# Patient Record
Sex: FEMALE | Race: WHITE | NOT HISPANIC OR LATINO | Employment: FULL TIME | URBAN - METROPOLITAN AREA
[De-identification: names, ages, dates, MRNs, and addresses within clinical notes are randomized per-mention and may not be internally consistent; named-entity substitution may affect disease eponyms.]

---

## 2017-02-06 DIAGNOSIS — M06.9 RHEUMATOID ARTHRITIS INVOLVING MULTIPLE SITES, UNSPECIFIED RHEUMATOID FACTOR PRESENCE: ICD-10-CM

## 2017-03-11 DIAGNOSIS — E03.9 HYPOTHYROIDISM, UNSPECIFIED TYPE: ICD-10-CM

## 2017-03-13 RX ORDER — LEVOTHYROXINE SODIUM 112 UG/1
TABLET ORAL
Qty: 90 TAB | Refills: 3 | Status: SHIPPED | OUTPATIENT
Start: 2017-03-13 | End: 2017-10-05

## 2017-04-10 RX ORDER — RANITIDINE 150 MG/1
TABLET ORAL
Qty: 180 TAB | Refills: 0 | Status: SHIPPED | OUTPATIENT
Start: 2017-04-10 | End: 2017-07-07 | Stop reason: SDUPTHER

## 2017-04-11 ENCOUNTER — PATIENT MESSAGE (OUTPATIENT)
Dept: MEDICAL GROUP | Age: 57
End: 2017-04-11

## 2017-07-10 RX ORDER — RANITIDINE 150 MG/1
TABLET ORAL
Qty: 180 TAB | Refills: 0 | Status: SHIPPED | OUTPATIENT
Start: 2017-07-10 | End: 2017-10-17 | Stop reason: SDUPTHER

## 2017-07-24 ENCOUNTER — TELEPHONE (OUTPATIENT)
Dept: RHEUMATOLOGY | Facility: PHYSICIAN GROUP | Age: 57
End: 2017-07-24

## 2017-07-24 DIAGNOSIS — M06.9 RHEUMATOID ARTHRITIS INVOLVING MULTIPLE SITES, UNSPECIFIED RHEUMATOID FACTOR PRESENCE: ICD-10-CM

## 2017-07-24 NOTE — TELEPHONE ENCOUNTER
Please notify patient we have refilled her hydroxychloroquine prescription for one month, patient needs to have labs done for more refills after this, last labs in November 2016 and labs are done every 6 months for this particular medication.  Labs have been ordered in the computer  Patient also canceled her appointment December 13, 2016, patient last seen September 16, 2016  For us  to continue refilling her medication we need to see patient before September 16, 2017

## 2017-07-24 NOTE — TELEPHONE ENCOUNTER
Returned pts call however got a voice message again so I left a detailed message that she needs an appt and lab work done. I mailed pts lab orders to her home address.   Thank you

## 2017-07-31 ENCOUNTER — OFFICE VISIT (OUTPATIENT)
Dept: RHEUMATOLOGY | Facility: PHYSICIAN GROUP | Age: 57
End: 2017-07-31

## 2017-07-31 VITALS
BODY MASS INDEX: 30.99 KG/M2 | SYSTOLIC BLOOD PRESSURE: 120 MMHG | DIASTOLIC BLOOD PRESSURE: 60 MMHG | HEIGHT: 65 IN | OXYGEN SATURATION: 94 % | WEIGHT: 186 LBS | RESPIRATION RATE: 15 BRPM | TEMPERATURE: 99.3 F | HEART RATE: 76 BPM

## 2017-07-31 DIAGNOSIS — Z72.0 TOBACCO ABUSE: ICD-10-CM

## 2017-07-31 DIAGNOSIS — M06.9 RHEUMATOID ARTHRITIS, INVOLVING UNSPECIFIED SITE, UNSPECIFIED RHEUMATOID FACTOR PRESENCE: ICD-10-CM

## 2017-07-31 DIAGNOSIS — E03.9 HYPOTHYROIDISM, UNSPECIFIED TYPE: ICD-10-CM

## 2017-07-31 PROCEDURE — 99214 OFFICE O/P EST MOD 30 MIN: CPT | Performed by: INTERNAL MEDICINE

## 2017-07-31 RX ORDER — LEFLUNOMIDE 20 MG/1
TABLET ORAL
Qty: 90 TAB | Refills: 0 | Status: SHIPPED | OUTPATIENT
Start: 2017-07-31 | End: 2017-10-29 | Stop reason: SDUPTHER

## 2017-07-31 NOTE — MR AVS SNAPSHOT
"        Clifford Murphy   2017 11:30 AM   Office Visit   MRN: [id number]    Department:  Rheumatology Med [facility]   Dept Phone:  [phone]    Description:  Female : 1960   Provider:  Elizabeth Gómez M.D.           Reason for Visit     Follow-Up           Allergies as of 2017     Allergen Noted Reactions    Aspirin 2013       Codeine 2010       Morphine 2011       Sulfa Drugs 2010         You were diagnosed with     Rheumatoid arthritis, involving unspecified site, unspecified rheumatoid factor presence (CMS-HCC)   [7911333]       Hypothyroidism, unspecified type   [0960578]       Tobacco abuse   [442763]         Vital Signs     Blood Pressure Pulse Temperature Respirations Height Weight    120/60 mmHg 76 37.4 °C (99.3 °F) 15 1.651 m (5' 5\") 84.369 kg (186 lb)    Body Mass Index Oxygen Saturation Last Menstrual Period Breastfeeding? Smoking Status       30.95 kg/m2 94% 2008 No Current Every Day Smoker       Basic Information     Date Of Birth Sex Race Ethnicity Preferred Language    1960 Female White Non- English      Your appointments     Oct 31, 2017  9:15 AM   Follow Up Visit with Elizabeth Gómez M.D.   [facility] (--)    [address] [place] [id number] [phone]           You will be receiving a confirmation call a few days before your appointment from our automated call confirmation system.              Problem List              ICD-10-CM Priority Class Noted - Resolved    Macrocytosis without anemia D75.89   2011 - Present    Hypothyroid E03.9   2011 - Present    RA (rheumatoid arthritis) (CMS-HCC) M06.9   2011 - Present    Preventative health care Z00.00   9/15/2011 - Present    Menopause Z78.0   9/15/2011 - Present    Vitamin D insufficiency E55.9   5/15/2012 - Present    Post-cholecystectomy syndrome K91.5   2012 - Present    High risk medications (not anticoagulants) long-term use " Z79.899   8/29/2012 - Present    Jaw pain R68.84   8/4/2013 - Present    Genital herpes in women (Chronic) A60.09   2/11/2014 - Present    Hypertriglyceridemia E78.1   9/29/2014 - Present    Acute allergic otitis media of right ear H65.111   6/17/2015 - Present    Seasonal allergies J30.2   6/17/2015 - Present    Tobacco abuse Z72.0   8/5/2015 - Present    Cough with sputum R05   12/28/2015 - Present    Diarrhea R19.7   12/28/2015 - Present      Health Maintenance        Date Due Completion Dates    IMM DTaP/Tdap/Td Vaccine (1 - Tdap) 1/19/1977 ---    IMM PNEUMOCOCCAL 19-64 (ADULT) MEDIUM RISK SERIES (1 of 1 - PPSV23) 1/19/1977 ---    COLON CANCER SCREENING ANNUAL FIT 12/1/2015 12/1/2014, 10/27/2013    MAMMOGRAM 7/25/2017 8/25/2015, 11/12/2013    IMM INFLUENZA (1) 9/1/2017 9/29/2014, 10/22/2013    PAP SMEAR 9/23/2018 9/23/2015, 5/23/2012            Current Immunizations     Hepatitis B Vaccine Non-Recombivax (Ped/Adol) 7/29/2013, 2/25/2013, 1/23/2013    Hepatitis B Vaccine Recombivax (Adol/Adult) 7/29/2013, 2/25/2013, 1/23/2013    Influenza TIV (IM) 10/22/2013    Influenza Vaccine Quad Inj (Pf) 9/29/2014      Below and/or attached are the medications your provider expects you to take. Review all of your home medications and newly ordered medications with your provider and/or pharmacist. Follow medication instructions as directed by your provider and/or pharmacist. Please keep your medication list with you and share with your provider. Update the information when medications are discontinued, doses are changed, or new medications (including over-the-counter products) are added; and carry medication information at all times in the event of emergency situations     Allergies:  ASPIRIN - (reactions not documented)     CODEINE - (reactions not documented)     MORPHINE - (reactions not documented)     SULFA DRUGS - (reactions not documented)               Medications  Valid as of: July 31, 2017 - 12:18 PM    Generic Name  Brand Name Tablet Size Instructions for use    Acyclovir (Cap) ZOVIRAX 200 MG TAKE ONE CAPSULE BY MOUTH TWICE A DAY        Albuterol Sulfate (Aero Soln) albuterol 108 (90 BASE) MCG/ACT Inhale 2 Puffs by mouth every 6 hours as needed for Shortness of Breath.        Cetirizine HCl (Tab) ZYRTEC 10 MG Take 10 mg by mouth every day.        Cholecalciferol (Cap) Vitamin D3 2000 UNIT Take 1 Cap by mouth every day.        Cholestyramine (Pack) QUESTRAN 4 G Take 4 g by mouth 1 time daily as needed.        Clobetasol Propionate (Ointment) TEMOVATE 0.05 % Use thin layer bid x 1-2 weeks then qhs x1-2 weeks then every other night x 1-2 weeks then as needed for itch or rash.        Cyanocobalamin (Lozenge) Vitamin B 12 100 MCG Take  by mouth.        Estradiol (Cream) ESTRACE 0.1 MG/GM Insert 1 g in vagina every day.        Fluticasone Propionate (Suspension) FLONASE 50 MCG/ACT Spray 1 Spray in nose every day.        Folic Acid (Tab) FOLVITE 1 MG Take 1 mg by mouth every day.        Hydroxychloroquine Sulfate (Tab) PLAQUENIL 200 MG TAKE 1 TABLET BY MOUTH TWICE A DAY        Hydroxychloroquine Sulfate (Tab) PLAQUENIL 200 MG TAKE 1 TABLET BY MOUTH TWICE A DAY        Hydroxychloroquine Sulfate (Tab) PLAQUENIL 200 MG TAKE 1 TAB BY MOUTH TWICE A DAY.        Leflunomide (Tab) ARAVA 20 MG 1 tab po qday        Levothyroxine Sodium (Tab) SYNTHROID 112 MCG TAKE 1 TAB BY MOUTH EVERY MORNING ON AN EMPTY STOMACH.        Naproxen Sodium (Cap) Naproxen Sodium 220 MG Take  by mouth.        Pneumococcal Vac Polyvalent (Injection) PNEUMOVAX-23 25 MCG/0.5ML 0.5 mL by Intramuscular route Once PRN for up to 1 dose.        PredniSONE (Tab) DELTASONE 5 MG 4 tabs po qam for 3 days then 3 tabs po qam for 3 days then 2 tabs po qam for 3 days then 1 tab po qam for 3 days then stop        RaNITidine HCl (Tab) ZANTAC 150 MG TAKE 1 TAB BY MOUTH 2 TIMES A DAY.        Tetanus-Diphth-Acell Pertussis (Suspension) BOOSTRIX (Ages 7 & Older) 5-2.5-18.5 LF-MCG/0.5 0.5  mL by Intramuscular route Once PRN for up to 1 dose. May use Adacel or Boostrix.        .                 Medicines prescribed today were sent to:     [id number] [place] - [address]    [address] [place] [address]    Phone: [phone] Fax: [phone]    Open 24 Hours?: No    [id number] [address] [facility]    [address] [facility] [address]    Phone: [phone] Fax: [phone]    Open 24 Hours?: No      Medication refill instructions:       If your prescription bottle indicates you have medication refills left, it is not necessary to call your provider’s office. Please contact your pharmacy and they will refill your medication.    If your prescription bottle indicates you do not have any refills left, you may request refills at any time through one of the following ways: The online [employer] system (except Urgent Care), by calling your provider’s office, or by asking your pharmacy to contact your provider’s office with a refill request. Medication refills are processed only during regular business hours and may not be available until the next business day. Your provider may request additional information or to have a follow-up visit with you prior to refilling your medication.   *Please Note: Medication refills are assigned a new Rx number when refilled electronically. Your pharmacy may indicate that no refills were authorized even though a new prescription for the same medication is available at the pharmacy. Please request the medicine by name with the pharmacy before contacting your provider for a refill.        Your To Do List     Future Labs/Procedures Complete By Expires    CBC WITH DIFFERENTIAL  As directed 7/31/2018    COMP METABOLIC PANEL  As directed 7/31/2018    FREE THYROXINE  As directed 7/31/2018    TSH  As directed 7/31/2018    WESTERGREN SED RATE  As directed 7/31/2018         [employer] Access Code: Activation code not generated  Current [employer] Status:  Active          Quit Tobacco Information     Do you want to quit using tobacco?    Quitting tobacco decreases risks of cancer, heart and lung disease, increases life expectancy, improves sense of taste and smell, and increases spending money, among other benefits.    If you are thinking about quitting, we can help.  • [facility] Quit Tobacco Program: [phone]  o Program occurs weekly for four weeks and includes pharmacist consultation on products to support quitting smoking or chewing tobacco. A provider referral is needed for pharmacist consultation.  • Tobacco Users Help Hotline: 0-509-QUIT-NOW (604-0542) or https://[place].quitlogix.org/  o Free, confidential telephone and online coaching for [place] residents. Sessions are designed on a schedule that is convenient for you. Eligible clients receive free nicotine replacement therapy.  • Nationally: www.smokefree.gov  o Information and professional assistance to support both immediate and long-term needs as you become, and remain, a non-smoker. Smokefree.gov allows you to choose the help that best fits your needs.

## 2017-07-31 NOTE — PROGRESS NOTES
Chief Complaint- joint pain    Subjective:   Clifford Murphy is a 57 y.o. female here today for follow up of rheumatological issues    Patient is Here to followup her rheumatoid arthritis currently on Plaquenil 200 mg by mouth twice a day, patient had been on leflunomide but now has been off for 2 months because of insurance reasons and states she can really feel that she is off the medication. Patient states that leflunamide really does help her joints. Patient will like to resume the leflunomide. Patient  Swims  3 times a week when she is feeling well,  Patient continues to smoke in spite of knowledge that tobacco abuse will exacerbate her rheumatoid arthritis, and states her thyroid functions are under good control as far she knows. Since last visit patient has retired and is back in [place] rather than doing all the traveling she's been doing with her . Patient has less stress now.      S/p MTX-N/V  S/p Enbrel-sinus infections  S/p Humira-sinus infections and injection site skin breakdown  S/p Actemra-exacerbated pneumonia  S/p sulfasalazine-contradicted, pt with sulfa allergy    CCP neg 9/2015  Quantiferon Gold neg 9/2015  Hep B neg 9/2015  Uric acid 4.6 9/2015  Last optho eval 6/2015  RF 18 6/2009  CHIQUITA neg 6/2009   Hand x-rays 5/2015-no pathology  Feet X-rays 5/2015-indicates DJD, no erosions      Current medicines (including changes today)  Current Outpatient Prescriptions   Medication Sig Dispense Refill   • Cyanocobalamin (VITAMIN B 12) 100 MCG Lozenge Take  by mouth.     • leflunomide (ARAVA) 20 MG Tab 1 tab po qday 90 Tab 0   • ranitidine (ZANTAC) 150 MG Tab TAKE 1 TAB BY MOUTH 2 TIMES A DAY. 180 Tab 0   • levothyroxine (SYNTHROID) 112 MCG Tab TAKE 1 TAB BY MOUTH EVERY MORNING ON AN EMPTY STOMACH. 90 Tab 3   • Naproxen Sodium 220 MG Cap Take  by mouth.     • hydroxychloroquine (PLAQUENIL) 200 MG Tab TAKE 1 TABLET BY MOUTH TWICE A DAY 60 Tab 2   • fluticasone (FLONASE) 50 MCG/ACT nasal spray Spray 1  Spray in nose every day.     • cetirizine (ZYRTEC) 10 MG TABS Take 10 mg by mouth every day.     • Cholecalciferol (VITAMIN D3) 2000 UNIT CAPS Take 1 Cap by mouth every day.     • folic acid (FOLVITE) 1 MG TABS Take 1 mg by mouth every day.     • hydroxychloroquine (PLAQUENIL) 200 MG Tab TAKE 1 TAB BY MOUTH TWICE A DAY. 60 Tab 0   • hydroxychloroquine (PLAQUENIL) 200 MG Tab TAKE 1 TABLET BY MOUTH TWICE A  Tab 1   • estradiol (ESTRACE) 0.1 MG/GM vaginal cream Insert 1 g in vagina every day. 42.5 g 3   • acyclovir (ZOVIRAX) 200 MG Cap TAKE ONE CAPSULE BY MOUTH TWICE A  Cap 1   • pneumococcal vaccine (PNEUMOVAX 23) 25 MCG/0.5ML Injection 0.5 mL by Intramuscular route Once PRN for up to 1 dose. 1 Vial 0   • tetanus-diphth-acell pertussis (BOOSTRIX, AGES 7 & OLDER,) 5-2.5-18.5 LF-MCG/0.5 Suspension 0.5 mL by Intramuscular route Once PRN for up to 1 dose. May use Adacel or Boostrix. 0.5 mL 0   • predniSONE (DELTASONE) 5 MG Tab 4 tabs po qam for 3 days then 3 tabs po qam for 3 days then 2 tabs po qam for 3 days then 1 tab po qam for 3 days then stop 30 Tab 0   • albuterol (VENTOLIN OR PROVENTIL) 108 (90 BASE) MCG/ACT Aero Soln inhalation aerosol Inhale 2 Puffs by mouth every 6 hours as needed for Shortness of Breath. 8.5 g 3   • clobetasol (TEMOVATE) 0.05 % Ointment Use thin layer bid x 1-2 weeks then qhs x1-2 weeks then every other night x 1-2 weeks then as needed for itch or rash. 1 Tube 0   • cholestyramine (QUESTRAN) 4 G packet Take 4 g by mouth 1 time daily as needed. 30 Each 5     No current facility-administered medications for this visit.     She  has a past medical history of Rheumatoid arthritis (CMS-[facility]); RA (rheumatoid arthritis) (CMS-HCC); Kidney stones; Tobacco abuse, episodic (9/14/2011); Macrocytosis without anemia (med effect); Hypothyroid (9/14/2011); Menopause (9/15/2011); Vitamin d deficiency (5/15/2012); Post-cholecystectomy syndrome (5/23/2012); High risk medications (not anticoagulants)  "long-term use (8/29/2012); and Genital herpes in women (2/11/2014).    ROS   Other than what is mentioned in HPI or physical exam, there is no history of headaches, double vision or blurred vision. No temporal tenderness or jaw claudication. No history of cataracts or glaucoma. No trouble swallowing difficulties or sore throats. No history of thyroid disease. No chest complaints including chest pain, cough or sputum production. No shortness of breath. No GI complaints including nausea, vomiting, change in bowel habits, or past peptic ulcer disease. No history of blood in the stools. No urinary complaints including dysuria or frequency. No history of rash including psoriasis. No history of alopecia, photosensitivity, oral ulcerations, Raynaud's phenomena, or swollen joints. No history of gout. No back complaints. No history of low blood counts.       Objective:     Blood pressure 120/60, pulse 76, temperature 37.4 °C (99.3 °F), resp. rate 15, height 1.651 m (5' 5\"), weight 84.369 kg (186 lb), last menstrual period 01/19/2008, SpO2 94 %, not currently breastfeeding. Body mass index is 30.95 kg/(m^2).   Physical Exam:  Constitution: Alert, oriented X 3 in no acute distress.Eye: contact is good, speech goal directed, affect calmHEENT: conjunctiva non-injected, sclera non-icteric, both TMs are pearly gray, no evidence of infection  Pinna normal. Oral: mucous membranes pink and moist with no lesions.Neck: No thyromegaly, trachea midline Lymph: No supraclavicular lymphadenopathy, no axillary lymphadenopathy, no cervical lymphadenopathyLungs: clear to auscultation bilaterally with good excursion, did not appreciate any wheezes or rhonchiCV: regular rate and rhythm.Abdomen: soft, nontender, No CVAT, no HSM, a bit overweightNeuro: Cranial nerves 2-12 are grossly intact, no foot drop, gait within normal limits, no antalgiaSkin: No discoid lesions, no petechial lesions, no malar rash, no vasculitic lesionsExt: No evidence of " swan-neck or boutonniere deformities, no sausage digits, patient does have tenderness to palpation along bilateral wrists, bilateral PIP joints, with right worse than left,, no flexure contractures, no nodules no tophi, mild tenderness to palpation along the MCP joints but no victorina synovitis noted. Shoulders with full abduction but positive mild painful arc, no effusions in knee, well-healed scar on that left knee secondary to surgical intervention      Lab Results   Component Value Date/Time    SODIUM 144 11/23/2016 09:46 AM    POTASSIUM 4.2 11/23/2016 09:46 AM    CHLORIDE 106 11/23/2016 09:46 AM    CO2 25 11/23/2016 09:46 AM    GLUCOSE 76 11/23/2016 09:46 AM    BUN 23 11/23/2016 09:46 AM    CREATININE 0.76 11/23/2016 09:46 AM    BUN-CREATININE RATIO 30* 11/23/2016 09:46 AM      Lab Results   Component Value Date/Time    WBC 5.1 03/21/2016 09:46 AM    RBC 4.43 03/21/2016 09:46 AM    HEMOGLOBIN 14.6 03/21/2016 09:46 AM    HEMATOCRIT 44.1 03/21/2016 09:46 AM    * 03/21/2016 09:46 AM    MCH 33.0 03/21/2016 09:46 AM    MCHC 33.1 03/21/2016 09:46 AM    MPV 7.2 08/04/2013 02:30 PM    NEUTROPHILS-POLYS 61 03/21/2016 09:46 AM    LYMPHOCYTES 29 03/21/2016 09:46 AM    MONOCYTES 9 03/21/2016 09:46 AM    EOSINOPHILS 1 03/21/2016 09:46 AM    BASOPHILS 0 03/21/2016 09:46 AM      Lab Results   Component Value Date/Time    CALCIUM 9.0 11/23/2016 09:46 AM    AST(SGOT) 19 11/23/2016 09:46 AM    ALT(SGPT) 13 11/23/2016 09:46 AM    ALKALINE PHOSPHATASE 77 11/23/2016 09:46 AM    TOTAL BILIRUBIN 0.3 11/23/2016 09:46 AM    ALBUMIN 4.0 11/23/2016 09:46 AM    TOTAL PROTEIN 6.6 11/23/2016 09:46 AM     Lab Results   Component Value Date/Time    RHEUMATOID FACTOR -NEPH- 18* 06/15/2009 09:27 AM    ANTINUCLEAR ANTIBODY None Detected 06/15/2009 09:27 AM     Lab Results   Component Value Date/Time    SED RATE BETTEERGREN 7 03/21/2016 09:46 AM     Lab Results   Component Value Date/Time    GLYCOHEMOGLOBIN 5.3 11/23/2016 09:46 AM     Results  for orders placed in visit on 05/12/15   DX-JOINT SURVEY-HANDS SINGLE VIEW    Impression Negative joint survey view of the hands.     Results for orders placed in visit on 05/12/15   DX-JOINT SURVEY-FEET SINGLE VIEW    Impression 1.  Moderate bilateral hallux valgus deformity.    2.  No evidence of erosive arthritic change.     Results for orders placed in visit on 09/29/14   DX-FOOT-COMPLETE 3+    Impression Subacute nondisplaced fifth proximal phalanx extra-articular fracture     Results for orders placed during the hospital encounter of 07/11/09   DX-PELVIS-1 OR 2 VIEWS    Impression IMPRESSION:     1. VERY MILD DEGENERATIVE CHANGE OF THE HIPS, SLIGHTLY WORSE ON THE RIGHT   THAN THE LEFT.  MINIMAL SCLEROSIS IS SEEN AT THE LATERAL ASPECT OF THE   RIGHT FEMORAL NECK, WHICH IS OF DOUBTFUL CLINICAL SIGNIFICANCE.      2. NO PELVIS FRACTURE.    MM/srd     Read By MARICARMEN ANSARI MD on Jul 13 2009  8:38AM  : SRP Transcription Date: Jul 13 2009 10:42AM  THIS DOCUMENT HAS BEEN ELECTRONICALLY SIGNED BY: MARICARMEN ANSARI MD on Jul 13 2009  4:05PM        Results for orders placed during the hospital encounter of 07/11/09   DX-PELVIS-1 OR 2 VIEWS    Impression IMPRESSION:     1. VERY MILD DEGENERATIVE CHANGE OF THE HIPS, SLIGHTLY WORSE ON THE RIGHT   THAN THE LEFT.  MINIMAL SCLEROSIS IS SEEN AT THE LATERAL ASPECT OF THE   RIGHT FEMORAL NECK, WHICH IS OF DOUBTFUL CLINICAL SIGNIFICANCE.      2. NO PELVIS FRACTURE.    MM/srd     Read By MARICARMEN ANSARI MD on Jul 13 2009  8:38AM  : SRP Transcription Date: Jul 13 2009 10:42AM  THIS DOCUMENT HAS BEEN ELECTRONICALLY SIGNED BY: MARICARMEN ANSARI MD on Jul 13 2009  4:05PM        Results for orders placed during the hospital encounter of 10/10/09   DX-KNEE COMPLETE 4+    Impression IMPRESSION:     3. MODERATE THREE COMPARTMENT ARTHROSIS.     4. QUESTION SMALL EFFUSION.     MPW/vadim         Read By JASSI GREEN MD on Oct 12 2009  9:01AM  :  ZPT Transcription Date: Oct 12 2009  2:56PM  THIS DOCUMENT HAS BEEN ELECTRONICALLY SIGNED BY: JASSI GREEN MD on Oct   12 2009  3:47PM        Results for orders placed in visit on 09/02/15   DX-KNEE 2- LEFT    Impression 1. No evidence of acute fracture or dislocation.    2. Increased sclerosis with punctate hyperdensities in the proximal tibial metadiaphysis anteriorly may be related to prior surgery.    3. Mild left knee osteoarthritis.     Results for orders placed during the hospital encounter of 07/10/12   DX-HAND 3+    Impression No new radiographic evidence of erosive arthropathy    Stable second DIP subchondral cyst versus erosion    Stable mild first MCP osteoarthritis, periarticular calcification and osteopenia       Assessment and Plan:     1. Rheumatoid arthritis, involving unspecified site, unspecified rheumatoid factor presence (CMS-[facility])  Continue Plaquenil at 200 mg by mouth twice a day and restart leflunomide at 20 mg by mouth daily, patient will be due for labs in October 2017, labs ordered for patient  Next visit check status of ophthalmology evaluation.  - leflunomide (ARAVA) 20 MG Tab; 1 tab po qday  Dispense: 90 Tab; Refill: 0  - TSH; Future  - FREE THYROXINE; Future  - CBC WITH DIFFERENTIAL; Future  - COMP METABOLIC PANEL; Future  - WESTERGREN SED RATE; Future    2. Hypothyroidism, unspecified type  Patient thinks that her thyroid functions are doing well, patient does take thyroid medication, today we'll check thyroid functions and defer to PCP if abnormal  - leflunomide (ARAVA) 20 MG Tab; 1 tab po qday  Dispense: 90 Tab; Refill: 0  - TSH; Future  - FREE THYROXINE; Future    3. Tobacco abuse  Tobacco abuse is known to exacerbate rheumatoid arthritis, 20 minute discussion with patient regarding the need to stop tobacco abuse, patient states understanding  - leflunomide (ARAVA) 20 MG Tab; 1 tab po qday  Dispense: 90 Tab; Refill: 0  - TSH; Future  - FREE THYROXINE; Future    Followup: Return in  about 3 months (around 10/31/2017). or sooner prn    Patient was seen 30 minutes face-to-face of which more than 50% of the time was spent counseling the patient (excluding time for procedures)  regarding  rheumatological condition and care. Therapy was discussed in detail.    Please note that this dictation was created using voice recognition software. I have made every reasonable attempt to correct obvious errors, but I expect that there are errors of grammar and possibly content that I did not discover before finalizing the note.

## 2017-07-31 NOTE — Clinical Note
[facility]   [address], [address]  [place] [address]  Phone: [phone]  Fax: [phone]              Encounter Date: 7/31/2017    Dear Dr. Whitney ref. provider found,    It was a pleasure seeing your patient, Clifford Murphy, on 7/31/2017. Diagnoses of Rheumatoid arthritis, involving unspecified site, unspecified rheumatoid factor presence (CMS-HCC), Hypothyroidism, unspecified type, and Tobacco abuse were pertinent to this visit.     Please find attached progress note which includes the history I obtained from Ms. Murphy, my physical examination findings, my impression and recommendations.      Once again, it was a pleasure participating in your patient's care.  Please feel free to contact me if you have any questions or if I can be of any further assistance to your patients.      Sincerely,    Elizabeth Gómez M.D.  Electronically Signed          PROGRESS NOTE:  No notes on file

## 2017-09-05 DIAGNOSIS — Z79.899 LONG-TERM USE OF PLAQUENIL: ICD-10-CM

## 2017-09-05 DIAGNOSIS — M19.90 INFLAMMATORY ARTHRITIS: ICD-10-CM

## 2017-10-03 ENCOUNTER — HOSPITAL ENCOUNTER (OUTPATIENT)
Dept: LAB | Facility: MEDICAL CENTER | Age: 57
End: 2017-10-03
Attending: INTERNAL MEDICINE
Payer: COMMERCIAL

## 2017-10-03 DIAGNOSIS — M06.9 RHEUMATOID ARTHRITIS INVOLVING MULTIPLE SITES, UNSPECIFIED RHEUMATOID FACTOR PRESENCE: ICD-10-CM

## 2017-10-03 DIAGNOSIS — E03.9 HYPOTHYROIDISM, UNSPECIFIED TYPE: ICD-10-CM

## 2017-10-03 DIAGNOSIS — M06.9 RHEUMATOID ARTHRITIS, INVOLVING UNSPECIFIED SITE, UNSPECIFIED RHEUMATOID FACTOR PRESENCE: ICD-10-CM

## 2017-10-03 DIAGNOSIS — Z72.0 TOBACCO ABUSE: ICD-10-CM

## 2017-10-03 LAB
ALBUMIN SERPL BCP-MCNC: 3.8 G/DL (ref 3.2–4.9)
ALBUMIN/GLOB SERPL: 1.3 G/DL
ALP SERPL-CCNC: 69 U/L (ref 30–99)
ALT SERPL-CCNC: 13 U/L (ref 2–50)
ANION GAP SERPL CALC-SCNC: 6 MMOL/L (ref 0–11.9)
AST SERPL-CCNC: 20 U/L (ref 12–45)
BASOPHILS # BLD AUTO: 1 % (ref 0–1.8)
BASOPHILS # BLD: 0.05 K/UL (ref 0–0.12)
BILIRUB SERPL-MCNC: 0.6 MG/DL (ref 0.1–1.5)
BUN SERPL-MCNC: 15 MG/DL (ref 8–22)
CALCIUM SERPL-MCNC: 9.3 MG/DL (ref 8.5–10.5)
CHLORIDE SERPL-SCNC: 108 MMOL/L (ref 96–112)
CO2 SERPL-SCNC: 25 MMOL/L (ref 20–33)
CREAT SERPL-MCNC: 0.69 MG/DL (ref 0.5–1.4)
EOSINOPHIL # BLD AUTO: 0 K/UL (ref 0–0.51)
EOSINOPHIL NFR BLD: 0 % (ref 0–6.9)
ERYTHROCYTE [DISTWIDTH] IN BLOOD BY AUTOMATED COUNT: 47.8 FL (ref 35.9–50)
ERYTHROCYTE [SEDIMENTATION RATE] IN BLOOD BY WESTERGREN METHOD: 13 MM/HOUR (ref 0–30)
GFR SERPL CREATININE-BSD FRML MDRD: >60 ML/MIN/1.73 M 2
GLOBULIN SER CALC-MCNC: 3 G/DL (ref 1.9–3.5)
GLUCOSE SERPL-MCNC: 73 MG/DL (ref 65–99)
HCT VFR BLD AUTO: 48.4 % (ref 37–47)
HGB BLD-MCNC: 15.4 G/DL (ref 12–16)
IMM GRANULOCYTES # BLD AUTO: 0.01 K/UL (ref 0–0.11)
IMM GRANULOCYTES NFR BLD AUTO: 0.2 % (ref 0–0.9)
LYMPHOCYTES # BLD AUTO: 1.58 K/UL (ref 1–4.8)
LYMPHOCYTES NFR BLD: 32.9 % (ref 22–41)
MCH RBC QN AUTO: 31.2 PG (ref 27–33)
MCHC RBC AUTO-ENTMCNC: 31.8 G/DL (ref 33.6–35)
MCV RBC AUTO: 98 FL (ref 81.4–97.8)
MONOCYTES # BLD AUTO: 0.41 K/UL (ref 0–0.85)
MONOCYTES NFR BLD AUTO: 8.5 % (ref 0–13.4)
NEUTROPHILS # BLD AUTO: 2.75 K/UL (ref 2–7.15)
NEUTROPHILS NFR BLD: 57.4 % (ref 44–72)
NRBC # BLD AUTO: 0 K/UL
NRBC BLD AUTO-RTO: 0 /100 WBC
PLATELET # BLD AUTO: 141 K/UL (ref 164–446)
PMV BLD AUTO: 12.6 FL (ref 9–12.9)
POTASSIUM SERPL-SCNC: 3.8 MMOL/L (ref 3.6–5.5)
PROT SERPL-MCNC: 6.8 G/DL (ref 6–8.2)
RBC # BLD AUTO: 4.94 M/UL (ref 4.2–5.4)
SODIUM SERPL-SCNC: 139 MMOL/L (ref 135–145)
T4 FREE SERPL-MCNC: 1.06 NG/DL (ref 0.53–1.43)
TSH SERPL DL<=0.005 MIU/L-ACNC: 6.77 UIU/ML (ref 0.3–3.7)
WBC # BLD AUTO: 4.8 K/UL (ref 4.8–10.8)

## 2017-10-03 PROCEDURE — 84443 ASSAY THYROID STIM HORMONE: CPT

## 2017-10-03 PROCEDURE — 80053 COMPREHEN METABOLIC PANEL: CPT

## 2017-10-03 PROCEDURE — 36415 COLL VENOUS BLD VENIPUNCTURE: CPT

## 2017-10-03 PROCEDURE — 85025 COMPLETE CBC W/AUTO DIFF WBC: CPT

## 2017-10-03 PROCEDURE — 84439 ASSAY OF FREE THYROXINE: CPT

## 2017-10-03 PROCEDURE — 85652 RBC SED RATE AUTOMATED: CPT

## 2017-10-03 PROCEDURE — 82955 ASSAY OF G6PD ENZYME: CPT

## 2017-10-05 ENCOUNTER — OFFICE VISIT (OUTPATIENT)
Dept: MEDICAL GROUP | Facility: MEDICAL CENTER | Age: 57
End: 2017-10-05
Payer: COMMERCIAL

## 2017-10-05 VITALS
RESPIRATION RATE: 16 BRPM | OXYGEN SATURATION: 96 % | HEART RATE: 88 BPM | DIASTOLIC BLOOD PRESSURE: 76 MMHG | WEIGHT: 186 LBS | TEMPERATURE: 97.6 F | BODY MASS INDEX: 31.76 KG/M2 | HEIGHT: 64 IN | SYSTOLIC BLOOD PRESSURE: 122 MMHG

## 2017-10-05 DIAGNOSIS — Z12.11 SCREENING FOR COLON CANCER: ICD-10-CM

## 2017-10-05 DIAGNOSIS — E03.9 ACQUIRED HYPOTHYROIDISM: ICD-10-CM

## 2017-10-05 DIAGNOSIS — Z12.31 ENCOUNTER FOR SCREENING MAMMOGRAM FOR BREAST CANCER: ICD-10-CM

## 2017-10-05 DIAGNOSIS — M06.9 RHEUMATOID ARTHRITIS INVOLVING MULTIPLE SITES, UNSPECIFIED RHEUMATOID FACTOR PRESENCE: ICD-10-CM

## 2017-10-05 DIAGNOSIS — E78.1 HYPERTRIGLYCERIDEMIA: ICD-10-CM

## 2017-10-05 DIAGNOSIS — Z23 NEED FOR VACCINATION: ICD-10-CM

## 2017-10-05 DIAGNOSIS — D69.6 THROMBOCYTOPENIA (HCC): ICD-10-CM

## 2017-10-05 LAB — G6PD RBC-CCNC: 11.5 U/G HB (ref 9.9–16.6)

## 2017-10-05 PROCEDURE — 90472 IMMUNIZATION ADMIN EACH ADD: CPT | Performed by: FAMILY MEDICINE

## 2017-10-05 PROCEDURE — 90732 PPSV23 VACC 2 YRS+ SUBQ/IM: CPT | Performed by: FAMILY MEDICINE

## 2017-10-05 PROCEDURE — 99214 OFFICE O/P EST MOD 30 MIN: CPT | Mod: 25 | Performed by: FAMILY MEDICINE

## 2017-10-05 PROCEDURE — 99999 PR NO CHARGE: CPT | Performed by: FAMILY MEDICINE

## 2017-10-05 PROCEDURE — 90686 IIV4 VACC NO PRSV 0.5 ML IM: CPT | Performed by: FAMILY MEDICINE

## 2017-10-05 PROCEDURE — 90471 IMMUNIZATION ADMIN: CPT | Performed by: FAMILY MEDICINE

## 2017-10-05 RX ORDER — LEVOTHYROXINE SODIUM 0.12 MG/1
125 TABLET ORAL
Qty: 90 TAB | Refills: 1 | Status: SHIPPED | OUTPATIENT
Start: 2017-10-05 | End: 2018-04-27 | Stop reason: SDUPTHER

## 2017-10-05 ASSESSMENT — PATIENT HEALTH QUESTIONNAIRE - PHQ9: CLINICAL INTERPRETATION OF PHQ2 SCORE: 0

## 2017-10-05 NOTE — ASSESSMENT & PLAN NOTE
Very fatigued, brittle hair and nails, cold intoleterance. Currently taking levothyroxine 112 mcg daily as prescribed.  Denies palpitations or changes in bowel habits.  She has recent labs that she would like to review.

## 2017-10-06 NOTE — ASSESSMENT & PLAN NOTE
Patient has had low platelet count is being monitored by the rheumatologist. She denies any excess bruising, nosebleeds or black or bloody stools.

## 2017-10-06 NOTE — ASSESSMENT & PLAN NOTE
Patient has a history of rheumatoid arthritis. She sees a rheumatologist regularly. She has not been able to tolerate the immune modulators and is currently taking Plaquenil and leflunomide. This seems to be working fairly well.

## 2017-10-06 NOTE — PROGRESS NOTES
Chief Complaint   Patient presents with   • Establish Care         Clifford Murphy is a 57 y.o. female here to establish care and for evaluation and management of:        HPI:    Acquired hypothyroidism  Very fatigued, brittle hair and nails, cold intoleterance. Currently taking levothyroxine 112 mcg daily as prescribed.  Denies palpitations or changes in bowel habits.  She has recent labs that she would like to review.        RA (rheumatoid arthritis)  Patient has a history of rheumatoid arthritis. She sees a rheumatologist regularly. She has not been able to tolerate the immune modulators and is currently taking Plaquenil and leflunomide. This seems to be working fairly well.    Thrombocytopenia (CMS-HCC)  Patient has had low platelet count is being monitored by the rheumatologist. She denies any excess bruising, nosebleeds or black or bloody stools.      Allergies   Allergen Reactions   • Aspirin    • Codeine    • Morphine    • Sulfa Drugs        Current medicines (including changes today)  Current Outpatient Prescriptions   Medication Sig Dispense Refill   • levothyroxine (SYNTHROID) 125 MCG Tab Take 1 Tab by mouth Every morning on an empty stomach. 90 Tab 1   • Cyanocobalamin (VITAMIN B 12) 100 MCG Lozenge Take  by mouth.     • leflunomide (ARAVA) 20 MG Tab 1 tab po qday 90 Tab 0   • ranitidine (ZANTAC) 150 MG Tab TAKE 1 TAB BY MOUTH 2 TIMES A DAY. 180 Tab 0   • acyclovir (ZOVIRAX) 200 MG Cap TAKE ONE CAPSULE BY MOUTH TWICE A  Cap 1   • Naproxen Sodium 220 MG Cap Take  by mouth.     • hydroxychloroquine (PLAQUENIL) 200 MG Tab TAKE 1 TABLET BY MOUTH TWICE A DAY 60 Tab 2   • predniSONE (DELTASONE) 5 MG Tab 4 tabs po qam for 3 days then 3 tabs po qam for 3 days then 2 tabs po qam for 3 days then 1 tab po qam for 3 days then stop 30 Tab 0   • clobetasol (TEMOVATE) 0.05 % Ointment Use thin layer bid x 1-2 weeks then qhs x1-2 weeks then every other night x 1-2 weeks then as needed for itch or rash. 1 Tube 0    • fluticasone (FLONASE) 50 MCG/ACT nasal spray Spray 1 Spray in nose every day.     • cetirizine (ZYRTEC) 10 MG TABS Take 10 mg by mouth every day.     • Cholecalciferol (VITAMIN D3) 2000 UNIT CAPS Take 1 Cap by mouth every day.     • folic acid (FOLVITE) 1 MG TABS Take 1 mg by mouth every day.     • hydroxychloroquine (PLAQUENIL) 200 MG Tab TAKE 1 TAB BY MOUTH TWICE A DAY. 180 Tab 1   • hydroxychloroquine (PLAQUENIL) 200 MG Tab TAKE 1 TABLET BY MOUTH TWICE A  Tab 1   • estradiol (ESTRACE) 0.1 MG/GM vaginal cream Insert 1 g in vagina every day. 42.5 g 3   • pneumococcal vaccine (PNEUMOVAX 23) 25 MCG/0.5ML Injection 0.5 mL by Intramuscular route Once PRN for up to 1 dose. 1 Vial 0   • tetanus-diphth-acell pertussis (BOOSTRIX, AGES 7 & OLDER,) 5-2.5-18.5 LF-MCG/0.5 Suspension 0.5 mL by Intramuscular route Once PRN for up to 1 dose. May use Adacel or Boostrix. 0.5 mL 0   • albuterol (VENTOLIN OR PROVENTIL) 108 (90 BASE) MCG/ACT Aero Soln inhalation aerosol Inhale 2 Puffs by mouth every 6 hours as needed for Shortness of Breath. 8.5 g 3   • cholestyramine (QUESTRAN) 4 G packet Take 4 g by mouth 1 time daily as needed. 30 Each 5     No current facility-administered medications for this visit.      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  She  has a past surgical history that includes other orthopedic surgery; other abdominal surgery; appendectomy; tonsillectomy; meniscectomy (left knee); tmj reconstruction bilateral; and cholecystectomy (2006).  Social History   Substance Use Topics   • Smoking status: Current Every Day Smoker  "    Packs/day: 0.50     Years: 35.00     Types: Cigarettes   • Smokeless tobacco: Never Used   • Alcohol use 0.6 oz/week     1 Glasses of wine per week      Comment: occasional     Social History     Social History Narrative        Travels a lot    . 1 child     Family History   Problem Relation Age of Onset   • Lung Disease Mother      COPD   • Hyperlipidemia Mother    • Hypertension Mother    • Cancer Father      Prostate   • Diabetes Father    • Heart Disease Father    • Genetic Sister      Family Status   Relation Status   • Mother    • Father    • Sister Alive   • Sister Alive         ROS  No fever or chills.  No nausea or vomiting.  No chest pain or palpitations.  No cough or SOB.  No pain with urination or hematuria.  No black or bloody stools.  All other systems reviewed and are negative     Objective:     Blood pressure 122/76, pulse 88, temperature 36.4 °C (97.6 °F), resp. rate 16, height 1.626 m (5' 4\"), weight 84.4 kg (186 lb), last menstrual period 2010, SpO2 96 %. Body mass index is 31.93 kg/m².  Physical Exam:      Well developed, well nourished.  Alert, oriented in no acute distress.  Psych: Eye contact is good, speech goal directed, affect calm  Eyes: conjunctiva non-injected, sclera non-icteric.  Neck Supple.  No adenopathy or masses in the neck or supraclavicular regions. No thyromegaly  Lungs: clear to auscultation bilaterally with good excursion. No wheezes or rhonchi  CV: regular rate and rhythm. No murmur      Assessment and Plan:   The following treatment plan was discussed    1. Acquired hypothyroidism  Under replaced. Increase levothyroxine to 125 µg daily. Recheck labs in 3 months  - levothyroxine (SYNTHROID) 125 MCG Tab; Take 1 Tab by mouth Every morning on an empty stomach.  Dispense: 90 Tab; Refill: 1  - FREE THYROXINE; Future  - TSH; Future  - TRIIDOTHYRONINE; Future    2. Rheumatoid arthritis involving multiple sites, unspecified rheumatoid factor presence " (CMS-HCC)  Follow-up with rheumatology. Patient is considered high risk and should be given a pneumococcal.  - PNEUMOCOCCAL POLYSACCHARIDE VACCINE 23-VALENT =>1YO SQ/IM    3. Hypertriglyceridemia  Check lipid panel  - LIPID PROFILE; Future    4. Thrombocytopenia (CMS-HCC)  Recheck in 3 months. Call sooner if increased bruising or bleeding    5. Screening for colon cancer    - OCCULT BLOOD FECES IMMUNOASSAY; Future    6. Encounter for screening mammogram for breast cancer    - MA-SCREEN MAMMO W/CAD-BILAT; Future    7. Need for vaccination    - PNEUMOCOCCAL POLYSACCHARIDE VACCINE 23-VALENT =>1YO SQ/IM  - INFLUENZA VACCINE QUAD INJ >3Y(PF)      Records reviewed    Any change or worsening of signs or symptoms, patient encouraged to follow-up or report to the emergency room for further evaluation. Patient understands and agrees.    Followup: Return in about 3 months (around 1/5/2018), or if symptoms worsen or fail to improve, for for labs.

## 2017-10-23 ENCOUNTER — TELEPHONE (OUTPATIENT)
Dept: MEDICAL GROUP | Facility: MEDICAL CENTER | Age: 57
End: 2017-10-23

## 2017-10-23 DIAGNOSIS — M06.9 RHEUMATOID ARTHRITIS INVOLVING MULTIPLE SITES, UNSPECIFIED RHEUMATOID FACTOR PRESENCE: ICD-10-CM

## 2017-10-23 NOTE — TELEPHONE ENCOUNTER
1. Caller Name: [facility]                      Call Back Number: No call back necessary    2. Message: Pt has appt with [facility] next Tuesday and needs referral.     3. Patient approves office to leave a detailed voicemail/MyChart message: N\A

## 2017-10-31 ENCOUNTER — OFFICE VISIT (OUTPATIENT)
Dept: RHEUMATOLOGY | Facility: PHYSICIAN GROUP | Age: 57
End: 2017-10-31
Payer: COMMERCIAL

## 2017-10-31 VITALS
RESPIRATION RATE: 14 BRPM | BODY MASS INDEX: 31.93 KG/M2 | OXYGEN SATURATION: 98 % | TEMPERATURE: 97.9 F | HEART RATE: 82 BPM | WEIGHT: 186 LBS | SYSTOLIC BLOOD PRESSURE: 130 MMHG | DIASTOLIC BLOOD PRESSURE: 80 MMHG

## 2017-10-31 DIAGNOSIS — Z72.0 TOBACCO ABUSE: ICD-10-CM

## 2017-10-31 DIAGNOSIS — E03.9 HYPOTHYROIDISM, UNSPECIFIED TYPE: ICD-10-CM

## 2017-10-31 DIAGNOSIS — Z79.899 ENCOUNTER FOR MONITORING ARAVA THERAPY: ICD-10-CM

## 2017-10-31 DIAGNOSIS — Z51.81 ENCOUNTER FOR MONITORING ARAVA THERAPY: ICD-10-CM

## 2017-10-31 DIAGNOSIS — M81.0 SENILE OSTEOPOROSIS: ICD-10-CM

## 2017-10-31 DIAGNOSIS — M06.9 RHEUMATOID ARTHRITIS INVOLVING MULTIPLE SITES, UNSPECIFIED RHEUMATOID FACTOR PRESENCE: ICD-10-CM

## 2017-10-31 PROCEDURE — 99214 OFFICE O/P EST MOD 30 MIN: CPT | Performed by: INTERNAL MEDICINE

## 2017-10-31 RX ORDER — HYDROXYCHLOROQUINE SULFATE 200 MG/1
TABLET, FILM COATED ORAL
Qty: 180 TAB | Refills: 1 | Status: SHIPPED | OUTPATIENT
Start: 2017-10-31 | End: 2018-02-21

## 2017-10-31 NOTE — PROGRESS NOTES
Chief Complaint- joint pain    Subjective:   Clifford Murphy is a 57 y.o. female here today for follow up of rheumatological issues    Patient is Here to followup her rheumatoid arthritis currently on Plaquenil 200 mg by mouth twice a day, And leflunomide 20 mg by mouth daily with folic acid 1 mg by mouth daily. Patient states that leflunamide really does help her joints.  Patient  Swims  3 times a week when she is feeling well,  Patient continues to smoke in spite of knowledge that tobacco abuse will exacerbate her rheumatoid arthritis, and states her thyroid functions are under good control as far she knows. Since last visit patient has retired and is back in [place] rather than doing all the traveling she's been doing with her . Patient has less stress now. Of note recent sedimentation rate equals 13 October 2017        S/p MTX-N/V  S/p Enbrel-sinus infections  S/p Humira-sinus infections and injection site skin breakdown  S/p Actemra-exacerbated pneumonia  S/p sulfasalazine-contradicted, pt with sulfa allergy     CCP neg 9/2015  Quantiferon Gold neg 9/2015  Hep B neg 9/2015  Uric acid 4.6 9/2015  G6PD 11.5 adequate 10/2017  Last optho eval 9/2017  RF 18 6/2009  CHIQUITA neg 6/2009   Hand x-rays 5/2015-no pathology  Feet X-rays 5/2015-indicates DJD, no erosions          Current medicines (including changes today)  Current Outpatient Prescriptions   Medication Sig Dispense Refill   • hydroxychloroquine (PLAQUENIL) 200 MG Tab TAKE 1 TABLET BY MOUTH TWICE A  Tab 1   • leflunomide (ARAVA) 20 MG Tab TAKE 1 TABLET BY MOUTH EVERY DAY 90 Tab 0   • ranitidine (ZANTAC) 150 MG Tab Take 1 Tab by mouth 2 times a day. TAKE 1 TAB BY MOUTH 2 TIMES A DAY. 180 Tab 2   • levothyroxine (SYNTHROID) 125 MCG Tab Take 1 Tab by mouth Every morning on an empty stomach. 90 Tab 1   • acyclovir (ZOVIRAX) 200 MG Cap TAKE ONE CAPSULE BY MOUTH TWICE A  Cap 1   • Naproxen Sodium 220 MG Cap Take  by mouth.     • clobetasol  (TEMOVATE) 0.05 % Ointment Use thin layer bid x 1-2 weeks then qhs x1-2 weeks then every other night x 1-2 weeks then as needed for itch or rash. 1 Tube 0   • fluticasone (FLONASE) 50 MCG/ACT nasal spray Spray 1 Spray in nose every day.     • cetirizine (ZYRTEC) 10 MG TABS Take 10 mg by mouth every day.     • Cholecalciferol (VITAMIN D3) 2000 UNIT CAPS Take 1 Cap by mouth every day.     • folic acid (FOLVITE) 1 MG TABS Take 1 mg by mouth every day.     • hydroxychloroquine (PLAQUENIL) 200 MG Tab TAKE 1 TAB BY MOUTH TWICE A DAY. 180 Tab 1   • Cyanocobalamin (VITAMIN B 12) 100 MCG Lozenge Take  by mouth.     • hydroxychloroquine (PLAQUENIL) 200 MG Tab TAKE 1 TABLET BY MOUTH TWICE A  Tab 1   • estradiol (ESTRACE) 0.1 MG/GM vaginal cream Insert 1 g in vagina every day. 42.5 g 3   • pneumococcal vaccine (PNEUMOVAX 23) 25 MCG/0.5ML Injection 0.5 mL by Intramuscular route Once PRN for up to 1 dose. 1 Vial 0   • tetanus-diphth-acell pertussis (BOOSTRIX, AGES 7 & OLDER,) 5-2.5-18.5 LF-MCG/0.5 Suspension 0.5 mL by Intramuscular route Once PRN for up to 1 dose. May use Adacel or Boostrix. 0.5 mL 0   • predniSONE (DELTASONE) 5 MG Tab 4 tabs po qam for 3 days then 3 tabs po qam for 3 days then 2 tabs po qam for 3 days then 1 tab po qam for 3 days then stop 30 Tab 0   • albuterol (VENTOLIN OR PROVENTIL) 108 (90 BASE) MCG/ACT Aero Soln inhalation aerosol Inhale 2 Puffs by mouth every 6 hours as needed for Shortness of Breath. 8.5 g 3   • cholestyramine (QUESTRAN) 4 G packet Take 4 g by mouth 1 time daily as needed. 30 Each 5     No current facility-administered medications for this visit.      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    ROS   Other than what is mentioned in HPI or physical exam, there is no history of headaches, double vision or blurred vision. No temporal tenderness or jaw claudication. No history of cataracts or glaucoma. No trouble swallowing difficulties or sore throats. No history of thyroid disease. No chest complaints including chest pain, cough or sputum production. No shortness of breath. No GI complaints including nausea, vomiting, change in bowel habits, or past peptic ulcer disease. No history of blood in the stools. No urinary complaints including dysuria or frequency. No history of rash including psoriasis. No history of alopecia, photosensitivity, oral ulcerations, Raynaud's phenomena, or swollen joints. No history of gout. No back complaints. No history of low blood counts.       Objective:     Blood pressure 130/80, pulse 82, temperature 36.6 °C (97.9 °F), resp. rate 14, weight 84.4 kg (186 lb), last menstrual period 01/01/2010, SpO2 98 %. Body mass index is 31.93 kg/m².   Physical Exam:  Constitution: Alert, oriented X 3 in no acute distress.Eye: contact is good, speech goal directed, affect calmHEENT: conjunctiva non-injected, sclera non-icteric, both TMs are pearly gray, no evidence of infection  Pinna normal. Oral: mucous membranes pink and moist with no lesions.Neck: No thyromegaly, trachea midline Lymph: No supraclavicular lymphadenopathy, no axillary lymphadenopathy, no cervical lymphadenopathyLungs: clear to auscultation bilaterally with good excursion, did not appreciate any wheezes or rhonchiCV: regular rate and rhythm.Abdomen: soft, nontender, No CVAT, no HSM, a bit overweightNeuro: Cranial nerves 2-12 are grossly intact, no foot drop, gait within normal limits, no antalgiaSkin: No discoid lesions, no petechial lesions, no malar rash, no vasculitic lesionsExt: No evidence of swan-neck or boutonniere  deformities, no sausage digits,  no flexure contractures, no nodules no tophi, mild tenderness to palpation along the MCP joints but no victorina synovitis noted. Shoulders with full abduction but positive mild painful arc, no effusions in knee, well-healed scar on that left knee secondary to surgical intervention  Lab Results   Component Value Date/Time    SODIUM 139 10/03/2017 09:54 AM    POTASSIUM 3.8 10/03/2017 09:54 AM    CHLORIDE 108 10/03/2017 09:54 AM    CO2 25 10/03/2017 09:54 AM    GLUCOSE 73 10/03/2017 09:54 AM    BUN 15 10/03/2017 09:54 AM    CREATININE 0.69 10/03/2017 09:54 AM    CREATININE 0.77 03/26/2013 12:00 AM    BUNCREATRAT 30 (H) 11/23/2016 09:46 AM    BUNCREATRAT 30 (H) 03/26/2013 12:00 AM      Lab Results   Component Value Date/Time    WBC 4.8 10/03/2017 09:54 AM    WBC 5.7 10/20/2011 12:00 AM    RBC 4.94 10/03/2017 09:54 AM    RBC 4.32 10/20/2011 12:00 AM    HEMOGLOBIN 15.4 10/03/2017 09:54 AM    HEMATOCRIT 48.4 (H) 10/03/2017 09:54 AM    MCV 98.0 (H) 10/03/2017 09:54 AM    MCV 99 (H) 10/20/2011 12:00 AM    MCH 31.2 10/03/2017 09:54 AM    MCH 34.0 10/20/2011 12:00 AM    MCHC 31.8 (L) 10/03/2017 09:54 AM    MPV 12.6 10/03/2017 09:54 AM    NEUTSPOLYS 57.40 10/03/2017 09:54 AM    LYMPHOCYTES 32.90 10/03/2017 09:54 AM    MONOCYTES 8.50 10/03/2017 09:54 AM    EOSINOPHILS 0.00 10/03/2017 09:54 AM    BASOPHILS 1.00 10/03/2017 09:54 AM      Lab Results   Component Value Date/Time    CALCIUM 9.3 10/03/2017 09:54 AM    ASTSGOT 20 10/03/2017 09:54 AM    ALTSGPT 13 10/03/2017 09:54 AM    ALKPHOSPHAT 69 10/03/2017 09:54 AM    TBILIRUBIN 0.6 10/03/2017 09:54 AM    ALBUMIN 3.8 10/03/2017 09:54 AM    TOTPROTEIN 6.8 10/03/2017 09:54 AM     Lab Results   Component Value Date/Time    RHEUMFACTN 18 (H) 06/15/2009 09:27 AM    ANTINUCAB None Detected 06/15/2009 09:27 AM     Lab Results   Component Value Date/Time    SEDRATEWES 13 10/03/2017 09:54 AM     Lab Results   Component Value Date/Time    HBA1C 5.3 11/23/2016  09:46 AM     Lab Results   Component Value Date/Time    G6PD 11.5 10/03/2017 09:54 AM     Results for orders placed in visit on 05/12/15   DX-JOINT SURVEY-HANDS SINGLE VIEW    Impression Negative joint survey view of the hands.     Results for orders placed in visit on 05/12/15   DX-JOINT SURVEY-FEET SINGLE VIEW    Impression 1.  Moderate bilateral hallux valgus deformity.    2.  No evidence of erosive arthritic change.     Results for orders placed in visit on 09/29/14   DX-FOOT-COMPLETE 3+    Impression Subacute nondisplaced fifth proximal phalanx extra-articular fracture     Results for orders placed during the hospital encounter of 07/11/09   DX-PELVIS-1 OR 2 VIEWS    Impression IMPRESSION:     1. VERY MILD DEGENERATIVE CHANGE OF THE HIPS, SLIGHTLY WORSE ON THE RIGHT   THAN THE LEFT.  MINIMAL SCLEROSIS IS SEEN AT THE LATERAL ASPECT OF THE   RIGHT FEMORAL NECK, WHICH IS OF DOUBTFUL CLINICAL SIGNIFICANCE.      2. NO PELVIS FRACTURE.    MM/srd     Read By MARICARMEN ANSARI MD on Jul 13 2009  8:38AM  : SRP Transcription Date: Jul 13 2009 10:42AM  THIS DOCUMENT HAS BEEN ELECTRONICALLY SIGNED BY: MARICARMEN ANSARI MD on Jul 13 2009  4:05PM        Results for orders placed during the hospital encounter of 07/11/09   DX-PELVIS-1 OR 2 VIEWS    Impression IMPRESSION:     1. VERY MILD DEGENERATIVE CHANGE OF THE HIPS, SLIGHTLY WORSE ON THE RIGHT   THAN THE LEFT.  MINIMAL SCLEROSIS IS SEEN AT THE LATERAL ASPECT OF THE   RIGHT FEMORAL NECK, WHICH IS OF DOUBTFUL CLINICAL SIGNIFICANCE.      2. NO PELVIS FRACTURE.    MM/srd     Read By MARICARMEN ANSARI MD on Jul 13 2009  8:38AM  : SRP Transcription Date: Jul 13 2009 10:42AM  THIS DOCUMENT HAS BEEN ELECTRONICALLY SIGNED BY: MARICARMEN ANSARI MD on Jul 13 2009  4:05PM        Results for orders placed during the hospital encounter of 10/10/09   DX-KNEE COMPLETE 4+    Impression IMPRESSION:     3. MODERATE THREE COMPARTMENT ARTHROSIS.     4. QUESTION SMALL  EFFUSION.     MPW/vadim         Read By JASSI GREEN MD on Oct 12 2009  9:01AM  : DBERA Transcription Date: Oct 12 2009  2:56PM  THIS DOCUMENT HAS BEEN ELECTRONICALLY SIGNED BY: JASSI GREEN MD on Oct   12 2009  3:47PM        Results for orders placed in visit on 09/02/15   DX-KNEE 2- LEFT    Impression 1. No evidence of acute fracture or dislocation.    2. Increased sclerosis with punctate hyperdensities in the proximal tibial metadiaphysis anteriorly may be related to prior surgery.    3. Mild left knee osteoarthritis.     Results for orders placed during the hospital encounter of 07/10/12   DX-HAND 3+    Impression No new radiographic evidence of erosive arthropathy    Stable second DIP subchondral cyst versus erosion    Stable mild first MCP osteoarthritis, periarticular calcification and osteopenia      Assessment and Plan:     1. Rheumatoid arthritis involving multiple sites, unspecified rheumatoid factor presence (CMS-[facility])  Patient is doing really quite well, continue Plaquenil at 200 mg by mouth twice a day and leflunomide 20 mg by mouth daily with folic acid 1 mg by mouth daily, of note patient's sedimentation rate is 13 October 2017  We will recheck hand and feet x-rays compared x-rays done 2015 for any indication of progression of erosive arthritis.  While on leflunomide patient to do labs every 3 months CBC, comprehensive panel and sedimentation rate  - DX-JOINT SURVEY-FEET SINGLE VIEW; Future  - DX-JOINT SURVEY-HANDS SINGLE VIEW; Future  - hydroxychloroquine (PLAQUENIL) 200 MG Tab; TAKE 1 TABLET BY MOUTH TWICE A DAY  Dispense: 180 Tab; Refill: 1  - CBC WITH DIFFERENTIAL; Future  - COMP METABOLIC PANEL; Future  - WESTERGREN SED RATE; Future  - DS-BONE DENSITY STUDY (DEXA); Future    2. Encounter for monitoring Arava therapy  Labs to be done every 3 months i.e. CBC, comprehensive panel and sedimentation rate  - hydroxychloroquine (PLAQUENIL) 200 MG Tab; TAKE 1 TABLET BY MOUTH TWICE A DAY   Dispense: 180 Tab; Refill: 1  - CBC WITH DIFFERENTIAL; Future  - COMP METABOLIC PANEL; Future  - WESTERGREN SED RATE; Future  - DS-BONE DENSITY STUDY (DEXA); Future    3. Senile osteoporosis  Last DEXA over 4 years ago per patient report,  We will schedule DEXA for evaluation of osteopenia versus osteoporosis.   continue calcium 1200 mg by mouth daily and vitamin D about 1000 units by mouth daily and magnesium 400 mg by mouth daily  - DS-BONE DENSITY STUDY (DEXA); Future    4. Hypothyroidism, unspecified type  Can exacerbate joint pain, I recommend monitoring thyroid on a regular basis to assure it is not contributing to the patient's joint pain  - CBC WITH DIFFERENTIAL; Future  - COMP METABOLIC PANEL; Future  - WESTERGREN SED RATE; Future  - DS-BONE DENSITY STUDY (DEXA); Future    5. Tobacco abuse  Tobacco abuse is known to exacerbate rheumatoid arthritis, 20 minute discussion with patient regarding the need to stop tobacco abuse, patient states understanding  - DS-BONE DENSITY STUDY (DEXA); Future    Followup: Return in about 3 months (around 1/31/2018). or sooner prn    Patient was seen 30 minutes face-to-face of which more than 50% of the time was spent counseling the patient (excluding time for procedures)  regarding  rheumatological condition and care. Therapy was discussed in detail.    Please note that this dictation was created using voice recognition software. I have made every reasonable attempt to correct obvious errors, but I expect that there are errors of grammar and possibly content that I did not discover before finalizing the note.

## 2017-11-17 ENCOUNTER — OFFICE VISIT (OUTPATIENT)
Dept: URGENT CARE | Facility: CLINIC | Age: 57
End: 2017-11-17
Payer: COMMERCIAL

## 2017-11-17 VITALS
OXYGEN SATURATION: 97 % | DIASTOLIC BLOOD PRESSURE: 82 MMHG | BODY MASS INDEX: 31.92 KG/M2 | HEART RATE: 86 BPM | RESPIRATION RATE: 14 BRPM | HEIGHT: 64 IN | SYSTOLIC BLOOD PRESSURE: 122 MMHG | TEMPERATURE: 98.6 F | WEIGHT: 187 LBS

## 2017-11-17 DIAGNOSIS — E66.9 OBESITY (BMI 30-39.9): ICD-10-CM

## 2017-11-17 DIAGNOSIS — H10.32 ACUTE BACTERIAL CONJUNCTIVITIS OF LEFT EYE: Primary | ICD-10-CM

## 2017-11-17 DIAGNOSIS — F17.200 SMOKER: ICD-10-CM

## 2017-11-17 DIAGNOSIS — J01.40 ACUTE NON-RECURRENT PANSINUSITIS: ICD-10-CM

## 2017-11-17 PROCEDURE — 99214 OFFICE O/P EST MOD 30 MIN: CPT | Performed by: PHYSICIAN ASSISTANT

## 2017-11-17 RX ORDER — POLYMYXIN B SULFATE AND TRIMETHOPRIM 1; 10000 MG/ML; [USP'U]/ML
1 SOLUTION OPHTHALMIC EVERY 4 HOURS
Qty: 10 ML | Refills: 0 | Status: SHIPPED | OUTPATIENT
Start: 2017-11-17 | End: 2018-02-21

## 2017-11-17 RX ORDER — DOXYCYCLINE HYCLATE 100 MG
100 TABLET ORAL 2 TIMES DAILY
Qty: 14 TAB | Refills: 0 | Status: SHIPPED | OUTPATIENT
Start: 2017-11-17 | End: 2017-11-24

## 2017-11-17 NOTE — PATIENT INSTRUCTIONS
Bacterial Conjunctivitis  Bacterial conjunctivitis, commonly called pink eye, is an inflammation of the clear membrane that covers the white part of the eye (conjunctiva). The inflammation can also happen on the underside of the eyelids. The blood vessels in the conjunctiva become inflamed, causing the eye to become red or pink. Bacterial conjunctivitis may spread easily from one eye to another and from person to person (contagious).   CAUSES   Bacterial conjunctivitis is caused by bacteria. The bacteria may come from your own skin, your upper respiratory tract, or from someone else with bacterial conjunctivitis.  SYMPTOMS   The normally white color of the eye or the underside of the eyelid is usually pink or red. The pink eye is usually associated with irritation, tearing, and some sensitivity to light. Bacterial conjunctivitis is often associated with a thick, yellowish discharge from the eye. The discharge may turn into a crust on the eyelids overnight, which causes your eyelids to stick together. If a discharge is present, there may also be some blurred vision in the affected eye.  DIAGNOSIS   Bacterial conjunctivitis is diagnosed by your caregiver through an eye exam and the symptoms that you report. Your caregiver looks for changes in the surface tissues of your eyes, which may point to the specific type of conjunctivitis. A sample of any discharge may be collected on a cotton-tip swab if you have a severe case of conjunctivitis, if your cornea is affected, or if you keep getting repeat infections that do not respond to treatment. The sample will be sent to a lab to see if the inflammation is caused by a bacterial infection and to see if the infection will respond to antibiotic medicines.  TREATMENT   · Bacterial conjunctivitis is treated with antibiotics. Antibiotic eyedrops are most often used. However, antibiotic ointments are also available. Antibiotics pills are sometimes used. Artificial tears or eye  washes may ease discomfort.  HOME CARE INSTRUCTIONS   · To ease discomfort, apply a cool, clean washcloth to your eye for 10-20 minutes, 3-4 times a day.  · Gently wipe away any drainage from your eye with a warm, wet washcloth or a cotton ball.  · Wash your hands often with soap and water. Use paper towels to dry your hands.  · Do not share towels or washcloths. This may spread the infection.  · Change or wash your pillowcase every day.  · You should not use eye makeup until the infection is gone.  · Do not operate machinery or drive if your vision is blurred.  · Stop using contact lenses. Ask your caregiver how to sterilize or replace your contacts before using them again. This depends on the type of contact lenses that you use.  · When applying medicine to the infected eye, do not touch the edge of your eyelid with the eyedrop bottle or ointment tube.  SEEK IMMEDIATE MEDICAL CARE IF:   · Your infection has not improved within 3 days after beginning treatment.  · You had yellow discharge from your eye and it returns.  · You have increased eye pain.  · Your eye redness is spreading.  · Your vision becomes blurred.  · You have a fever or persistent symptoms for more than 2-3 days.  · You have a fever and your symptoms suddenly get worse.  · You have facial pain, redness, or swelling.  MAKE SURE YOU:   · Understand these instructions.  · Will watch your condition.  · Will get help right away if you are not doing well or get worse.     This information is not intended to replace advice given to you by your health care provider. Make sure you discuss any questions you have with your health care provider.     Document Released: 12/18/2006 Document Revised: 01/08/2016 Document Reviewed: 05/20/2013  [employer] Patient Education ©2016 [employer]

## 2017-11-17 NOTE — PROGRESS NOTES
Subjective:      Pt is a 57 y.o. female who presents with Eye Problem (possible pink eye)            HPI  PT presents to  clinic today complaining of sore throat,  pressure in ears,  fatigue, runny nose, red left eye and sinus pressure and congestion. PT denies CP, SOB, NVD, abdominal pain, joint pain. PT states these symptoms began around 2 days ago. PT states the pain is a 7/10 with sinus pressure, aching in nature and worse at night.  Pt has not taken any RX medications for this condition. The pt's medication list, problem list, and allergies have been evaluated and reviewed during today's visit.      PMH:  Past Medical History:   Diagnosis Date   • Genital herpes in women 2014   • High risk medications (not anticoagulants) long-term use 2012   • Hypothyroid 2011   • Kidney stones    • Macrocytosis without anemia med effect   • Menopause 9/15/2011   • Post-cholecystectomy syndrome 2012   • RA (rheumatoid arthritis) (CMS-[facility])    • Rheumatoid arthritis(714.0)    • Tobacco abuse, episodic 2011   • Vitamin d deficiency 5/15/2012       PSH:  Past Surgical History:   Procedure Laterality Date   • CHOLECYSTECTOMY     • APPENDECTOMY     • MENISCECTOMY  left knee   • OTHER ABDOMINAL SURGERY      gall bladder & appendix   • OTHER ORTHOPEDIC SURGERY     • TMJ RECONSTRUCTION BILATERAL     • TONSILLECTOMY         Fam Hx:    family history includes Cancer in her father; Diabetes in her father; Genetic in her sister; Heart Disease in her father; Hyperlipidemia in her mother; Hypertension in her mother; Lung Disease in her mother.  Family Status   Relation Status   • Mother    • Father    • Sister Alive   • Sister Alive       Soc HX:  Social History     Social History   • Marital status:      Spouse name: N/A   • Number of children: N/A   • Years of education: N/A     Occupational History   • Not on file.     Social History Main Topics   • Smoking status: Current Every Day  Smoker     Packs/day: 0.50     Years: 35.00     Types: Cigarettes   • Smokeless tobacco: Never Used   • Alcohol use 0.6 oz/week     1 Glasses of wine per week      Comment: occasional   • Drug use: No   • Sexual activity: Yes     Partners: Male     Other Topics Concern   • Not on file     Social History Narrative        Travels a lot    . 1 child         Medications:    Current Outpatient Prescriptions:   •  polymixin-trimethoprim (POLYTRIM) 01038-7.1 UNIT/ML-% Solution, Place 1 Drop in both eyes every 4 hours., Disp: 10 mL, Rfl: 0  •  doxycycline (VIBRAMYCIN) 100 MG Tab, Take 1 Tab by mouth 2 times a day for 7 days., Disp: 14 Tab, Rfl: 0  •  leflunomide (ARAVA) 20 MG Tab, TAKE 1 TABLET BY MOUTH EVERY DAY, Disp: 90 Tab, Rfl: 0  •  ranitidine (ZANTAC) 150 MG Tab, Take 1 Tab by mouth 2 times a day. TAKE 1 TAB BY MOUTH 2 TIMES A DAY., Disp: 180 Tab, Rfl: 2  •  levothyroxine (SYNTHROID) 125 MCG Tab, Take 1 Tab by mouth Every morning on an empty stomach., Disp: 90 Tab, Rfl: 1  •  Cyanocobalamin (VITAMIN B 12) 100 MCG Lozenge, Take  by mouth., Disp: , Rfl:   •  acyclovir (ZOVIRAX) 200 MG Cap, TAKE ONE CAPSULE BY MOUTH TWICE A DAY, Disp: 180 Cap, Rfl: 1  •  Naproxen Sodium 220 MG Cap, Take  by mouth., Disp: , Rfl:   •  predniSONE (DELTASONE) 5 MG Tab, 4 tabs po qam for 3 days then 3 tabs po qam for 3 days then 2 tabs po qam for 3 days then 1 tab po qam for 3 days then stop, Disp: 30 Tab, Rfl: 0  •  clobetasol (TEMOVATE) 0.05 % Ointment, Use thin layer bid x 1-2 weeks then qhs x1-2 weeks then every other night x 1-2 weeks then as needed for itch or rash., Disp: 1 Tube, Rfl: 0  •  fluticasone (FLONASE) 50 MCG/ACT nasal spray, Spray 1 Spray in nose every day., Disp: , Rfl:   •  cetirizine (ZYRTEC) 10 MG TABS, Take 10 mg by mouth every day., Disp: , Rfl:   •  Cholecalciferol (VITAMIN D3) 2000 UNIT CAPS, Take 1 Cap by mouth every day., Disp: , Rfl:   •  folic acid (FOLVITE) 1 MG TABS, Take 1 mg by mouth every day., Disp:  , Rfl:   •  hydroxychloroquine (PLAQUENIL) 200 MG Tab, TAKE 1 TABLET BY MOUTH TWICE A DAY, Disp: 180 Tab, Rfl: 1  •  hydroxychloroquine (PLAQUENIL) 200 MG Tab, TAKE 1 TAB BY MOUTH TWICE A DAY., Disp: 180 Tab, Rfl: 1  •  hydroxychloroquine (PLAQUENIL) 200 MG Tab, TAKE 1 TABLET BY MOUTH TWICE A DAY, Disp: 180 Tab, Rfl: 1  •  estradiol (ESTRACE) 0.1 MG/GM vaginal cream, Insert 1 g in vagina every day., Disp: 42.5 g, Rfl: 3  •  pneumococcal vaccine (PNEUMOVAX 23) 25 MCG/0.5ML Injection, 0.5 mL by Intramuscular route Once PRN for up to 1 dose., Disp: 1 Vial, Rfl: 0  •  tetanus-diphth-acell pertussis (BOOSTRIX, AGES 7 & OLDER,) 5-2.5-18.5 LF-MCG/0.5 Suspension, 0.5 mL by Intramuscular route Once PRN for up to 1 dose. May use Adacel or Boostrix., Disp: 0.5 mL, Rfl: 0  •  albuterol (VENTOLIN OR PROVENTIL) 108 (90 BASE) MCG/ACT Aero Soln inhalation aerosol, Inhale 2 Puffs by mouth every 6 hours as needed for Shortness of Breath., Disp: 8.5 g, Rfl: 3  •  cholestyramine (QUESTRAN) 4 G packet, Take 4 g by mouth 1 time daily as needed., Disp: 30 Each, Rfl: 5      Allergies:  Aspirin; Codeine; Morphine; and Sulfa drugs    ROS  Constitutional: Positive for chills and malaise/fatigue.   HENT: Positive for congestion and sore throat. Negative for ear pain.    Eyes: Negative for blurred vision, double vision and photophobia. +red left eye  Respiratory: Positive for cough and sputum production. Negative for hemoptysis, shortness of breath and wheezing.    Cardiovascular: Negative for chest pain and palpitations.   Gastrointestinal: Negative for nausea, vomiting, abdominal pain, diarrhea and constipation.   Genitourinary: Negative for dysuria and flank pain.   Musculoskeletal: Negative for falls and myalgias.   Skin: Negative for itching and rash.   Neurological: Positive for headaches. Negative for dizziness and tingling.   Endo/Heme/Allergies: Does not bruise/bleed easily.   Psychiatric/Behavioral: Negative for depression. The  "patient is not nervous/anxious.    All other systems reviewed and are negative.         Objective:     /82   Pulse 86   Temp 37 °C (98.6 °F)   Resp 14   Ht 1.626 m (5' 4\")   Wt 84.8 kg (187 lb)   LMP 01/01/2010   SpO2 97%   BMI 32.10 kg/m²      Physical Exam      Constitutional: PT is oriented to person, place, and time. PT appears well-developed and well-nourished. No distress.   HENT:   Head: Normocephalic and atraumatic.   Right Ear: Hearing, tympanic membrane, external ear and ear canal normal.   Left Ear: Hearing, tympanic membrane, external ear and ear canal normal.   Nose: Mucosal edema, rhinorrhea and sinus tenderness present. Right sinus exhibits frontal sinus tenderness. Left sinus exhibits frontal sinus tenderness.   Mouth/Throat: Uvula is midline. Mucous membranes are pale. Posterior oropharyngeal edema and posterior oropharyngeal erythema present. No oropharyngeal exudate.   Eyes: EOM are normal. Conjunctiva on left injected. Pupils are equal, round, and reactive to light. Right eye exhibits no discharge. Left eye exhibits discharge. No scleral icterus.    Neck: Normal range of motion. Neck supple. No thyromegaly present.   Cardiovascular: Normal rate, regular rhythm, normal heart sounds and intact distal pulses.  Exam reveals no gallop and no friction rub.    No murmur heard.  Pulmonary/Chest: Effort normal and breath sounds normal. No respiratory distress. PT has no wheezes. PT has no rales. PT exhibits no tenderness.   Abdominal: Soft. Bowel sounds are normal. PT exhibits no distension and no mass. There is no tenderness. There is no rebound and no guarding.   Musculoskeletal: Normal range of motion. PT exhibits no edema and no tenderness.   Lymphadenopathy:     PT has no cervical adenopathy.   Neurological: PT is alert and oriented to person, place, and time. PT displays normal reflexes. No cranial nerve deficit. PT exhibits normal muscle tone. Coordination normal.   Skin: Skin is warm " and dry. No rash noted. No erythema.   Psychiatric: PT has a normal mood and affect. PT behavior is normal. Judgment and thought content normal.          Assessment/Plan:     1. Acute bacterial conjunctivitis of left eye    - polymixin-trimethoprim (POLYTRIM) 75103-8.1 UNIT/ML-% Solution; Place 1 Drop in both eyes every 4 hours.  Dispense: 10 mL; Refill: 0    2. Acute non-recurrent pansinusitis    - doxycycline (VIBRAMYCIN) 100 MG Tab; Take 1 Tab by mouth 2 times a day for 7 days.  Dispense: 14 Tab; Refill: 0    3. Obesity (BMI 30-39.9)  Based on the electronic medical record calculations for BMI: the pt was diagnosed with obesity. Obesity counseling discussed concerning diet and exercise improvements. Pt was in full understanding with information given and plan set forth.    4. Smoker  Ready to quit: No  Counseling given: Yes      Rest, fluids encouraged.  OTC decongestant for congestion  AVS with medical info given.  Pt was in full understanding and agreement with the plan.  Follow-up as needed if symptoms worsen or fail to improve.

## 2017-12-11 ENCOUNTER — TELEPHONE (OUTPATIENT)
Dept: MEDICAL GROUP | Facility: MEDICAL CENTER | Age: 57
End: 2017-12-11

## 2017-12-12 ENCOUNTER — OFFICE VISIT (OUTPATIENT)
Dept: MEDICAL GROUP | Facility: MEDICAL CENTER | Age: 57
End: 2017-12-12

## 2017-12-12 VITALS
TEMPERATURE: 98.4 F | OXYGEN SATURATION: 97 % | DIASTOLIC BLOOD PRESSURE: 84 MMHG | WEIGHT: 178.3 LBS | HEART RATE: 75 BPM | BODY MASS INDEX: 30.44 KG/M2 | HEIGHT: 64 IN | SYSTOLIC BLOOD PRESSURE: 130 MMHG

## 2017-12-12 DIAGNOSIS — M23.92 INTERNAL DERANGEMENT OF LEFT KNEE: ICD-10-CM

## 2017-12-12 DIAGNOSIS — M54.32 SCIATICA OF LEFT SIDE: ICD-10-CM

## 2017-12-12 PROCEDURE — 99214 OFFICE O/P EST MOD 30 MIN: CPT | Performed by: FAMILY MEDICINE

## 2017-12-12 RX ORDER — METHYLPREDNISOLONE 4 MG/1
TABLET ORAL
Qty: 21 TAB | Refills: 0 | Status: SHIPPED | OUTPATIENT
Start: 2017-12-12 | End: 2018-06-25

## 2017-12-14 ENCOUNTER — PATIENT MESSAGE (OUTPATIENT)
Dept: MEDICAL GROUP | Facility: MEDICAL CENTER | Age: 57
End: 2017-12-14

## 2017-12-14 NOTE — TELEPHONE ENCOUNTER
From: Clifford Murphy  To: Tish Smallwood M.D.  Sent: 12/14/2017 7:59 AM PST  Subject: Non-Urgent Medical Question    I saw you Tuesday for a fall. We started prednisone for inflammation. The next day I started a deep cough and now have a head cold too. I feel awful and think I might need more than cold medication.    My back is still very painful, but more localized to certain areas.    Please advise.    Thank you,    Clifford Murphy

## 2017-12-15 ENCOUNTER — OFFICE VISIT (OUTPATIENT)
Dept: MEDICAL GROUP | Facility: MEDICAL CENTER | Age: 57
End: 2017-12-15

## 2017-12-15 VITALS
HEIGHT: 64 IN | WEIGHT: 178 LBS | BODY MASS INDEX: 30.39 KG/M2 | OXYGEN SATURATION: 98 % | DIASTOLIC BLOOD PRESSURE: 84 MMHG | RESPIRATION RATE: 16 BRPM | HEART RATE: 71 BPM | SYSTOLIC BLOOD PRESSURE: 132 MMHG | TEMPERATURE: 98 F

## 2017-12-15 DIAGNOSIS — J40 BRONCHITIS: ICD-10-CM

## 2017-12-15 PROBLEM — J06.9 VIRAL UPPER RESPIRATORY TRACT INFECTION: Status: ACTIVE | Noted: 2017-12-15

## 2017-12-15 PROCEDURE — 99213 OFFICE O/P EST LOW 20 MIN: CPT | Performed by: FAMILY MEDICINE

## 2017-12-15 RX ORDER — AZITHROMYCIN 250 MG/1
TABLET, FILM COATED ORAL
Qty: 6 TAB | Refills: 0 | Status: SHIPPED | OUTPATIENT
Start: 2017-12-15 | End: 2017-12-20

## 2017-12-15 NOTE — ASSESSMENT & PLAN NOTE
"Back Pain: Location/quality of pain: left sciatic notch  Radiation? yes - down left leg  Course of symptoms: worsening    Initial precipitant of symptoms: Tripped and fell backwards. Time of day when symptoms are worst: evening. Alleviating factors identifiable by patient: standing, recumbency. Exacerbating factors identifiable by patient: standing. Treatments so far initiated by patient: none    Previous back problems: Has rheumatoid arthritis. Previous workup: none. Previous treatment: none    CURRENT LIMITATIONS OF FUNCTION:  Sitting, standing, walking:   Lifting weights:  Activities limited to this degree since significant.     No \"RED FLAGS’ FOR FRACTURE: (Recent major trauma, Minor trauma or strenuous lifting in older or potentially osteoporotic patient, History of chronic corticosteroid therapy)  No \"RED FLAGS\" FOR NEOPLASM OR INFECTION: (Age over 50 or under 20, History of cancer, doron. breast, lung, prostate, Recent fever/chills, Recent unexplained weight loss,  Recent bacterial infection, current IV drug use, Immunosuppression (from meds, HIV, etc.), Pain worse when supine or at night)  No \"RED FLAGS’ FOR CAUDA EQUINA SYNDROME: (Saddle anesthesia, urine retention, fecal incontinence, Severe or progressive LE neurologic deficit)        "

## 2017-12-15 NOTE — PROGRESS NOTES
"Subjective:     Chief Complaint   Patient presents with   • Follow-Up     Hospital   • Fall     left leg and back pain       Clifford Murphy is a 57 y.o. female here today for evaluation and management of:    Sciatica of left side  Back Pain: Location/quality of pain: left sciatic notch  Radiation? yes - down left leg  Course of symptoms: worsening    Initial precipitant of symptoms: Tripped and fell backwards. Time of day when symptoms are worst: evening. Alleviating factors identifiable by patient: standing, recumbency. Exacerbating factors identifiable by patient: standing. Treatments so far initiated by patient: none    Previous back problems: Has rheumatoid arthritis. Previous workup: none. Previous treatment: none    CURRENT LIMITATIONS OF FUNCTION:  Sitting, standing, walking:   Lifting weights:  Activities limited to this degree since significant.     No \"RED FLAGS’ FOR FRACTURE: (Recent major trauma, Minor trauma or strenuous lifting in older or potentially osteoporotic patient, History of chronic corticosteroid therapy)  No \"RED FLAGS\" FOR NEOPLASM OR INFECTION: (Age over 50 or under 20, History of cancer, doron. breast, lung, prostate, Recent fever/chills, Recent unexplained weight loss,  Recent bacterial infection, current IV drug use, Immunosuppression (from meds, HIV, etc.), Pain worse when supine or at night)  No \"RED FLAGS’ FOR CAUDA EQUINA SYNDROME: (Saddle anesthesia, urine retention, fecal incontinence, Severe or progressive LE neurologic deficit)          Internal derangement of left knee  Patient took a step back both standing at the kitchen sink and her left leg buckled underneath her. She has a history of a meniscal tear.  She has had difficulty bearing weight since that time. She went to the emergency room at [facility] where she had a negative x-ray. She was given a long leg brace which has helped her pain. She is using crutches as she is unable to bear weight.   "     Allergies   Allergen Reactions   • Aspirin    • Codeine    • Morphine    • Sulfa Drugs        Current medicines (including changes today)  Current Outpatient Prescriptions   Medication Sig Dispense Refill   • MethylPREDNISolone (MEDROL DOSEPAK) 4 MG Tablet Therapy Pack As directed on the packaging label. 21 Tab 0   • polymixin-trimethoprim (POLYTRIM) 85853-9.1 UNIT/ML-% Solution Place 1 Drop in both eyes every 4 hours. 10 mL 0   • hydroxychloroquine (PLAQUENIL) 200 MG Tab TAKE 1 TABLET BY MOUTH TWICE A  Tab 1   • leflunomide (ARAVA) 20 MG Tab TAKE 1 TABLET BY MOUTH EVERY DAY 90 Tab 0   • ranitidine (ZANTAC) 150 MG Tab Take 1 Tab by mouth 2 times a day. TAKE 1 TAB BY MOUTH 2 TIMES A DAY. 180 Tab 2   • levothyroxine (SYNTHROID) 125 MCG Tab Take 1 Tab by mouth Every morning on an empty stomach. 90 Tab 1   • hydroxychloroquine (PLAQUENIL) 200 MG Tab TAKE 1 TAB BY MOUTH TWICE A DAY. 180 Tab 1   • Cyanocobalamin (VITAMIN B 12) 100 MCG Lozenge Take  by mouth.     • hydroxychloroquine (PLAQUENIL) 200 MG Tab TAKE 1 TABLET BY MOUTH TWICE A  Tab 1   • estradiol (ESTRACE) 0.1 MG/GM vaginal cream Insert 1 g in vagina every day. 42.5 g 3   • acyclovir (ZOVIRAX) 200 MG Cap TAKE ONE CAPSULE BY MOUTH TWICE A  Cap 1   • pneumococcal vaccine (PNEUMOVAX 23) 25 MCG/0.5ML Injection 0.5 mL by Intramuscular route Once PRN for up to 1 dose. 1 Vial 0   • tetanus-diphth-acell pertussis (BOOSTRIX, AGES 7 & OLDER,) 5-2.5-18.5 LF-MCG/0.5 Suspension 0.5 mL by Intramuscular route Once PRN for up to 1 dose. May use Adacel or Boostrix. 0.5 mL 0   • Naproxen Sodium 220 MG Cap Take  by mouth.     • albuterol (VENTOLIN OR PROVENTIL) 108 (90 BASE) MCG/ACT Aero Soln inhalation aerosol Inhale 2 Puffs by mouth every 6 hours as needed for Shortness of Breath. 8.5 g 3   • clobetasol (TEMOVATE) 0.05 % Ointment Use thin layer bid x 1-2 weeks then qhs x1-2 weeks then every other night x 1-2 weeks then as needed for itch or rash. 1 Tube  0   • cholestyramine (QUESTRAN) 4 G packet Take 4 g by mouth 1 time daily as needed. 30 Each 5   • fluticasone (FLONASE) 50 MCG/ACT nasal spray Spray 1 Spray in nose every day.     • cetirizine (ZYRTEC) 10 MG TABS Take 10 mg by mouth every day.     • Cholecalciferol (VITAMIN D3) 2000 UNIT CAPS Take 1 Cap by mouth every day.     • folic acid (FOLVITE) 1 MG TABS Take 1 mg by mouth every day.       No current facility-administered medications for this visit.        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    Patient Active Problem List    Diagnosis Date Noted   • Sciatica of left side 12/12/2017   • Internal derangement of left knee 12/12/2017   • Obesity (BMI 30-39.9) 11/17/2017   • Thrombocytopenia (CMS-HCC) 10/05/2017   • Cough with sputum 12/28/2015   • Diarrhea 12/28/2015   • Tobacco abuse 08/05/2015   • Acute allergic otitis media of right ear 06/17/2015   • Seasonal allergies 06/17/2015   • Hypertriglyceridemia 09/29/2014   • Genital herpes in women 02/11/2014   • Jaw pain 08/04/2013   • High risk medications (not anticoagulants) long-term use 08/29/2012   • Post-cholecystectomy syndrome 05/23/2012   • Vitamin D insufficiency 05/15/2012   • Preventative health care 09/15/2011   • Menopause 09/15/2011   • Macrocytosis without anemia 09/14/2011   • Acquired hypothyroidism 09/14/2011   • RA (rheumatoid arthritis) (CMS-HCC) 09/14/2011       ROS   No fever or chills.  No nausea or vomiting.  No chest pain or palpitations.  No cough or SOB.  No pain with urination or hematuria.  No black or bloody stools.       Objective:     Blood  "pressure 130/84, pulse 75, temperature 36.9 °C (98.4 °F), height 1.626 m (5' 4\"), weight 80.9 kg (178 lb 4.8 oz), last menstrual period 01/01/2010, SpO2 97 %. Body mass index is 30.61 kg/m².   Physical Exam:  Well developed, well nourished.  Alert, oriented in no acute distress.  Eye contact is good, speech goal directed, affect calm  Eyes: conjunctiva non-injected, sclera non-icteric.  SPINE: No significant spinal curvature on forward bend. Mild tenderness in paraspinous muscles lumbar spine without current spasm. SLT negative. DTR 2+ patella, 1+ achilles bilaterally. Strength 5/5 proximal and distal LE.  No pain with stress of SI. Full hip ROM. Poor hamstring flexibility. No symptoms with axial loading. Pain with palpation of sciatic notch  Knees: No erythema/edema/ecchymosis.  Patellar effusion present. Tenderness to palpation on MCL. Joint line tenderness bilaterally. Patellar grind test positive. Lachman's positive. Jeff's negative. ROM intact. 5/5 strength bilaterally. 2+ deep tendon reflexes bilaterally.            Assessment and Plan:   The following treatment plan was discussed    1. Sciatica of left side  Trial of Medrol Dosepak as directed. Patient does not tolerate pain medications so would like to avoid these. Consider physical therapy  - MethylPREDNISolone (MEDROL DOSEPAK) 4 MG Tablet Therapy Pack; As directed on the packaging label.  Dispense: 21 Tab; Refill: 0    2. Internal derangement of left knee  Weight-bear as tolerated. Continue knee brace as needed for stability. Consider physical therapy. Patient will need an MRI if not improving.    Any change or worsening of signs or symptoms, patient encouraged to follow-up or report to the emergency room for further evaluation. Patient understands and agrees.    Followup: Return in about 4 weeks (around 1/9/2018).           "

## 2017-12-15 NOTE — TELEPHONE ENCOUNTER
Regarding: FW: Non-Urgent Medical Question  Contact: [phone]  Corrine you call and see if she wants to come in tomorrow to be seen.  Tish Smallwood M.D.    ----- Message -----  From: Clifford Murphy  Sent: 12/14/2017   8:56 AM  To: Tish Smallwood M.D.  Subject: Non-Urgent Medical Question                      ----- Message from Lester Ruiz Ass't sent at 12/14/2017  8:56 AM PST -----       ----- Message from Clifford Murphy to Tish Smallwood M.D. sent at 12/14/2017  7:59 AM -----   I saw you Tuesday for a fall.  We started prednisone for inflammation.  The next day I started a deep cough and now have a head cold too.  I feel awful and think I might need more than cold medication.    My back is still very painful, but more localized to certain areas.    Please advise.    Thank you,    Clifford Murphy

## 2017-12-15 NOTE — ASSESSMENT & PLAN NOTE
Patient took a step back both standing at the kitchen sink and her left leg buckled underneath her. She has a history of a meniscal tear.  She has had difficulty bearing weight since that time. She went to the emergency room at [facility] where she had a negative x-ray. She was given a long leg brace which has helped her pain. She is using crutches as she is unable to bear weight.

## 2017-12-15 NOTE — PATIENT INSTRUCTIONS

## 2017-12-26 NOTE — PROGRESS NOTES
Subjective:     Chief Complaint   Patient presents with   • Cough       Clifford Murphy is a 57 y.o. female here today for evaluation and management of:    Bronchitis  Illness: 7 days of illness including: nasal congestion, laryngitis, cough, myalgias ,  Symptoms negative for fever, chills,   Treatments tried: OTC meds   Since onset, symptoms are worse   Similarly ill exposures: yes  Medical history negative for asthma  She  reports that she has been smoking Cigarettes.  She has a 17.50 pack-year smoking history. She has never used smokeless tobacco.         Allergies   Allergen Reactions   • Aspirin    • Codeine    • Morphine    • Sulfa Drugs        Current medicines (including changes today)  Current Outpatient Prescriptions   Medication Sig Dispense Refill   • MethylPREDNISolone (MEDROL DOSEPAK) 4 MG Tablet Therapy Pack As directed on the packaging label. 21 Tab 0   • polymixin-trimethoprim (POLYTRIM) 60261-0.1 UNIT/ML-% Solution Place 1 Drop in both eyes every 4 hours. 10 mL 0   • hydroxychloroquine (PLAQUENIL) 200 MG Tab TAKE 1 TABLET BY MOUTH TWICE A  Tab 1   • leflunomide (ARAVA) 20 MG Tab TAKE 1 TABLET BY MOUTH EVERY DAY 90 Tab 0   • ranitidine (ZANTAC) 150 MG Tab Take 1 Tab by mouth 2 times a day. TAKE 1 TAB BY MOUTH 2 TIMES A DAY. 180 Tab 2   • levothyroxine (SYNTHROID) 125 MCG Tab Take 1 Tab by mouth Every morning on an empty stomach. 90 Tab 1   • hydroxychloroquine (PLAQUENIL) 200 MG Tab TAKE 1 TAB BY MOUTH TWICE A DAY. 180 Tab 1   • Cyanocobalamin (VITAMIN B 12) 100 MCG Lozenge Take  by mouth.     • hydroxychloroquine (PLAQUENIL) 200 MG Tab TAKE 1 TABLET BY MOUTH TWICE A  Tab 1   • estradiol (ESTRACE) 0.1 MG/GM vaginal cream Insert 1 g in vagina every day. 42.5 g 3   • acyclovir (ZOVIRAX) 200 MG Cap TAKE ONE CAPSULE BY MOUTH TWICE A  Cap 1   • pneumococcal vaccine (PNEUMOVAX 23) 25 MCG/0.5ML Injection 0.5 mL by Intramuscular route Once PRN for up to 1 dose. 1 Vial 0   •  tetanus-diphth-acell pertussis (BOOSTRIX, AGES 7 & OLDER,) 5-2.5-18.5 LF-MCG/0.5 Suspension 0.5 mL by Intramuscular route Once PRN for up to 1 dose. May use Adacel or Boostrix. 0.5 mL 0   • Naproxen Sodium 220 MG Cap Take  by mouth.     • albuterol (VENTOLIN OR PROVENTIL) 108 (90 BASE) MCG/ACT Aero Soln inhalation aerosol Inhale 2 Puffs by mouth every 6 hours as needed for Shortness of Breath. 8.5 g 3   • clobetasol (TEMOVATE) 0.05 % Ointment Use thin layer bid x 1-2 weeks then qhs x1-2 weeks then every other night x 1-2 weeks then as needed for itch or rash. 1 Tube 0   • cholestyramine (QUESTRAN) 4 G packet Take 4 g by mouth 1 time daily as needed. 30 Each 5   • fluticasone (FLONASE) 50 MCG/ACT nasal spray Spray 1 Spray in nose every day.     • cetirizine (ZYRTEC) 10 MG TABS Take 10 mg by mouth every day.     • Cholecalciferol (VITAMIN D3) 2000 UNIT CAPS Take 1 Cap by mouth every day.     • folic acid (FOLVITE) 1 MG TABS Take 1 mg by mouth every day.       No current facility-administered medications for this visit.        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    Patient Active Problem List    Diagnosis Date Noted   • Bronchitis 12/15/2017   • Sciatica of left side 12/12/2017   • Internal derangement of left knee 12/12/2017   • Obesity (BMI 30-39.9) 11/17/2017   • Thrombocytopenia (CMS-[facility]) 10/05/2017   • Cough with sputum 12/28/2015   • Diarrhea 12/28/2015   • Tobacco abuse 08/05/2015   • Acute allergic otitis media of right ear 06/17/2015   • Seasonal allergies 06/17/2015   • Hypertriglyceridemia 09/29/2014   • Genital herpes  "in women 02/11/2014   • Jaw pain 08/04/2013   • High risk medications (not anticoagulants) long-term use 08/29/2012   • Post-cholecystectomy syndrome 05/23/2012   • Vitamin D insufficiency 05/15/2012   • Preventative health care 09/15/2011   • Menopause 09/15/2011   • Macrocytosis without anemia 09/14/2011   • Acquired hypothyroidism 09/14/2011   • RA (rheumatoid arthritis) (CMS-HCC) 09/14/2011       ROS   No fever or chills.  No nausea or vomiting.  No chest pain or palpitations.  No  SOB.  No pain with urination or hematuria.  No black or bloody stools.       Objective:     Blood pressure 132/84, pulse 71, temperature 36.7 °C (98 °F), resp. rate 16, height 1.626 m (5' 4\"), weight 80.7 kg (178 lb), last menstrual period 01/01/2010, SpO2 98 %. Body mass index is 30.55 kg/m².   Physical Exam:  Well developed, well nourished.  Alert, oriented in no acute distress.  Eye contact is good, speech goal directed, affect calm  Eyes: conjunctiva non-injected, sclera non-icteric.  Ears: Pinna normal. TM pearly gray.   Nose: Nares are patent.Erythematous, swollen mucosa with yellow discharge  Mouth: Oral mucous membranes pink and moist with no lesions. Mild diffuse erythema of the posterior pharynx   Neck Supple.  No adenopathy or masses in the neck or supraclavicular regions. No thyromegaly  Lungs: clear to auscultation bilaterally with good excursion. No wheezes or rhonchi  CV: regular rate and rhythm. No murmur      Assessment and Plan:   The following treatment plan was discussed    1. Bronchitis  Increase fluids and rest. If not improving start Zithromax as directed.  - azithromycin (ZITHROMAX) 250 MG Tab; 2 tabs by mouth day 1, 1 tab by mouth days 2-5  Dispense: 6 Tab; Refill: 0    Any change or worsening of signs or symptoms, patient encouraged to follow-up or report to the emergency room for further evaluation. Patient understands and agrees.    Followup: Return if symptoms worsen or fail to improve.           "

## 2017-12-26 NOTE — ASSESSMENT & PLAN NOTE
Illness: 7 days of illness including: nasal congestion, laryngitis, cough, myalgias ,  Symptoms negative for fever, chills,   Treatments tried: OTC meds   Since onset, symptoms are worse   Similarly ill exposures: yes  Medical history negative for asthma  She  reports that she has been smoking Cigarettes.  She has a 17.50 pack-year smoking history. She has never used smokeless tobacco.

## 2018-01-02 ENCOUNTER — OFFICE VISIT (OUTPATIENT)
Dept: MEDICAL GROUP | Facility: MEDICAL CENTER | Age: 58
End: 2018-01-02
Payer: COMMERCIAL

## 2018-01-02 VITALS
HEIGHT: 64 IN | OXYGEN SATURATION: 97 % | WEIGHT: 183 LBS | TEMPERATURE: 98 F | HEART RATE: 84 BPM | BODY MASS INDEX: 31.24 KG/M2 | RESPIRATION RATE: 16 BRPM | SYSTOLIC BLOOD PRESSURE: 132 MMHG | DIASTOLIC BLOOD PRESSURE: 86 MMHG

## 2018-01-02 DIAGNOSIS — J40 BRONCHITIS: ICD-10-CM

## 2018-01-02 DIAGNOSIS — M54.32 SCIATICA OF LEFT SIDE: ICD-10-CM

## 2018-01-02 DIAGNOSIS — M06.9 RHEUMATOID ARTHRITIS INVOLVING MULTIPLE SITES, UNSPECIFIED RHEUMATOID FACTOR PRESENCE: ICD-10-CM

## 2018-01-02 DIAGNOSIS — M23.92 INTERNAL DERANGEMENT OF LEFT KNEE: ICD-10-CM

## 2018-01-02 PROCEDURE — 99214 OFFICE O/P EST MOD 30 MIN: CPT | Performed by: FAMILY MEDICINE

## 2018-01-02 RX ORDER — AMOXICILLIN AND CLAVULANATE POTASSIUM 875; 125 MG/1; MG/1
1 TABLET, FILM COATED ORAL 2 TIMES DAILY
Qty: 20 TAB | Refills: 0 | Status: SHIPPED | OUTPATIENT
Start: 2018-01-02 | End: 2018-02-21

## 2018-01-02 NOTE — ASSESSMENT & PLAN NOTE
Steroids really seem to help her back pain. She noticed at least a 50% improvement with that. She does continue to have left knee pain. She is able to sit for 3-4 hours at a time which is a huge improvement from before. She denies any numbness or tingling. No loss of bowel or bladder function.

## 2018-01-02 NOTE — ASSESSMENT & PLAN NOTE
Patient continues to feel unstable on her left knee. She wears a brace at any time she is standing otherwise she feels like it gives way and she'll fall. It hasn't improved significantly since the original incident a month ago so she is ready to proceed with MRI and orthopedic referral. She continues to have pain in the area that is fairly well controlled with Naprosyn.

## 2018-01-02 NOTE — ASSESSMENT & PLAN NOTE
Illness: Improved for a week after antibiotics.  7 days of illness including: nasal congestion, ear pain/congestion, facial pressure, central, cough ,  Sinus pain and pressure: bilateral frontal.  Some fever and chills at the start  Symptoms negative for rhinorrhea,   Since onset, symptoms are worse   Similarly ill exposures: yes  Medical history negative for asthma  She  reports that she has been smoking Cigarettes.  She has a 17.50 pack-year smoking history. She has never used smokeless tobacco.

## 2018-01-02 NOTE — PROGRESS NOTES
Subjective:     Chief Complaint   Patient presents with   • Follow-Up       Clifford Murphy is a 57 y.o. female here today for evaluation and management of:    Bronchitis  Illness: Improved for a week after antibiotics.  7 days of illness including: nasal congestion, ear pain/congestion, facial pressure, central, cough ,  Sinus pain and pressure: bilateral frontal.  Some fever and chills at the start  Symptoms negative for rhinorrhea,   Since onset, symptoms are worse   Similarly ill exposures: yes  Medical history negative for asthma  She  reports that she has been smoking Cigarettes.  She has a 17.50 pack-year smoking history. She has never used smokeless tobacco.      RA (rheumatoid arthritis)  Patient has  insurance and needs a referral to Dr. Gómez for her rheumatoid arthritis.    Sciatica of left side  Steroids really seem to help her back pain. She noticed at least a 50% improvement with that. She does continue to have left knee pain. She is able to sit for 3-4 hours at a time which is a huge improvement from before. She denies any numbness or tingling. No loss of bowel or bladder function.    Internal derangement of left knee  Patient continues to feel unstable on her left knee. She wears a brace at any time she is standing otherwise she feels like it gives way and she'll fall. It hasn't improved significantly since the original incident a month ago so she is ready to proceed with MRI and orthopedic referral. She continues to have pain in the area that is fairly well controlled with Naprosyn.       Allergies   Allergen Reactions   • Aspirin    • Codeine    • Morphine    • Sulfa Drugs        Current medicines (including changes today)  Current Outpatient Prescriptions   Medication Sig Dispense Refill   • Acetaminophen (TYLENOL EX ST ARTHRITIS PAIN PO) Take  by mouth.     • amoxicillin-clavulanate (AUGMENTIN) 875-125 MG Tab Take 1 Tab by mouth 2 times a day. 20 Tab 0   • leflunomide (ARAVA) 20 MG Tab TAKE  1 TABLET BY MOUTH EVERY DAY 90 Tab 0   • ranitidine (ZANTAC) 150 MG Tab Take 1 Tab by mouth 2 times a day. TAKE 1 TAB BY MOUTH 2 TIMES A DAY. 180 Tab 2   • levothyroxine (SYNTHROID) 125 MCG Tab Take 1 Tab by mouth Every morning on an empty stomach. 90 Tab 1   • hydroxychloroquine (PLAQUENIL) 200 MG Tab TAKE 1 TAB BY MOUTH TWICE A DAY. 180 Tab 1   • Naproxen Sodium 220 MG Cap Take  by mouth.     • Cholecalciferol (VITAMIN D3) 2000 UNIT CAPS Take 1 Cap by mouth every day.     • folic acid (FOLVITE) 1 MG TABS Take 1 mg by mouth every day.     • MethylPREDNISolone (MEDROL DOSEPAK) 4 MG Tablet Therapy Pack As directed on the packaging label. 21 Tab 0   • polymixin-trimethoprim (POLYTRIM) 85526-0.1 UNIT/ML-% Solution Place 1 Drop in both eyes every 4 hours. 10 mL 0   • hydroxychloroquine (PLAQUENIL) 200 MG Tab TAKE 1 TABLET BY MOUTH TWICE A  Tab 1   • Cyanocobalamin (VITAMIN B 12) 100 MCG Lozenge Take  by mouth.     • hydroxychloroquine (PLAQUENIL) 200 MG Tab TAKE 1 TABLET BY MOUTH TWICE A  Tab 1   • estradiol (ESTRACE) 0.1 MG/GM vaginal cream Insert 1 g in vagina every day. 42.5 g 3   • acyclovir (ZOVIRAX) 200 MG Cap TAKE ONE CAPSULE BY MOUTH TWICE A  Cap 1   • pneumococcal vaccine (PNEUMOVAX 23) 25 MCG/0.5ML Injection 0.5 mL by Intramuscular route Once PRN for up to 1 dose. 1 Vial 0   • tetanus-diphth-acell pertussis (BOOSTRIX, AGES 7 & OLDER,) 5-2.5-18.5 LF-MCG/0.5 Suspension 0.5 mL by Intramuscular route Once PRN for up to 1 dose. May use Adacel or Boostrix. 0.5 mL 0   • albuterol (VENTOLIN OR PROVENTIL) 108 (90 BASE) MCG/ACT Aero Soln inhalation aerosol Inhale 2 Puffs by mouth every 6 hours as needed for Shortness of Breath. 8.5 g 3   • clobetasol (TEMOVATE) 0.05 % Ointment Use thin layer bid x 1-2 weeks then qhs x1-2 weeks then every other night x 1-2 weeks then as needed for itch or rash. 1 Tube 0   • cholestyramine (QUESTRAN) 4 G packet Take 4 g by mouth 1 time daily as needed. 30 Each 5   •  "fluticasone (FLONASE) 50 MCG/ACT nasal spray Spray 1 Spray in nose every day.     • cetirizine (ZYRTEC) 10 MG TABS Take 10 mg by mouth every day.       No current facility-administered medications for this visit.        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    Patient Active Problem List    Diagnosis Date Noted   • Bronchitis 12/15/2017   • Sciatica of left side 12/12/2017   • Internal derangement of left knee 12/12/2017   • Obesity (BMI 30-39.9) 11/17/2017   • Thrombocytopenia (CMS-HCC) 10/05/2017   • Cough with sputum 12/28/2015   • Diarrhea 12/28/2015   • Tobacco abuse 08/05/2015   • Acute allergic otitis media of right ear 06/17/2015   • Seasonal allergies 06/17/2015   • Hypertriglyceridemia 09/29/2014   • Genital herpes in women 02/11/2014   • Jaw pain 08/04/2013   • High risk medications (not anticoagulants) long-term use 08/29/2012   • Post-cholecystectomy syndrome 05/23/2012   • Vitamin D insufficiency 05/15/2012   • Preventative health care 09/15/2011   • Menopause 09/15/2011   • Macrocytosis without anemia 09/14/2011   • Acquired hypothyroidism 09/14/2011   • RA (rheumatoid arthritis) (CMS-HCC) 09/14/2011       ROS   No fever or chills.  No nausea or vomiting.  No chest pain or palpitations.  No cough or SOB.  No pain with urination or hematuria.  No black or bloody stools.       Objective:     Blood pressure 132/86, pulse 84, temperature 36.7 °C (98 °F), resp. rate 16, height 1.626 m (5' 4\"), weight 83 kg (183 lb), last menstrual period 01/01/2010, SpO2 97 %. Body mass index is 31.41 kg/m².   Physical Exam:  Well " developed, well nourished.  Alert, oriented in no acute distress.  Eye contact is good, speech goal directed, affect calm  Eyes: conjunctiva non-injected, sclera non-icteric.  Ears: Pinna normal. TM Dull with fluid bilaterally left greater than right   Nose: Nares are patent. Erythematous, swollen mucosa with yellow discharge  Mouth: Oral mucous membranes pink and moist with no lesions. Erythema of the posterior pharynx with yellow postnasal drainage  Sinuses: Tender to percussion of the bilateral frontal sinuses without any fluctuance or deformity  Neck Supple.  No adenopathy or masses in the neck or supraclavicular regions. No thyromegaly  Lungs: clear to auscultation bilaterally with good excursion. No wheezes or rhonchi  CV: regular rate and rhythm. No murmur  Abdomen: soft, nontender, no masses or organomegaly.  No rebound or gaurding  Ext: no edema, color normal, vascularity normal, temperature normal  Knees: No erythema/edema/ecchymosis/effusion. Tenderness to palpation on patella bilaterally. Joint line tenderness bilaterally. Patellar grind test positive. Lachman's positive. Jeff's negative. ROM intact. 5/5 strength bilaterally. 2+ deep tendon reflexes bilaterally.        Assessment and Plan:   The following treatment plan was discussed    1. Internal derangement of left knee  MRI and referred to orthopedics  - REFERRAL TO ORTHOPEDICS  - MR-KNEE-W/O LEFT; Future    2. Bronchitis  Augmentin as directed. Increase fluids and rest  - amoxicillin-clavulanate (AUGMENTIN) 875-125 MG Tab; Take 1 Tab by mouth 2 times a day.  Dispense: 20 Tab; Refill: 0    3. Rheumatoid arthritis involving multiple sites, unspecified rheumatoid factor presence (CMS-HCC)  Refer to rheumatology  - REFERRAL TO RHEUMATOLOGY    4. Sciatica of left side  Improved. No further therapy at this time.    Any change or worsening of signs or symptoms, patient encouraged to follow-up or report to the emergency room for further evaluation.  Patient understands and agrees.    Followup: Return if symptoms worsen or fail to improve.

## 2018-01-03 ENCOUNTER — HOSPITAL ENCOUNTER (OUTPATIENT)
Dept: RADIOLOGY | Facility: MEDICAL CENTER | Age: 58
End: 2018-01-03
Attending: FAMILY MEDICINE
Payer: COMMERCIAL

## 2018-01-03 DIAGNOSIS — M23.92 INTERNAL DERANGEMENT OF LEFT KNEE: ICD-10-CM

## 2018-01-03 PROCEDURE — 73721 MRI JNT OF LWR EXTRE W/O DYE: CPT | Mod: LT

## 2018-01-06 ENCOUNTER — HOSPITAL ENCOUNTER (OUTPATIENT)
Dept: LAB | Facility: MEDICAL CENTER | Age: 58
End: 2018-01-06
Attending: FAMILY MEDICINE
Payer: COMMERCIAL

## 2018-01-06 ENCOUNTER — HOSPITAL ENCOUNTER (OUTPATIENT)
Dept: LAB | Facility: MEDICAL CENTER | Age: 58
End: 2018-01-06
Attending: INTERNAL MEDICINE
Payer: COMMERCIAL

## 2018-01-06 DIAGNOSIS — E78.1 HYPERTRIGLYCERIDEMIA: ICD-10-CM

## 2018-01-06 DIAGNOSIS — E03.9 HYPOTHYROIDISM, UNSPECIFIED TYPE: ICD-10-CM

## 2018-01-06 DIAGNOSIS — Z79.899 ENCOUNTER FOR MONITORING ARAVA THERAPY: ICD-10-CM

## 2018-01-06 DIAGNOSIS — E03.9 ACQUIRED HYPOTHYROIDISM: ICD-10-CM

## 2018-01-06 DIAGNOSIS — M06.9 RHEUMATOID ARTHRITIS INVOLVING MULTIPLE SITES, UNSPECIFIED RHEUMATOID FACTOR PRESENCE: ICD-10-CM

## 2018-01-06 DIAGNOSIS — Z51.81 ENCOUNTER FOR MONITORING ARAVA THERAPY: ICD-10-CM

## 2018-01-06 LAB
ALBUMIN SERPL BCP-MCNC: 3.9 G/DL (ref 3.2–4.9)
ALBUMIN/GLOB SERPL: 1.2 G/DL
ALP SERPL-CCNC: 77 U/L (ref 30–99)
ALT SERPL-CCNC: 10 U/L (ref 2–50)
ANION GAP SERPL CALC-SCNC: 8 MMOL/L (ref 0–11.9)
AST SERPL-CCNC: 16 U/L (ref 12–45)
BASOPHILS # BLD AUTO: 0.8 % (ref 0–1.8)
BASOPHILS # BLD: 0.04 K/UL (ref 0–0.12)
BILIRUB SERPL-MCNC: 0.5 MG/DL (ref 0.1–1.5)
BUN SERPL-MCNC: 21 MG/DL (ref 8–22)
CALCIUM SERPL-MCNC: 9.4 MG/DL (ref 8.5–10.5)
CHLORIDE SERPL-SCNC: 108 MMOL/L (ref 96–112)
CHOLEST SERPL-MCNC: 136 MG/DL (ref 100–199)
CO2 SERPL-SCNC: 24 MMOL/L (ref 20–33)
CREAT SERPL-MCNC: 0.8 MG/DL (ref 0.5–1.4)
EOSINOPHIL # BLD AUTO: 0 K/UL (ref 0–0.51)
EOSINOPHIL NFR BLD: 0 % (ref 0–6.9)
ERYTHROCYTE [DISTWIDTH] IN BLOOD BY AUTOMATED COUNT: 45.9 FL (ref 35.9–50)
ERYTHROCYTE [SEDIMENTATION RATE] IN BLOOD BY WESTERGREN METHOD: 29 MM/HOUR (ref 0–30)
GFR SERPL CREATININE-BSD FRML MDRD: >60 ML/MIN/1.73 M 2
GLOBULIN SER CALC-MCNC: 3.2 G/DL (ref 1.9–3.5)
GLUCOSE SERPL-MCNC: 107 MG/DL (ref 65–99)
HCT VFR BLD AUTO: 44.8 % (ref 37–47)
HDLC SERPL-MCNC: 42 MG/DL
HGB BLD-MCNC: 14.6 G/DL (ref 12–16)
IMM GRANULOCYTES # BLD AUTO: 0.01 K/UL (ref 0–0.11)
IMM GRANULOCYTES NFR BLD AUTO: 0.2 % (ref 0–0.9)
LDLC SERPL CALC-MCNC: 78 MG/DL
LYMPHOCYTES # BLD AUTO: 1.61 K/UL (ref 1–4.8)
LYMPHOCYTES NFR BLD: 33.1 % (ref 22–41)
MCH RBC QN AUTO: 30.9 PG (ref 27–33)
MCHC RBC AUTO-ENTMCNC: 32.6 G/DL (ref 33.6–35)
MCV RBC AUTO: 94.7 FL (ref 81.4–97.8)
MONOCYTES # BLD AUTO: 0.58 K/UL (ref 0–0.85)
MONOCYTES NFR BLD AUTO: 11.9 % (ref 0–13.4)
NEUTROPHILS # BLD AUTO: 2.62 K/UL (ref 2–7.15)
NEUTROPHILS NFR BLD: 54 % (ref 44–72)
NRBC # BLD AUTO: 0 K/UL
NRBC BLD-RTO: 0 /100 WBC
PLATELET # BLD AUTO: 191 K/UL (ref 164–446)
PMV BLD AUTO: 11.9 FL (ref 9–12.9)
POTASSIUM SERPL-SCNC: 3.9 MMOL/L (ref 3.6–5.5)
PROT SERPL-MCNC: 7.1 G/DL (ref 6–8.2)
RBC # BLD AUTO: 4.73 M/UL (ref 4.2–5.4)
SODIUM SERPL-SCNC: 140 MMOL/L (ref 135–145)
T3 SERPL-MCNC: 86 NG/DL (ref 60–181)
T4 FREE SERPL-MCNC: 1.27 NG/DL (ref 0.53–1.43)
TRIGL SERPL-MCNC: 78 MG/DL (ref 0–149)
TSH SERPL DL<=0.005 MIU/L-ACNC: 5.62 UIU/ML (ref 0.38–5.33)
WBC # BLD AUTO: 4.9 K/UL (ref 4.8–10.8)

## 2018-01-06 PROCEDURE — 80053 COMPREHEN METABOLIC PANEL: CPT

## 2018-01-06 PROCEDURE — 36415 COLL VENOUS BLD VENIPUNCTURE: CPT

## 2018-01-06 PROCEDURE — 84439 ASSAY OF FREE THYROXINE: CPT

## 2018-01-06 PROCEDURE — 84443 ASSAY THYROID STIM HORMONE: CPT

## 2018-01-06 PROCEDURE — 84480 ASSAY TRIIODOTHYRONINE (T3): CPT

## 2018-01-06 PROCEDURE — 85025 COMPLETE CBC W/AUTO DIFF WBC: CPT

## 2018-01-06 PROCEDURE — 85652 RBC SED RATE AUTOMATED: CPT

## 2018-01-06 PROCEDURE — 80061 LIPID PANEL: CPT

## 2018-02-01 ENCOUNTER — OFFICE VISIT (OUTPATIENT)
Dept: RHEUMATOLOGY | Facility: PHYSICIAN GROUP | Age: 58
End: 2018-02-01
Payer: COMMERCIAL

## 2018-02-01 VITALS
BODY MASS INDEX: 30.73 KG/M2 | TEMPERATURE: 97.5 F | HEART RATE: 92 BPM | RESPIRATION RATE: 14 BRPM | WEIGHT: 179 LBS | SYSTOLIC BLOOD PRESSURE: 128 MMHG | OXYGEN SATURATION: 96 % | DIASTOLIC BLOOD PRESSURE: 80 MMHG

## 2018-02-01 DIAGNOSIS — Z79.899 LONG-TERM USE OF PLAQUENIL: ICD-10-CM

## 2018-02-01 DIAGNOSIS — Z79.899 ENCOUNTER FOR MONITORING ARAVA THERAPY: ICD-10-CM

## 2018-02-01 DIAGNOSIS — M54.42 ACUTE MIDLINE LOW BACK PAIN WITH LEFT-SIDED SCIATICA: ICD-10-CM

## 2018-02-01 DIAGNOSIS — Z72.0 TOBACCO ABUSE: ICD-10-CM

## 2018-02-01 DIAGNOSIS — M81.0 SENILE OSTEOPOROSIS: ICD-10-CM

## 2018-02-01 DIAGNOSIS — E03.9 HYPOTHYROIDISM, UNSPECIFIED TYPE: ICD-10-CM

## 2018-02-01 DIAGNOSIS — Z51.81 ENCOUNTER FOR MONITORING ARAVA THERAPY: ICD-10-CM

## 2018-02-01 DIAGNOSIS — M05.79 RHEUMATOID ARTHRITIS INVOLVING MULTIPLE SITES WITH POSITIVE RHEUMATOID FACTOR (HCC): ICD-10-CM

## 2018-02-01 PROCEDURE — 99214 OFFICE O/P EST MOD 30 MIN: CPT | Performed by: INTERNAL MEDICINE

## 2018-02-01 NOTE — LETTER
[facility]   [address], [address]  [place] [address]  Phone: [phone]  Fax: [phone]              Encounter Date: 2/1/2018    Dear Dr. Whitney ref. provider found,    It was a pleasure seeing your patient, Clifford Murphy, on 2/1/2018. Diagnoses of Rheumatoid arthritis involving multiple sites with positive rheumatoid factor (CMS-HCC), Acute midline low back pain with left-sided sciatica, Encounter for monitoring Arava therapy, Long-term use of Plaquenil, Senile osteoporosis, Hypothyroidism, unspecified type, and Tobacco abuse were pertinent to this visit.     Please find attached progress note which includes the history I obtained from Ms. Murphy, my physical examination findings, my impression and recommendations.      Once again, it was a pleasure participating in your patient's care.  Please feel free to contact me if you have any questions or if I can be of any further assistance to your patients.      Sincerely,    Elizabeth Gómez M.D.  Electronically Signed          PROGRESS NOTE:  No notes on file

## 2018-02-01 NOTE — PROGRESS NOTES
Chief Complaint- joint pain    Subjective:   Clifford Murphy is a 58 y.o. female here today for follow up of rheumatological issues    Patient is Here to followup her rheumatoid arthritis currently on Plaquenil 200 mg by mouth twice a day, And leflunomide 20 mg by mouth daily with folic acid 1 mg by mouth daily. Patient states that leflunamide really does help her joints. of note recent sedimentation rate 29.  Last ophthalmology evaluation July 2017.  At last visit we asked patient to get hand and feet x-rays as well as a bone density scan for which patient did not follow through.    Since last visit on December 9, 2017 patient had a fall with twisting of her left knee and falling on her low back status post evaluation the emergency room who found no broken bones and patient discharged. Patient now status post left knee MRI that does show some DJD, bone-on-bone and history of previous surgeries ×3 in that left knee, patient still having low back pain and feels pain down the medial aspect of her left calf and wonders if it's sciatica. Patient denies any bowel or bladder incontinence.      Patient continues to smoke in spite of knowledge that tobacco abuse will exacerbate her rheumatoid arthritis.    Other comorbidities include hypothyroidism       S/p MTX-N/V  S/p Enbrel-sinus infections  S/p Humira-sinus infections and injection site skin breakdown  S/p Actemra-exacerbated pneumonia  S/p sulfasalazine-contradicted, pt with sulfa allergy     CCP neg 9/2015  Quantiferon Gold neg 9/2015  Hep B neg 9/2015  Uric acid 4.6 9/2015  G6PD 11.5 adequate 10/2017  Last optho eval 9/2017  RF 18 6/2009  CHIQUITA neg 6/2009   Hand x-rays 5/2015-no pathology  Feet X-rays 5/2015-indicates DJD, no erosions    LS pine x-rays 7/2009-indicates multilevel DJD          Current medicines (including changes today)  Current Outpatient Prescriptions   Medication Sig Dispense Refill   • leflunomide (ARAVA) 20 MG Tab TAKE 1 TABLET BY MOUTH EVERY DAY  90 Tab 0   • ranitidine (ZANTAC) 150 MG Tab Take 1 Tab by mouth 2 times a day. TAKE 1 TAB BY MOUTH 2 TIMES A DAY. 180 Tab 2   • levothyroxine (SYNTHROID) 125 MCG Tab Take 1 Tab by mouth Every morning on an empty stomach. 90 Tab 1   • hydroxychloroquine (PLAQUENIL) 200 MG Tab TAKE 1 TAB BY MOUTH TWICE A DAY. 180 Tab 1   • Cyanocobalamin (VITAMIN B 12) 100 MCG Lozenge Take  by mouth.     • hydroxychloroquine (PLAQUENIL) 200 MG Tab TAKE 1 TABLET BY MOUTH TWICE A  Tab 1   • Naproxen Sodium 220 MG Cap Take  by mouth.     • Cholecalciferol (VITAMIN D3) 2000 UNIT CAPS Take 1 Cap by mouth every day.     • folic acid (FOLVITE) 1 MG TABS Take 1 mg by mouth every day.     • Acetaminophen (TYLENOL EX ST ARTHRITIS PAIN PO) Take  by mouth.     • amoxicillin-clavulanate (AUGMENTIN) 875-125 MG Tab Take 1 Tab by mouth 2 times a day. 20 Tab 0   • MethylPREDNISolone (MEDROL DOSEPAK) 4 MG Tablet Therapy Pack As directed on the packaging label. 21 Tab 0   • polymixin-trimethoprim (POLYTRIM) 06672-9.1 UNIT/ML-% Solution Place 1 Drop in both eyes every 4 hours. 10 mL 0   • hydroxychloroquine (PLAQUENIL) 200 MG Tab TAKE 1 TABLET BY MOUTH TWICE A  Tab 1   • estradiol (ESTRACE) 0.1 MG/GM vaginal cream Insert 1 g in vagina every day. 42.5 g 3   • acyclovir (ZOVIRAX) 200 MG Cap TAKE ONE CAPSULE BY MOUTH TWICE A  Cap 1   • pneumococcal vaccine (PNEUMOVAX 23) 25 MCG/0.5ML Injection 0.5 mL by Intramuscular route Once PRN for up to 1 dose. 1 Vial 0   • tetanus-diphth-acell pertussis (BOOSTRIX, AGES 7 & OLDER,) 5-2.5-18.5 LF-MCG/0.5 Suspension 0.5 mL by Intramuscular route Once PRN for up to 1 dose. May use Adacel or Boostrix. 0.5 mL 0   • albuterol (VENTOLIN OR PROVENTIL) 108 (90 BASE) MCG/ACT Aero Soln inhalation aerosol Inhale 2 Puffs by mouth every 6 hours as needed for Shortness of Breath. 8.5 g 3   • clobetasol (TEMOVATE) 0.05 % Ointment Use thin layer bid x 1-2 weeks then qhs x1-2 weeks then every other night x 1-2 weeks  then as needed for itch or rash. 1 Tube 0   • cholestyramine (QUESTRAN) 4 G packet Take 4 g by mouth 1 time daily as needed. 30 Each 5   • fluticasone (FLONASE) 50 MCG/ACT nasal spray Spray 1 Spray in nose every day.     • cetirizine (ZYRTEC) 10 MG TABS Take 10 mg by mouth every day.       No current facility-administered medications for this visit.      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    ROS   Other than what is mentioned in HPI or physical exam, there is no history of headaches, double vision or blurred vision. No temporal tenderness or jaw claudication. No history of cataracts or glaucoma. No trouble swallowing difficulties or sore throats. No history of thyroid disease. No chest complaints including chest pain, cough or sputum production. No shortness of breath. No GI complaints including nausea, vomiting, change in bowel habits, or past peptic ulcer disease. No history of blood in the stools. No urinary complaints including dysuria or frequency. No history of rash including psoriasis. No history of alopecia, photosensitivity, oral ulcerations, Raynaud's phenomena, or swollen joints. No history of gout. No back complaints. No history of low blood counts.       Objective:     Blood pressure 128/80, pulse 92, temperature 36.4 °C (97.5 °F), resp. rate 14, weight 81.2 kg (179 lb), last menstrual period 01/01/2010, SpO2 96 %. Body mass index is 30.73 kg/m².   Physical Exam:  Constitution: Alert, oriented X 3 in no acute distress.Eye: contact is good, speech goal directed, affect calmHEENT: conjunctiva non-injected, sclera  non-icteric, both TMs are pearly gray, no evidence of infection  Pinna normal. Oral: mucous membranes pink and moist with no lesions.Neck: No thyromegaly, trachea midline Lymph: No supraclavicular lymphadenopathy, no axillary lymphadenopathy, no cervical lymphadenopathyLungs: clear to auscultation bilaterally with good excursion, did not appreciate any wheezes or rhonchiCV: regular rate and rhythm.Abdomen: soft, nontender, No CVAT, no HSM, a bit overweightNeuro: Cranial nerves 2-12 are grossly intact, no foot drop, gait within normal limits, no antalgiaSkin: No discoid lesions, no petechial lesions, no malar rash, no vasculitic lesionsExt: No evidence of swan-neck or boutonniere deformities, no sausage digits,  no flexure contractures, no nodules no tophi, mild tenderness to palpation along the MCP joints but no victorina synovitis noted. Patient has tenderness along left knee joint line, slight bogginess but no erythema and no caloric no lymphangitis, well-healed scar secondary to multiple surgeries in that left knee. Patient does have some feliberto lumbar spine tenderness, no step-offs are noted, Schoebers is 10-13 cm. Gait without antalgia and without foot drop.    Lab Results   Component Value Date/Time    SODIUM 140 01/06/2018 09:24 AM    POTASSIUM 3.9 01/06/2018 09:24 AM    CHLORIDE 108 01/06/2018 09:24 AM    CO2 24 01/06/2018 09:24 AM    GLUCOSE 107 (H) 01/06/2018 09:24 AM    BUN 21 01/06/2018 09:24 AM    CREATININE 0.80 01/06/2018 09:24 AM    CREATININE 0.77 03/26/2013 12:00 AM    BUNCREATRAT 30 (H) 11/23/2016 09:46 AM    BUNCREATRAT 30 (H) 03/26/2013 12:00 AM      Lab Results   Component Value Date/Time    WBC 4.9 01/06/2018 09:24 AM    WBC 5.7 10/20/2011 12:00 AM    RBC 4.73 01/06/2018 09:24 AM    RBC 4.32 10/20/2011 12:00 AM    HEMOGLOBIN 14.6 01/06/2018 09:24 AM    HEMATOCRIT 44.8 01/06/2018 09:24 AM    MCV 94.7 01/06/2018 09:24 AM    MCV 99 (H) 10/20/2011 12:00 AM    MCH 30.9 01/06/2018 09:24 AM    MCH 34.0  10/20/2011 12:00 AM    MCHC 32.6 (L) 01/06/2018 09:24 AM    MPV 11.9 01/06/2018 09:24 AM    NEUTSPOLYS 54.00 01/06/2018 09:24 AM    LYMPHOCYTES 33.10 01/06/2018 09:24 AM    MONOCYTES 11.90 01/06/2018 09:24 AM    EOSINOPHILS 0.00 01/06/2018 09:24 AM    BASOPHILS 0.80 01/06/2018 09:24 AM      Lab Results   Component Value Date/Time    CALCIUM 9.4 01/06/2018 09:24 AM    ASTSGOT 16 01/06/2018 09:24 AM    ALTSGPT 10 01/06/2018 09:24 AM    ALKPHOSPHAT 77 01/06/2018 09:24 AM    TBILIRUBIN 0.5 01/06/2018 09:24 AM    ALBUMIN 3.9 01/06/2018 09:24 AM    TOTPROTEIN 7.1 01/06/2018 09:24 AM     Lab Results   Component Value Date/Time    RHEUMFACTN 18 (H) 06/15/2009 09:27 AM    ANTINUCAB None Detected 06/15/2009 09:27 AM     Lab Results   Component Value Date/Time    SEDRATEWES 29 01/06/2018 09:24 AM     Lab Results   Component Value Date/Time    HBA1C 5.3 11/23/2016 09:46 AM     Lab Results   Component Value Date/Time    G6PD 11.5 10/03/2017 09:54 AM     Results for orders placed in visit on 05/12/15   DX-JOINT SURVEY-HANDS SINGLE VIEW    Impression Negative joint survey view of the hands.     Results for orders placed in visit on 05/12/15   DX-JOINT SURVEY-FEET SINGLE VIEW    Impression 1.  Moderate bilateral hallux valgus deformity.    2.  No evidence of erosive arthritic change.     Results for orders placed in visit on 09/29/14   DX-FOOT-COMPLETE 3+    Impression Subacute nondisplaced fifth proximal phalanx extra-articular fracture     Results for orders placed during the hospital encounter of 07/11/09   DX-PELVIS-1 OR 2 VIEWS    Impression IMPRESSION:     1. VERY MILD DEGENERATIVE CHANGE OF THE HIPS, SLIGHTLY WORSE ON THE RIGHT   THAN THE LEFT.  MINIMAL SCLEROSIS IS SEEN AT THE LATERAL ASPECT OF THE   RIGHT FEMORAL NECK, WHICH IS OF DOUBTFUL CLINICAL SIGNIFICANCE.      2. NO PELVIS FRACTURE.    MM/srd     Read By MARICARMEN ANSARI MD on Jul 13 2009  8:38AM  : PREMA Transcription Date: Jul 13 2009 10:42AM  THIS  DOCUMENT HAS BEEN ELECTRONICALLY SIGNED BY: MARICARMEN ANSARI MD on Jul 13 2009  4:05PM        Results for orders placed during the hospital encounter of 07/11/09   DX-PELVIS-1 OR 2 VIEWS    Impression IMPRESSION:     1. VERY MILD DEGENERATIVE CHANGE OF THE HIPS, SLIGHTLY WORSE ON THE RIGHT   THAN THE LEFT.  MINIMAL SCLEROSIS IS SEEN AT THE LATERAL ASPECT OF THE   RIGHT FEMORAL NECK, WHICH IS OF DOUBTFUL CLINICAL SIGNIFICANCE.      2. NO PELVIS FRACTURE.    MM/srd     Read By MARICARMEN ANSARI MD on Jul 13 2009  8:38AM  : SRP Transcription Date: Jul 13 2009 10:42AM  THIS DOCUMENT HAS BEEN ELECTRONICALLY SIGNED BY: MARICARMEN ANSARI MD on Jul 13 2009  4:05PM        Results for orders placed during the hospital encounter of 10/10/09   DX-KNEE COMPLETE 4+    Impression IMPRESSION:     3. MODERATE THREE COMPARTMENT ARTHROSIS.     4. QUESTION SMALL EFFUSION.     MPW/vadim         Read By JASSI GREEN MD on Oct 12 2009  9:01AM  : ZPT Transcription Date: Oct 12 2009  2:56PM  THIS DOCUMENT HAS BEEN ELECTRONICALLY SIGNED BY: JASSI GREEN MD on Oct   12 2009  3:47PM        Results for orders placed in visit on 09/02/15   DX-KNEE 2- LEFT    Impression 1. No evidence of acute fracture or dislocation.    2. Increased sclerosis with punctate hyperdensities in the proximal tibial metadiaphysis anteriorly may be related to prior surgery.    3. Mild left knee osteoarthritis.     Results for orders placed during the hospital encounter of 07/10/12   DX-HAND 3+    Impression No new radiographic evidence of erosive arthropathy    Stable second DIP subchondral cyst versus erosion    Stable mild first MCP osteoarthritis, periarticular calcification and osteopenia     Results for orders placed during the hospital encounter of 01/03/18   MR-KNEE-W/O LEFT    Impression 1.  No MR explanation for acute symptoms. Ligaments and menisci are intact    2.  Postoperative change related to presumed prior tibial tubercle  transfer. There is mild patella timothy    3.  Severe patellofemoral cartilage thinning with areas of bone-on-bone articulation and moderate osteophytes.    4.  Moderate femorotibial osteophytes with minimal cartilage thinning     Assessment and Plan:     1. Rheumatoid arthritis involving multiple sites with positive rheumatoid factor (CMS-[facility])  Clinically looks quite good, continue Plaquenil 200 mg by mouth twice a day and leflunomide 20 mg by mouth daily with folic acid 1 mg by mouth daily  Labs to be done every 3 months well and leflunomide, standing orders written for patient  Asked patient to do her hand and feet x-rays as requested at last visit so can monitor for progression of erosive arthropathy.  - MR-LUMBAR SPINE-W/O; Future  - CBC WITH DIFFERENTIAL; Standing  - COMP METABOLIC PANEL; Standing  - WESTERGREN SED RATE; Standing    2. Acute midline low back pain with left-sided sciatica  Status post fall December 9, 2017, status post evaluation by [facility] with a CT scan negative for any fracture but patient still with pain and symptoms of sciatica in the left calf  Today will do an MRI for evaluation of disc issue versus osteophyte versus other pathology  - MR-LUMBAR SPINE-W/O; Future  - CBC WITH DIFFERENTIAL; Standing  - COMP METABOLIC PANEL; Standing  - WESTERGREN SED RATE; Standing    3. Encounter for monitoring Arava therapy  Labs every 3 months i.e. CBC, comprehensive panel and sedimentation rate also continue folic acid 1 mg by mouth daily  - MR-LUMBAR SPINE-W/O; Future  - CBC WITH DIFFERENTIAL; Standing  - COMP METABOLIC PANEL; Standing  - WESTERGREN SED RATE; Standing    4. Long-term use of Plaquenil  Last ophthalmology evaluation July 2017, next ophthalmology evaluation July 2018, of no G6PD levels are adequate patient will need CBC every 6 months while on Plaquenil    5. Senile osteoporosis  Last DEXA over 4 years ago, we had ordered bone density scan at last visit which patient  was not compliant we printed order for bone density scan to be done   continue calcium 1200 mg by mouth daily and vitamin D about 1000 units by mouth daily and magnesium 400 mg by mouth daily  - MR-LUMBAR SPINE-W/O; Future  - CBC WITH DIFFERENTIAL; Standing  - COMP METABOLIC PANEL; Standing  - WESTERGREN SED RATE; Standing    6. Hypothyroidism, unspecified type  Can exacerbate joint pain, I recommend monitoring thyroid on a regular basis to assure it is not contributing to the patient's joint pain  - MR-LUMBAR SPINE-W/O; Future  - CBC WITH DIFFERENTIAL; Standing  - COMP METABOLIC PANEL; Standing  - WESTERGREN SED RATE; Standing    7. Tobacco abuse  Tobacco abuse is known to exacerbate rheumatoid arthritis, 20 minute discussion with patient regarding the need to stop tobacco abuse, patient states understanding  - MR-LUMBAR SPINE-W/O; Future  - CBC WITH DIFFERENTIAL; Standing  - COMP METABOLIC PANEL; Standing  - WESTERGREN SED RATE; Standing    Followup: Return in about 3 months (around 5/1/2018). or sooner prn    Patient was seen 30 minutes face-to-face of which more than 50% of the time was spent counseling the patient (excluding time for procedures)  regarding  rheumatological condition and care. Therapy was discussed in detail.    Please note that this dictation was created using voice recognition software. I have made every reasonable attempt to correct obvious errors, but I expect that there are errors of grammar and possibly content that I did not discover before finalizing the note.

## 2018-02-02 ENCOUNTER — HOSPITAL ENCOUNTER (OUTPATIENT)
Dept: RADIOLOGY | Facility: MEDICAL CENTER | Age: 58
End: 2018-02-02
Attending: INTERNAL MEDICINE
Payer: COMMERCIAL

## 2018-02-02 DIAGNOSIS — Z51.81 ENCOUNTER FOR MONITORING ARAVA THERAPY: ICD-10-CM

## 2018-02-02 DIAGNOSIS — E03.9 HYPOTHYROIDISM, UNSPECIFIED TYPE: ICD-10-CM

## 2018-02-02 DIAGNOSIS — M06.9 RHEUMATOID ARTHRITIS INVOLVING MULTIPLE SITES, UNSPECIFIED RHEUMATOID FACTOR PRESENCE: ICD-10-CM

## 2018-02-02 DIAGNOSIS — Z72.0 TOBACCO ABUSE: ICD-10-CM

## 2018-02-02 DIAGNOSIS — M81.0 SENILE OSTEOPOROSIS: ICD-10-CM

## 2018-02-02 DIAGNOSIS — M05.79 RHEUMATOID ARTHRITIS INVOLVING MULTIPLE SITES WITH POSITIVE RHEUMATOID FACTOR (HCC): ICD-10-CM

## 2018-02-02 DIAGNOSIS — Z79.899 ENCOUNTER FOR MONITORING ARAVA THERAPY: ICD-10-CM

## 2018-02-02 DIAGNOSIS — M54.42 ACUTE MIDLINE LOW BACK PAIN WITH LEFT-SIDED SCIATICA: ICD-10-CM

## 2018-02-02 PROCEDURE — 77077 JOINT SURVEY SINGLE VIEW: CPT

## 2018-02-02 PROCEDURE — 72148 MRI LUMBAR SPINE W/O DYE: CPT

## 2018-02-05 ENCOUNTER — TELEPHONE (OUTPATIENT)
Dept: RHEUMATOLOGY | Facility: PHYSICIAN GROUP | Age: 58
End: 2018-02-05

## 2018-02-05 DIAGNOSIS — S32.010S COMPRESSION FRACTURE OF L1 LUMBAR VERTEBRA, SEQUELA: ICD-10-CM

## 2018-02-06 NOTE — TELEPHONE ENCOUNTER
Called patient, left message on voicemail regarding her MRI of her low back indicating compression of L1 and also multiple areas of neural foraminal impingement  As the compression of L1 looked acute will refer to interventional radiology for evaluation of expansion.  Also left message on voicemail stating the same

## 2018-02-08 ENCOUNTER — TELEPHONE (OUTPATIENT)
Dept: RHEUMATOLOGY | Facility: PHYSICIAN GROUP | Age: 58
End: 2018-02-08

## 2018-02-08 NOTE — TELEPHONE ENCOUNTER
Pt would like to have her MRI results released to my chart so she can read the report ect.  Please release asap. Thank you

## 2018-02-21 ENCOUNTER — HOSPITAL ENCOUNTER (OUTPATIENT)
Dept: RADIOLOGY | Facility: MEDICAL CENTER | Age: 58
End: 2018-02-21
Attending: ORTHOPAEDIC SURGERY | Admitting: ORTHOPAEDIC SURGERY
Payer: COMMERCIAL

## 2018-02-21 DIAGNOSIS — Z01.812 PRE-PROCEDURAL LABORATORY EXAMINATION: ICD-10-CM

## 2018-02-21 DIAGNOSIS — M80.08XS CRUSH FRACTURE OF VERTEBRA DUE TO OSTEOPOROSIS, SEQUELA: ICD-10-CM

## 2018-02-21 DIAGNOSIS — Z01.810 PRE-OPERATIVE CARDIOVASCULAR EXAMINATION: ICD-10-CM

## 2018-02-21 LAB
ANION GAP SERPL CALC-SCNC: 10 MMOL/L (ref 0–11.9)
APPEARANCE UR: CLEAR
APTT PPP: 32 SEC (ref 24.7–36)
BASOPHILS # BLD AUTO: 0.7 % (ref 0–1.8)
BASOPHILS # BLD: 0.04 K/UL (ref 0–0.12)
BILIRUB UR QL STRIP.AUTO: NEGATIVE
BUN SERPL-MCNC: 19 MG/DL (ref 8–22)
CALCIUM SERPL-MCNC: 9.6 MG/DL (ref 8.5–10.5)
CHLORIDE SERPL-SCNC: 105 MMOL/L (ref 96–112)
CO2 SERPL-SCNC: 24 MMOL/L (ref 20–33)
COLOR UR: YELLOW
CREAT SERPL-MCNC: 0.75 MG/DL (ref 0.5–1.4)
CULTURE IF INDICATED INDCX: NO UA CULTURE
EKG IMPRESSION: NORMAL
EOSINOPHIL # BLD AUTO: 0 K/UL (ref 0–0.51)
EOSINOPHIL NFR BLD: 0 % (ref 0–6.9)
ERYTHROCYTE [DISTWIDTH] IN BLOOD BY AUTOMATED COUNT: 51.5 FL (ref 35.9–50)
ERYTHROCYTE [SEDIMENTATION RATE] IN BLOOD BY WESTERGREN METHOD: 15 MM/HOUR (ref 0–30)
GLUCOSE SERPL-MCNC: 83 MG/DL (ref 65–99)
GLUCOSE UR STRIP.AUTO-MCNC: NEGATIVE MG/DL
HCT VFR BLD AUTO: 46.9 % (ref 37–47)
HGB BLD-MCNC: 15.3 G/DL (ref 12–16)
IMM GRANULOCYTES # BLD AUTO: 0.01 K/UL (ref 0–0.11)
IMM GRANULOCYTES NFR BLD AUTO: 0.2 % (ref 0–0.9)
INR PPP: 1.02 (ref 0.87–1.13)
KETONES UR STRIP.AUTO-MCNC: NEGATIVE MG/DL
LEUKOCYTE ESTERASE UR QL STRIP.AUTO: NEGATIVE
LYMPHOCYTES # BLD AUTO: 1.62 K/UL (ref 1–4.8)
LYMPHOCYTES NFR BLD: 28.7 % (ref 22–41)
MCH RBC QN AUTO: 32.6 PG (ref 27–33)
MCHC RBC AUTO-ENTMCNC: 32.6 G/DL (ref 33.6–35)
MCV RBC AUTO: 100 FL (ref 81.4–97.8)
MICRO URNS: NORMAL
MONOCYTES # BLD AUTO: 0.49 K/UL (ref 0–0.85)
MONOCYTES NFR BLD AUTO: 8.7 % (ref 0–13.4)
NEUTROPHILS # BLD AUTO: 3.49 K/UL (ref 2–7.15)
NEUTROPHILS NFR BLD: 61.7 % (ref 44–72)
NITRITE UR QL STRIP.AUTO: NEGATIVE
NRBC # BLD AUTO: 0 K/UL
NRBC BLD-RTO: 0 /100 WBC
PH UR STRIP.AUTO: 5.5 [PH]
PLATELET # BLD AUTO: 187 K/UL (ref 164–446)
PMV BLD AUTO: 12 FL (ref 9–12.9)
POTASSIUM SERPL-SCNC: 4 MMOL/L (ref 3.6–5.5)
PROT UR QL STRIP: NEGATIVE MG/DL
PROTHROMBIN TIME: 13.1 SEC (ref 12–14.6)
RBC # BLD AUTO: 4.69 M/UL (ref 4.2–5.4)
RBC UR QL AUTO: NEGATIVE
SODIUM SERPL-SCNC: 139 MMOL/L (ref 135–145)
SP GR UR STRIP.AUTO: 1.01
UROBILINOGEN UR STRIP.AUTO-MCNC: 0.2 MG/DL
WBC # BLD AUTO: 5.7 K/UL (ref 4.8–10.8)

## 2018-02-21 PROCEDURE — 85652 RBC SED RATE AUTOMATED: CPT

## 2018-02-21 PROCEDURE — 80048 BASIC METABOLIC PNL TOTAL CA: CPT

## 2018-02-21 PROCEDURE — 85730 THROMBOPLASTIN TIME PARTIAL: CPT

## 2018-02-21 PROCEDURE — 93005 ELECTROCARDIOGRAM TRACING: CPT

## 2018-02-21 PROCEDURE — 85610 PROTHROMBIN TIME: CPT

## 2018-02-21 PROCEDURE — 71046 X-RAY EXAM CHEST 2 VIEWS: CPT

## 2018-02-21 PROCEDURE — 93010 ELECTROCARDIOGRAM REPORT: CPT | Performed by: INTERNAL MEDICINE

## 2018-02-21 PROCEDURE — 72040 X-RAY EXAM NECK SPINE 2-3 VW: CPT

## 2018-02-21 PROCEDURE — 36415 COLL VENOUS BLD VENIPUNCTURE: CPT

## 2018-02-21 PROCEDURE — 81003 URINALYSIS AUTO W/O SCOPE: CPT

## 2018-02-21 PROCEDURE — 85025 COMPLETE CBC W/AUTO DIFF WBC: CPT

## 2018-02-22 ENCOUNTER — HOSPITAL ENCOUNTER (OUTPATIENT)
Facility: MEDICAL CENTER | Age: 58
End: 2018-02-22
Attending: ORTHOPAEDIC SURGERY | Admitting: ORTHOPAEDIC SURGERY
Payer: COMMERCIAL

## 2018-02-22 ENCOUNTER — APPOINTMENT (OUTPATIENT)
Dept: RADIOLOGY | Facility: MEDICAL CENTER | Age: 58
End: 2018-02-22
Attending: ORTHOPAEDIC SURGERY
Payer: COMMERCIAL

## 2018-02-22 VITALS
RESPIRATION RATE: 16 BRPM | HEIGHT: 64 IN | WEIGHT: 178.57 LBS | HEART RATE: 74 BPM | TEMPERATURE: 97.6 F | OXYGEN SATURATION: 98 % | BODY MASS INDEX: 30.49 KG/M2

## 2018-02-22 DIAGNOSIS — M54.32 SCIATICA OF LEFT SIDE: ICD-10-CM

## 2018-02-22 PROCEDURE — 160028 HCHG SURGERY MINUTES - 1ST 30 MINS LEVEL 3: Performed by: ORTHOPAEDIC SURGERY

## 2018-02-22 PROCEDURE — 88342 IMHCHEM/IMCYTCHM 1ST ANTB: CPT

## 2018-02-22 PROCEDURE — 72080 X-RAY EXAM THORACOLMB 2/> VW: CPT

## 2018-02-22 PROCEDURE — 88311 DECALCIFY TISSUE: CPT

## 2018-02-22 PROCEDURE — A6402 STERILE GAUZE <= 16 SQ IN: HCPCS | Performed by: ORTHOPAEDIC SURGERY

## 2018-02-22 PROCEDURE — 160009 HCHG ANES TIME/MIN: Performed by: ORTHOPAEDIC SURGERY

## 2018-02-22 PROCEDURE — A9270 NON-COVERED ITEM OR SERVICE: HCPCS

## 2018-02-22 PROCEDURE — 500448 HCHG DRESSING, TELFA 3X4: Performed by: ORTHOPAEDIC SURGERY

## 2018-02-22 PROCEDURE — 160002 HCHG RECOVERY MINUTES (STAT): Performed by: ORTHOPAEDIC SURGERY

## 2018-02-22 PROCEDURE — 160046 HCHG PACU - 1ST 60 MINS PHASE II: Performed by: ORTHOPAEDIC SURGERY

## 2018-02-22 PROCEDURE — 160035 HCHG PACU - 1ST 60 MINS PHASE I: Performed by: ORTHOPAEDIC SURGERY

## 2018-02-22 PROCEDURE — 700111 HCHG RX REV CODE 636 W/ 250 OVERRIDE (IP)

## 2018-02-22 PROCEDURE — 88341 IMHCHEM/IMCYTCHM EA ADD ANTB: CPT

## 2018-02-22 PROCEDURE — 160048 HCHG OR STATISTICAL LEVEL 1-5: Performed by: ORTHOPAEDIC SURGERY

## 2018-02-22 PROCEDURE — 160039 HCHG SURGERY MINUTES - EA ADDL 1 MIN LEVEL 3: Performed by: ORTHOPAEDIC SURGERY

## 2018-02-22 PROCEDURE — 88307 TISSUE EXAM BY PATHOLOGIST: CPT

## 2018-02-22 PROCEDURE — 500768 HCHG KIT, FIRST FX: Performed by: ORTHOPAEDIC SURGERY

## 2018-02-22 PROCEDURE — 700101 HCHG RX REV CODE 250

## 2018-02-22 PROCEDURE — L8699 PROSTHETIC IMPLANT NOS: HCPCS | Performed by: ORTHOPAEDIC SURGERY

## 2018-02-22 PROCEDURE — 160025 RECOVERY II MINUTES (STATS): Performed by: ORTHOPAEDIC SURGERY

## 2018-02-22 PROCEDURE — 700102 HCHG RX REV CODE 250 W/ 637 OVERRIDE(OP)

## 2018-02-22 DEVICE — BONE CEMENT & MIXER FOR KYPHO: Type: IMPLANTABLE DEVICE | Status: FUNCTIONAL

## 2018-02-22 RX ORDER — SODIUM CHLORIDE, SODIUM LACTATE, POTASSIUM CHLORIDE, CALCIUM CHLORIDE 600; 310; 30; 20 MG/100ML; MG/100ML; MG/100ML; MG/100ML
INJECTION, SOLUTION INTRAVENOUS CONTINUOUS
Status: DISCONTINUED | OUTPATIENT
Start: 2018-02-22 | End: 2018-02-22 | Stop reason: HOSPADM

## 2018-02-22 RX ORDER — MIDAZOLAM HYDROCHLORIDE 1 MG/ML
INJECTION INTRAMUSCULAR; INTRAVENOUS
Status: DISCONTINUED
Start: 2018-02-22 | End: 2018-02-22 | Stop reason: HOSPADM

## 2018-02-22 RX ORDER — MEPERIDINE HYDROCHLORIDE 50 MG/ML
INJECTION INTRAMUSCULAR; INTRAVENOUS; SUBCUTANEOUS
Status: COMPLETED
Start: 2018-02-22 | End: 2018-02-22

## 2018-02-22 RX ORDER — OXYCODONE HCL 5 MG/5 ML
SOLUTION, ORAL ORAL
Status: COMPLETED
Start: 2018-02-22 | End: 2018-02-22

## 2018-02-22 RX ORDER — TRAMADOL HYDROCHLORIDE 50 MG/1
50-100 TABLET ORAL EVERY 4 HOURS PRN
Qty: 30 TAB | Refills: 0 | Status: SHIPPED | OUTPATIENT
Start: 2018-02-22 | End: 2018-03-08

## 2018-02-22 RX ORDER — HALOPERIDOL 5 MG/ML
INJECTION INTRAMUSCULAR
Status: COMPLETED
Start: 2018-02-22 | End: 2018-02-22

## 2018-02-22 RX ORDER — ONDANSETRON 2 MG/ML
INJECTION INTRAMUSCULAR; INTRAVENOUS
Status: COMPLETED
Start: 2018-02-22 | End: 2018-02-22

## 2018-02-22 RX ORDER — BUPIVACAINE HYDROCHLORIDE AND EPINEPHRINE 5; 5 MG/ML; UG/ML
INJECTION, SOLUTION EPIDURAL; INTRACAUDAL; PERINEURAL
Status: DISCONTINUED | OUTPATIENT
Start: 2018-02-22 | End: 2018-02-22 | Stop reason: HOSPADM

## 2018-02-22 RX ADMIN — SODIUM CHLORIDE, SODIUM LACTATE, POTASSIUM CHLORIDE, CALCIUM CHLORIDE: 600; 310; 30; 20 INJECTION, SOLUTION INTRAVENOUS at 13:25

## 2018-02-22 RX ADMIN — MEPERIDINE HYDROCHLORIDE 12.5 MG: 50 INJECTION INTRAMUSCULAR; INTRAVENOUS; SUBCUTANEOUS at 15:50

## 2018-02-22 RX ADMIN — ONDANSETRON 4 MG: 2 INJECTION INTRAMUSCULAR; INTRAVENOUS at 15:30

## 2018-02-22 RX ADMIN — FENTANYL CITRATE 50 MCG: 50 INJECTION, SOLUTION INTRAMUSCULAR; INTRAVENOUS at 16:00

## 2018-02-22 RX ADMIN — FENTANYL CITRATE 50 MCG: 50 INJECTION, SOLUTION INTRAMUSCULAR; INTRAVENOUS at 15:30

## 2018-02-22 RX ADMIN — HALOPERIDOL LACTATE 1 MG: 5 INJECTION, SOLUTION INTRAMUSCULAR at 15:40

## 2018-02-22 RX ADMIN — OXYCODONE HYDROCHLORIDE 5 MG: 5 SOLUTION ORAL at 15:45

## 2018-02-22 RX ADMIN — FENTANYL CITRATE 50 MCG: 50 INJECTION, SOLUTION INTRAMUSCULAR; INTRAVENOUS at 15:45

## 2018-02-22 ASSESSMENT — PAIN SCALES - GENERAL
PAINLEVEL_OUTOF10: 0
PAINLEVEL_OUTOF10: 8
PAINLEVEL_OUTOF10: 8
PAINLEVEL_OUTOF10: 5
PAINLEVEL_OUTOF10: 7

## 2018-02-22 NOTE — OR SURGEON
Immediate Post OP Note    PreOp Diagnosis: L1 compression fracture osteoporosis    PostOp Diagnosis: same    Procedure(s):  KYPHOPLASTY - L1 and deep vertebral body biopsy- Wound Class: Clean    Surgeon(s):  Antonio Doran M.D.    Anesthesiologist/Type of Anesthesia:  Anesthesiologist: Rafa Álvarez M.D./General    Surgical Staff:  Circulator: Mariaelena Rios R.N.; Fina White R.N.  Scrub Person: Clarice Morris  Radiology Technologist: Calvin Laird    Specimens:  Biopsy L1    Estimated Blood Loss: min    Findings: none    Complications: none        2/22/2018 3:12 PM Antonio Doran

## 2018-02-22 NOTE — OR NURSING
Pt to PACU s/p right L1 kyphoplasty. VSS throughout stay, NSR on monitor, Bps WNL. Maintaining sats on Ra. Surgical incision WNL, dressing CDI. Tolerating PO liquids with no c/o nausea after earlier administration of antiemetics. Medicated with IV and PO analgesics for moderate c/o head and back pain. Updated pt and spouse to POC, emotional support provided. Stable for transfer to phase two. Report to KEREN Santana.

## 2018-02-22 NOTE — OP REPORT
DATE OF SERVICE:  02/22/2018    SERVICE:  Orthopedic spine surgery.    PREOPERATIVE DIAGNOSES:  1.  L1 subacute compression fracture.  2.  Osteoporosis.  3.  Pathological fracture L1.    POSTOPERATIVE DIAGNOSES:  1.  L1 subacute compression fracture.  2.  Osteoporosis.  3.  Pathological fracture L1.    PROCEDURE PERFORMED:  1.  L1 vertebral compression fracture, surgical reduction and internal   fixation (kyphoplasty) using Kyphon 20 mm inflatable balloon tamps.  2.  Deep vertebral biopsy L1.  3.  Greater than 1 hour use of operating room fluoroscopy.    SURGEON:  Antonio Doran MD    ASSISTANT SURGEON:  None.    ANESTHETIC:  General.    ANESTHESIOLOGIST:  Rafa Álvarez MD    COMPLICATIONS:  None.    BLOOD LOSS:  2 mL    PROCEDURE:  After informed consent was obtained, the patient was brought to   the operating room and given general endotracheal anesthetic.  She was placed   prone on the operating room table and given preoperative IV Ancef.  After the   field was draped, a time-out was called correctly identifying the patient and   the procedure to be done.  All surgical team members agreed on the parameters   of the operation and eye contact was maintained with the circulating nurse   throughout the entire timeout to ensure safety of the patient.  After the   field was draped, we then injected the area of the incisions with 20 mL of   0.5% Marcaine with epinephrine.  AP and lateral C-arms were brought in; the L1   vertebral body was visualized.  Process was checked twice to make sure we   were at the correct level.  We then made a small incision to the right of   midline and advanced a Jamshidi to the 1:30 position of the pedicle on the AP.    We then advanced the Jamshidi to the medial border of the pedicle on the AP   at the same time as it was at the posterior margin of the vertebral body and   lateral.  The center of the Jamshidi was then removed and a bone biopsy   cannula was then placed deep within the  vertebral body and the bone biopsy was   harvested for permanent section evaluation.  We then placed a 20 mm balloon   within the vertebral body and inflated at 44 PSI.  The guide pin was then   removed from the balloon.  We then did the same sequence of events on the left   side except the biopsy was not taken.  Once both balloons were in place, we   used sequentially higher pressures to create a cavity and placed pressure on   the superior end plate.    Cement was then mixed.  Both balloons were deflated and removed and cement was   placed under low pressure using live direct fluoroscopic visualization during   insertion of cement.  Excellent distribution of cement was achieved and no   significant extravasation was noted.  Cement was then allowed to harden and   all instrumentation was removed.    The wounds were then carefully cleaned, dried off and closed with Benzoin and   Steri-Strips.  As mentioned, we had injected 20 mL of 0.5% Marcaine with   epinephrine as local anesthetic.  Wound dressings were applied.  The procedure   was terminated.  No complications were experienced.  Blood loss was   approximately 2 mL       ____________________________________     MD JASON ALLEN / MERISSA    DD:  02/22/2018 15:18:20  DT:  02/22/2018 15:40:25    D#:  [id number]  Job#:  [id number]

## 2018-02-23 NOTE — DISCHARGE INSTRUCTIONS
ACTIVITY: Rest and take it easy for the first 24 hours.  A responsible adult is recommended to remain with you during that time.  It is normal to feel sleepy.  We encourage you to not do anything that requires balance, judgment or coordination.    MILD FLU-LIKE SYMPTOMS ARE NORMAL. YOU MAY EXPERIENCE GENERALIZED MUSCLE ACHES, THROAT IRRITATION, HEADACHE AND/OR SOME NAUSEA.    FOR 24 HOURS DO NOT:  Drive, operate machinery or run household appliances.  Drink beer or alcoholic beverages.   Make important decisions or sign legal documents.    SPECIAL INSTRUCTIONS: *follow up with Dr. Doran in 2 weeks.    DIET: To avoid nausea, slowly advance diet as tolerated, avoiding spicy or greasy foods for the first day.  Add more substantial food to your diet according to your physician's instructions.  Babies can be fed formula or breast milk as soon as they are hungry.  INCREASE FLUIDS AND FIBER TO AVOID CONSTIPATION.    SURGICAL DRESSING/BATHING: *follow your surgeon's instructions regarding showering.  No tub baths or soaking of incision area until cleared by surgeon.    FOLLOW-UP APPOINTMENT:  A follow-up appointment should be arranged with your doctor.  Call to schedule.    You should CALL YOUR PHYSICIAN if you develop:  Fever greater than 101 degrees F.  Pain not relieved by medication, or persistent nausea or vomiting.  Excessive bleeding (blood soaking through dressing) or unexpected drainage from the wound.  Extreme redness or swelling around the incision site, drainage of pus or foul smelling drainage.  Inability to urinate or empty your bladder within 8 hours.  Problems with breathing or chest pain.    You should call 911 if you develop problems with breathing or chest pain.  If you are unable to contact your doctor or surgical center, you should go to the nearest emergency room or urgent care center.  Physician's telephone #: * [phone]    If any questions arise, call your doctor.  If your doctor is  not available, please feel free to call the Surgical Center at [phone].  The Center is open Monday through Friday from 7AM to 7PM.  You can also call the HEALTH HOTLINE open 24 hours/day, 7 days/week and speak to a nurse at [phone], or toll free at [phone].    A registered nurse may call you a few days after your surgery to see how you are doing after your procedure.    MEDICATIONS: Resume taking daily medication.  Take prescribed pain medication with food.  If no medication is prescribed, you may take non-aspirin pain medication if needed.  PAIN MEDICATION CAN BE VERY CONSTIPATING.  Take a stool softener or laxative such as senokot, pericolace, or milk of magnesia if needed.    Prescription given for Tramadol.  Last pain medication given at 3:45.    If your physician has prescribed pain medication that includes Acetaminophen (Tylenol), do not take additional Acetaminophen (Tylenol) while taking the prescribed medication.    Depression / Suicide Risk    As you are discharged from this [facility] facility, it is important to learn how to keep safe from harming yourself.    Recognize the warning signs:  · Abrupt changes in personality, positive or negative- including increase in energy   · Giving away possessions  · Change in eating patterns- significant weight changes-  positive or negative  · Change in sleeping patterns- unable to sleep or sleeping all the time   · Unwillingness or inability to communicate  · Depression  · Unusual sadness, discouragement and loneliness  · Talk of wanting to die  · Neglect of personal appearance   · Rebelliousness- reckless behavior  · Withdrawal from people/activities they love  · Confusion- inability to concentrate     If you or a loved one observes any of these behaviors or has concerns about self-harm, here's what you can do:  · Talk about it- your feelings and reasons for harming yourself  · Remove any means that you might use to hurt yourself (examples:  pills, rope, extension cords, firearm)  · Get professional help from the community (Mental Health, Substance Abuse, psychological counseling)  · Do not be alone:Call your Safe Contact- someone whom you trust who will be there for you.  · Call your local CRISIS HOTLINE 056-6798 or 207-679-8216  · Call your local Children's Mobile Crisis Response Team [place] (894) 250-9567 or www.Netology  · Call the toll free National Suicide Prevention Hotlines   · National Suicide Prevention Lifeline 784-331-OLAT (7859)  · National Hope Line Network 800-SUICIDE (534-4926)

## 2018-02-27 ENCOUNTER — HOSPITAL ENCOUNTER (EMERGENCY)
Facility: MEDICAL CENTER | Age: 58
End: 2018-02-27
Payer: COMMERCIAL

## 2018-02-27 VITALS
RESPIRATION RATE: 14 BRPM | HEIGHT: 64 IN | TEMPERATURE: 97 F | WEIGHT: 178.79 LBS | HEART RATE: 88 BPM | BODY MASS INDEX: 30.52 KG/M2 | OXYGEN SATURATION: 98 % | SYSTOLIC BLOOD PRESSURE: 154 MMHG | DIASTOLIC BLOOD PRESSURE: 96 MMHG

## 2018-02-27 PROCEDURE — 302449 STATCHG TRIAGE ONLY (STATISTIC)

## 2018-02-27 ASSESSMENT — PAIN SCALES - GENERAL: PAINLEVEL_OUTOF10: 10

## 2018-02-27 NOTE — ED TRIAGE NOTES
Complaints of Head Ache started today, no relief from tramadol which she took last night but states she laid down felt better but woke up this am with it again.  Patient had recent back surgery last Thursday, complaints of lower back pain.  She has an appointment today at 1:45 with them to get imaging.  No major redness or fevers from surgical site.   She is alert.  Neuro assessment otherwise normal.

## 2018-03-05 ENCOUNTER — APPOINTMENT (OUTPATIENT)
Dept: RADIOLOGY | Facility: MEDICAL CENTER | Age: 58
End: 2018-03-05
Attending: ORTHOPAEDIC SURGERY
Payer: COMMERCIAL

## 2018-03-05 DIAGNOSIS — M54.5 LOW BACK PAIN, UNSPECIFIED BACK PAIN LATERALITY, UNSPECIFIED CHRONICITY, WITH SCIATICA PRESENCE UNSPECIFIED: ICD-10-CM

## 2018-03-05 PROCEDURE — 72158 MRI LUMBAR SPINE W/O & W/DYE: CPT

## 2018-03-05 PROCEDURE — 700117 HCHG RX CONTRAST REV CODE 255: Performed by: ORTHOPAEDIC SURGERY

## 2018-03-05 PROCEDURE — A9579 GAD-BASE MR CONTRAST NOS,1ML: HCPCS | Performed by: ORTHOPAEDIC SURGERY

## 2018-03-05 RX ADMIN — GADODIAMIDE 20 ML: 287 INJECTION INTRAVENOUS at 14:49

## 2018-03-30 ENCOUNTER — HOSPITAL ENCOUNTER (OUTPATIENT)
Dept: LAB | Facility: MEDICAL CENTER | Age: 58
End: 2018-03-30
Attending: INTERNAL MEDICINE
Payer: COMMERCIAL

## 2018-03-30 DIAGNOSIS — M81.0 SENILE OSTEOPOROSIS: ICD-10-CM

## 2018-03-30 DIAGNOSIS — Z72.0 TOBACCO ABUSE: ICD-10-CM

## 2018-03-30 DIAGNOSIS — Z79.899 ENCOUNTER FOR MONITORING ARAVA THERAPY: ICD-10-CM

## 2018-03-30 DIAGNOSIS — E03.9 HYPOTHYROIDISM, UNSPECIFIED TYPE: ICD-10-CM

## 2018-03-30 DIAGNOSIS — M54.42 ACUTE MIDLINE LOW BACK PAIN WITH LEFT-SIDED SCIATICA: ICD-10-CM

## 2018-03-30 DIAGNOSIS — M05.79 RHEUMATOID ARTHRITIS INVOLVING MULTIPLE SITES WITH POSITIVE RHEUMATOID FACTOR (HCC): ICD-10-CM

## 2018-03-30 DIAGNOSIS — Z51.81 ENCOUNTER FOR MONITORING ARAVA THERAPY: ICD-10-CM

## 2018-03-30 LAB
ALBUMIN SERPL BCP-MCNC: 4.1 G/DL (ref 3.2–4.9)
ALBUMIN/GLOB SERPL: 1.6 G/DL
ALP SERPL-CCNC: 67 U/L (ref 30–99)
ALT SERPL-CCNC: 15 U/L (ref 2–50)
ANION GAP SERPL CALC-SCNC: 9 MMOL/L (ref 0–11.9)
AST SERPL-CCNC: 21 U/L (ref 12–45)
BASOPHILS # BLD AUTO: 1 % (ref 0–1.8)
BASOPHILS # BLD: 0.05 K/UL (ref 0–0.12)
BILIRUB SERPL-MCNC: 0.5 MG/DL (ref 0.1–1.5)
BUN SERPL-MCNC: 20 MG/DL (ref 8–22)
CALCIUM SERPL-MCNC: 9.3 MG/DL (ref 8.5–10.5)
CHLORIDE SERPL-SCNC: 111 MMOL/L (ref 96–112)
CO2 SERPL-SCNC: 24 MMOL/L (ref 20–33)
CREAT SERPL-MCNC: 0.69 MG/DL (ref 0.5–1.4)
EOSINOPHIL # BLD AUTO: 0 K/UL (ref 0–0.51)
EOSINOPHIL NFR BLD: 0 % (ref 0–6.9)
ERYTHROCYTE [DISTWIDTH] IN BLOOD BY AUTOMATED COUNT: 48.7 FL (ref 35.9–50)
ERYTHROCYTE [SEDIMENTATION RATE] IN BLOOD BY WESTERGREN METHOD: 14 MM/HOUR (ref 0–30)
GLOBULIN SER CALC-MCNC: 2.6 G/DL (ref 1.9–3.5)
GLUCOSE SERPL-MCNC: 88 MG/DL (ref 65–99)
HCT VFR BLD AUTO: 44 % (ref 37–47)
HGB BLD-MCNC: 14.2 G/DL (ref 12–16)
IMM GRANULOCYTES # BLD AUTO: 0.01 K/UL (ref 0–0.11)
IMM GRANULOCYTES NFR BLD AUTO: 0.2 % (ref 0–0.9)
LYMPHOCYTES # BLD AUTO: 1.53 K/UL (ref 1–4.8)
LYMPHOCYTES NFR BLD: 30.8 % (ref 22–41)
MCH RBC QN AUTO: 32.3 PG (ref 27–33)
MCHC RBC AUTO-ENTMCNC: 32.3 G/DL (ref 33.6–35)
MCV RBC AUTO: 100.2 FL (ref 81.4–97.8)
MONOCYTES # BLD AUTO: 0.51 K/UL (ref 0–0.85)
MONOCYTES NFR BLD AUTO: 10.3 % (ref 0–13.4)
NEUTROPHILS # BLD AUTO: 2.87 K/UL (ref 2–7.15)
NEUTROPHILS NFR BLD: 57.7 % (ref 44–72)
NRBC # BLD AUTO: 0 K/UL
NRBC BLD-RTO: 0 /100 WBC
PLATELET # BLD AUTO: 146 K/UL (ref 164–446)
PMV BLD AUTO: 12.3 FL (ref 9–12.9)
POTASSIUM SERPL-SCNC: 3.9 MMOL/L (ref 3.6–5.5)
PROT SERPL-MCNC: 6.7 G/DL (ref 6–8.2)
RBC # BLD AUTO: 4.39 M/UL (ref 4.2–5.4)
SODIUM SERPL-SCNC: 144 MMOL/L (ref 135–145)
WBC # BLD AUTO: 5 K/UL (ref 4.8–10.8)

## 2018-03-30 PROCEDURE — 85652 RBC SED RATE AUTOMATED: CPT

## 2018-03-30 PROCEDURE — 80053 COMPREHEN METABOLIC PANEL: CPT

## 2018-03-30 PROCEDURE — 36415 COLL VENOUS BLD VENIPUNCTURE: CPT

## 2018-03-30 PROCEDURE — 85025 COMPLETE CBC W/AUTO DIFF WBC: CPT

## 2018-04-03 ENCOUNTER — HOSPITAL ENCOUNTER (OUTPATIENT)
Dept: RADIOLOGY | Facility: REHABILITATION | Age: 58
End: 2018-04-03
Attending: PHYSICAL MEDICINE & REHABILITATION
Payer: COMMERCIAL

## 2018-04-03 ENCOUNTER — HOSPITAL ENCOUNTER (OUTPATIENT)
Dept: PAIN MANAGEMENT | Facility: REHABILITATION | Age: 58
End: 2018-04-03
Attending: PHYSICAL MEDICINE & REHABILITATION
Payer: COMMERCIAL

## 2018-04-03 VITALS
BODY MASS INDEX: 30.15 KG/M2 | WEIGHT: 176.59 LBS | SYSTOLIC BLOOD PRESSURE: 180 MMHG | HEART RATE: 74 BPM | HEIGHT: 64 IN | DIASTOLIC BLOOD PRESSURE: 92 MMHG | OXYGEN SATURATION: 98 % | RESPIRATION RATE: 16 BRPM | TEMPERATURE: 98.6 F

## 2018-04-03 PROCEDURE — 700111 HCHG RX REV CODE 636 W/ 250 OVERRIDE (IP)

## 2018-04-03 PROCEDURE — 700101 HCHG RX REV CODE 250

## 2018-04-03 PROCEDURE — 62323 NJX INTERLAMINAR LMBR/SAC: CPT

## 2018-04-03 PROCEDURE — 700117 HCHG RX CONTRAST REV CODE 255

## 2018-04-03 RX ORDER — METHYLPREDNISOLONE ACETATE 80 MG/ML
INJECTION, SUSPENSION INTRA-ARTICULAR; INTRALESIONAL; INTRAMUSCULAR; SOFT TISSUE
Status: COMPLETED
Start: 2018-04-03 | End: 2018-04-03

## 2018-04-03 RX ORDER — LIDOCAINE HYDROCHLORIDE 20 MG/ML
INJECTION, SOLUTION EPIDURAL; INFILTRATION; INTRACAUDAL; PERINEURAL
Status: COMPLETED
Start: 2018-04-03 | End: 2018-04-03

## 2018-04-03 RX ADMIN — IOHEXOL 1 ML: 240 INJECTION, SOLUTION INTRATHECAL; INTRAVASCULAR; INTRAVENOUS; ORAL at 16:47

## 2018-04-03 RX ADMIN — LIDOCAINE HYDROCHLORIDE 2 ML: 20 INJECTION, SOLUTION EPIDURAL; INFILTRATION; INTRACAUDAL; PERINEURAL at 16:48

## 2018-04-03 RX ADMIN — METHYLPREDNISOLONE ACETATE 80 MG: 80 INJECTION, SUSPENSION INTRA-ARTICULAR; INTRALESIONAL; INTRAMUSCULAR; SOFT TISSUE at 16:48

## 2018-04-03 ASSESSMENT — PAIN SCALES - GENERAL
PAINLEVEL_OUTOF10: 6
PAINLEVEL_OUTOF10: 3

## 2018-04-03 NOTE — PROGRESS NOTES
Positioned patient by APPLE RN, X - ray Tech. Both lower legs & feet pillow placed for support. Hands supported on stool under head of the bed. Procedure tolerated well by patient. Accompanied to recovery room, ambulatory.

## 2018-04-03 NOTE — PROGRESS NOTES
Reviewed Plan of Care with patient. Confirmed that she  has stopped all blood thinning medications for last 4 days.  Confirmed that she has a . Reviewed home care instructions with patient prior to procedure. All questions and concerns addressed.   Dr Vergara in to evaluate pt and answer all questions and concerns aware.

## 2018-04-03 NOTE — PROGRESS NOTES
Spoke to patient baseline nursing  assessment done. Preparatory pre- procedure education given and understood by patient.Consent signed and H&P updated.

## 2018-04-04 NOTE — PROGRESS NOTES
Reviewed instructions. Tolerated instructions. Pain level subsided but still present . Pt denied desire to use ice pack. Dressing intact and dry. Ambulated without deficits.

## 2018-04-04 NOTE — PROCEDURES
DATE OF SERVICE:  04/03/2018    PREOPERATIVE DIAGNOSIS:  Lumbar degenerative disk disease and radiculopathy.    POSTOPERATIVE DIAGNOSIS:  Lumbar degenerative disk disease and radiculopathy.    OPERATION PERFORMED:  1.  An L5-S1 epidural steroid injection under fluoroscopic guidance.  2.  Epidurogram.  3.  Interpretation of epidurogram.    SURGEON:  Kale Morris MD    ANESTHESIA:  None.    ESTIMATED BLOOD LOSS:  None.    IV FLUIDS:  None.    COMPLICATIONS:  None.    Dear Dr. Doran;    Thank you for the referral of this patient.    SUBJECTIVE:  The patient reports constant, achy low back pain and left   anterior lateral leg pain.  Her pain is worse with prolonged standing, walking   and overlying.    OBJECTIVE:  VITAL SIGNS:  Pulse is 68, respirations 18, and blood pressure 136/80.  GENERAL:  Patient is alert and oriented x3 in no acute distress.  CARDIOVASCULAR:  Shows no ankle edema bilaterally.  SKIN:  Evaluation shows normal temperature and good capillary refill.  MUSCULOSKELETAL:  She has some tenderness to palpation over her lumbar   paraspinal muscles.    INDICATIONS:  This is a 58-year-old female patient with chronic low back pain   who is here today for a lumbar epidural steroid injection.  She was given and   explained risks, benefits, and alternatives of the procedure, including but   not limited to bleeding, infection, nerve damage, increased pain, paralysis,   or weakness must be permanent, coma, stroke and even death.  The patient   understood all risks and consented to the procedure today.  She has been   n.p.o. and has a ride home.    PROCEDURE IN DETAIL:  After discussion of the risks and benefits, the patient   consented to the procedure and was taken to the fluoroscopy suite.  She was   placed in the prone position on the fluoroscopy table, and monitors were   applied.  The lumbosacral area was cleaned and prepped in the usual sterile   fashion with chlorhexidine and a sterile drape.  Using AP  fluoroscopy, the   L5-S1 interlaminar space was brought into view and optimized.  The skin   overlying the target was anesthetized with 1% lidocaine using a 27-gauge   needle.  Next, using a 3-1/2 inch, 20-gauge Tuohy needle, it was directed into   the epidural space with loss of resistance to air at approximately 1 cm from   the hub of the needle.  Multiple fluoroscopic views were obtained to confirm   needle placement.  Contrast dye was then instilled.  Contrast showed craniad   flow up to the L2-L3 level.  Contrast crossed midline.  No filling defects   were seen.  There was no evidence of vascular pattern.  At that point, mixture   containing 80 mg of Depo-Medrol, 2 mL of 2% lidocaine, and 6 mL of   preservative-free normal saline was injected in a fractionated fashion without   any persistent paresthesia or pain.  The needle was flushed and withdrawn.    The patient's back was cleaned and she was taken to the postop area where she   was monitored for 30-45 minutes without complications and with stable vital   signs.  She was given appropriate discharge instructions and discharged home   with a responsible adult.       ____________________________________     MD KYLE Gomez Jr. / MERISSA    DD:  04/03/2018 17:28:27  DT:  04/03/2018 18:02:03    D#:  [id number]  Job#:  [id number]

## 2018-04-27 DIAGNOSIS — E03.9 ACQUIRED HYPOTHYROIDISM: ICD-10-CM

## 2018-04-27 RX ORDER — LEVOTHYROXINE SODIUM 0.12 MG/1
125 TABLET ORAL
Qty: 90 TAB | Refills: 1 | Status: SHIPPED | OUTPATIENT
Start: 2018-04-27 | End: 2018-06-28

## 2018-05-01 ENCOUNTER — OFFICE VISIT (OUTPATIENT)
Dept: RHEUMATOLOGY | Facility: PHYSICIAN GROUP | Age: 58
End: 2018-05-01
Payer: COMMERCIAL

## 2018-05-01 VITALS
HEART RATE: 96 BPM | RESPIRATION RATE: 14 BRPM | DIASTOLIC BLOOD PRESSURE: 80 MMHG | WEIGHT: 183 LBS | TEMPERATURE: 97.7 F | SYSTOLIC BLOOD PRESSURE: 130 MMHG | OXYGEN SATURATION: 96 % | BODY MASS INDEX: 31.41 KG/M2

## 2018-05-01 DIAGNOSIS — Z98.890 HISTORY OF KYPHOPLASTY: ICD-10-CM

## 2018-05-01 DIAGNOSIS — M06.9 RHEUMATOID ARTHRITIS INVOLVING MULTIPLE SITES, UNSPECIFIED RHEUMATOID FACTOR PRESENCE: ICD-10-CM

## 2018-05-01 DIAGNOSIS — Z79.899 LONG-TERM USE OF PLAQUENIL: ICD-10-CM

## 2018-05-01 DIAGNOSIS — M48.061 SPINAL STENOSIS AT L4-L5 LEVEL: ICD-10-CM

## 2018-05-01 DIAGNOSIS — Z51.81 ENCOUNTER FOR MONITORING ARAVA THERAPY: ICD-10-CM

## 2018-05-01 DIAGNOSIS — S32.010S CLOSED COMPRESSION FRACTURE OF FIRST LUMBAR VERTEBRA, SEQUELA: ICD-10-CM

## 2018-05-01 DIAGNOSIS — Z79.899 ENCOUNTER FOR MONITORING ARAVA THERAPY: ICD-10-CM

## 2018-05-01 DIAGNOSIS — Z72.0 TOBACCO ABUSE: ICD-10-CM

## 2018-05-01 DIAGNOSIS — M81.0 SENILE OSTEOPOROSIS: ICD-10-CM

## 2018-05-01 PROCEDURE — 99214 OFFICE O/P EST MOD 30 MIN: CPT | Performed by: INTERNAL MEDICINE

## 2018-05-01 RX ORDER — ALENDRONATE SODIUM 70 MG/1
TABLET ORAL
Qty: 12 TAB | Refills: 3 | Status: ON HOLD | OUTPATIENT
Start: 2018-05-01 | End: 2018-07-19

## 2018-05-01 NOTE — LETTER
[facility]   [address], [address]  [place] [address]  Phone: [phone]  Fax: [phone]              Encounter Date: 5/1/2018    Dear Dr. Whitney ref. provider found,    It was a pleasure seeing your patient, Clifford Murphy, on 5/1/2018. Diagnoses of Rheumatoid arthritis involving multiple sites, unspecified rheumatoid factor presence (HCC), Long-term use of Plaquenil, Encounter for monitoring Arava therapy, Spinal stenosis at L4-L5 level, Senile osteoporosis, History of kyphoplasty, Closed compression fracture of first lumbar vertebra, sequela, and Tobacco abuse were pertinent to this visit.     Please find attached progress note which includes the history I obtained from Ms. Murphy, my physical examination findings, my impression and recommendations.      Once again, it was a pleasure participating in your patient's care.  Please feel free to contact me if you have any questions or if I can be of any further assistance to your patients.      Sincerely,    Elizabeth Gómez M.D.  Electronically Signed          PROGRESS NOTE:  No notes on file

## 2018-05-02 NOTE — PROGRESS NOTES
Chief Complaint- joint pain    Subjective:   Clifford Murphy is a 58 y.o. female here today for follow up of rheumatological issues    Patient is Here to followup her rheumatoid arthritis currently on Plaquenil 200 mg by mouth twice a day, And leflunomide 20 mg by mouth daily with folic acid 1 mg by mouth daily. Patient states that leflunamide really does help her joints. of note recent sedimentation rate 14 March 2018.  Last ophthalmology evaluation July 2017.    Since last visit, patient was having problems with her back and we did an MRI that indicates some fairly significant DDD and DJD status post evaluation by spine orthopedics and is in the process of undergoing physical therapy and possible surgical intervention. Patient also underwent kyphoplasty with success. Of note, we had been asking patient to do a bone density scan that was ordered October 2017 which patient never followed through with.       Patient continues to smoke in spite of knowledge that tobacco abuse will exacerbate her rheumatoid arthritis.     Other comorbidities include hypothyroidism       S/p MTX-N/V  S/p Enbrel-sinus infections  S/p Humira-sinus infections and injection site skin breakdown  S/p Actemra-exacerbated pneumonia  S/p sulfasalazine-contradicted, pt with sulfa allergy     CCP neg 9/2015  Quantiferon Gold neg 9/2015  Hep B neg 9/2015  Uric acid 4.6 9/2015  G6PD 11.5 adequate 10/2017  Last optho eval 9/2017  RF 18 6/2009  CHIQUITA neg 6/2009   Hand x-rays 5/2015-no pathology  Feet X-rays 5/2015-indicates DJD, no erosions    LS pine x-rays 7/2009-indicates multilevel DJD     MRI LS spine 2/2018-L1 compression fracture, multilevel multifactorial DJD, neuroforaminal narrowing at multiple sites, severe left neural foraminal narrowing  Patient on Fosamax since 5/2018    Current medicines (including changes today)  Current Outpatient Prescriptions   Medication Sig Dispense Refill   • alendronate (FOSAMAX) 70 MG Tab 1 tab po one day/week  in AM on an empty stomach with 8 oz of water, do not lie down for 30 minutes after taking medication 12 Tab 3   • levothyroxine (SYNTHROID) 125 MCG Tab TAKE 1 TAB BY MOUTH EVERY MORNING ON AN EMPTY STOMACH. 90 Tab 1   • leflunomide (ARAVA) 20 MG Tab TAKE 1 TABLET BY MOUTH EVERY DAY 90 Tab 0   • Multiple Vitamins-Minerals (AIRBORNE PO) Take  by mouth as needed.     • Acetaminophen (TYLENOL EX ST ARTHRITIS PAIN PO) Take 1,000 mg by mouth.     • ranitidine (ZANTAC) 150 MG Tab Take 1 Tab by mouth 2 times a day. TAKE 1 TAB BY MOUTH 2 TIMES A DAY. 180 Tab 2   • hydroxychloroquine (PLAQUENIL) 200 MG Tab TAKE 1 TABLET BY MOUTH TWICE A  Tab 1   • Naproxen Sodium 220 MG Cap Take  by mouth 2 Times a Day.     • fluticasone (FLONASE) 50 MCG/ACT nasal spray Spray 1 Spray in nose every day.     • Cholecalciferol (VITAMIN D3) 2000 UNIT CAPS Take 1 Cap by mouth every day.     • folic acid (FOLVITE) 1 MG TABS Take 1 mg by mouth every day.     • MethylPREDNISolone (MEDROL DOSEPAK) 4 MG Tablet Therapy Pack As directed on the packaging label. (Patient not taking: Reported on 5/1/2018) 21 Tab 0   • acyclovir (ZOVIRAX) 200 MG Cap TAKE ONE CAPSULE BY MOUTH TWICE A  Cap 1   • albuterol (VENTOLIN OR PROVENTIL) 108 (90 BASE) MCG/ACT Aero Soln inhalation aerosol Inhale 2 Puffs by mouth every 6 hours as needed for Shortness of Breath. 8.5 g 3   • cholestyramine (QUESTRAN) 4 G packet Take 4 g by mouth 1 time daily as needed. 30 Each 5     No current facility-administered medications for this visit.      She  has a past medical history of Asthma (02/21/2018); Bronchitis (12/2017); Genital herpes in women (2/11/2014); High risk medications (not anticoagulants) long-term use (8/29/2012); Hypothyroid (9/14/2011); Kidney stones; Macrocytosis without anemia (med effect); Menopause (9/15/2011); Pneumonia (01/2015); Post-cholecystectomy syndrome (5/23/2012); RA (rheumatoid arthritis) (CMS-HCC) (HCC); Rheumatoid arthritis(714.0); Tobacco  abuse, episodic (9/14/2011); and Vitamin d deficiency (5/15/2012).    ROS   Other than what is mentioned in HPI or physical exam, there is no history of headaches, double vision or blurred vision. No temporal tenderness or jaw claudication. No history of cataracts or glaucoma. No trouble swallowing difficulties or sore throats. No history of thyroid disease. No chest complaints including chest pain, cough or sputum production. No shortness of breath. No GI complaints including nausea, vomiting, change in bowel habits, or past peptic ulcer disease. No history of blood in the stools. No urinary complaints including dysuria or frequency. No history of rash including psoriasis. No history of alopecia, photosensitivity, oral ulcerations, Raynaud's phenomena, or swollen joints. No history of gout. No back complaints. No history of low blood counts.       Objective:     Blood pressure 130/80, pulse 96, temperature 36.5 °C (97.7 °F), resp. rate 14, weight 83 kg (183 lb), last menstrual period 01/01/2010, SpO2 96 %. Body mass index is 31.41 kg/m².   Physical Exam:  Constitution: Alert, oriented X 3, talkative with good eye contact, patient is moving carefully because of low back pain Eye: contact is good, speech goal directed, affect calmHEENT: conjunctiva non-injected, sclera non-icteric, both TMs are pearly gray, no evidence of infection  Pinna normal. Oral: mucous membranes pink and moist with no lesions.Neck: No thyromegaly, trachea midline Lymph: No supraclavicular lymphadenopathy, no axillary lymphadenopathy, no cervical lymphadenopathyLungs: clear to auscultation bilaterally with good excursion, did not appreciate any wheezes or rhonchiCV: regular rate and rhythm.Abdomen: soft, nontender, No CVAT, no HSM, a bit overweightNeuro: Cranial nerves 2-12 are grossly intact, no foot drop, gait within normal limits, no antalgiaSkin: No discoid lesions, no petechial lesions, no malar rash, no vasculitic lesionsExt: No evidence  of swan-neck or boutonniere deformities, no sausage digits,  no flexure contractures, no nodules no tophi, mild tenderness to palpation along the MCP joints but no victorina synovitis noted. well-healed scar secondary to multiple surgeries in that left knee. Patient does have some feliberto lumbar spine tenderness, no step-offs are noted, previous Schoebers is 10-13 cm. Gait without antalgia and without foot drop, patient is walking with a little kyphosis of the back.    Lab Results   Component Value Date/Time    SODIUM 144 03/30/2018 09:27 AM    POTASSIUM 3.9 03/30/2018 09:27 AM    CHLORIDE 111 03/30/2018 09:27 AM    CO2 24 03/30/2018 09:27 AM    GLUCOSE 88 03/30/2018 09:27 AM    BUN 20 03/30/2018 09:27 AM    CREATININE 0.69 03/30/2018 09:27 AM    CREATININE 0.77 03/26/2013 12:00 AM    BUNCREATRAT 30 (H) 11/23/2016 09:46 AM    BUNCREATRAT 30 (H) 03/26/2013 12:00 AM      Lab Results   Component Value Date/Time    WBC 5.0 03/30/2018 09:27 AM    WBC 5.7 10/20/2011 12:00 AM    RBC 4.39 03/30/2018 09:27 AM    RBC 4.32 10/20/2011 12:00 AM    HEMOGLOBIN 14.2 03/30/2018 09:27 AM    HEMATOCRIT 44.0 03/30/2018 09:27 AM    .2 (H) 03/30/2018 09:27 AM    MCV 99 (H) 10/20/2011 12:00 AM    MCH 32.3 03/30/2018 09:27 AM    MCH 34.0 10/20/2011 12:00 AM    MCHC 32.3 (L) 03/30/2018 09:27 AM    MPV 12.3 03/30/2018 09:27 AM    NEUTSPOLYS 57.70 03/30/2018 09:27 AM    LYMPHOCYTES 30.80 03/30/2018 09:27 AM    MONOCYTES 10.30 03/30/2018 09:27 AM    EOSINOPHILS 0.00 03/30/2018 09:27 AM    BASOPHILS 1.00 03/30/2018 09:27 AM      Lab Results   Component Value Date/Time    CALCIUM 9.3 03/30/2018 09:27 AM    ASTSGOT 21 03/30/2018 09:27 AM    ALTSGPT 15 03/30/2018 09:27 AM    ALKPHOSPHAT 67 03/30/2018 09:27 AM    TBILIRUBIN 0.5 03/30/2018 09:27 AM    ALBUMIN 4.1 03/30/2018 09:27 AM    TOTPROTEIN 6.7 03/30/2018 09:27 AM     Lab Results   Component Value Date/Time    RHEUMFACTN 18 (H) 06/15/2009 09:27 AM    ANTINUCAB None Detected 06/15/2009 09:27 AM      Lab Results   Component Value Date/Time    SEDRATEWES 14 03/30/2018 09:27 AM     Lab Results   Component Value Date/Time    HBA1C 5.3 11/23/2016 09:46 AM     Lab Results   Component Value Date/Time    G6PD 11.5 10/03/2017 09:54 AM     Results for orders placed during the hospital encounter of 02/02/18   DX-JOINT SURVEY-HANDS SINGLE VIEW    Impression 1.  No acute fracture or bone erosion identified.    2.  Mild osteoarthritis.     Results for orders placed during the hospital encounter of 02/02/18   DX-JOINT SURVEY-FEET SINGLE VIEW    Impression 1.  No acute fracture or bone erosion.    2.  Hallux valgus deformities and mild osteoarthritis.     Results for orders placed in visit on 09/29/14   DX-FOOT-COMPLETE 3+    Impression Subacute nondisplaced fifth proximal phalanx extra-articular fracture     Results for orders placed during the hospital encounter of 07/11/09   DX-PELVIS-1 OR 2 VIEWS    Impression IMPRESSION:     1. VERY MILD DEGENERATIVE CHANGE OF THE HIPS, SLIGHTLY WORSE ON THE RIGHT   THAN THE LEFT.  MINIMAL SCLEROSIS IS SEEN AT THE LATERAL ASPECT OF THE   RIGHT FEMORAL NECK, WHICH IS OF DOUBTFUL CLINICAL SIGNIFICANCE.      2. NO PELVIS FRACTURE.    MM/srd     Read By MARICARMEN ANSARI MD on Jul 13 2009  8:38AM  : SRP Transcription Date: Jul 13 2009 10:42AM  THIS DOCUMENT HAS BEEN ELECTRONICALLY SIGNED BY: MARICARMEN ANSARI MD on Jul 13 2009  4:05PM        Results for orders placed during the hospital encounter of 07/11/09   DX-PELVIS-1 OR 2 VIEWS    Impression IMPRESSION:     1. VERY MILD DEGENERATIVE CHANGE OF THE HIPS, SLIGHTLY WORSE ON THE RIGHT   THAN THE LEFT.  MINIMAL SCLEROSIS IS SEEN AT THE LATERAL ASPECT OF THE   RIGHT FEMORAL NECK, WHICH IS OF DOUBTFUL CLINICAL SIGNIFICANCE.      2. NO PELVIS FRACTURE.    MM/srd     Read By MARICARMEN ANSARI MD on Jul 13 2009  8:38AM  : SRP Transcription Date: Jul 13 2009 10:42AM  THIS DOCUMENT HAS BEEN ELECTRONICALLY SIGNED BY: MARICARMEN  MD COTY on Jul 13 2009  4:05PM        Results for orders placed during the hospital encounter of 10/10/09   DX-KNEE COMPLETE 4+    Impression IMPRESSION:     3. MODERATE THREE COMPARTMENT ARTHROSIS.     4. QUESTION SMALL EFFUSION.     MPW/vadim         Read By JASSI GREEN MD on Oct 12 2009  9:01AM  : DEBRA Transcription Date: Oct 12 2009  2:56PM  THIS DOCUMENT HAS BEEN ELECTRONICALLY SIGNED BY: JASSI GREEN MD on Oct   12 2009  3:47PM        Results for orders placed in visit on 09/02/15   DX-KNEE 2- LEFT    Impression 1. No evidence of acute fracture or dislocation.    2. Increased sclerosis with punctate hyperdensities in the proximal tibial metadiaphysis anteriorly may be related to prior surgery.    3. Mild left knee osteoarthritis.     Results for orders placed during the hospital encounter of 07/10/12   DX-HAND 3+    Impression No new radiographic evidence of erosive arthropathy    Stable second DIP subchondral cyst versus erosion    Stable mild first MCP osteoarthritis, periarticular calcification and osteopenia      Results for orders placed during the hospital encounter of 02/02/18   MR-LUMBAR SPINE-W/O    Impression 1.  Acute L1 vertebral compression fracture. This would be amenable to percutaneous augmentation with vertebroplasty or kyphoplasty if clinically appropriate. Interventional radiology is available for consultation and therapy.  2.  Multilevel multifactorial degenerative changes  3.  No areas of high-grade central canal narrowing  4.  LEFT neural foraminal narrowing worse at L5-S1  5.  Other areas of central canal and neural foraminal narrowing as described above  6.  Mild intra and extrahepatic biliary dilatation without visualized obstructing lesion. Further imaging assessment could be performed with ultrasound or MRCP if clinically appropriate. I have no relevant prior studies available for comparison.     MR-KNEE-W/O LEFT    Impression 1.  No MR explanation for acute  symptoms. Ligaments and menisci are intact    2.  Postoperative change related to presumed prior tibial tubercle transfer. There is mild patella timothy    3.  Severe patellofemoral cartilage thinning with areas of bone-on-bone articulation and moderate osteophytes.    4.  Moderate femorotibial osteophytes with minimal cartilage thinning     Results for orders placed during the hospital encounter of 02/21/18   DX-CERVICAL SPINE-2 OR 3 VIEWS    Impression 1.  No evidence of atlantoaxial subluxation with flexion and extension maneuvers of the cervical spine.    2.  Degenerative changes C6-7. No demonstrable range of motion at this level.     Assessment and Plan:     1. Rheumatoid arthritis involving multiple sites, unspecified rheumatoid factor presence (HCC)  Clinically rheumatoid arthritis looks stable especially with the recent sedimentation rate of 14, continue Plaquenil 200 mg by mouth twice a day and leflunomide at 20 mg by mouth daily with folic acid 1 mg by mouth daily  - alendronate (FOSAMAX) 70 MG Tab; 1 tab po one day/week in AM on an empty stomach with 8 oz of water, do not lie down for 30 minutes after taking medication  Dispense: 12 Tab; Refill: 3  - CBC WITH DIFFERENTIAL; Standing  - COMP METABOLIC PANEL; Standing  - WESTERGREN SED RATE; Standing    2. Long-term use of Plaquenil  Of note G6PD levels are adequate, last ophthalmology evaluation was July 2017, patient due for ophthalmology evaluation July 2018 and patient will need CBC every 6 months  - CBC WITH DIFFERENTIAL; Standing  - COMP METABOLIC PANEL; Standing  - WESTERGREN SED RATE; Standing    3. Encounter for monitoring Arava therapy  Continue folic acid 1 mg by mouth daily with leflunomide, patient will need labs every 3 months while on leflunomide, patient was given standing orders for labs today  - CBC WITH DIFFERENTIAL; Standing  - COMP METABOLIC PANEL; Standing  - WESTERGREN SED RATE; Standing    4. Spinal stenosis at L4-L5 level  Currently  being followed by orthopedic spine is in the process of undergoing physical therapy    5. Senile osteoporosis  Now diagnosed with osteoporosis with the compression fracture status post kyphoplasty Will start Fosamax 70 mg by mouth every week, asked patient to follow through with her bone density scan  - alendronate (FOSAMAX) 70 MG Tab; 1 tab po one day/week in AM on an empty stomach with 8 oz of water, do not lie down for 30 minutes after taking medication  Dispense: 12 Tab; Refill: 3    6. History of kyphoplasty  - alendronate (FOSAMAX) 70 MG Tab; 1 tab po one day/week in AM on an empty stomach with 8 oz of water, do not lie down for 30 minutes after taking medication  Dispense: 12 Tab; Refill: 3    7. Closed compression fracture of first lumbar vertebra, sequela  Now diagnosed with osteoporosis with compression fracture  - alendronate (FOSAMAX) 70 MG Tab; 1 tab po one day/week in AM on an empty stomach with 8 oz of water, do not lie down for 30 minutes after taking medication  Dispense: 12 Tab; Refill: 3  - CBC WITH DIFFERENTIAL; Standing  - COMP METABOLIC PANEL; Standing  - WESTERGREN SED RATE; Standing    8. Tobacco abuse  Tobacco abuse is known to exacerbate rheumatoid arthritis, 20 minute discussion with patient regarding the need to stop tobacco abuse, patient states understanding  - CBC WITH DIFFERENTIAL; Standing  - COMP METABOLIC PANEL; Standing  - WESTERGREN SED RATE; Standing    Followup: Return in about 3 months (around 8/1/2018). or sooner prn    Patient was seen 30 minutes face-to-face of which more than 50% of the time was spent counseling the patient (excluding time for procedures)  regarding  rheumatological condition and care. Therapy was discussed in detail.    Please note that this dictation was created using voice recognition software. I have made every reasonable attempt to correct obvious errors, but I expect that there are errors of grammar and possibly content that I did not discover before  finalizing the note.

## 2018-06-05 ENCOUNTER — HOSPITAL ENCOUNTER (OUTPATIENT)
Dept: RADIOLOGY | Facility: MEDICAL CENTER | Age: 58
End: 2018-06-05
Attending: INTERNAL MEDICINE
Payer: COMMERCIAL

## 2018-06-05 ENCOUNTER — TELEPHONE (OUTPATIENT)
Dept: RHEUMATOLOGY | Facility: PHYSICIAN GROUP | Age: 58
End: 2018-06-05

## 2018-06-05 ENCOUNTER — HOSPITAL ENCOUNTER (OUTPATIENT)
Dept: RADIOLOGY | Facility: MEDICAL CENTER | Age: 58
End: 2018-06-05
Attending: FAMILY MEDICINE
Payer: COMMERCIAL

## 2018-06-05 DIAGNOSIS — M06.9 RHEUMATOID ARTHRITIS INVOLVING MULTIPLE SITES, UNSPECIFIED RHEUMATOID FACTOR PRESENCE: ICD-10-CM

## 2018-06-05 DIAGNOSIS — Z72.0 TOBACCO ABUSE: ICD-10-CM

## 2018-06-05 DIAGNOSIS — E03.9 HYPOTHYROIDISM, UNSPECIFIED TYPE: ICD-10-CM

## 2018-06-05 DIAGNOSIS — Z12.31 ENCOUNTER FOR SCREENING MAMMOGRAM FOR BREAST CANCER: ICD-10-CM

## 2018-06-05 DIAGNOSIS — Z51.81 ENCOUNTER FOR MONITORING ARAVA THERAPY: ICD-10-CM

## 2018-06-05 DIAGNOSIS — Z79.899 ENCOUNTER FOR MONITORING ARAVA THERAPY: ICD-10-CM

## 2018-06-05 DIAGNOSIS — M81.0 SENILE OSTEOPOROSIS: ICD-10-CM

## 2018-06-05 PROCEDURE — 77080 DXA BONE DENSITY AXIAL: CPT

## 2018-06-05 PROCEDURE — 77067 SCR MAMMO BI INCL CAD: CPT

## 2018-06-06 NOTE — TELEPHONE ENCOUNTER
Please notify patient we received the results of your bone density scan and it does confirm the diagnosis of osteoporosis  Please continue with her Fosamax at 70 mg by mouth every week   continue calcium 1200 mg by mouth daily and vitamin D about 1000 units by mouth daily and magnesium 400 mg by mouth daily

## 2018-06-25 ENCOUNTER — OFFICE VISIT (OUTPATIENT)
Dept: MEDICAL GROUP | Facility: MEDICAL CENTER | Age: 58
End: 2018-06-25
Payer: COMMERCIAL

## 2018-06-25 VITALS
BODY MASS INDEX: 31.24 KG/M2 | TEMPERATURE: 99.3 F | SYSTOLIC BLOOD PRESSURE: 126 MMHG | HEIGHT: 64 IN | DIASTOLIC BLOOD PRESSURE: 78 MMHG | WEIGHT: 183 LBS | HEART RATE: 93 BPM | OXYGEN SATURATION: 97 %

## 2018-06-25 DIAGNOSIS — E03.8 SUBCLINICAL HYPOTHYROIDISM: ICD-10-CM

## 2018-06-25 DIAGNOSIS — E03.9 ACQUIRED HYPOTHYROIDISM: ICD-10-CM

## 2018-06-25 DIAGNOSIS — Z01.818 PREOPERATIVE CLEARANCE: ICD-10-CM

## 2018-06-25 DIAGNOSIS — J30.9 ALLERGIC SINUSITIS: ICD-10-CM

## 2018-06-25 DIAGNOSIS — M06.9 RHEUMATOID ARTHRITIS INVOLVING MULTIPLE SITES, UNSPECIFIED RHEUMATOID FACTOR PRESENCE: ICD-10-CM

## 2018-06-25 DIAGNOSIS — M48.061 SPINAL STENOSIS OF LUMBAR REGION, UNSPECIFIED WHETHER NEUROGENIC CLAUDICATION PRESENT: ICD-10-CM

## 2018-06-25 DIAGNOSIS — J30.1 SEASONAL ALLERGIC RHINITIS DUE TO POLLEN: ICD-10-CM

## 2018-06-25 PROBLEM — J40 BRONCHITIS: Status: RESOLVED | Noted: 2017-12-15 | Resolved: 2018-06-25

## 2018-06-25 PROCEDURE — 99215 OFFICE O/P EST HI 40 MIN: CPT | Performed by: FAMILY MEDICINE

## 2018-06-26 NOTE — ASSESSMENT & PLAN NOTE
"Chief Complaint   Patient presents with     Weight Loss     Follow up return for weight management.        Vitals:    06/08/18 0934   BP: 161/87   BP Location: Left arm   Patient Position: Chair   Cuff Size: Adult Regular   Pulse: 74   Temp: 98.8  F (37.1  C)   TempSrc: Oral   SpO2: 94%   Weight: 164 lb 3.2 oz   Height: 5' 5.75\"       Body mass index is 26.7 kg/(m^2).    Garima PHILLIPS LPN                     " Acute exacerbation of chronic condition.  Patient with sinus congestion and pain that is backing up into the ears, with left side worse than right.  This is associated with increased lacrimation, as well as increased postnasal drip.    Patient is not currently taking any medication to address these symptoms.  However, patient is performing nasal saline rinses, and this is mildly helping.    Of note, review of records reveals the patient has had recurrent episodes of bronchitis at least once to twice yearly for the last few years.    Review of records reveals the patient has had episodes of bronchitis on a recurrent basis over the last few years, with at least 2  ROS is negative for fevers, chills, nausea, emesis, hematemesis, hematochezia, melena, diarrhea, constipation, rash

## 2018-06-27 PROBLEM — M48.061 SPINAL STENOSIS OF LUMBAR REGION: Status: ACTIVE | Noted: 2017-12-12

## 2018-06-27 NOTE — ASSESSMENT & PLAN NOTE
Acute exacerbation of chronic condition, please see notes from same date of service 06/26/18 re: allergic sinusitis.

## 2018-06-27 NOTE — PROGRESS NOTES
Subjective:   Chief Complaint/History of Present Illness:  Clifford Murphy is a 58 y.o. female established patient who presents today to discuss medical problems as listed below    Diagnoses of Allergic sinusitis, Seasonal allergic rhinitis due to pollen, Preoperative clearance, Spinal stenosis of lumbar region, unspecified whether neurogenic claudication present, Rheumatoid arthritis involving multiple sites, unspecified rheumatoid factor presence (HCC), Subclinical hypothyroidism, and Acquired hypothyroidism were pertinent to this visit.    Allergic sinusitis  Acute exacerbation of chronic condition.  Patient with sinus congestion and pain that is backing up into the ears, with left side worse than right.  This is associated with increased lacrimation, as well as increased postnasal drip.    Patient is not currently taking any medication to address these symptoms.  However, patient is performing nasal saline rinses, and this is mildly helping.    Of note, review of records reveals the patient has had recurrent episodes of bronchitis at least once to twice yearly for the last few years.    Review of records reveals the patient has had episodes of bronchitis on a recurrent basis over the last few years, with at least 2  ROS is negative for fevers, chills, nausea, emesis, hematemesis, hematochezia, melena, diarrhea, constipation, rash    Seasonal allergies  Acute exacerbation of chronic condition, please see notes from same date of service 06/26/18 re: allergic sinusitis.    Preoperative clearance  New problem, uncontrolled, patient is asking for surgical clearance for spinal surgery.  Based on Ng perioperative risk scoring, patient is Class 1 with 1% risk of perioperative risk.  Patient advised to send form form preoperative clearance from orthopedic surgeon's office.  Patient informed that she may need to return to clinic in order for further physical exam and/or placement of orders.  Patient verbalized  understanding.    Spinal stenosis of lumbar region  Patient is following with Dr. Antonio Doran, and apparently is planned to have lumbar spinal surgery.  Review of MRI imaging today with patient reveals that patient does have significant facet arthropathies as well as foraminal stenosis.  Therefore, it is reasonable to proceed with spinal surgery.  Please see notes from same date of service 6/25/2018 regarding preoperative clearance.    RA (rheumatoid arthritis)  Chronic, uncontrolled, patient continues care under Dr. Rangel.  Currently, patient is taking a combination of medications under her direction, and states that her pains are better controlled.    Subclinical hypothyroidism  Acute exacerbation of chronic condition, based on review of labs from 01/2018, uncontrolled, over asymptomatic.  Patient taking levothyroxine as directed.    ROS is NEGATIVE for: cold or heat intolerance, anxiety/depression, chest pain/pressure, palpitations, hair thickening/coarsening/falling out/thinning, skin changes, diarrhea/constipation, unexpected weight change.     Acquired hypothyroidism  Acute exacerbation of chronic condition, uncontrolled, please see notes from same date of service 6/25/2018 regarding subclinical hypothyroid.      Patient Active Problem List    Diagnosis Date Noted   • Allergic sinusitis 06/25/2018   • Preoperative clearance 06/25/2018   • Subclinical hypothyroidism 06/25/2018   • Long-term use of Plaquenil 02/01/2018   • Spinal stenosis of lumbar region 12/12/2017   • Internal derangement of left knee 12/12/2017   • Obesity (BMI 30-39.9) 11/17/2017   • Thrombocytopenia (HCC) 10/05/2017   • Tobacco abuse 08/05/2015   • Seasonal allergies 06/17/2015   • Hypertriglyceridemia 09/29/2014   • Genital herpes in women 02/11/2014   • Jaw pain 08/04/2013   • High risk medications (not anticoagulants) long-term use 08/29/2012   • Post-cholecystectomy syndrome 05/23/2012   • Vitamin D insufficiency 05/15/2012   •  Preventative health care 09/15/2011   • Menopause 09/15/2011   • Macrocytosis without anemia 09/14/2011   • Acquired hypothyroidism 09/14/2011   • RA (rheumatoid arthritis) ([facility]) 09/14/2011       Additional History:   Allergies:    Morphine; Sulfa drugs; Aspirin; Codeine; and Tramadol     Current Medications:     Current Outpatient Prescriptions   Medication Sig Dispense Refill   • hydroxychloroquine (PLAQUENIL) 200 MG Tab TAKE 1 TABLET BY MOUTH TWICE A  Tab 0   • alendronate (FOSAMAX) 70 MG Tab 1 tab po one day/week in AM on an empty stomach with 8 oz of water, do not lie down for 30 minutes after taking medication 12 Tab 3   • levothyroxine (SYNTHROID) 125 MCG Tab TAKE 1 TAB BY MOUTH EVERY MORNING ON AN EMPTY STOMACH. 90 Tab 1   • leflunomide (ARAVA) 20 MG Tab TAKE 1 TABLET BY MOUTH EVERY DAY 90 Tab 0   • Multiple Vitamins-Minerals (AIRBORNE PO) Take  by mouth as needed.     • Acetaminophen (TYLENOL EX ST ARTHRITIS PAIN PO) Take 1,000 mg by mouth.     • ranitidine (ZANTAC) 150 MG Tab Take 1 Tab by mouth 2 times a day. TAKE 1 TAB BY MOUTH 2 TIMES A DAY. 180 Tab 2   • hydroxychloroquine (PLAQUENIL) 200 MG Tab TAKE 1 TABLET BY MOUTH TWICE A  Tab 1   • acyclovir (ZOVIRAX) 200 MG Cap TAKE ONE CAPSULE BY MOUTH TWICE A  Cap 1   • Naproxen Sodium 220 MG Cap Take  by mouth 2 Times a Day.     • albuterol (VENTOLIN OR PROVENTIL) 108 (90 BASE) MCG/ACT Aero Soln inhalation aerosol Inhale 2 Puffs by mouth every 6 hours as needed for Shortness of Breath. 8.5 g 3   • cholestyramine (QUESTRAN) 4 G packet Take 4 g by mouth 1 time daily as needed. 30 Each 5   • fluticasone (FLONASE) 50 MCG/ACT nasal spray Spray 1 Spray in nose every day.     • Cholecalciferol (VITAMIN D3) 2000 UNIT CAPS Take 1 Cap by mouth every day.     • folic acid (FOLVITE) 1 MG TABS Take 1 mg by mouth every day.       No current facility-administered medications for this visit.         Social History:     Social History   Substance Use  "Topics   • Smoking status: Current Every Day Smoker     Packs/day: 0.50     Years: 35.00     Types: Cigarettes   • Smokeless tobacco: Never Used   • Alcohol use 0.6 oz/week     1 Glasses of wine per week      Comment: occasional, maybe 1 per month       ROS:     - NOTE: All other systems reviewed and are negative, except as in HPI.     Objective:   Physical Exam:    Vitals: Blood pressure 126/78, pulse 93, temperature 37.4 °C (99.3 °F), height 1.626 m (5' 4\"), weight 83 kg (183 lb), last menstrual period 01/01/2010, SpO2 97 %.   BMI: Body mass index is 31.41 kg/m².   General/Constitutional: Vitals as above, Well nourished, well developed female in no acute distress   Head/Eyes: Head is grossly normal & atraumatic, bilateral conjunctivae clear and not injected but with increased lacrimation, bilateral EOMI, bilateral PERRL   ENT: Bilateral external ears grossly normal in appearance, Hearing grossly intact, External nares normal in appearance and without discharge/bleeding, bilateral frontal and maxillary sinuses moderately tender to palpation, bilateral tympanic membranes visualized with serous discharge behind TMs but without erythema/exudates   Respiratory: No respiratory distress, bilateral lungs are clear to ausculation in all lung fields (anterior/lateral/posterior), no wheezing/rhonchi/rales   Cardiovascular: Regular rate and rhythm without murmur/gallops/rubs, distal pulses are intact and equal bilaterally (radial, posterior tibial), no bilateral lower extremity edema   MSK: Gait grossly normal & not antalgic   Integumentary: No apparent rashes   Psych: Judgment grossly appropriate, no apparent depression/anxiety    Health Maintenance:     - Not addressed at this visit    Imaging/Labs:     - 03/2018 -- CMP WNL     Assessment and Plan:   1. Allergic sinusitis  2. Seasonal allergic rhinitis due to pollen  Chronic, uncontrolled, patient with recurrent exacerbations.  Referral to Allergy & ENT specialists for " further evaluation/management.   - REFERRAL TO ALLERGY   - REFERRAL TO ENT    3. Preoperative clearance  New problem, stable, well-controlled.  Please see HPI for further details.    4. Spinal stenosis of lumbar region, unspecified whether neurogenic claudication present  Chronic, uncontrolled, worsening, patient to continue care under Dr. Antonio Doran.    5. Rheumatoid arthritis involving multiple sites, unspecified rheumatoid factor presence (HCC)  Chronic, uncontrolled, improving, patient to continue care under Dr. Rangel.    6. Subclinical hypothyroidism  7. Acquired hypothyroidism  Acute exacerbation of chronic condition, asymptomatic.  Continue current dosage of levothyroxine.  Thyroid labs to re-evaluate.   - TSH WITH REFLEX TO FT4; Future    NOTE: A total of 40minutes was spent in direct face-to-face time with the patient, of which over 50% of the time was spent in counseling and/or coordination of care regarding review of acute concerns (allergic sinusitis), discussion concerning general health and other questions related to preoperative clearance, ongoing back pain, as well as management of thyroid metabolism.    RTC: as needed.    PLEASE NOTE: This dictation was created using voice recognition software. I have made every reasonable attempt to correct obvious errors, but I expect that there are errors of grammar and possibly content that I did not discover before finalizing the note.

## 2018-06-27 NOTE — ASSESSMENT & PLAN NOTE
Acute exacerbation of chronic condition, uncontrolled, please see notes from same date of service 6/25/2018 regarding subclinical hypothyroid.

## 2018-06-27 NOTE — ASSESSMENT & PLAN NOTE
Acute exacerbation of chronic condition, based on review of labs from 01/2018, uncontrolled, over asymptomatic.  Patient taking levothyroxine as directed.    ROS is NEGATIVE for: cold or heat intolerance, anxiety/depression, chest pain/pressure, palpitations, hair thickening/coarsening/falling out/thinning, skin changes, diarrhea/constipation, unexpected weight change.

## 2018-06-27 NOTE — ASSESSMENT & PLAN NOTE
New problem, uncontrolled, patient is asking for surgical clearance for spinal surgery.  Based on Ng perioperative risk scoring, patient is Class 1 with 1% risk of perioperative risk.  Patient advised to send form form preoperative clearance from orthopedic surgeon's office.  Patient informed that she may need to return to clinic in order for further physical exam and/or placement of orders.  Patient verbalized understanding.

## 2018-06-27 NOTE — ASSESSMENT & PLAN NOTE
Patient is following with Dr. Antonio Doran, and apparently is planned to have lumbar spinal surgery.  Review of MRI imaging today with patient reveals that patient does have significant facet arthropathies as well as foraminal stenosis.  Therefore, it is reasonable to proceed with spinal surgery.  Please see notes from same date of service 6/25/2018 regarding preoperative clearance.

## 2018-06-27 NOTE — ASSESSMENT & PLAN NOTE
Chronic, uncontrolled, patient continues care under Dr. Rangel.  Currently, patient is taking a combination of medications under her direction, and states that her pains are better controlled.

## 2018-06-28 ENCOUNTER — OFFICE VISIT (OUTPATIENT)
Dept: MEDICAL GROUP | Facility: LAB | Age: 58
End: 2018-06-28
Payer: COMMERCIAL

## 2018-06-28 ENCOUNTER — HOSPITAL ENCOUNTER (OUTPATIENT)
Dept: LAB | Facility: MEDICAL CENTER | Age: 58
End: 2018-06-28
Attending: FAMILY MEDICINE
Payer: COMMERCIAL

## 2018-06-28 ENCOUNTER — TELEPHONE (OUTPATIENT)
Dept: MEDICAL GROUP | Facility: MEDICAL CENTER | Age: 58
End: 2018-06-28

## 2018-06-28 ENCOUNTER — HOSPITAL ENCOUNTER (OUTPATIENT)
Dept: LAB | Facility: MEDICAL CENTER | Age: 58
End: 2018-06-28
Attending: INTERNAL MEDICINE
Payer: COMMERCIAL

## 2018-06-28 VITALS
RESPIRATION RATE: 14 BRPM | OXYGEN SATURATION: 96 % | WEIGHT: 180 LBS | HEART RATE: 76 BPM | BODY MASS INDEX: 30.73 KG/M2 | TEMPERATURE: 98.3 F | HEIGHT: 64 IN | DIASTOLIC BLOOD PRESSURE: 70 MMHG | SYSTOLIC BLOOD PRESSURE: 124 MMHG

## 2018-06-28 DIAGNOSIS — E03.8 SUBCLINICAL HYPOTHYROIDISM: ICD-10-CM

## 2018-06-28 DIAGNOSIS — S32.010S CLOSED COMPRESSION FRACTURE OF FIRST LUMBAR VERTEBRA, SEQUELA: ICD-10-CM

## 2018-06-28 DIAGNOSIS — E03.9 ACQUIRED HYPOTHYROIDISM: ICD-10-CM

## 2018-06-28 DIAGNOSIS — B07.8 OTHER VIRAL WARTS: ICD-10-CM

## 2018-06-28 DIAGNOSIS — M06.9 RHEUMATOID ARTHRITIS INVOLVING MULTIPLE SITES, UNSPECIFIED RHEUMATOID FACTOR PRESENCE: ICD-10-CM

## 2018-06-28 DIAGNOSIS — Z72.0 TOBACCO ABUSE: ICD-10-CM

## 2018-06-28 DIAGNOSIS — Z79.899 LONG-TERM USE OF PLAQUENIL: ICD-10-CM

## 2018-06-28 DIAGNOSIS — Z79.899 ENCOUNTER FOR MONITORING ARAVA THERAPY: ICD-10-CM

## 2018-06-28 DIAGNOSIS — Z51.81 ENCOUNTER FOR MONITORING ARAVA THERAPY: ICD-10-CM

## 2018-06-28 LAB
ALBUMIN SERPL BCP-MCNC: 4.2 G/DL (ref 3.2–4.9)
ALBUMIN/GLOB SERPL: 1.3 G/DL
ALP SERPL-CCNC: 65 U/L (ref 30–99)
ALT SERPL-CCNC: 12 U/L (ref 2–50)
ANION GAP SERPL CALC-SCNC: 5 MMOL/L (ref 0–11.9)
AST SERPL-CCNC: 20 U/L (ref 12–45)
BASOPHILS # BLD AUTO: 0.8 % (ref 0–1.8)
BASOPHILS # BLD: 0.05 K/UL (ref 0–0.12)
BILIRUB SERPL-MCNC: 0.4 MG/DL (ref 0.1–1.5)
BUN SERPL-MCNC: 24 MG/DL (ref 8–22)
CALCIUM SERPL-MCNC: 9.3 MG/DL (ref 8.5–10.5)
CHLORIDE SERPL-SCNC: 110 MMOL/L (ref 96–112)
CO2 SERPL-SCNC: 26 MMOL/L (ref 20–33)
CREAT SERPL-MCNC: 0.86 MG/DL (ref 0.5–1.4)
EOSINOPHIL # BLD AUTO: 0 K/UL (ref 0–0.51)
EOSINOPHIL NFR BLD: 0 % (ref 0–6.9)
ERYTHROCYTE [DISTWIDTH] IN BLOOD BY AUTOMATED COUNT: 46.5 FL (ref 35.9–50)
ERYTHROCYTE [SEDIMENTATION RATE] IN BLOOD BY WESTERGREN METHOD: 32 MM/HOUR (ref 0–30)
GLOBULIN SER CALC-MCNC: 3.3 G/DL (ref 1.9–3.5)
GLUCOSE SERPL-MCNC: 89 MG/DL (ref 65–99)
HCT VFR BLD AUTO: 45.2 % (ref 37–47)
HGB BLD-MCNC: 14.9 G/DL (ref 12–16)
IMM GRANULOCYTES # BLD AUTO: 0.02 K/UL (ref 0–0.11)
IMM GRANULOCYTES NFR BLD AUTO: 0.3 % (ref 0–0.9)
LYMPHOCYTES # BLD AUTO: 1.74 K/UL (ref 1–4.8)
LYMPHOCYTES NFR BLD: 26.7 % (ref 22–41)
MCH RBC QN AUTO: 33.2 PG (ref 27–33)
MCHC RBC AUTO-ENTMCNC: 33 G/DL (ref 33.6–35)
MCV RBC AUTO: 100.7 FL (ref 81.4–97.8)
MONOCYTES # BLD AUTO: 0.61 K/UL (ref 0–0.85)
MONOCYTES NFR BLD AUTO: 9.4 % (ref 0–13.4)
NEUTROPHILS # BLD AUTO: 4.09 K/UL (ref 2–7.15)
NEUTROPHILS NFR BLD: 62.8 % (ref 44–72)
NRBC # BLD AUTO: 0 K/UL
NRBC BLD-RTO: 0 /100 WBC
PLATELET # BLD AUTO: 157 K/UL (ref 164–446)
PMV BLD AUTO: 12.1 FL (ref 9–12.9)
POTASSIUM SERPL-SCNC: 4.3 MMOL/L (ref 3.6–5.5)
PROT SERPL-MCNC: 7.5 G/DL (ref 6–8.2)
RBC # BLD AUTO: 4.49 M/UL (ref 4.2–5.4)
SODIUM SERPL-SCNC: 141 MMOL/L (ref 135–145)
T4 FREE SERPL-MCNC: 1.07 NG/DL (ref 0.53–1.43)
TSH SERPL DL<=0.005 MIU/L-ACNC: 7.06 UIU/ML (ref 0.38–5.33)
WBC # BLD AUTO: 6.5 K/UL (ref 4.8–10.8)

## 2018-06-28 PROCEDURE — 17000 DESTRUCT PREMALG LESION: CPT | Performed by: PHYSICIAN ASSISTANT

## 2018-06-28 PROCEDURE — 85025 COMPLETE CBC W/AUTO DIFF WBC: CPT

## 2018-06-28 PROCEDURE — 84439 ASSAY OF FREE THYROXINE: CPT

## 2018-06-28 PROCEDURE — 80053 COMPREHEN METABOLIC PANEL: CPT

## 2018-06-28 PROCEDURE — 84443 ASSAY THYROID STIM HORMONE: CPT

## 2018-06-28 PROCEDURE — 85652 RBC SED RATE AUTOMATED: CPT

## 2018-06-28 PROCEDURE — 99213 OFFICE O/P EST LOW 20 MIN: CPT | Mod: 25 | Performed by: PHYSICIAN ASSISTANT

## 2018-06-28 PROCEDURE — 36415 COLL VENOUS BLD VENIPUNCTURE: CPT

## 2018-06-28 RX ORDER — LEVOTHYROXINE SODIUM 0.15 MG/1
150 TABLET ORAL
Qty: 90 TAB | Refills: 0 | Status: SHIPPED | OUTPATIENT
Start: 2018-06-28 | End: 2018-07-13

## 2018-06-28 NOTE — PROGRESS NOTES
Subjective:     Chief Complaint   Patient presents with   • Warts     Clifford Murphy is a 58 y.o. female here today for wart as listed below    Wart  Right middle finger dorsal, bump present several month.   Started compound W, helps a little but not much.   Hx of other warts.   Denies bleeding, burning, growing.     Current medicines (including changes today)  Current Outpatient Prescriptions   Medication Sig Dispense Refill   • hydroxychloroquine (PLAQUENIL) 200 MG Tab TAKE 1 TABLET BY MOUTH TWICE A  Tab 0   • alendronate (FOSAMAX) 70 MG Tab 1 tab po one day/week in AM on an empty stomach with 8 oz of water, do not lie down for 30 minutes after taking medication 12 Tab 3   • levothyroxine (SYNTHROID) 125 MCG Tab TAKE 1 TAB BY MOUTH EVERY MORNING ON AN EMPTY STOMACH. 90 Tab 1   • leflunomide (ARAVA) 20 MG Tab TAKE 1 TABLET BY MOUTH EVERY DAY 90 Tab 0   • Multiple Vitamins-Minerals (AIRBORNE PO) Take  by mouth as needed.     • Acetaminophen (TYLENOL EX ST ARTHRITIS PAIN PO) Take 1,000 mg by mouth.     • ranitidine (ZANTAC) 150 MG Tab Take 1 Tab by mouth 2 times a day. TAKE 1 TAB BY MOUTH 2 TIMES A DAY. 180 Tab 2   • hydroxychloroquine (PLAQUENIL) 200 MG Tab TAKE 1 TABLET BY MOUTH TWICE A  Tab 1   • acyclovir (ZOVIRAX) 200 MG Cap TAKE ONE CAPSULE BY MOUTH TWICE A  Cap 1   • Naproxen Sodium 220 MG Cap Take  by mouth 2 Times a Day.     • albuterol (VENTOLIN OR PROVENTIL) 108 (90 BASE) MCG/ACT Aero Soln inhalation aerosol Inhale 2 Puffs by mouth every 6 hours as needed for Shortness of Breath. 8.5 g 3   • cholestyramine (QUESTRAN) 4 G packet Take 4 g by mouth 1 time daily as needed. 30 Each 5   • fluticasone (FLONASE) 50 MCG/ACT nasal spray Spray 1 Spray in nose every day.     • Cholecalciferol (VITAMIN D3) 2000 UNIT CAPS Take 1 Cap by mouth every day.     • folic acid (FOLVITE) 1 MG TABS Take 1 mg by mouth every day.       No current facility-administered medications for this visit.      She  has  "a past medical history of Asthma (02/21/2018); Bronchitis (12/2017); Genital herpes in women (2/11/2014); High risk medications (not anticoagulants) long-term use (8/29/2012); Hypothyroid (9/14/2011); Kidney stones; Macrocytosis without anemia (med effect); Menopause (9/15/2011); Pneumonia (01/2015); Post-cholecystectomy syndrome (5/23/2012); RA (rheumatoid arthritis) (HCC); Rheumatoid arthritis(714.0); Tobacco abuse, episodic (9/14/2011); and Vitamin d deficiency (5/15/2012).    ROS   No chest pain, no shortness of breath, no abdominal pain       Objective:     Blood pressure 124/70, pulse 76, temperature 36.8 °C (98.3 °F), resp. rate 14, height 1.626 m (5' 4.02\"), weight 81.6 kg (180 lb), last menstrual period 01/01/2010, SpO2 96 %. Body mass index is 30.88 kg/m².   Physical Exam:  Alert, oriented in no acute distress.  Eye contact is good, speech goal directed, affect calm  HEENT: conjunctiva non-injected, sclera non-icteric, PERRL.  Ext: right middle finger, 1cm pink verrucous symmetric circular plaque, no erythema, no edema, color normal, peripheral pulses 2+, temperature normal    CRYOTHERAPY:  Discussed risks and benefits of cryotherapy. Patient verbally agreed. 3 applications of cryotherapy were applied to 3 lesion on finger. Patient tolerated procedure well. Aftercare instructions given.    Assessment and Plan:   The following treatment plan was discussed     1. Other viral warts  Discussed treatment options. Pt would like cryo therapy and then 1 week later restart compound W  Pt tolerated well, wound care discussed.       Followup: Return in about 4 weeks (around 7/26/2018) for wart.           Please note that this dictation was created using voice recognition software. I have made every reasonable attempt to correct obvious errors, but I expect that there are errors of grammar and possibly content that I did not discover before finalizing the note.  "

## 2018-06-29 ENCOUNTER — PATIENT MESSAGE (OUTPATIENT)
Dept: MEDICAL GROUP | Facility: MEDICAL CENTER | Age: 58
End: 2018-06-29

## 2018-06-29 DIAGNOSIS — J30.1 SEASONAL ALLERGIC RHINITIS DUE TO POLLEN: ICD-10-CM

## 2018-06-29 DIAGNOSIS — J30.9 ALLERGIC SINUSITIS: ICD-10-CM

## 2018-07-02 RX ORDER — PREDNISONE 20 MG/1
TABLET ORAL
Qty: 8 TAB | Refills: 0 | Status: SHIPPED | OUTPATIENT
Start: 2018-07-02 | End: 2018-07-13

## 2018-07-02 NOTE — TELEPHONE ENCOUNTER
1. Caller Name: Clifford                                         Call Back Number: [phone] (home)       Patient approves a detailed voicemail message: yes    Pt was notified 6/29/18

## 2018-07-06 ENCOUNTER — PATIENT MESSAGE (OUTPATIENT)
Dept: MEDICAL GROUP | Facility: MEDICAL CENTER | Age: 58
End: 2018-07-06

## 2018-07-09 ENCOUNTER — OFFICE VISIT (OUTPATIENT)
Dept: MEDICAL GROUP | Facility: MEDICAL CENTER | Age: 58
End: 2018-07-09
Payer: COMMERCIAL

## 2018-07-09 VITALS
HEART RATE: 90 BPM | HEIGHT: 64 IN | SYSTOLIC BLOOD PRESSURE: 136 MMHG | TEMPERATURE: 98.7 F | RESPIRATION RATE: 20 BRPM | WEIGHT: 179 LBS | BODY MASS INDEX: 30.56 KG/M2 | DIASTOLIC BLOOD PRESSURE: 80 MMHG | OXYGEN SATURATION: 97 %

## 2018-07-09 DIAGNOSIS — E03.9 ACQUIRED HYPOTHYROIDISM: ICD-10-CM

## 2018-07-09 DIAGNOSIS — Z72.0 TOBACCO ABUSE: ICD-10-CM

## 2018-07-09 DIAGNOSIS — M48.061 SPINAL STENOSIS OF LUMBAR REGION, UNSPECIFIED WHETHER NEUROGENIC CLAUDICATION PRESENT: ICD-10-CM

## 2018-07-09 DIAGNOSIS — Z01.818 PREOPERATIVE CLEARANCE: ICD-10-CM

## 2018-07-09 DIAGNOSIS — J30.1 SEASONAL ALLERGIC RHINITIS DUE TO POLLEN: ICD-10-CM

## 2018-07-09 PROCEDURE — 99214 OFFICE O/P EST MOD 30 MIN: CPT | Performed by: FAMILY MEDICINE

## 2018-07-09 NOTE — PATIENT INSTRUCTIONS
Levothyroxine 125 mcg for the next 3 days then 150 mcg MWF, then 125 mcg daily.  We will recheck labs in 2 months  Take 10 mg of prednisone today and then stop          Allergies, Adult  An allergy is when your body's defense system (immune system) overreacts to an otherwise harmless substance (allergen) that you breathe in or eat or something that touches your skin. When you come into contact with something that you are allergic to, your immune system produces certain proteins (antibodies). These proteins cause cells to release chemicals (histamines) that trigger the symptoms of an allergic reaction.  Allergies often affect the nasal passages (allergic rhinitis), eyes (allergic conjunctivitis), skin (atopic dermatitis), and stomach. Allergies can be mild or severe. Allergies cannot spread from person to person (are not contagious). They can develop at any age and may be outgrown.  What are the causes?  Allergies can be caused by any substance that your immune system mistakenly targets as harmful. These may include:  · Outdoor allergens, such as pollen, grass, weeds, car exhaust, and mold spores.  · Indoor allergens, such as dust, smoke, mold, and pet dander.  · Foods, especially peanuts, milk, eggs, fish, shellfish, soy, nuts, and wheat.  · Medicines, such as penicillin.  · Skin irritants, such as detergents, chemicals, and latex.  · Perfume.  · Insect bites or stings.  What increases the risk?  You may be at greater risk of allergies if other people in your family have allergies.  What are the signs or symptoms?  Symptoms depend on what type of allergy you have. They may include:  · Runny, stuffy nose.  · Sneezing.  · Itchy mouth, ears, or throat.  · Postnasal drip.  · Sore throat.  · Itchy, red, watery, or puffy eyes.  · Skin rash or hives.  · Stomach pain.  · Vomiting.  · Diarrhea.  · Bloating.  · Wheezing or coughing.  People with a severe allergy to food, medicine, or an insect bite may have a life-threatening  allergic reaction (anaphylaxis). Symptoms of anaphylaxis include:  · Hives.  · Itching.  · Flushed face.  · Swollen lips, tongue, or mouth.  · Tight or swollen throat.  · Chest pain or tightness in the chest.  · Trouble breathing or shortness of breath.  · Rapid heartbeat.  · Dizziness or fainting.  · Vomiting.  · Diarrhea.  · Pain in the abdomen.  How is this diagnosed?  This condition is diagnosed based on:  · Your symptoms.  · Your family and medical history.  · A physical exam.  You may need to see a health care provider who specializes in treating allergies (allergist). You may also have tests, including:  · Skin tests to see which allergens are causing your symptoms, such as:  ¨ Skin prick test. In this test, your skin is pricked with a tiny needle and exposed to small amounts of possible allergens to see if your skin reacts.  ¨ Intradermal skin test. In this test, a small amount of allergen is injected under your skin to see if your skin reacts.  ¨ Patch test. In this test, a small amount of allergen is placed on your skin and then your skin is covered with a bandage. Your health care provider will check your skin after a couple of days to see if a rash has developed.  · Blood tests.  · Challenges tests. In this test, you inhale a small amount of allergen by mouth to see if you have an allergic reaction.  You may also be asked to:  · Keep a food diary. A food diary is a record of all the foods and drinks you have in a day and any symptoms you experience.  · Practice an elimination diet. An elimination diet involves eliminating specific foods from your diet and then adding them back in one by one to find out if a certain food causes an allergic reaction.  How is this treated?  Treatment for allergies depends on your symptoms. Treatment may include:  · Cold compresses to soothe itching and swelling.  · Eye drops.  · Nasal sprays.  · Using a saline spray or container (neti pot) to flush out the nose (nasal  irrigation). These methods can help clear away mucus and keep the nasal passages moist.  · Using a humidifier.  · Oral antihistamines or other medicines to block allergic reaction and inflammation.  · Skin creams to treat rashes or itching.  · Diet changes to eliminate food allergy triggers.  · Repeated exposure to tiny amounts of allergens to build up a tolerance and prevent future allergic reactions (immunotherapy). These include:  ¨ Allergy shots.  ¨ Oral treatment. This involves taking small doses of an allergen under the tongue (sublingual immunotherapy).  · Emergency epinephrine injection (auto-injector) in case of an allergic emergency. This is a self-injectable, pre-measured medicine that must be given within the first few minutes of a serious allergic reaction.  Follow these instructions at home:  · Avoid known allergens whenever possible.  · If you suffer from airborne allergens, wash out your nose daily. You can do this with a saline spray or a neti pot to flush out your nose (nasal irrigation).  · Take over-the-counter and prescription medicines only as told by your health care provider.  · Keep all follow-up visits as told by your health care provider. This is important.  · If you are at risk of a severe allergic reaction (anaphylaxis), keep your auto-injector with you at all times.  · If you have ever had anaphylaxis, wear a medical alert bracelet or necklace that states you have a severe allergy.  Contact a health care provider if:  · Your symptoms do not improve with treatment.  Get help right away if:  · You have symptoms of anaphylaxis, such as:  ¨ Swollen mouth, tongue, or throat.  ¨ Pain or tightness in your chest.  ¨ Trouble breathing or shortness of breath.  ¨ Dizziness or fainting.  ¨ Severe abdominal pain, vomiting, or diarrhea.  This information is not intended to replace advice given to you by your health care provider. Make sure you discuss any questions you have with your health care  provider.  Document Released: 03/12/2004 Document Revised: 08/17/2017 Document Reviewed: 07/05/2017  [employer] Patient Education © 2017 [employer]

## 2018-07-09 NOTE — ASSESSMENT & PLAN NOTE
Her  cut down a [place] tree and was exposed to a lot of pine pollen. She is been complaining of severe congestion, rhinorrhea and a dry cough. She's had very itchy eyes.

## 2018-07-12 ENCOUNTER — OFFICE VISIT (OUTPATIENT)
Dept: RHEUMATOLOGY | Facility: PHYSICIAN GROUP | Age: 58
End: 2018-07-12
Payer: COMMERCIAL

## 2018-07-12 VITALS
WEIGHT: 179 LBS | DIASTOLIC BLOOD PRESSURE: 80 MMHG | OXYGEN SATURATION: 95 % | SYSTOLIC BLOOD PRESSURE: 130 MMHG | HEART RATE: 90 BPM | TEMPERATURE: 97.5 F | RESPIRATION RATE: 14 BRPM | BODY MASS INDEX: 30.73 KG/M2

## 2018-07-12 DIAGNOSIS — M48.061 SPINAL STENOSIS OF LUMBAR REGION, UNSPECIFIED WHETHER NEUROGENIC CLAUDICATION PRESENT: ICD-10-CM

## 2018-07-12 DIAGNOSIS — J30.1 SEASONAL ALLERGIC RHINITIS DUE TO POLLEN: ICD-10-CM

## 2018-07-12 DIAGNOSIS — E03.9 ACQUIRED HYPOTHYROIDISM: ICD-10-CM

## 2018-07-12 DIAGNOSIS — Z79.899 LONG-TERM USE OF PLAQUENIL: ICD-10-CM

## 2018-07-12 DIAGNOSIS — M81.0 SENILE OSTEOPOROSIS: ICD-10-CM

## 2018-07-12 DIAGNOSIS — Z79.899 ENCOUNTER FOR MONITORING ARAVA THERAPY: ICD-10-CM

## 2018-07-12 DIAGNOSIS — M06.9 RHEUMATOID ARTHRITIS INVOLVING MULTIPLE SITES, UNSPECIFIED RHEUMATOID FACTOR PRESENCE: ICD-10-CM

## 2018-07-12 DIAGNOSIS — Z51.81 ENCOUNTER FOR MONITORING ARAVA THERAPY: ICD-10-CM

## 2018-07-12 PROCEDURE — 99214 OFFICE O/P EST MOD 30 MIN: CPT | Performed by: INTERNAL MEDICINE

## 2018-07-12 RX ORDER — METHYLPREDNISOLONE 4 MG/1
TABLET ORAL
Qty: 21 TAB | Refills: 0 | Status: SHIPPED | OUTPATIENT
Start: 2018-07-12 | End: 2018-08-11

## 2018-07-12 NOTE — PROGRESS NOTES
Chief Complaint- joint pain    Subjective:   Clifford Murphy is a 58 y.o. female here today for follow up of rheumatological issues    This is a follow-up visit for this patient with rheumatoid arthritis currently on Plaquenil 200 mg p.o. twice daily and leflunomide 20 mg p.o. daily with folic acid 1 mg p.o. daily.  Patient here today stating that she thinks she might be having a flare of rheumatoid arthritis it all started when her  was cutting down a pine tree limb that hit the ground in front of the patient spring pollen with pollen getting into the patient's sinuses causing pretty significant sinus reaction and a resultant sympathetic rheumatoid arthritis flare.  Patient has tried antihistamines without benefit and wonders  what she can do about her joints.       Additional comorbidities include lumbar spine DDD and DJD seen by MRI, and hypothyroidism      S/p MTX-N/V  S/p Enbrel-sinus infections  S/p Humira-sinus infections and injection site skin breakdown  S/p Actemra-exacerbated pneumonia  S/p sulfasalazine-contradicted, pt with sulfa allergy     CCP neg 9/2015  Quantiferon Gold neg 9/2015  Hep B neg 9/2015  Uric acid 4.6 9/2015  G6PD 11.5 adequate 10/2017  RF 18 6/2009  CHIQUITA neg 6/2009   Hand x-rays 5/2015-no pathology  Feet X-rays 5/2015-indicates DJD, no erosions    LS pine x-rays 7/2009-indicates multilevel DJD     MRI LS spine 2/2018-L1 compression fracture, multilevel multifactorial DJD, neuroforaminal narrowing at multiple sites, severe left neural foraminal narrowing  DEXA 6/5/2018 T scores -0.7, -0.9  FRAX 6/5/2018 major osteoporotic fracture risk 17.8%, hip fracture risk 3.1%  Patient on Fosamax since 5/2018     Current medicines (including changes today)  Current Outpatient Prescriptions   Medication Sig Dispense Refill   • methylPREDNISolone (MEDROL) 4 MG Tab 6 tabs po one day then 5 tabs po one day then 4 tabs po one day then 3 tabs po one day then 2 tabs po one day then 1 tab po for one  day 21 Tab 0   • leflunomide (ARAVA) 20 MG Tab TAKE 1 TABLET BY MOUTH EVERY DAY 90 Tab 0   • levothyroxine (SYNTHROID) 150 MCG Tab Take 1 Tab by mouth Every morning on an empty stomach. 90 Tab 0   • hydroxychloroquine (PLAQUENIL) 200 MG Tab TAKE 1 TABLET BY MOUTH TWICE A  Tab 0   • alendronate (FOSAMAX) 70 MG Tab 1 tab po one day/week in AM on an empty stomach with 8 oz of water, do not lie down for 30 minutes after taking medication 12 Tab 3   • Acetaminophen (TYLENOL EX ST ARTHRITIS PAIN PO) Take 1,000 mg by mouth.     • ranitidine (ZANTAC) 150 MG Tab Take 1 Tab by mouth 2 times a day. TAKE 1 TAB BY MOUTH 2 TIMES A DAY. 180 Tab 2   • hydroxychloroquine (PLAQUENIL) 200 MG Tab TAKE 1 TABLET BY MOUTH TWICE A  Tab 1   • fluticasone (FLONASE) 50 MCG/ACT nasal spray Spray 1 Spray in nose every day.     • predniSONE (DELTASONE) 20 MG Tab 20mg twice daily for 2days, 10mg twice daily for 2days, 10mg once daily for 4days, stop. 8 Tab 0   • Multiple Vitamins-Minerals (AIRBORNE PO) Take  by mouth as needed.     • acyclovir (ZOVIRAX) 200 MG Cap TAKE ONE CAPSULE BY MOUTH TWICE A  Cap 1   • Naproxen Sodium 220 MG Cap Take  by mouth 2 Times a Day.     • albuterol (VENTOLIN OR PROVENTIL) 108 (90 BASE) MCG/ACT Aero Soln inhalation aerosol Inhale 2 Puffs by mouth every 6 hours as needed for Shortness of Breath. 8.5 g 3   • cholestyramine (QUESTRAN) 4 G packet Take 4 g by mouth 1 time daily as needed. 30 Each 5   • Cholecalciferol (VITAMIN D3) 2000 UNIT CAPS Take 1 Cap by mouth every day.     • folic acid (FOLVITE) 1 MG TABS Take 1 mg by mouth every day.       No current facility-administered medications for this visit.      She  has a past medical history of Asthma (02/21/2018); Bronchitis (12/2017); Genital herpes in women (2/11/2014); High risk medications (not anticoagulants) long-term use (8/29/2012); Hypothyroid (9/14/2011); Kidney stones; Macrocytosis without anemia (med effect); Menopause (9/15/2011);  Pneumonia (01/2015); Post-cholecystectomy syndrome (5/23/2012); RA (rheumatoid arthritis) (HCC); Rheumatoid arthritis(714.0); Tobacco abuse, episodic (9/14/2011); and Vitamin d deficiency (5/15/2012).    ROS   Other than what is mentioned in HPI or physical exam, there is no history of headaches, double vision or blurred vision. No temporal tenderness or jaw claudication. No history of cataracts or glaucoma. No trouble swallowing difficulties or sore throats.  No chest complaints including chest pain, cough or sputum production. No shortness of breath. No GI complaints including nausea, vomiting, change in bowel habits, or past peptic ulcer disease. No history of blood in the stools. No urinary complaints including dysuria or frequency. No history of alopecia, photosensitivity, oral ulcerations, Raynaud's phenomena.       Objective:     Blood pressure 130/80, pulse 90, temperature 36.4 °C (97.5 °F), resp. rate 14, weight 81.2 kg (179 lb), last menstrual period 01/01/2010, SpO2 95 %. Body mass index is 30.73 kg/m².   Physical Exam:    Constitutional: Alert and oriented X3, no distress, patient is talkative with good eye contact.Skin: Warm, dry, good turgor, no rashes in visible areas, no psoriatic lesions, no petechial lesions, no malar rashes.Eye: Equal, round and reactive, conjunctiva clear, lids normal EOM intactENMT: Lips without lesions, good dentition, no oropharyngeal ulcers, moist buccal mucosa, pinna without deformity, throat clear, did not appreciate any nasal discharge, no purulent nasal discharge neck: Trachea midline, no masses, no thyromegaly.Lymph:  No cervical lymphadenopathy, no axillary lymphadenopathy, no supraclavicular lymphadenopathyRespiratory: Unlabored respiratory effort, lungs clear to auscultation, no wheezes, no ronchi.Cardiovascular: Normal S1, S2, no murmur, no edema.Abdomen: Soft, non-tender, no masses, no hepatosplenomegaly.Psych: Alert and oriented x3, normal affect and mood.Neuro:  Cranial nerves 2-12 are grossly intact, no loss of sensation LEExt:no joint laxity noted in bilateral arms, no joint laxity noted in bilateral legs, no swan-neck or boutonniere deformities, no sausage digits, no flexion contractures, no nodules no tophi, no victorina synovitis, gait without antalgia and without foot drop, there is some mild tenderness to palpation along the MCP joints both hands right worse than left      Lab Results   Component Value Date/Time    SODIUM 141 06/28/2018 10:30 AM    POTASSIUM 4.3 06/28/2018 10:30 AM    CHLORIDE 110 06/28/2018 10:30 AM    CO2 26 06/28/2018 10:30 AM    GLUCOSE 89 06/28/2018 10:30 AM    BUN 24 (H) 06/28/2018 10:30 AM    CREATININE 0.86 06/28/2018 10:30 AM    CREATININE 0.77 03/26/2013 12:00 AM    BUNCREATRAT 30 (H) 11/23/2016 09:46 AM    BUNCREATRAT 30 (H) 03/26/2013 12:00 AM      Lab Results   Component Value Date/Time    WBC 6.5 06/28/2018 10:30 AM    WBC 5.7 10/20/2011 12:00 AM    RBC 4.49 06/28/2018 10:30 AM    RBC 4.32 10/20/2011 12:00 AM    HEMOGLOBIN 14.9 06/28/2018 10:30 AM    HEMATOCRIT 45.2 06/28/2018 10:30 AM    .7 (H) 06/28/2018 10:30 AM    MCV 99 (H) 10/20/2011 12:00 AM    MCH 33.2 (H) 06/28/2018 10:30 AM    MCH 34.0 10/20/2011 12:00 AM    MCHC 33.0 (L) 06/28/2018 10:30 AM    MPV 12.1 06/28/2018 10:30 AM    NEUTSPOLYS 62.80 06/28/2018 10:30 AM    LYMPHOCYTES 26.70 06/28/2018 10:30 AM    MONOCYTES 9.40 06/28/2018 10:30 AM    EOSINOPHILS 0.00 06/28/2018 10:30 AM    BASOPHILS 0.80 06/28/2018 10:30 AM      Lab Results   Component Value Date/Time    CALCIUM 9.3 06/28/2018 10:30 AM    ASTSGOT 20 06/28/2018 10:30 AM    ALTSGPT 12 06/28/2018 10:30 AM    ALKPHOSPHAT 65 06/28/2018 10:30 AM    TBILIRUBIN 0.4 06/28/2018 10:30 AM    ALBUMIN 4.2 06/28/2018 10:30 AM    TOTPROTEIN 7.5 06/28/2018 10:30 AM     Lab Results   Component Value Date/Time    RHEUMFACTN 18 (H) 06/15/2009 09:27 AM    ANTINUCAB None Detected 06/15/2009 09:27 AM     Lab Results   Component Value  Date/Time    SEDRATEWES 32 (H) 06/28/2018 10:30 AM     Lab Results   Component Value Date/Time    HBA1C 5.3 11/23/2016 09:46 AM     Lab Results   Component Value Date/Time    G6PD 11.5 10/03/2017 09:54 AM     Results for orders placed during the hospital encounter of 02/02/18   DX-JOINT SURVEY-HANDS SINGLE VIEW    Impression 1.  No acute fracture or bone erosion identified.    2.  Mild osteoarthritis.     Results for orders placed during the hospital encounter of 02/02/18   DX-JOINT SURVEY-FEET SINGLE VIEW    Impression 1.  No acute fracture or bone erosion.    2.  Hallux valgus deformities and mild osteoarthritis.     Results for orders placed during the hospital encounter of 06/05/18   DS-BONE DENSITY STUDY (DEXA)    Impression According to the World Health Organization classification, bone mineral density of this patient is normal.        10-year Probability of Fracture:  Major Osteoporotic     17.8%  Hip     3.1%  Population      [place] ()    Based on left femur neck BMD          INTERPRETING LOCATION:  [address] [place] [address], [id number]     Results for orders placed in visit on 09/29/14   DX-FOOT-COMPLETE 3+    Impression Subacute nondisplaced fifth proximal phalanx extra-articular fracture     Results for orders placed during the hospital encounter of 07/11/09   DX-PELVIS-1 OR 2 VIEWS    Impression IMPRESSION:     1. VERY MILD DEGENERATIVE CHANGE OF THE HIPS, SLIGHTLY WORSE ON THE RIGHT   THAN THE LEFT.  MINIMAL SCLEROSIS IS SEEN AT THE LATERAL ASPECT OF THE   RIGHT FEMORAL NECK, WHICH IS OF DOUBTFUL CLINICAL SIGNIFICANCE.      2. NO PELVIS FRACTURE.    MM/srd     Read By MARICARMEN ANSARI MD on Jul 13 2009  8:38AM  : PREMA Transcription Date: Jul 13 2009 10:42AM  THIS DOCUMENT HAS BEEN ELECTRONICALLY SIGNED BY: MARICARMEN ANSARI MD on Jul 13 2009  4:05PM        Results for orders placed during the hospital encounter of 07/11/09   DX-PELVIS-1 OR 2 VIEWS    Impression IMPRESSION:     1.  VERY MILD DEGENERATIVE CHANGE OF THE HIPS, SLIGHTLY WORSE ON THE RIGHT   THAN THE LEFT.  MINIMAL SCLEROSIS IS SEEN AT THE LATERAL ASPECT OF THE   RIGHT FEMORAL NECK, WHICH IS OF DOUBTFUL CLINICAL SIGNIFICANCE.      2. NO PELVIS FRACTURE.    MM/srd     Read By MARICARMEN ANSARI MD on Jul 13 2009  8:38AM  : SRP Transcription Date: Jul 13 2009 10:42AM  THIS DOCUMENT HAS BEEN ELECTRONICALLY SIGNED BY: MARICARMEN ANSARI MD on Jul 13 2009  4:05PM        Results for orders placed during the hospital encounter of 10/10/09   DX-KNEE COMPLETE 4+    Impression IMPRESSION:     3. MODERATE THREE COMPARTMENT ARTHROSIS.     4. QUESTION SMALL EFFUSION.     MPW/vadim         Read By JASSI GREEN MD on Oct 12 2009  9:01AM  : ZFREDERIC Transcription Date: Oct 12 2009  2:56PM  THIS DOCUMENT HAS BEEN ELECTRONICALLY SIGNED BY: JASSI GREEN MD on Oct   12 2009  3:47PM        Results for orders placed in visit on 09/02/15   DX-KNEE 2- LEFT    Impression 1. No evidence of acute fracture or dislocation.    2. Increased sclerosis with punctate hyperdensities in the proximal tibial metadiaphysis anteriorly may be related to prior surgery.    3. Mild left knee osteoarthritis.        Results for orders placed during the hospital encounter of 07/10/12   DX-HAND 3+    Impression No new radiographic evidence of erosive arthropathy    Stable second DIP subchondral cyst versus erosion    Stable mild first MCP osteoarthritis, periarticular calcification and osteopenia     Results for orders placed during the hospital encounter of 02/02/18   MR-LUMBAR SPINE-W/O    Impression 1.  Acute L1 vertebral compression fracture. This would be amenable to percutaneous augmentation with vertebroplasty or kyphoplasty if clinically appropriate. Interventional radiology is available for consultation and therapy.  2.  Multilevel multifactorial degenerative changes  3.  No areas of high-grade central canal narrowing  4.  LEFT neural foraminal  narrowing worse at L5-S1  5.  Other areas of central canal and neural foraminal narrowing as described above  6.  Mild intra and extrahepatic biliary dilatation without visualized obstructing lesion. Further imaging assessment could be performed with ultrasound or MRCP if clinically appropriate. I have no relevant prior studies available for comparison.     Results for orders placed during the hospital encounter of 01/03/18   MR-KNEE-W/O LEFT    Impression 1.  No MR explanation for acute symptoms. Ligaments and menisci are intact    2.  Postoperative change related to presumed prior tibial tubercle transfer. There is mild patella timothy    3.  Severe patellofemoral cartilage thinning with areas of bone-on-bone articulation and moderate osteophytes.    4.  Moderate femorotibial osteophytes with minimal cartilage thinning     Results for orders placed during the hospital encounter of 02/21/18   DX-CERVICAL SPINE-2 OR 3 VIEWS    Impression 1.  No evidence of atlantoaxial subluxation with flexion and extension maneuvers of the cervical spine.    2.  Degenerative changes C6-7. No demonstrable range of motion at this level.     Assessment and Plan:     1. Rheumatoid arthritis involving multiple sites, unspecified rheumatoid factor presence (HCC)  Continue Plaquenil 200 mg p.o. twice daily and leflunomide 20 mg p.o. daily and folic acid 1 mg p.o. Daily  Patient is getting a sympathetic flare rheumatoid arthritis from her acute sinus reaction to the pollen, and will do a Medrol Dosepak to see if we can help calm down the sympathetic flare.  - methylPREDNISolone (MEDROL) 4 MG Tab; 6 tabs po one day then 5 tabs po one day then 4 tabs po one day then 3 tabs po one day then 2 tabs po one day then 1 tab po for one day  Dispense: 21 Tab; Refill: 0    2. Long-term use of Plaquenil  Of note G6PD levels are adequate, patient will need CBC every 6 months, next CBC due December 2018, patient has standing orders available  Last  ophthalmology evaluation about 1 year ago patient to schedule ophthalmology evaluation  - methylPREDNISolone (MEDROL) 4 MG Tab; 6 tabs po one day then 5 tabs po one day then 4 tabs po one day then 3 tabs po one day then 2 tabs po one day then 1 tab po for one day  Dispense: 21 Tab; Refill: 0    3. Encounter for monitoring Arava therapy  Currently on leflunomide 20 mg p.o. daily continue folic acid 1 mg p.o. Daily  Patient will need labs every 3 months, next labs due September 2018 patient has standing orders available to her  - methylPREDNISolone (MEDROL) 4 MG Tab; 6 tabs po one day then 5 tabs po one day then 4 tabs po one day then 3 tabs po one day then 2 tabs po one day then 1 tab po for one day  Dispense: 21 Tab; Refill: 0    4. Senile osteoporosis  Last DEXA June 2018 which shows osteopenia however patient has had a compression fracture which qualifies patient has osteoporosis patient currently on Fosamax since May 2018.  - methylPREDNISolone (MEDROL) 4 MG Tab; 6 tabs po one day then 5 tabs po one day then 4 tabs po one day then 3 tabs po one day then 2 tabs po one day then 1 tab po for one day  Dispense: 21 Tab; Refill: 0    5. Spinal stenosis of lumbar region, unspecified whether neurogenic claudication present  Currently being followed by orthopedic/spine clinic    6. Seasonal allergic rhinitis due to pollen  Has an ENT evaluation pending August 2018, will do a Medrol Dosepak to see if we can quiet down the sinusitis    7. Acquired hypothyroidism  Can exacerbate joint pain, I recommend monitoring thyroid on a regular basis to assure it is not contributing to the patient's joint pain    Followup: Return in about 2 months (around 9/12/2018). or sooner prn    Clifford Murphy was seen 30 minutes face-to-face of which more than 50% of the time was spent counseling the patient (excluding time for procedures)  regarding  rheumatological condition and care. Therapy was discussed in detail.    Please note that this  dictation was created using voice recognition software. I have made every reasonable attempt to correct obvious errors, but I expect that there are errors of grammar and possibly content that I did not discover before finalizing the note.

## 2018-07-12 NOTE — PROGRESS NOTES
Subjective:     Chief Complaint   Patient presents with   • Allergic Rhinitis     having surgery the 19th, want to make sure everythings in line        Clifford Murphy is a 58 y.o. female here today for evaluation and management of:    Seasonal allergies  Her  cut down a [place] tree and was exposed to a lot of pine pollen. She is been complaining of severe congestion, rhinorrhea and a dry cough. She's had very itchy eyes.    Preoperative clearance  Patient is scheduled for a lumbar fusion on the 19th. She has had labs and EKG and needs preoperative clearance. She denies any chest pain or shortness of breath.       Allergies   Allergen Reactions   • Morphine Anaphylaxis     Morphine and morphine derivatives.   • Sulfa Drugs Anaphylaxis   • Aspirin      Stomach pain   • Codeine      Stomach pain   • Tramadol Vomiting     SEVERE HEADACHE       Current medicines (including changes today)  Current Outpatient Prescriptions   Medication Sig Dispense Refill   • leflunomide (ARAVA) 20 MG Tab TAKE 1 TABLET BY MOUTH EVERY DAY 90 Tab 0   • predniSONE (DELTASONE) 20 MG Tab 20mg twice daily for 2days, 10mg twice daily for 2days, 10mg once daily for 4days, stop. 8 Tab 0   • levothyroxine (SYNTHROID) 150 MCG Tab Take 1 Tab by mouth Every morning on an empty stomach. 90 Tab 0   • hydroxychloroquine (PLAQUENIL) 200 MG Tab TAKE 1 TABLET BY MOUTH TWICE A  Tab 0   • alendronate (FOSAMAX) 70 MG Tab 1 tab po one day/week in AM on an empty stomach with 8 oz of water, do not lie down for 30 minutes after taking medication 12 Tab 3   • Acetaminophen (TYLENOL EX ST ARTHRITIS PAIN PO) Take 1,000 mg by mouth.     • ranitidine (ZANTAC) 150 MG Tab Take 1 Tab by mouth 2 times a day. TAKE 1 TAB BY MOUTH 2 TIMES A DAY. 180 Tab 2   • hydroxychloroquine (PLAQUENIL) 200 MG Tab TAKE 1 TABLET BY MOUTH TWICE A  Tab 1   • fluticasone (FLONASE) 50 MCG/ACT nasal spray Spray 1 Spray in nose every day.     • Multiple Vitamins-Minerals  (AIRBORNE PO) Take  by mouth as needed.     • acyclovir (ZOVIRAX) 200 MG Cap TAKE ONE CAPSULE BY MOUTH TWICE A  Cap 1   • Naproxen Sodium 220 MG Cap Take  by mouth 2 Times a Day.     • albuterol (VENTOLIN OR PROVENTIL) 108 (90 BASE) MCG/ACT Aero Soln inhalation aerosol Inhale 2 Puffs by mouth every 6 hours as needed for Shortness of Breath. 8.5 g 3   • cholestyramine (QUESTRAN) 4 G packet Take 4 g by mouth 1 time daily as needed. 30 Each 5   • Cholecalciferol (VITAMIN D3) 2000 UNIT CAPS Take 1 Cap by mouth every day.     • folic acid (FOLVITE) 1 MG TABS Take 1 mg by mouth every day.       No current facility-administered medications for this visit.        She  has a past medical history of Asthma (02/21/2018); Bronchitis (12/2017); Genital herpes in women (2/11/2014); High risk medications (not anticoagulants) long-term use (8/29/2012); Hypothyroid (9/14/2011); Kidney stones; Macrocytosis without anemia (med effect); Menopause (9/15/2011); Pneumonia (01/2015); Post-cholecystectomy syndrome (5/23/2012); RA (rheumatoid arthritis) (HCC); Rheumatoid arthritis(714.0); Tobacco abuse, episodic (9/14/2011); and Vitamin d deficiency (5/15/2012).    Patient Active Problem List    Diagnosis Date Noted   • Allergic sinusitis 06/25/2018   • Preoperative clearance 06/25/2018   • Subclinical hypothyroidism 06/25/2018   • Long-term use of Plaquenil 02/01/2018   • Spinal stenosis of lumbar region 12/12/2017   • Internal derangement of left knee 12/12/2017   • Obesity (BMI 30-39.9) 11/17/2017   • Thrombocytopenia (HCC) 10/05/2017   • Tobacco abuse 08/05/2015   • Seasonal allergies 06/17/2015   • Hypertriglyceridemia 09/29/2014   • Genital herpes in women 02/11/2014   • Jaw pain 08/04/2013   • High risk medications (not anticoagulants) long-term use 08/29/2012   • Post-cholecystectomy syndrome 05/23/2012   • Vitamin D insufficiency 05/15/2012   • Preventative health care 09/15/2011   • Menopause 09/15/2011   • Macrocytosis  "without anemia 09/14/2011   • Acquired hypothyroidism 09/14/2011   • RA (rheumatoid arthritis) (HCC) 09/14/2011       ROS   No fever or chills.  No nausea or vomiting.  No chest pain or palpitations.  No cough or SOB.  No pain with urination or hematuria.  No black or bloody stools.       Objective:     Blood pressure 136/80, pulse 90, temperature 37.1 °C (98.7 °F), resp. rate 20, height 1.626 m (5' 4\"), weight 81.2 kg (179 lb), last menstrual period 01/01/2010, SpO2 97 %. Body mass index is 30.73 kg/m².   Physical Exam:  Well developed, well nourished.  Alert, oriented in no acute distress.  Eye contact is good, speech goal directed, affect calm  Eyes: conjunctiva diffusely injected, sclera non-icteric. No discharge  Ears: Pinna normal. TM pearly gray.   Nose: Nares are patent.  Pale, boggy mucosa with clear discharge  Mouth: Oral mucous membranes pink and moist with no lesions. Mild erythema of the posterior pharynx without exudate Neck Supple.  No adenopathy or masses in the neck or supraclavicular regions. No thyromegaly  Lungs: clear to auscultation bilaterally with good excursion. No wheezes or rhonchi  CV: regular rate and rhythm. No murmur  Abdomen: soft, nontender, no masses or organomegaly.  No rebound or guarding  Ext: no edema, color normal, vascularity normal, temperature normal          Assessment and Plan:   The following treatment plan was discussed    1. Preoperative clearance  Patient is medically cleared for procedure pending labs and EKG review.    2. Seasonal allergic rhinitis due to pollen  Discussed avoidance measures, Claritin or Allegra. She can use Benadryl at night. Call if not improving    3. Acquired hypothyroidism  This is a chronic medical condition that is currently stable  Continue current levothyroxine dose    4. Tobacco abuse  Encourage patient to stop smoking for overall health especially postoperatively. Strategies to quit discussed    5. Spinal stenosis of lumbar region, " unspecified whether neurogenic claudication present  Scheduled for surgery on the 19th.    Any change or worsening of signs or symptoms, patient encouraged to follow-up or report to the emergency room for further evaluation. Patient understands and agrees.    Followup: Return in about 3 months (around 10/9/2018).

## 2018-07-12 NOTE — ASSESSMENT & PLAN NOTE
Patient is scheduled for a lumbar fusion on the 19th. She has had labs and EKG and needs preoperative clearance. She denies any chest pain or shortness of breath.

## 2018-07-12 NOTE — LETTER
[facility]   [address], [address]  [place] [address]  Phone: [phone]  Fax: [phone]              Encounter Date: 7/12/2018    Dear Dr. Whitney ref. provider found,    It was a pleasure seeing your patient, Clifford Murphy, on 7/12/2018. Diagnoses of Rheumatoid arthritis involving multiple sites, unspecified rheumatoid factor presence (HCC), Long-term use of Plaquenil, Encounter for monitoring Arava therapy, Senile osteoporosis, Spinal stenosis of lumbar region, unspecified whether neurogenic claudication present, Seasonal allergic rhinitis due to pollen, and Acquired hypothyroidism were pertinent to this visit.     Please find attached progress note which includes the history I obtained from Ms. Murphy, my physical examination findings, my impression and recommendations.      Once again, it was a pleasure participating in your patient's care.  Please feel free to contact me if you have any questions or if I can be of any further assistance to your patients.      Sincerely,    Elizabeth Gómez M.D.  Electronically Signed          PROGRESS NOTE:  No notes on file

## 2018-07-13 ENCOUNTER — HOSPITAL ENCOUNTER (OUTPATIENT)
Dept: RADIOLOGY | Facility: MEDICAL CENTER | Age: 58
DRG: 460 | End: 2018-07-13
Attending: ORTHOPAEDIC SURGERY | Admitting: ORTHOPAEDIC SURGERY
Payer: COMMERCIAL

## 2018-07-13 DIAGNOSIS — Z01.812 PRE-OPERATIVE LABORATORY EXAMINATION: ICD-10-CM

## 2018-07-13 DIAGNOSIS — Z01.810 PRE-OPERATIVE CARDIOVASCULAR EXAMINATION: ICD-10-CM

## 2018-07-13 DIAGNOSIS — Z01.811 PRE-OPERATIVE RESPIRATORY EXAMINATION: ICD-10-CM

## 2018-07-13 LAB
ANION GAP SERPL CALC-SCNC: 8 MMOL/L (ref 0–11.9)
APPEARANCE UR: CLEAR
APTT PPP: 26.7 SEC (ref 24.7–36)
BASOPHILS # BLD AUTO: 0.3 % (ref 0–1.8)
BASOPHILS # BLD: 0.03 K/UL (ref 0–0.12)
BILIRUB UR QL STRIP.AUTO: NEGATIVE
BUN SERPL-MCNC: 23 MG/DL (ref 8–22)
CALCIUM SERPL-MCNC: 9.9 MG/DL (ref 8.5–10.5)
CHLORIDE SERPL-SCNC: 107 MMOL/L (ref 96–112)
CO2 SERPL-SCNC: 26 MMOL/L (ref 20–33)
COLOR UR: YELLOW
CREAT SERPL-MCNC: 0.86 MG/DL (ref 0.5–1.4)
EKG IMPRESSION: NORMAL
EOSINOPHIL # BLD AUTO: 0 K/UL (ref 0–0.51)
EOSINOPHIL NFR BLD: 0 % (ref 0–6.9)
ERYTHROCYTE [DISTWIDTH] IN BLOOD BY AUTOMATED COUNT: 47 FL (ref 35.9–50)
ERYTHROCYTE [SEDIMENTATION RATE] IN BLOOD BY WESTERGREN METHOD: 16 MM/HOUR (ref 0–30)
GLUCOSE SERPL-MCNC: 89 MG/DL (ref 65–99)
GLUCOSE UR STRIP.AUTO-MCNC: NEGATIVE MG/DL
HCT VFR BLD AUTO: 44.9 % (ref 37–47)
HGB BLD-MCNC: 14.5 G/DL (ref 12–16)
IMM GRANULOCYTES # BLD AUTO: 0.03 K/UL (ref 0–0.11)
IMM GRANULOCYTES NFR BLD AUTO: 0.3 % (ref 0–0.9)
INR PPP: 1.01 (ref 0.87–1.13)
KETONES UR STRIP.AUTO-MCNC: NEGATIVE MG/DL
LEUKOCYTE ESTERASE UR QL STRIP.AUTO: NEGATIVE
LYMPHOCYTES # BLD AUTO: 1.15 K/UL (ref 1–4.8)
LYMPHOCYTES NFR BLD: 11.2 % (ref 22–41)
MCH RBC QN AUTO: 32.8 PG (ref 27–33)
MCHC RBC AUTO-ENTMCNC: 32.3 G/DL (ref 33.6–35)
MCV RBC AUTO: 101.6 FL (ref 81.4–97.8)
MICRO URNS: NORMAL
MONOCYTES # BLD AUTO: 0.62 K/UL (ref 0–0.85)
MONOCYTES NFR BLD AUTO: 6.1 % (ref 0–13.4)
NEUTROPHILS # BLD AUTO: 8.4 K/UL (ref 2–7.15)
NEUTROPHILS NFR BLD: 82.1 % (ref 44–72)
NITRITE UR QL STRIP.AUTO: NEGATIVE
NRBC # BLD AUTO: 0 K/UL
NRBC BLD-RTO: 0 /100 WBC
PH UR STRIP.AUTO: 5.5 [PH]
PLATELET # BLD AUTO: 201 K/UL (ref 164–446)
PMV BLD AUTO: 11.7 FL (ref 9–12.9)
POTASSIUM SERPL-SCNC: 3.9 MMOL/L (ref 3.6–5.5)
PROT UR QL STRIP: NEGATIVE MG/DL
PROTHROMBIN TIME: 13 SEC (ref 12–14.6)
RBC # BLD AUTO: 4.42 M/UL (ref 4.2–5.4)
RBC UR QL AUTO: NEGATIVE
SODIUM SERPL-SCNC: 141 MMOL/L (ref 135–145)
SP GR UR STRIP.AUTO: 1.01
UROBILINOGEN UR STRIP.AUTO-MCNC: 0.2 MG/DL
WBC # BLD AUTO: 10.2 K/UL (ref 4.8–10.8)

## 2018-07-13 PROCEDURE — 81003 URINALYSIS AUTO W/O SCOPE: CPT

## 2018-07-13 PROCEDURE — 80048 BASIC METABOLIC PNL TOTAL CA: CPT

## 2018-07-13 PROCEDURE — 85610 PROTHROMBIN TIME: CPT

## 2018-07-13 PROCEDURE — 93010 ELECTROCARDIOGRAM REPORT: CPT | Performed by: INTERNAL MEDICINE

## 2018-07-13 PROCEDURE — 85025 COMPLETE CBC W/AUTO DIFF WBC: CPT

## 2018-07-13 PROCEDURE — 93005 ELECTROCARDIOGRAM TRACING: CPT

## 2018-07-13 PROCEDURE — 71046 X-RAY EXAM CHEST 2 VIEWS: CPT

## 2018-07-13 PROCEDURE — 85652 RBC SED RATE AUTOMATED: CPT

## 2018-07-13 PROCEDURE — 85730 THROMBOPLASTIN TIME PARTIAL: CPT

## 2018-07-13 PROCEDURE — 36415 COLL VENOUS BLD VENIPUNCTURE: CPT

## 2018-07-13 RX ORDER — LEVOTHYROXINE SODIUM 0.12 MG/1
125-150 TABLET ORAL
COMMUNITY
End: 2018-08-28

## 2018-07-13 RX ORDER — FOLIC ACID 0.8 MG
1 TABLET ORAL DAILY
COMMUNITY
End: 2018-08-11

## 2018-07-13 RX ORDER — LEVOTHYROXINE SODIUM 0.15 MG/1
150 TABLET ORAL
Status: ON HOLD | COMMUNITY
End: 2018-07-19

## 2018-07-19 ENCOUNTER — APPOINTMENT (OUTPATIENT)
Dept: RADIOLOGY | Facility: MEDICAL CENTER | Age: 58
DRG: 460 | End: 2018-07-19
Attending: ORTHOPAEDIC SURGERY
Payer: COMMERCIAL

## 2018-07-19 ENCOUNTER — HOSPITAL ENCOUNTER (INPATIENT)
Facility: MEDICAL CENTER | Age: 58
LOS: 3 days | DRG: 460 | End: 2018-07-22
Attending: ORTHOPAEDIC SURGERY | Admitting: ORTHOPAEDIC SURGERY
Payer: COMMERCIAL

## 2018-07-19 DIAGNOSIS — M48.061 SPINAL STENOSIS OF LUMBAR REGION, UNSPECIFIED WHETHER NEUROGENIC CLAUDICATION PRESENT: ICD-10-CM

## 2018-07-19 PROCEDURE — 160048 HCHG OR STATISTICAL LEVEL 1-5: Performed by: ORTHOPAEDIC SURGERY

## 2018-07-19 PROCEDURE — 500122 HCHG BOVIE, BLADE: Performed by: ORTHOPAEDIC SURGERY

## 2018-07-19 PROCEDURE — A4314 CATH W/DRAINAGE 2-WAY LATEX: HCPCS | Performed by: ORTHOPAEDIC SURGERY

## 2018-07-19 PROCEDURE — A9270 NON-COVERED ITEM OR SERVICE: HCPCS | Performed by: PHYSICIAN ASSISTANT

## 2018-07-19 PROCEDURE — 700111 HCHG RX REV CODE 636 W/ 250 OVERRIDE (IP)

## 2018-07-19 PROCEDURE — A9270 NON-COVERED ITEM OR SERVICE: HCPCS | Performed by: ORTHOPAEDIC SURGERY

## 2018-07-19 PROCEDURE — 0SB20ZZ EXCISION OF LUMBAR VERTEBRAL DISC, OPEN APPROACH: ICD-10-PCS | Performed by: ORTHOPAEDIC SURGERY

## 2018-07-19 PROCEDURE — 500367 HCHG DRAIN KIT, HEMOVAC: Performed by: ORTHOPAEDIC SURGERY

## 2018-07-19 PROCEDURE — 700102 HCHG RX REV CODE 250 W/ 637 OVERRIDE(OP): Performed by: PHYSICIAN ASSISTANT

## 2018-07-19 PROCEDURE — 4A11X4G MONITORING OF PERIPHERAL NERVOUS ELECTRICAL ACTIVITY, INTRAOPERATIVE, EXTERNAL APPROACH: ICD-10-PCS | Performed by: ORTHOPAEDIC SURGERY

## 2018-07-19 PROCEDURE — 160009 HCHG ANES TIME/MIN: Performed by: ORTHOPAEDIC SURGERY

## 2018-07-19 PROCEDURE — 160042 HCHG SURGERY MINUTES - EA ADDL 1 MIN LEVEL 5: Performed by: ORTHOPAEDIC SURGERY

## 2018-07-19 PROCEDURE — A9270 NON-COVERED ITEM OR SERVICE: HCPCS

## 2018-07-19 PROCEDURE — 700112 HCHG RX REV CODE 229: Performed by: PHYSICIAN ASSISTANT

## 2018-07-19 PROCEDURE — 770006 HCHG ROOM/CARE - MED/SURG/GYN SEMI*

## 2018-07-19 PROCEDURE — 502000 HCHG MISC OR IMPLANTS RC 0278: Performed by: ORTHOPAEDIC SURGERY

## 2018-07-19 PROCEDURE — 302129 PCA PLUS: Performed by: ORTHOPAEDIC SURGERY

## 2018-07-19 PROCEDURE — 160002 HCHG RECOVERY MINUTES (STAT): Performed by: ORTHOPAEDIC SURGERY

## 2018-07-19 PROCEDURE — 700111 HCHG RX REV CODE 636 W/ 250 OVERRIDE (IP): Performed by: PHYSICIAN ASSISTANT

## 2018-07-19 PROCEDURE — 500858 HCHG NEEDLE, KYPHOPLASTY 11GA: Performed by: ORTHOPAEDIC SURGERY

## 2018-07-19 PROCEDURE — 01NB0ZZ RELEASE LUMBAR NERVE, OPEN APPROACH: ICD-10-PCS | Performed by: ORTHOPAEDIC SURGERY

## 2018-07-19 PROCEDURE — 160031 HCHG SURGERY MINUTES - 1ST 30 MINS LEVEL 5: Performed by: ORTHOPAEDIC SURGERY

## 2018-07-19 PROCEDURE — 501838 HCHG SUTURE GENERAL: Performed by: ORTHOPAEDIC SURGERY

## 2018-07-19 PROCEDURE — 700101 HCHG RX REV CODE 250

## 2018-07-19 PROCEDURE — 110454 HCHG SHELL REV 250: Performed by: ORTHOPAEDIC SURGERY

## 2018-07-19 PROCEDURE — 160035 HCHG PACU - 1ST 60 MINS PHASE I: Performed by: ORTHOPAEDIC SURGERY

## 2018-07-19 PROCEDURE — A6222 GAUZE <=16 IN NO W/SAL W/O B: HCPCS | Performed by: ORTHOPAEDIC SURGERY

## 2018-07-19 PROCEDURE — 07DR3ZZ EXTRACTION OF ILIAC BONE MARROW, PERCUTANEOUS APPROACH: ICD-10-PCS | Performed by: ORTHOPAEDIC SURGERY

## 2018-07-19 PROCEDURE — 700101 HCHG RX REV CODE 250: Performed by: PHYSICIAN ASSISTANT

## 2018-07-19 PROCEDURE — 700102 HCHG RX REV CODE 250 W/ 637 OVERRIDE(OP)

## 2018-07-19 PROCEDURE — 160036 HCHG PACU - EA ADDL 30 MINS PHASE I: Performed by: ORTHOPAEDIC SURGERY

## 2018-07-19 PROCEDURE — 700105 HCHG RX REV CODE 258: Performed by: PHYSICIAN ASSISTANT

## 2018-07-19 PROCEDURE — 0SG00AJ FUSION OF LUMBAR VERTEBRAL JOINT WITH INTERBODY FUSION DEVICE, POSTERIOR APPROACH, ANTERIOR COLUMN, OPEN APPROACH: ICD-10-PCS | Performed by: ORTHOPAEDIC SURGERY

## 2018-07-19 PROCEDURE — 503195 HCHG SEALER, BIPOLAR AQUAMANTYS: Performed by: ORTHOPAEDIC SURGERY

## 2018-07-19 PROCEDURE — 500445 HCHG HEMOSTAT, SURGICEL 4X8: Performed by: ORTHOPAEDIC SURGERY

## 2018-07-19 PROCEDURE — 500885 HCHG PACK, JACKSON TABLE: Performed by: ORTHOPAEDIC SURGERY

## 2018-07-19 PROCEDURE — 700102 HCHG RX REV CODE 250 W/ 637 OVERRIDE(OP): Performed by: ORTHOPAEDIC SURGERY

## 2018-07-19 PROCEDURE — 72100 X-RAY EXAM L-S SPINE 2/3 VWS: CPT

## 2018-07-19 RX ORDER — AMOXICILLIN 250 MG
1 CAPSULE ORAL NIGHTLY
Status: DISCONTINUED | OUTPATIENT
Start: 2018-07-19 | End: 2018-07-19

## 2018-07-19 RX ORDER — AMOXICILLIN 250 MG
1 CAPSULE ORAL NIGHTLY
Status: DISCONTINUED | OUTPATIENT
Start: 2018-07-19 | End: 2018-07-22 | Stop reason: HOSPADM

## 2018-07-19 RX ORDER — DOCUSATE SODIUM 100 MG/1
100 CAPSULE, LIQUID FILLED ORAL 2 TIMES DAILY
Status: DISCONTINUED | OUTPATIENT
Start: 2018-07-19 | End: 2018-07-19

## 2018-07-19 RX ORDER — DIPHENHYDRAMINE HCL 25 MG
25 TABLET ORAL EVERY 6 HOURS PRN
Status: DISCONTINUED | OUTPATIENT
Start: 2018-07-19 | End: 2018-07-22 | Stop reason: HOSPADM

## 2018-07-19 RX ORDER — ONDANSETRON 4 MG/1
4 TABLET, ORALLY DISINTEGRATING ORAL EVERY 4 HOURS PRN
Status: DISCONTINUED | OUTPATIENT
Start: 2018-07-19 | End: 2018-07-22 | Stop reason: HOSPADM

## 2018-07-19 RX ORDER — DIPHENHYDRAMINE HYDROCHLORIDE 50 MG/ML
25 INJECTION INTRAMUSCULAR; INTRAVENOUS EVERY 6 HOURS PRN
Status: DISCONTINUED | OUTPATIENT
Start: 2018-07-19 | End: 2018-07-22 | Stop reason: HOSPADM

## 2018-07-19 RX ORDER — POLYETHYLENE GLYCOL 3350 17 G/17G
1 POWDER, FOR SOLUTION ORAL 2 TIMES DAILY PRN
Status: DISCONTINUED | OUTPATIENT
Start: 2018-07-19 | End: 2018-07-19

## 2018-07-19 RX ORDER — FAMOTIDINE 20 MG/1
20 TABLET, FILM COATED ORAL 2 TIMES DAILY
Status: DISCONTINUED | OUTPATIENT
Start: 2018-07-19 | End: 2018-07-22 | Stop reason: HOSPADM

## 2018-07-19 RX ORDER — LABETALOL HYDROCHLORIDE 5 MG/ML
10 INJECTION, SOLUTION INTRAVENOUS
Status: DISCONTINUED | OUTPATIENT
Start: 2018-07-19 | End: 2018-07-22 | Stop reason: HOSPADM

## 2018-07-19 RX ORDER — BACITRACIN 50000 [IU]/1
INJECTION, POWDER, FOR SOLUTION INTRAMUSCULAR
Status: DISCONTINUED | OUTPATIENT
Start: 2018-07-19 | End: 2018-07-19 | Stop reason: HOSPADM

## 2018-07-19 RX ORDER — GABAPENTIN 300 MG/1
CAPSULE ORAL
Status: DISPENSED
Start: 2018-07-19 | End: 2018-07-19

## 2018-07-19 RX ORDER — BUPIVACAINE HYDROCHLORIDE AND EPINEPHRINE 5; 5 MG/ML; UG/ML
INJECTION, SOLUTION EPIDURAL; INTRACAUDAL; PERINEURAL
Status: DISCONTINUED | OUTPATIENT
Start: 2018-07-19 | End: 2018-07-19 | Stop reason: HOSPADM

## 2018-07-19 RX ORDER — ENEMA 19; 7 G/133ML; G/133ML
1 ENEMA RECTAL
Status: DISCONTINUED | OUTPATIENT
Start: 2018-07-19 | End: 2018-07-19

## 2018-07-19 RX ORDER — ACETAMINOPHEN 500 MG
1000 TABLET ORAL EVERY 6 HOURS PRN
Status: DISCONTINUED | OUTPATIENT
Start: 2018-07-19 | End: 2018-07-20

## 2018-07-19 RX ORDER — DIAZEPAM 5 MG/1
5 TABLET ORAL EVERY 6 HOURS PRN
Status: DISCONTINUED | OUTPATIENT
Start: 2018-07-19 | End: 2018-07-22 | Stop reason: HOSPADM

## 2018-07-19 RX ORDER — ALENDRONATE SODIUM 10 MG/1
70 TABLET ORAL
Status: DISCONTINUED | OUTPATIENT
Start: 2018-07-22 | End: 2018-07-22 | Stop reason: HOSPADM

## 2018-07-19 RX ORDER — HYDRALAZINE HYDROCHLORIDE 20 MG/ML
10 INJECTION INTRAMUSCULAR; INTRAVENOUS
Status: DISCONTINUED | OUTPATIENT
Start: 2018-07-19 | End: 2018-07-19

## 2018-07-19 RX ORDER — CEFAZOLIN SODIUM 2 G/100ML
2 INJECTION, SOLUTION INTRAVENOUS EVERY 8 HOURS
Status: DISPENSED | OUTPATIENT
Start: 2018-07-19 | End: 2018-07-20

## 2018-07-19 RX ORDER — BISACODYL 10 MG
10 SUPPOSITORY, RECTAL RECTAL
Status: DISCONTINUED | OUTPATIENT
Start: 2018-07-19 | End: 2018-07-22 | Stop reason: HOSPADM

## 2018-07-19 RX ORDER — LEVOTHYROXINE SODIUM 0.07 MG/1
150 TABLET ORAL
Status: DISCONTINUED | OUTPATIENT
Start: 2018-07-20 | End: 2018-07-22 | Stop reason: HOSPADM

## 2018-07-19 RX ORDER — ACETAMINOPHEN 500 MG
TABLET ORAL
Status: DISPENSED
Start: 2018-07-19 | End: 2018-07-19

## 2018-07-19 RX ORDER — LIDOCAINE HYDROCHLORIDE 10 MG/ML
INJECTION, SOLUTION INFILTRATION; PERINEURAL
Status: COMPLETED
Start: 2018-07-19 | End: 2018-07-19

## 2018-07-19 RX ORDER — CALCIUM CARBONATE 500 MG/1
1000 TABLET, CHEWABLE ORAL 2 TIMES DAILY
Status: DISCONTINUED | OUTPATIENT
Start: 2018-07-19 | End: 2018-07-22 | Stop reason: HOSPADM

## 2018-07-19 RX ORDER — PROMETHAZINE HYDROCHLORIDE 25 MG/1
12.5-25 TABLET ORAL EVERY 4 HOURS PRN
Status: DISCONTINUED | OUTPATIENT
Start: 2018-07-19 | End: 2018-07-22 | Stop reason: HOSPADM

## 2018-07-19 RX ORDER — BISACODYL 10 MG
10 SUPPOSITORY, RECTAL RECTAL
Status: DISCONTINUED | OUTPATIENT
Start: 2018-07-19 | End: 2018-07-19

## 2018-07-19 RX ORDER — ZOLPIDEM TARTRATE 5 MG/1
5 TABLET ORAL
Status: DISCONTINUED | OUTPATIENT
Start: 2018-07-20 | End: 2018-07-22 | Stop reason: HOSPADM

## 2018-07-19 RX ORDER — POLYETHYLENE GLYCOL 3350 17 G/17G
1 POWDER, FOR SOLUTION ORAL 2 TIMES DAILY PRN
Status: DISCONTINUED | OUTPATIENT
Start: 2018-07-19 | End: 2018-07-22 | Stop reason: HOSPADM

## 2018-07-19 RX ORDER — LIDOCAINE HYDROCHLORIDE 10 MG/ML
0.5 INJECTION, SOLUTION INFILTRATION; PERINEURAL
Status: ACTIVE | OUTPATIENT
Start: 2018-07-19 | End: 2018-07-20

## 2018-07-19 RX ORDER — ALENDRONATE SODIUM 70 MG/1
70 TABLET ORAL
COMMUNITY
End: 2018-08-29

## 2018-07-19 RX ORDER — OXYCODONE HCL 5 MG/5 ML
SOLUTION, ORAL ORAL
Status: COMPLETED
Start: 2018-07-19 | End: 2018-07-19

## 2018-07-19 RX ORDER — HALOPERIDOL 5 MG/ML
INJECTION INTRAMUSCULAR
Status: COMPLETED
Start: 2018-07-19 | End: 2018-07-19

## 2018-07-19 RX ORDER — SODIUM CHLORIDE, SODIUM LACTATE, POTASSIUM CHLORIDE, CALCIUM CHLORIDE 600; 310; 30; 20 MG/100ML; MG/100ML; MG/100ML; MG/100ML
INJECTION, SOLUTION INTRAVENOUS CONTINUOUS
Status: DISCONTINUED | OUTPATIENT
Start: 2018-07-19 | End: 2018-07-21

## 2018-07-19 RX ORDER — ENEMA 19; 7 G/133ML; G/133ML
1 ENEMA RECTAL
Status: DISCONTINUED | OUTPATIENT
Start: 2018-07-19 | End: 2018-07-22 | Stop reason: HOSPADM

## 2018-07-19 RX ORDER — LEVOTHYROXINE SODIUM 0.03 MG/1
125 TABLET ORAL
Status: DISCONTINUED | OUTPATIENT
Start: 2018-07-19 | End: 2018-07-22 | Stop reason: HOSPADM

## 2018-07-19 RX ORDER — NICOTINE 21 MG/24HR
14 PATCH, TRANSDERMAL 24 HOURS TRANSDERMAL
Status: DISCONTINUED | OUTPATIENT
Start: 2018-07-19 | End: 2018-07-22 | Stop reason: HOSPADM

## 2018-07-19 RX ORDER — RANITIDINE 150 MG/1
150 TABLET ORAL 2 TIMES DAILY
Status: DISCONTINUED | OUTPATIENT
Start: 2018-07-19 | End: 2018-07-19

## 2018-07-19 RX ORDER — HYDROXYCHLOROQUINE SULFATE 200 MG/1
200 TABLET, FILM COATED ORAL 2 TIMES DAILY
Status: DISCONTINUED | OUTPATIENT
Start: 2018-07-19 | End: 2018-07-22 | Stop reason: HOSPADM

## 2018-07-19 RX ORDER — PROMETHAZINE HYDROCHLORIDE 25 MG/1
12.5-25 SUPPOSITORY RECTAL EVERY 4 HOURS PRN
Status: DISCONTINUED | OUTPATIENT
Start: 2018-07-19 | End: 2018-07-22 | Stop reason: HOSPADM

## 2018-07-19 RX ORDER — M-VIT,TX,IRON,MINS/CALC/FOLIC 27MG-0.4MG
1 TABLET ORAL DAILY
Status: DISCONTINUED | OUTPATIENT
Start: 2018-07-19 | End: 2018-07-22 | Stop reason: HOSPADM

## 2018-07-19 RX ORDER — LEVOTHYROXINE SODIUM 0.12 MG/1
125-150 TABLET ORAL
Status: DISCONTINUED | OUTPATIENT
Start: 2018-07-19 | End: 2018-07-19

## 2018-07-19 RX ORDER — AMOXICILLIN 250 MG
1 CAPSULE ORAL
Status: DISCONTINUED | OUTPATIENT
Start: 2018-07-19 | End: 2018-07-22 | Stop reason: HOSPADM

## 2018-07-19 RX ORDER — DOCUSATE SODIUM 100 MG/1
100 CAPSULE, LIQUID FILLED ORAL 2 TIMES DAILY
Status: DISCONTINUED | OUTPATIENT
Start: 2018-07-19 | End: 2018-07-22 | Stop reason: HOSPADM

## 2018-07-19 RX ORDER — FLUTICASONE PROPIONATE 50 MCG
1 SPRAY, SUSPENSION (ML) NASAL DAILY
Status: DISCONTINUED | OUTPATIENT
Start: 2018-07-19 | End: 2018-07-22 | Stop reason: HOSPADM

## 2018-07-19 RX ORDER — FOLIC ACID 1 MG/1
1 TABLET ORAL DAILY
Status: DISCONTINUED | OUTPATIENT
Start: 2018-07-19 | End: 2018-07-22 | Stop reason: HOSPADM

## 2018-07-19 RX ORDER — ACETAMINOPHEN 500 MG
500-1000 TABLET ORAL 2 TIMES DAILY PRN
COMMUNITY
End: 2021-12-07

## 2018-07-19 RX ORDER — LEFLUNOMIDE 20 MG/1
20 TABLET ORAL
Status: DISCONTINUED | OUTPATIENT
Start: 2018-07-20 | End: 2018-07-22 | Stop reason: HOSPADM

## 2018-07-19 RX ORDER — ONDANSETRON 2 MG/ML
4 INJECTION INTRAMUSCULAR; INTRAVENOUS EVERY 4 HOURS PRN
Status: DISCONTINUED | OUTPATIENT
Start: 2018-07-19 | End: 2018-07-22 | Stop reason: HOSPADM

## 2018-07-19 RX ORDER — AMOXICILLIN 250 MG
1 CAPSULE ORAL
Status: DISCONTINUED | OUTPATIENT
Start: 2018-07-19 | End: 2018-07-19

## 2018-07-19 RX ORDER — CLONIDINE HYDROCHLORIDE 0.1 MG/1
0.1 TABLET ORAL EVERY 4 HOURS PRN
Status: DISCONTINUED | OUTPATIENT
Start: 2018-07-19 | End: 2018-07-22 | Stop reason: HOSPADM

## 2018-07-19 RX ORDER — DEXAMETHASONE SODIUM PHOSPHATE 4 MG/ML
6 INJECTION, SOLUTION INTRA-ARTICULAR; INTRALESIONAL; INTRAMUSCULAR; INTRAVENOUS; SOFT TISSUE EVERY 6 HOURS PRN
Status: DISCONTINUED | OUTPATIENT
Start: 2018-07-19 | End: 2018-07-22 | Stop reason: HOSPADM

## 2018-07-19 RX ADMIN — SODIUM CHLORIDE, SODIUM LACTATE, POTASSIUM CHLORIDE, CALCIUM CHLORIDE: 600; 310; 30; 20 INJECTION, SOLUTION INTRAVENOUS at 07:05

## 2018-07-19 RX ADMIN — FENTANYL CITRATE 50 MCG: 50 INJECTION, SOLUTION INTRAMUSCULAR; INTRAVENOUS at 12:21

## 2018-07-19 RX ADMIN — HYDROMORPHONE HYDROCHLORIDE 0.5 MG: 10 INJECTION, SOLUTION INTRAMUSCULAR; INTRAVENOUS; SUBCUTANEOUS at 14:58

## 2018-07-19 RX ADMIN — FAMOTIDINE 20 MG: 20 TABLET ORAL at 17:55

## 2018-07-19 RX ADMIN — Medication: at 14:15

## 2018-07-19 RX ADMIN — LIDOCAINE HYDROCHLORIDE 0.5 ML: 10 INJECTION, SOLUTION INFILTRATION; PERINEURAL at 07:05

## 2018-07-19 RX ADMIN — DOCUSATE SODIUM 100 MG: 100 CAPSULE, LIQUID FILLED ORAL at 17:54

## 2018-07-19 RX ADMIN — HYDROMORPHONE HYDROCHLORIDE 0.5 MG: 10 INJECTION, SOLUTION INTRAMUSCULAR; INTRAVENOUS; SUBCUTANEOUS at 14:00

## 2018-07-19 RX ADMIN — MAGNESIUM GLUCONATE 500 MG ORAL TABLET 400 MG: 500 TABLET ORAL at 18:55

## 2018-07-19 RX ADMIN — HYDROMORPHONE HYDROCHLORIDE 0.5 MG: 10 INJECTION, SOLUTION INTRAMUSCULAR; INTRAVENOUS; SUBCUTANEOUS at 12:35

## 2018-07-19 RX ADMIN — CEFAZOLIN SODIUM 2 G: 2 INJECTION, SOLUTION INTRAVENOUS at 22:28

## 2018-07-19 RX ADMIN — ACETAMINOPHEN 1000 MG: 500 TABLET, FILM COATED ORAL at 19:57

## 2018-07-19 RX ADMIN — HALOPERIDOL LACTATE 1 MG: 5 INJECTION, SOLUTION INTRAMUSCULAR at 12:50

## 2018-07-19 RX ADMIN — HYDROMORPHONE HYDROCHLORIDE 0.5 MG: 10 INJECTION, SOLUTION INTRAMUSCULAR; INTRAVENOUS; SUBCUTANEOUS at 12:26

## 2018-07-19 RX ADMIN — HYDROXYCHLOROQUINE SULFATE 200 MG: 200 TABLET ORAL at 19:16

## 2018-07-19 RX ADMIN — TRANEXAMIC ACID 1000 MG: 100 INJECTION, SOLUTION INTRAVENOUS at 12:45

## 2018-07-19 RX ADMIN — OXYCODONE HYDROCHLORIDE 10 MG: 5 SOLUTION ORAL at 12:25

## 2018-07-19 RX ADMIN — DIAZEPAM 5 MG: 5 TABLET ORAL at 19:58

## 2018-07-19 RX ADMIN — ANTACID TABLETS 1000 MG: 500 TABLET, CHEWABLE ORAL at 17:55

## 2018-07-19 RX ADMIN — NICOTINE 14 MG: 14 PATCH, EXTENDED RELEASE TRANSDERMAL at 22:25

## 2018-07-19 RX ADMIN — FENTANYL CITRATE 50 MCG: 50 INJECTION, SOLUTION INTRAMUSCULAR; INTRAVENOUS at 12:05

## 2018-07-19 ASSESSMENT — PAIN SCALES - GENERAL
PAINLEVEL_OUTOF10: 7
PAINLEVEL_OUTOF10: 5
PAINLEVEL_OUTOF10: 7
PAINLEVEL_OUTOF10: 5
PAINLEVEL_OUTOF10: 7
PAINLEVEL_OUTOF10: 7
PAINLEVEL_OUTOF10: 9
PAINLEVEL_OUTOF10: 8
PAINLEVEL_OUTOF10: 10
PAINLEVEL_OUTOF10: 5
PAINLEVEL_OUTOF10: 10
PAINLEVEL_OUTOF10: 10
PAINLEVEL_OUTOF10: 6
PAINLEVEL_OUTOF10: 5

## 2018-07-19 ASSESSMENT — PATIENT HEALTH QUESTIONNAIRE - PHQ9
1. LITTLE INTEREST OR PLEASURE IN DOING THINGS: NOT AT ALL
SUM OF ALL RESPONSES TO PHQ9 QUESTIONS 1 AND 2: 0
2. FEELING DOWN, DEPRESSED, IRRITABLE, OR HOPELESS: NOT AT ALL

## 2018-07-19 NOTE — OR NURSING
The pt is awake and oriented. Respirations are regular and easy. Pain is controlled, 5/10 Dressing dry and intact.No neuro deficits noted.

## 2018-07-19 NOTE — OP REPORT
DATE OF SERVICE:  07/19/2018    SERVICE:  Orthopedic spine surgery.    PREOPERATIVE DIAGNOSES:  1.  L4-L5 spondylolisthesis.  2.  L2-S1 lumbar spinal stenosis with central lateral recess and foraminal   stenosis.  3.  Lumbar radiculopathy.  4.  Degenerative disk disease L4-L5.    POSTOPERATIVE DIAGNOSES:  1.  L4-L5 spondylolisthesis.  2.  L2-S1 lumbar spinal stenosis with central lateral recess and foraminal   stenosis.  3.  Lumbar radiculopathy.  4.  Degenerative disk disease L4-L5.    PROCEDURES:  1.  L4-L5 transforaminal lumbar interbody instrumented fusion using Bardolph   spine titanium pedicle screw instrumentation and 2 interbody bone cages filled   with local autograft bone, allograft bone, and beta tricalcium phosphate bone   graft extender.  2.  Left-sided transforaminal transpedicular approach decompression to the   L4-L5 interspace for insertion of interbody cages.  3.  Aspiration of right posterior iliac crest bone marrow for beta tricalcium   phosphate bone graft extender.  4.  Decompressive laminectomy with foraminotomies L2-S1.  5.  Greater than 1 hour use of operating room fluoroscopy.  6.  Use NMA spinal cord monitoring with running electromyography,   somatosensory evoked potentials and pedicle screw testing.    SURGEON:  Antonio Doran MD    ASSISTANT SURGEON:  Stan Abel PA-C, certified first assist.    ANESTHETIC:  General.    ANESTHESIOLOGIST:  Eliot Steele MD    COMPLICATIONS:  None.    BLOOD LOSS:  250 mL    HISTORY AND INDICATIONS:  The patient is a 58-year-old female with chief   complaint of low back pain with radiation of symptoms to bilateral lower   extremities.  She failed conservative treatment and because of this was   scheduled for surgery.  My recommendation was for an L2-S1 decompression and a   L4-L5 fusion.  However, the insurance company did not think that the MRI was   bad enough to authorize L2-L4 decompression.  They only authorized L4-S1   decompression and  also authorized fusion.  However, the patient clearly had   evidence of L2 and L3 nerve dysfunction on her history and physical   examination, so I told her it would be best for me to decompress all the way   up to L2 to eliminate her current proximal thigh, inguinal ligament symptoms   and thigh symptoms.  I told her that I would thoroughly inspect especially the   lateral gutters at the time of the operation and given her physical symptoms,   her physical exam findings if there was significant stenosis I would continue   the decompression up to L2, which was done during this operation.    DESCRIPTION OF PROCEDURE:  After informed consent was obtained, the patient   was brought to the operating room and given general endotracheal anesthetic.    A Russell catheter was placed along with NMA spinal cord monitoring needles.    The patient was turned in the prone position and prepped and draped in the   usual sterile fashion after Ancef and tranexamic acid were given.  After the   field was draped, a time-out was called correctly identifying the patient and   the procedure to be done.  We then injected the area of the incision with   approximately 15 mL of 0.5% Marcaine with epinephrine.  A longitudinal midline   incision was then made.  We then dissected down to the fascial layer.  A   Jamshidi was then advanced between the iliac tables on the right side and   approximately 10 mL of bone marrow was harvested for beta tricalcium phosphate   bone graft extender.  We then carefully cleaned off the fascia using a Lacy   elevator.  We then dissected subperiosteally on both left and right side   exposing the lamina of the bottom part of L3-S1.  We then dissected deeper at   L4-L5 exposing the transverse process of L4 and L5.  Pedicle screws were then   placed in standard fashion using fluoroscopic guidance.  With each screw, the   same sequence of events was used.  First, a Midas Rudy was used to create a   starting hole.  This  was followed by placement of the pedicle finder gearshift   tap and the appropriate length of screw was placed.  All screws were 45x6.0   mm Amston titanium screws.  We then tested them using NMA spinal cord   monitoring pedicle screw testing system and there were noted to be in the   highest level of impedance.    After irrigating with copious amounts of irrigation, we then turned our   attention to doing the decompression.  We decompressed, removed the spinous   process of L4-S1.  We then extended our incision proximally as there was   lateral gutter stenosis and given history described above, patient has   symptoms of dysfunction of L2 and L3 on physical examination and on history   and because of this, I did not think it would be ethically correct not to   extend the decompression superiorly despite the fact that the insurance did   not authorize payment for up to L2.  I believe that was adequate correcting to   do so we did carry that out and dissected all the way up to L2.  We did a   complete decompression, decompressing the central canal, lateral gutters and   the foramina.  We did a complete facetectomies at L4-L5.    We then turned our attention to doing the interbody fusion portion of the   operation.  We did a left-sided transforaminal transpedicular approach to the   disk space.  The nerve root retractors were then used to gently protect the   dura mater.  We incised the disk, evacuated the disk, decorticated the   endplates, and placed 2 cages.  The position was checked and noted to be good.    We also added additional bone graft behind the cages in the form of beta   tricalcium phosphate.  We also placed a plug of Nu-Knit to prevent any   retropulsion of the cages.  After irrigating with copious amounts of   irrigation, we then placed the rest of the hardware.    We placed 2 rods and secured them down.  We slightly distracted off the rods   to increase the superior inferior diameter of the foramina  by approximately 2   mm.  The set screws were checked twice a torque wrench.  A crosslink was   placed and all 3 fixtures of the crosslink were tightened securely.  Digital   traction was placed on the crosslink.  The tops of the screws were then broken   off as recommended by .  We then irrigated the wound with copious   amounts of irrigation and then placed bone graft in the lateral gutters   consisting of local autograft bone, allograft bone, and beta tricalcium   phosphate bone graft extender.  The wound was then closed in layers.  The dura   was thoroughly inspected to make sure there was no evidence of leakage   whatsoever.  We did place FloSeal around the dura mater and Gelfoam on top of   this.  Vancomycin 1000 mg was placed in the wound on closure distributing   below and above the fascia.  The fascia layer was closed with interrupted #1   Vicryl sutures, subcutaneous tissue was closed with 2-0 Vicryl and skin was   closed with staples.  We did inject 20 mL of 0.5% Marcaine with epinephrine   into the wound as local anesthetic.  The procedure was terminated.  No   complications were experienced.  Blood loss was approximately 250 mL.  An NMA   spinal cord monitoring signals were stable throughout the entire operation.       ____________________________________     MD JASON ALLEN / MERISSA    DD:  07/19/2018 11:03:31  DT:  07/19/2018 11:38:20    D#:  [id number]  Job#:  [id number]   8

## 2018-07-19 NOTE — OR NURSING
Dr Steele at bedside; spoke with patient.  Patient awakened, when Dr asked pain level, patient reported 8/10 then fell asleep.  BP 95/58, P 54, R 10-12.

## 2018-07-19 NOTE — OR SURGEON
Immediate Post OP Note    PreOp Diagnosis: L2-s1 stenosis L4-5 spondylolisthesis    PostOp Diagnosis: same    Procedure(s):  LUMBAR FUSION POSTERIOR- L4-5 TLIF - Wound Class: Clean with Drain  LUMBAR DECOMPRESSION- L2-S1 - Wound Class: Clean with Drain    Surgeon(s):  Antonio Doran M.D.    Anesthesiologist/Type of Anesthesia:  Anesthesiologist: Eliot Steele M.D./General    Surgical Staff:  Assistant: CHERYL Prater  Circulator: Fina White R.N.  Relief Circulator: Tessy Herron R.N.  Scrub Person: Leonor Hale  Radiology Technologist: Fina Rachel    Specimens removed if any:  none    Estimated Blood Loss: 250cc    Findings: stenosis up to L2    Complications: none        7/19/2018 11:04 AM Antonio Doran M.D.

## 2018-07-19 NOTE — OR NURSING
The pts upper lip is swollen.There are 2 abrasions on each cheek under the nasal cannula tubing.red area on forehead.

## 2018-07-19 NOTE — PROGRESS NOTES
The Medication Reconciliation process has been completed by interviewing the patient    Allergies have been reviewed  Antibiotic use in 30 days - none    Home Pharmacy:  [facility] [place]

## 2018-07-20 LAB
ERYTHROCYTE [DISTWIDTH] IN BLOOD BY AUTOMATED COUNT: 46.8 FL (ref 35.9–50)
HCT VFR BLD AUTO: 42.4 % (ref 37–47)
HGB BLD-MCNC: 13.8 G/DL (ref 12–16)
MCH RBC QN AUTO: 32.8 PG (ref 27–33)
MCHC RBC AUTO-ENTMCNC: 32.5 G/DL (ref 33.6–35)
MCV RBC AUTO: 100.7 FL (ref 81.4–97.8)
PLATELET # BLD AUTO: 158 K/UL (ref 164–446)
PMV BLD AUTO: 10.9 FL (ref 9–12.9)
RBC # BLD AUTO: 4.21 M/UL (ref 4.2–5.4)
WBC # BLD AUTO: 11.6 K/UL (ref 4.8–10.8)

## 2018-07-20 PROCEDURE — A9270 NON-COVERED ITEM OR SERVICE: HCPCS | Performed by: PHYSICIAN ASSISTANT

## 2018-07-20 PROCEDURE — 700102 HCHG RX REV CODE 250 W/ 637 OVERRIDE(OP): Performed by: ORTHOPAEDIC SURGERY

## 2018-07-20 PROCEDURE — 700102 HCHG RX REV CODE 250 W/ 637 OVERRIDE(OP): Performed by: PHYSICIAN ASSISTANT

## 2018-07-20 PROCEDURE — A9270 NON-COVERED ITEM OR SERVICE: HCPCS | Performed by: ORTHOPAEDIC SURGERY

## 2018-07-20 PROCEDURE — 36415 COLL VENOUS BLD VENIPUNCTURE: CPT

## 2018-07-20 PROCEDURE — L0637 LSO SC R ANT/POS PNL PRE CST: HCPCS

## 2018-07-20 PROCEDURE — 700111 HCHG RX REV CODE 636 W/ 250 OVERRIDE (IP): Performed by: PHYSICIAN ASSISTANT

## 2018-07-20 PROCEDURE — 770006 HCHG ROOM/CARE - MED/SURG/GYN SEMI*

## 2018-07-20 PROCEDURE — 85027 COMPLETE CBC AUTOMATED: CPT

## 2018-07-20 PROCEDURE — 700112 HCHG RX REV CODE 229: Performed by: PHYSICIAN ASSISTANT

## 2018-07-20 RX ORDER — ACETAMINOPHEN 500 MG
1000 TABLET ORAL EVERY 6 HOURS PRN
Status: DISCONTINUED | OUTPATIENT
Start: 2018-07-20 | End: 2018-07-22 | Stop reason: HOSPADM

## 2018-07-20 RX ORDER — HYDROMORPHONE HYDROCHLORIDE 4 MG/1
8 TABLET ORAL
Status: DISCONTINUED | OUTPATIENT
Start: 2018-07-20 | End: 2018-07-20

## 2018-07-20 RX ORDER — HYDROMORPHONE HYDROCHLORIDE 4 MG/1
4 TABLET ORAL
Status: DISCONTINUED | OUTPATIENT
Start: 2018-07-20 | End: 2018-07-20

## 2018-07-20 RX ORDER — OXYCODONE HYDROCHLORIDE 10 MG/1
10 TABLET ORAL
Status: DISCONTINUED | OUTPATIENT
Start: 2018-07-20 | End: 2018-07-20

## 2018-07-20 RX ORDER — OXYCODONE HYDROCHLORIDE 10 MG/1
20 TABLET ORAL
Status: DISCONTINUED | OUTPATIENT
Start: 2018-07-20 | End: 2018-07-20

## 2018-07-20 RX ORDER — KETOROLAC TROMETHAMINE 30 MG/ML
30 INJECTION, SOLUTION INTRAMUSCULAR; INTRAVENOUS EVERY 6 HOURS PRN
Status: DISCONTINUED | OUTPATIENT
Start: 2018-07-20 | End: 2018-07-22 | Stop reason: HOSPADM

## 2018-07-20 RX ORDER — DEXTROSE MONOHYDRATE, SODIUM CHLORIDE, AND POTASSIUM CHLORIDE 50; 1.49; 4.5 G/1000ML; G/1000ML; G/1000ML
INJECTION, SOLUTION INTRAVENOUS CONTINUOUS
Status: DISCONTINUED | OUTPATIENT
Start: 2018-07-20 | End: 2018-07-21

## 2018-07-20 RX ADMIN — DIPHENHYDRAMINE HCL 25 MG: 25 TABLET ORAL at 11:39

## 2018-07-20 RX ADMIN — HYDROXYCHLOROQUINE SULFATE 200 MG: 200 TABLET ORAL at 05:46

## 2018-07-20 RX ADMIN — MAGNESIUM GLUCONATE 500 MG ORAL TABLET 400 MG: 500 TABLET ORAL at 05:44

## 2018-07-20 RX ADMIN — FAMOTIDINE 20 MG: 20 TABLET ORAL at 05:46

## 2018-07-20 RX ADMIN — Medication: at 14:38

## 2018-07-20 RX ADMIN — DIAZEPAM 5 MG: 5 TABLET ORAL at 02:29

## 2018-07-20 RX ADMIN — DEXAMETHASONE SODIUM PHOSPHATE 6 MG: 4 INJECTION, SOLUTION INTRAMUSCULAR; INTRAVENOUS at 17:10

## 2018-07-20 RX ADMIN — Medication 1 MG: at 05:44

## 2018-07-20 RX ADMIN — ACETAMINOPHEN 1000 MG: 500 TABLET, FILM COATED ORAL at 08:42

## 2018-07-20 RX ADMIN — FAMOTIDINE 20 MG: 20 TABLET ORAL at 17:10

## 2018-07-20 RX ADMIN — DEXAMETHASONE SODIUM PHOSPHATE 6 MG: 4 INJECTION, SOLUTION INTRAMUSCULAR; INTRAVENOUS at 00:14

## 2018-07-20 RX ADMIN — HYDROXYCHLOROQUINE SULFATE 200 MG: 200 TABLET ORAL at 17:12

## 2018-07-20 RX ADMIN — VITAMIN D, TAB 1000IU (100/BT) 1000 UNITS: 25 TAB at 05:44

## 2018-07-20 RX ADMIN — NICOTINE 14 MG: 14 PATCH, EXTENDED RELEASE TRANSDERMAL at 21:00

## 2018-07-20 RX ADMIN — DOCUSATE SODIUM 100 MG: 100 CAPSULE, LIQUID FILLED ORAL at 17:11

## 2018-07-20 RX ADMIN — DOCUSATE SODIUM 100 MG: 100 CAPSULE, LIQUID FILLED ORAL at 05:46

## 2018-07-20 RX ADMIN — ANTACID TABLETS 1000 MG: 500 TABLET, CHEWABLE ORAL at 05:47

## 2018-07-20 RX ADMIN — KETOROLAC TROMETHAMINE 30 MG: 30 INJECTION, SOLUTION INTRAMUSCULAR at 13:47

## 2018-07-20 RX ADMIN — FLUTICASONE PROPIONATE 50 MCG: 50 SPRAY, METERED NASAL at 08:44

## 2018-07-20 RX ADMIN — DIAZEPAM 5 MG: 5 TABLET ORAL at 17:10

## 2018-07-20 RX ADMIN — ANTACID TABLETS 1000 MG: 500 TABLET, CHEWABLE ORAL at 17:10

## 2018-07-20 RX ADMIN — LEVOTHYROXINE SODIUM 150 MCG: 75 TABLET ORAL at 05:45

## 2018-07-20 RX ADMIN — ACETAMINOPHEN 1000 MG: 500 TABLET, FILM COATED ORAL at 02:29

## 2018-07-20 RX ADMIN — DIAZEPAM 5 MG: 5 TABLET ORAL at 08:42

## 2018-07-20 RX ADMIN — DEXAMETHASONE SODIUM PHOSPHATE 6 MG: 4 INJECTION, SOLUTION INTRAMUSCULAR; INTRAVENOUS at 05:49

## 2018-07-20 ASSESSMENT — COGNITIVE AND FUNCTIONAL STATUS - GENERAL
DRESSING REGULAR UPPER BODY CLOTHING: A LOT
STANDING UP FROM CHAIR USING ARMS: A LOT
SUGGESTED CMS G CODE MODIFIER DAILY ACTIVITY: CK
CLIMB 3 TO 5 STEPS WITH RAILING: A LOT
MOBILITY SCORE: 13
WALKING IN HOSPITAL ROOM: A LOT
TURNING FROM BACK TO SIDE WHILE IN FLAT BAD: A LITTLE
DRESSING REGULAR LOWER BODY CLOTHING: A LOT
TOILETING: A LOT
PERSONAL GROOMING: A LITTLE
HELP NEEDED FOR BATHING: A LOT
SUGGESTED CMS G CODE MODIFIER MOBILITY: CL
MOVING TO AND FROM BED TO CHAIR: A LOT
MOVING FROM LYING ON BACK TO SITTING ON SIDE OF FLAT BED: A LOT
EATING MEALS: A LITTLE
DAILY ACTIVITIY SCORE: 14

## 2018-07-20 ASSESSMENT — PAIN SCALES - GENERAL
PAINLEVEL_OUTOF10: 4
PAINLEVEL_OUTOF10: 5
PAINLEVEL_OUTOF10: 4
PAINLEVEL_OUTOF10: 5
PAINLEVEL_OUTOF10: 8
PAINLEVEL_OUTOF10: 4

## 2018-07-20 ASSESSMENT — LIFESTYLE VARIABLES: ALCOHOL_USE: NO

## 2018-07-20 NOTE — PROGRESS NOTES
Patient is not receiving basal dose from PCA, she states that she doesn't want to take too much because she is sensitive to pain medication.

## 2018-07-20 NOTE — PROGRESS NOTES
Patient states pain is uncontrolled and would like to receive basal dose from PCA. Order is already in place, PCA pump rate was changed and verified with second RN.

## 2018-07-20 NOTE — PROGRESS NOTES
LSO brace was delivered and fitted to pt.   For questions or adjustments contact traction at ext. [phone].

## 2018-07-20 NOTE — CARE PLAN
Problem: Knowledge Deficit  Goal: Knowledge of disease process/condition, treatment plan, diagnostic tests, and medications will improve    Intervention: Explain information regarding disease process/condition, treatment plan, diagnostic tests, and medications and document in education  Spine precaution, LSO prn, encourage use of IS      Problem: Pain Management  Goal: Pain level will decrease to patient's comfort goal    Intervention: Follow pain managment plan developed in collaboration with patient and Interdisciplinary Team  Keep pca today, prn meds per MAR

## 2018-07-20 NOTE — PROGRESS NOTES
"Orthopaedic Progress Note    Author: Stan bAel Date & Time created: 7/20/2018   9:13 AM     Interval Events:  Pod#1  ROS  Hemodynamics:  Blood pressure 125/77, pulse 61, temperature 36.3 °C (97.4 °F), resp. rate 16, height 1.626 m (5' 4\"), weight 80.2 kg (176 lb 12.9 oz), SpO2 99 %.     No Active Precaution Orders    Respiratory:    Respiration: 16, Pulse Oximetry: 99 %        RUL Breath Sounds: Diminished, RML Breath Sounds: Diminished, RLL Breath Sounds: Diminished, TIA Breath Sounds: Diminished, LLL Breath Sounds: Diminished  Fluids:    Intake/Output Summary (Last 24 hours) at 07/20/18 0913  Last data filed at 07/19/18 2157   Gross per 24 hour   Intake             2800 ml   Output             2825 ml   Net              -25 ml     Admit Weight: 81 kg (178 lb 9.2 oz)  Current      Physical Exam  Labs:            Medical Decision Making/Problem List:    There are no active hospital problems to display for this patient.    Core Measures & Quality Metrics:  Current DVT prophylaxis: compression stocking, michael haynes  Discussed patient condition with RN.  Clearance for lovenox/heparin: not recommend spine surgery  Weight Bearing Status: as tolerated with pain  Wounds & Drains: dressing cdi  Disposition and Follow-up:   Pt. pca is working, orders were not released but are now. Will continue pca longer than usual because of this.  Pt. PE shows motors, sensory, reflexes pulses intact, no edema, will reevalute tommorrow  "

## 2018-07-21 LAB
ERYTHROCYTE [DISTWIDTH] IN BLOOD BY AUTOMATED COUNT: 47.4 FL (ref 35.9–50)
HCT VFR BLD AUTO: 37.8 % (ref 37–47)
HGB BLD-MCNC: 12 G/DL (ref 12–16)
MCH RBC QN AUTO: 32.4 PG (ref 27–33)
MCHC RBC AUTO-ENTMCNC: 31.7 G/DL (ref 33.6–35)
MCV RBC AUTO: 102.2 FL (ref 81.4–97.8)
PLATELET # BLD AUTO: 142 K/UL (ref 164–446)
PMV BLD AUTO: 11.8 FL (ref 9–12.9)
RBC # BLD AUTO: 3.7 M/UL (ref 4.2–5.4)
WBC # BLD AUTO: 12.9 K/UL (ref 4.8–10.8)

## 2018-07-21 PROCEDURE — 700102 HCHG RX REV CODE 250 W/ 637 OVERRIDE(OP): Performed by: ORTHOPAEDIC SURGERY

## 2018-07-21 PROCEDURE — 770006 HCHG ROOM/CARE - MED/SURG/GYN SEMI*

## 2018-07-21 PROCEDURE — 700102 HCHG RX REV CODE 250 W/ 637 OVERRIDE(OP): Performed by: PHYSICIAN ASSISTANT

## 2018-07-21 PROCEDURE — 700111 HCHG RX REV CODE 636 W/ 250 OVERRIDE (IP): Performed by: PHYSICIAN ASSISTANT

## 2018-07-21 PROCEDURE — 97161 PT EVAL LOW COMPLEX 20 MIN: CPT | Performed by: PHYSICAL THERAPIST

## 2018-07-21 PROCEDURE — G8980 MOBILITY D/C STATUS: HCPCS | Mod: CJ | Performed by: PHYSICAL THERAPIST

## 2018-07-21 PROCEDURE — 700112 HCHG RX REV CODE 229: Performed by: PHYSICIAN ASSISTANT

## 2018-07-21 PROCEDURE — 97165 OT EVAL LOW COMPLEX 30 MIN: CPT

## 2018-07-21 PROCEDURE — A9270 NON-COVERED ITEM OR SERVICE: HCPCS | Performed by: PHYSICIAN ASSISTANT

## 2018-07-21 PROCEDURE — G8988 SELF CARE GOAL STATUS: HCPCS | Mod: CI

## 2018-07-21 PROCEDURE — G8987 SELF CARE CURRENT STATUS: HCPCS | Mod: CJ

## 2018-07-21 PROCEDURE — 85027 COMPLETE CBC AUTOMATED: CPT

## 2018-07-21 PROCEDURE — G8978 MOBILITY CURRENT STATUS: HCPCS | Mod: CJ | Performed by: PHYSICAL THERAPIST

## 2018-07-21 PROCEDURE — G8979 MOBILITY GOAL STATUS: HCPCS | Mod: CJ | Performed by: PHYSICAL THERAPIST

## 2018-07-21 PROCEDURE — 36415 COLL VENOUS BLD VENIPUNCTURE: CPT

## 2018-07-21 PROCEDURE — A9270 NON-COVERED ITEM OR SERVICE: HCPCS | Performed by: ORTHOPAEDIC SURGERY

## 2018-07-21 RX ORDER — HYDROMORPHONE HYDROCHLORIDE 2 MG/1
2 TABLET ORAL
Status: DISCONTINUED | OUTPATIENT
Start: 2018-07-21 | End: 2018-07-21

## 2018-07-21 RX ORDER — HYDROMORPHONE HYDROCHLORIDE 4 MG/1
4 TABLET ORAL
Status: DISCONTINUED | OUTPATIENT
Start: 2018-07-21 | End: 2018-07-22 | Stop reason: HOSPADM

## 2018-07-21 RX ORDER — HYDROMORPHONE HYDROCHLORIDE 4 MG/1
4 TABLET ORAL
Status: DISCONTINUED | OUTPATIENT
Start: 2018-07-21 | End: 2018-07-21

## 2018-07-21 RX ORDER — DIAZEPAM 5 MG/1
5 TABLET ORAL EVERY 6 HOURS PRN
Qty: 60 TAB | Refills: 0 | Status: SHIPPED | OUTPATIENT
Start: 2018-07-21 | End: 2018-08-04

## 2018-07-21 RX ORDER — HYDROMORPHONE HYDROCHLORIDE 4 MG/1
4 TABLET ORAL
Qty: 112 TAB | Refills: 0 | Status: SHIPPED | OUTPATIENT
Start: 2018-07-21 | End: 2018-08-04

## 2018-07-21 RX ORDER — HYDROMORPHONE HYDROCHLORIDE 2 MG/1
2 TABLET ORAL
Status: DISCONTINUED | OUTPATIENT
Start: 2018-07-21 | End: 2018-07-22 | Stop reason: HOSPADM

## 2018-07-21 RX ORDER — HYDROMORPHONE HYDROCHLORIDE 4 MG/1
8 TABLET ORAL
Status: DISCONTINUED | OUTPATIENT
Start: 2018-07-21 | End: 2018-07-21

## 2018-07-21 RX ORDER — CEPHALEXIN 500 MG/1
500 CAPSULE ORAL 4 TIMES DAILY
Qty: 56 CAP | Refills: 0 | Status: SHIPPED | OUTPATIENT
Start: 2018-07-21 | End: 2018-08-04

## 2018-07-21 RX ADMIN — DIAZEPAM 5 MG: 5 TABLET ORAL at 09:13

## 2018-07-21 RX ADMIN — HYDROMORPHONE HYDROCHLORIDE 4 MG: 4 TABLET ORAL at 23:25

## 2018-07-21 RX ADMIN — FAMOTIDINE 20 MG: 20 TABLET ORAL at 18:05

## 2018-07-21 RX ADMIN — LEFLUNOMIDE 20 MG: 20 TABLET, FILM COATED ORAL at 04:30

## 2018-07-21 RX ADMIN — DOCUSATE SODIUM 100 MG: 100 CAPSULE, LIQUID FILLED ORAL at 18:00

## 2018-07-21 RX ADMIN — FLUTICASONE PROPIONATE 50 MCG: 50 SPRAY, METERED NASAL at 04:34

## 2018-07-21 RX ADMIN — DIPHENHYDRAMINE HCL 25 MG: 25 TABLET ORAL at 07:59

## 2018-07-21 RX ADMIN — MULTIPLE VITAMINS W/ MINERALS TAB 1 TABLET: TAB at 04:30

## 2018-07-21 RX ADMIN — HYDROXYCHLOROQUINE SULFATE 200 MG: 200 TABLET ORAL at 04:30

## 2018-07-21 RX ADMIN — HYDROXYCHLOROQUINE SULFATE 200 MG: 200 TABLET ORAL at 18:04

## 2018-07-21 RX ADMIN — HYDROMORPHONE HYDROCHLORIDE 4 MG: 4 TABLET ORAL at 15:07

## 2018-07-21 RX ADMIN — HYDROMORPHONE HYDROCHLORIDE 4 MG: 4 TABLET ORAL at 12:10

## 2018-07-21 RX ADMIN — STANDARDIZED SENNA CONCENTRATE AND DOCUSATE SODIUM 1 TABLET: 8.6; 5 TABLET, FILM COATED ORAL at 18:04

## 2018-07-21 RX ADMIN — NICOTINE 14 MG: 14 PATCH, EXTENDED RELEASE TRANSDERMAL at 18:05

## 2018-07-21 RX ADMIN — DOCUSATE SODIUM 100 MG: 100 CAPSULE, LIQUID FILLED ORAL at 04:35

## 2018-07-21 RX ADMIN — FAMOTIDINE 20 MG: 20 TABLET ORAL at 04:30

## 2018-07-21 RX ADMIN — LEVOTHYROXINE SODIUM 125 MCG: 25 TABLET ORAL at 04:30

## 2018-07-21 RX ADMIN — MAGNESIUM GLUCONATE 500 MG ORAL TABLET 400 MG: 500 TABLET ORAL at 04:30

## 2018-07-21 RX ADMIN — ANTACID TABLETS 1000 MG: 500 TABLET, CHEWABLE ORAL at 04:30

## 2018-07-21 RX ADMIN — Medication 1 MG: at 04:30

## 2018-07-21 RX ADMIN — ACETAMINOPHEN 1000 MG: 500 TABLET, FILM COATED ORAL at 11:19

## 2018-07-21 RX ADMIN — ACETAMINOPHEN 1000 MG: 500 TABLET, FILM COATED ORAL at 04:35

## 2018-07-21 RX ADMIN — VITAMIN D, TAB 1000IU (100/BT) 1000 UNITS: 25 TAB at 04:30

## 2018-07-21 RX ADMIN — ANTACID TABLETS 1000 MG: 500 TABLET, CHEWABLE ORAL at 18:05

## 2018-07-21 RX ADMIN — HYDROMORPHONE HYDROCHLORIDE 4 MG: 4 TABLET ORAL at 18:06

## 2018-07-21 RX ADMIN — KETOROLAC TROMETHAMINE 30 MG: 30 INJECTION, SOLUTION INTRAMUSCULAR at 06:02

## 2018-07-21 RX ADMIN — HYDROMORPHONE HYDROCHLORIDE 4 MG: 4 TABLET ORAL at 09:13

## 2018-07-21 RX ADMIN — DIAZEPAM 5 MG: 5 TABLET ORAL at 15:07

## 2018-07-21 ASSESSMENT — COGNITIVE AND FUNCTIONAL STATUS - GENERAL
CLIMB 3 TO 5 STEPS WITH RAILING: A LITTLE
DRESSING REGULAR LOWER BODY CLOTHING: A LITTLE
SUGGESTED CMS G CODE MODIFIER DAILY ACTIVITY: CJ
MOVING FROM LYING ON BACK TO SITTING ON SIDE OF FLAT BED: A LITTLE
DAILY ACTIVITIY SCORE: 22
HELP NEEDED FOR BATHING: A LITTLE
STANDING UP FROM CHAIR USING ARMS: A LITTLE
SUGGESTED CMS G CODE MODIFIER MOBILITY: CK
TURNING FROM BACK TO SIDE WHILE IN FLAT BAD: A LITTLE
MOBILITY SCORE: 19
MOVING TO AND FROM BED TO CHAIR: A LITTLE

## 2018-07-21 ASSESSMENT — PAIN SCALES - GENERAL
PAINLEVEL_OUTOF10: 8
PAINLEVEL_OUTOF10: 5
PAINLEVEL_OUTOF10: 8
PAINLEVEL_OUTOF10: 6
PAINLEVEL_OUTOF10: 7
PAINLEVEL_OUTOF10: 5
PAINLEVEL_OUTOF10: 7
PAINLEVEL_OUTOF10: 5
PAINLEVEL_OUTOF10: 8
PAINLEVEL_OUTOF10: 5
PAINLEVEL_OUTOF10: 5
PAINLEVEL_OUTOF10: 8
PAINLEVEL_OUTOF10: 9
PAINLEVEL_OUTOF10: 8

## 2018-07-21 ASSESSMENT — GAIT ASSESSMENTS
GAIT LEVEL OF ASSIST: SUPERVISED
ASSISTIVE DEVICE: FRONT WHEEL WALKER
DISTANCE (FEET): 100

## 2018-07-21 ASSESSMENT — PATIENT HEALTH QUESTIONNAIRE - PHQ9
SUM OF ALL RESPONSES TO PHQ9 QUESTIONS 1 AND 2: 0
1. LITTLE INTEREST OR PLEASURE IN DOING THINGS: NOT AT ALL
2. FEELING DOWN, DEPRESSED, IRRITABLE, OR HOPELESS: NOT AT ALL

## 2018-07-21 ASSESSMENT — ACTIVITIES OF DAILY LIVING (ADL): TOILETING: INDEPENDENT

## 2018-07-21 NOTE — CARE PLAN
Problem: Safety  Goal: Will remain free from injury  Outcome: PROGRESSING AS EXPECTED  Call light within reach, pt calls for assistance appropriately. Bed in low position and locked, upper bedside rails up, proper mobility signs placed, personal possessions within reach, treaded socks on. Hourly rounding in place.          Problem: Mobility  Goal: Risk for activity intolerance will decrease  Outcome: PROGRESSING AS EXPECTED  Pt able to ambulate to bathroom with one person assistance and a FWW. Pt maintained log roll precautions getting in and out of bed.

## 2018-07-21 NOTE — THERAPY
"Occupational Therapy Evaluation completed.   Functional Status: SBA/CGA transfers with FWW, min A LB dressing with AE, supv toileting,   Plan of Care: Will benefit from Occupational Therapy 3 times per week  Discharge Recommendations:  Equipment: Will Continue to Assess for Equipment Needs. Post-acute therapy Discharge to home with outpatient or home health for additional skilled therapy services.    See \"Rehab Therapy-Acute\" Patient Summary Report for complete documentation.    58 y.o. female with dx of lumbar spondylolisthesis s/p L4-L5 TLIF and L2-S1 decompression. Pt seen for OT eval. In bathroom at start of session having just completed seated shower with sister. Pt amb to EOB with FWW. Doffed damp hospital pants and donned dry ones using reacher. Donned hospital socks using sock-aid. Amb back to bathroom with FWW and completed toileting (urination only). Returned to EOB and performed EOB > supine via logroll. Instructed on shifting position in bed while maintaining neutral spine. Pt is mobilizing gingerly, maintains neutral spine well. She is limited by: pain, spinal precautions. Pt has good support from family at home. Acute OT to follow while in-house. Pt may benefit from HH OT on DC.     "

## 2018-07-21 NOTE — PROGRESS NOTES
"Orthopaedic Progress Note    Author: Stan Abel Date & Time created: 7/21/2018   10:53 AM     Interval Events:  Pod#2  ROS  Hemodynamics:  Blood pressure 120/67, pulse 62, temperature 37.1 °C (98.7 °F), resp. rate 17, height 1.626 m (5' 4\"), weight 80.2 kg (176 lb 12.9 oz), SpO2 91 %, not currently breastfeeding.     No Active Precaution Orders    Respiratory:    Respiration: 17, Pulse Oximetry: 91 %, O2 Daily Delivery Respiratory : Silicone Nasal Cannula        RUL Breath Sounds: Clear, RML Breath Sounds: Diminished, RLL Breath Sounds: Diminished, TIA Breath Sounds: Clear, LLL Breath Sounds: Diminished  Fluids:    Intake/Output Summary (Last 24 hours) at 07/21/18 1053  Last data filed at 07/21/18 0721   Gross per 24 hour   Intake                0 ml   Output             2900 ml   Net            -2900 ml     Admit Weight: 81 kg (178 lb 9.2 oz)  Current      Physical Exam  Labs:  Recent Labs      07/20/18   0913  07/21/18   0340   WBC  11.6*  12.9*   RBC  4.21  3.70*   HEMOGLOBIN  13.8  12.0   HEMATOCRIT  42.4  37.8   MCV  100.7*  102.2*   MCH  32.8  32.4   MCHC  32.5*  31.7*   RDW  46.8  47.4   PLATELETCT  158*  142*   MPV  10.9  11.8           Medical Decision Making/Problem List:    There are no active hospital problems to display for this patient.    Core Measures & Quality Metrics:  Current DVT prophylaxis: mobility, michael hose, compression stocking when in bed  Discussed patient condition with Family and RN.  Clearance for lovenox/heparin: not recommened with spine surgery  Weight Bearing Status: as tolerate with pain  Wounds & Drains: new island dressing applied, minimal drainage on old bandage  Disposition and Follow-up:   Pt. Is doing extremely well, will dc tomorrow after PT/OT sessions.   Pain is controlled well, preoperative symptoms have diminished with anti-inflammitories and will  Continue with time and PT.  "

## 2018-07-21 NOTE — CARE PLAN
Problem: Knowledge Deficit  Goal: Knowledge of disease process/condition, treatment plan, diagnostic tests, and medications will improve    Intervention: Explain information regarding disease process/condition, treatment plan, diagnostic tests, and medications and document in education  Cordell betancur dc pca, PT/OT eval, spine precaution, encourage use of IS      Problem: Pain Management  Goal: Pain level will decrease to patient's comfort goal    Intervention: Follow pain managment plan developed in collaboration with patient and Interdisciplinary Team  Prn meds per MAR

## 2018-07-21 NOTE — THERAPY
"Physical Therapy Evaluation completed.   Bed Mobility:  Supine to Sit: Minimal Assist  Transfers: Sit to Stand: Stand by Assist  Gait: Level Of Assist: Supervised with Front-Wheel Walker       Plan of Care: Patient demonstrates safety with mobility in this environment at this time.   Discharge Recommendations: Equipment: No Equipment Needed. Post-acute therapy Discharge to home with outpatient or home health for additional skilled therapy services.    See \"Rehab Therapy-Acute\" Patient Summary Report for complete documentation.     "

## 2018-07-21 NOTE — PROGRESS NOTES
Nigel Cristobal while in bed, POC discussed, betancur dcd, will monitor for voiding difficulty, pca basal rate dcd, will start on oral prn meds as planned, PT/OT stalin, needs attended.

## 2018-07-21 NOTE — PROGRESS NOTES
Bedside report received from KEREN Strickland. Pt A&O X 4  Attempted to wean pt off oxygen, pt desaturates to 88% on room air, 1L oxygen reapplied via NC. VSS.    Voiding via betancur catheter. CMS +. SCDs to BLE.     Pt calls appropriately.   Plan of care discussed including pain management, mobilization, safety.    Call light and belongings at bedside and within reach.    Bed in lowest position and locked.

## 2018-07-22 VITALS
RESPIRATION RATE: 16 BRPM | OXYGEN SATURATION: 97 % | WEIGHT: 176.81 LBS | DIASTOLIC BLOOD PRESSURE: 70 MMHG | BODY MASS INDEX: 30.19 KG/M2 | TEMPERATURE: 98.1 F | HEART RATE: 82 BPM | SYSTOLIC BLOOD PRESSURE: 115 MMHG | HEIGHT: 64 IN

## 2018-07-22 LAB
ERYTHROCYTE [DISTWIDTH] IN BLOOD BY AUTOMATED COUNT: 47.6 FL (ref 35.9–50)
HCT VFR BLD AUTO: 37 % (ref 37–47)
HGB BLD-MCNC: 12.1 G/DL (ref 12–16)
MCH RBC QN AUTO: 33.3 PG (ref 27–33)
MCHC RBC AUTO-ENTMCNC: 32.7 G/DL (ref 33.6–35)
MCV RBC AUTO: 101.9 FL (ref 81.4–97.8)
PLATELET # BLD AUTO: 104 K/UL (ref 164–446)
PMV BLD AUTO: 11.7 FL (ref 9–12.9)
RBC # BLD AUTO: 3.63 M/UL (ref 4.2–5.4)
WBC # BLD AUTO: 7.7 K/UL (ref 4.8–10.8)

## 2018-07-22 PROCEDURE — 85027 COMPLETE CBC AUTOMATED: CPT

## 2018-07-22 PROCEDURE — 36415 COLL VENOUS BLD VENIPUNCTURE: CPT

## 2018-07-22 PROCEDURE — 700112 HCHG RX REV CODE 229: Performed by: PHYSICIAN ASSISTANT

## 2018-07-22 PROCEDURE — 700102 HCHG RX REV CODE 250 W/ 637 OVERRIDE(OP): Performed by: ORTHOPAEDIC SURGERY

## 2018-07-22 PROCEDURE — 700111 HCHG RX REV CODE 636 W/ 250 OVERRIDE (IP): Performed by: PHYSICIAN ASSISTANT

## 2018-07-22 PROCEDURE — A9270 NON-COVERED ITEM OR SERVICE: HCPCS | Performed by: ORTHOPAEDIC SURGERY

## 2018-07-22 PROCEDURE — A9270 NON-COVERED ITEM OR SERVICE: HCPCS | Performed by: PHYSICIAN ASSISTANT

## 2018-07-22 PROCEDURE — 700102 HCHG RX REV CODE 250 W/ 637 OVERRIDE(OP): Performed by: PHYSICIAN ASSISTANT

## 2018-07-22 RX ADMIN — HYDROMORPHONE HYDROCHLORIDE 4 MG: 4 TABLET ORAL at 03:18

## 2018-07-22 RX ADMIN — HYDROXYCHLOROQUINE SULFATE 200 MG: 200 TABLET ORAL at 06:11

## 2018-07-22 RX ADMIN — ANTACID TABLETS 1000 MG: 500 TABLET, CHEWABLE ORAL at 06:11

## 2018-07-22 RX ADMIN — DIAZEPAM 5 MG: 5 TABLET ORAL at 01:30

## 2018-07-22 RX ADMIN — FAMOTIDINE 20 MG: 20 TABLET ORAL at 06:12

## 2018-07-22 RX ADMIN — DOCUSATE SODIUM 100 MG: 100 CAPSULE, LIQUID FILLED ORAL at 06:19

## 2018-07-22 RX ADMIN — FLUTICASONE PROPIONATE 50 MCG: 50 SPRAY, METERED NASAL at 06:12

## 2018-07-22 RX ADMIN — ALENDRONATE SODIUM 70 MG: 10 TABLET ORAL at 05:35

## 2018-07-22 RX ADMIN — Medication 1 MG: at 06:12

## 2018-07-22 RX ADMIN — LEVOTHYROXINE SODIUM 125 MCG: 25 TABLET ORAL at 07:29

## 2018-07-22 RX ADMIN — VITAMIN D, TAB 1000IU (100/BT) 1000 UNITS: 25 TAB at 06:12

## 2018-07-22 RX ADMIN — ONDANSETRON 4 MG: 2 INJECTION INTRAMUSCULAR; INTRAVENOUS at 06:40

## 2018-07-22 RX ADMIN — HYDROMORPHONE HYDROCHLORIDE 4 MG: 4 TABLET ORAL at 06:12

## 2018-07-22 RX ADMIN — DIAZEPAM 5 MG: 5 TABLET ORAL at 09:16

## 2018-07-22 RX ADMIN — MULTIPLE VITAMINS W/ MINERALS TAB 1 TABLET: TAB at 06:12

## 2018-07-22 RX ADMIN — LEFLUNOMIDE 20 MG: 20 TABLET, FILM COATED ORAL at 06:11

## 2018-07-22 RX ADMIN — HYDROMORPHONE HYDROCHLORIDE 4 MG: 4 TABLET ORAL at 09:16

## 2018-07-22 RX ADMIN — MAGNESIUM GLUCONATE 500 MG ORAL TABLET 400 MG: 500 TABLET ORAL at 06:11

## 2018-07-22 ASSESSMENT — PAIN SCALES - GENERAL
PAINLEVEL_OUTOF10: 8
PAINLEVEL_OUTOF10: 10
PAINLEVEL_OUTOF10: 8
PAINLEVEL_OUTOF10: 5

## 2018-07-22 NOTE — CARE PLAN
Problem: Safety  Goal: Will remain free from injury  Outcome: PROGRESSING AS EXPECTED  Call light within reach, pt calls for assistance appropriately. Bed in low position and locked, upper bedside rails up, proper mobility signs placed, personal possessions within reach, treaded socks on. Hourly rounding in place.        Problem: Urinary Elimination:  Goal: Ability to reestablish a normal urinary elimination pattern will improve  Outcome: PROGRESSING AS EXPECTED  Pt voiding post betancur removal with no issue.

## 2018-07-22 NOTE — PROGRESS NOTES
aaox4, c/o uncontrolled pain last noc as verbalized with some nausea this am, POC discussed, wants zofran for home, dc plans this am, needs attended.

## 2018-07-22 NOTE — DISCHARGE INSTRUCTIONS
Discharge Instructions    Discharged to home by car with relative. Discharged via wheelchair, hospital escort: Yes.  Special equipment needed: Lumbar Support  And front wheel Walker (at home)    Be sure to schedule a follow-up appointment with your primary care doctor or any specialists as instructed.     Discharge Plan:   Influenza Vaccine Indication: Not indicated: Previously immunized this influenza season and > 8 years of age    I understand that a diet low in cholesterol, fat, and sodium is recommended for good health. Unless I have been given specific instructions below for another diet, I accept this instruction as my diet prescription.   Other diet: As tolerated    Special Instructions:    No pushing, pulling or lifting greater than 10 pounds   No repetitive bending and twisting   Okay to shower, pat incision, change dressing after shower and as needed, dressing supplies provided  Ambulate as much it is comfortable for you  No driving for at least two weeks after surgery  Obtain over the counter stool softener while you're taking narcotic pain medications  Lumbar support/brace as needed per MD          · Is patient discharged on Warfarin / Coumadin?   No     Depression / Suicide Risk    As you are discharged from this [facility] facility, it is important to learn how to keep safe from harming yourself.    Recognize the warning signs:  · Abrupt changes in personality, positive or negative- including increase in energy   · Giving away possessions  · Change in eating patterns- significant weight changes-  positive or negative  · Change in sleeping patterns- unable to sleep or sleeping all the time   · Unwillingness or inability to communicate  · Depression  · Unusual sadness, discouragement and loneliness  · Talk of wanting to die  · Neglect of personal appearance   · Rebelliousness- reckless behavior  · Withdrawal from people/activities they love  · Confusion- inability to concentrate     If you or a loved  one observes any of these behaviors or has concerns about self-harm, here's what you can do:  · Talk about it- your feelings and reasons for harming yourself  · Remove any means that you might use to hurt yourself (examples: pills, rope, extension cords, firearm)  · Get professional help from the community (Mental Health, Substance Abuse, psychological counseling)  · Do not be alone:Call your Safe Contact- someone whom you trust who will be there for you.  · Call your local CRISIS HOTLINE 373-9557 or 584-890-2435  · Call your local Children's Mobile Crisis Response Team [place] (604) 864-1815 or www.jslyhl  · Call the toll free National Suicide Prevention Hotlines   · National Suicide Prevention Lifeline 538-849-SPCT (6573)  · National Hope Line Network 800-SUICIDE (728-6915)

## 2018-07-22 NOTE — PROGRESS NOTES
Attempted to reach MELITON Pierce for zofran meds for home to no avail, dc instructions lakeisha signed and in chart, belongings with pt.

## 2018-07-22 NOTE — CARE PLAN
Problem: Knowledge Deficit  Goal: Knowledge of disease process/condition, treatment plan, diagnostic tests, and medications will improve    Intervention: Explain information regarding disease process/condition, treatment plan, diagnostic tests, and medications and document in education  DC plans, cleared by PT/OT      Problem: Pain Management  Goal: Pain level will decrease to patient's comfort goal    Intervention: Follow pain managment plan developed in collaboration with patient and Interdisciplinary Team  Prn meds per MAR

## 2018-07-23 NOTE — DISCHARGE SUMMARY
DATE OF ADMISSION:  07/19/2018    DATE OF DISCHARGE:  07/22/2018.    ADMITTING PHYSICIAN:  Dr. Antonio Doran.    DISCHARGE DIAGNOSES:  Status post transforaminal lumbar interbody fusion   instrumentation L4-L5 with L2-S1 decompression due to severe lumbar stenosis   and radiculopathy symptoms.    DISCHARGE MEDICATIONS:  Include, hydromorphone 4 mg 1 tablet p.o. q. 3 hours,   distribution of 112 for 14 days.  I also gave her diazepam 5 mg 1 p.o. q. 6   hours, distribution of 60 for 14 days and I also give her Keflex 500 mg 1 p.o.   q.i.d. for 14 days postop antibiotics.    Follow up with Dr. Antonio Doran's office at [phone] in approximately 2 weeks   from the date of the operation.    HOSPITAL COURSE AND HISTORY OF PRESENT ILLNESS:  The patient is a 58-year-old   female who is seen in our office with severe lumbar and radicular symptoms,   and has absolutely resolved all conservative treatment; therefore, it was   recommended that she can go under a surgical intervention.  Therefore, the   surgery stated above, would initiated on the day of admission without any   complications preoperatively, intraoperatively, and postoperatively.  She has   passed all physical therapy, occupational therapy and nursing criteria and   will be discharged on postop day #3 without any complications.  We will   reevaluate the patient in approximately 2 weeks.       ____________________________________     BARRINGTON Tafoya / MERISSA    DD:  07/23/2018 09:57:36  DT:  07/23/2018 10:30:12    D#:  [id number]  Job#:  [id number]

## 2018-07-26 ENCOUNTER — TELEPHONE (OUTPATIENT)
Dept: MEDICAL GROUP | Facility: MEDICAL CENTER | Age: 58
End: 2018-07-26

## 2018-07-27 RX ORDER — RANITIDINE 150 MG/1
TABLET ORAL
Qty: 180 TAB | Refills: 2 | Status: SHIPPED | OUTPATIENT
Start: 2018-07-27 | End: 2019-04-25 | Stop reason: SDUPTHER

## 2018-07-30 ENCOUNTER — HOSPITAL ENCOUNTER (EMERGENCY)
Facility: MEDICAL CENTER | Age: 58
End: 2018-07-30
Payer: COMMERCIAL

## 2018-07-30 VITALS
DIASTOLIC BLOOD PRESSURE: 87 MMHG | SYSTOLIC BLOOD PRESSURE: 124 MMHG | HEART RATE: 83 BPM | HEIGHT: 64 IN | OXYGEN SATURATION: 95 % | RESPIRATION RATE: 18 BRPM | WEIGHT: 173 LBS | TEMPERATURE: 98.8 F | BODY MASS INDEX: 29.53 KG/M2

## 2018-07-30 PROCEDURE — 302449 STATCHG TRIAGE ONLY (STATISTIC)

## 2018-07-30 ASSESSMENT — PAIN SCALES - GENERAL: PAINLEVEL_OUTOF10: 10

## 2018-07-30 NOTE — ED TRIAGE NOTES
Chief Complaint   Patient presents with   • Shortness of Breath     decreased oxgenation per family    • Pain     post op back surgery    pt bib family concerned pt oxygenation decreased 89% at home pt recently had increased her hydromorphine and diazapam per PMD. Pt arrived to ED alert continues to have 9/10 pain.

## 2018-07-31 NOTE — ED NOTES
Pt reassessed VSS she is tearful and has increase in pain she is not getting relief from pain meds at home pt last medication early this am. Pt informed and aware of triage process.

## 2018-07-31 NOTE — ED NOTES
Pt family member came to triage room and informed RN pt had left the ER and she will contact her PMD.

## 2018-08-11 ENCOUNTER — APPOINTMENT (OUTPATIENT)
Dept: RADIOLOGY | Facility: MEDICAL CENTER | Age: 58
DRG: 857 | End: 2018-08-11
Attending: EMERGENCY MEDICINE
Payer: COMMERCIAL

## 2018-08-11 ENCOUNTER — HOSPITAL ENCOUNTER (INPATIENT)
Facility: MEDICAL CENTER | Age: 58
LOS: 11 days | DRG: 857 | End: 2018-08-22
Attending: EMERGENCY MEDICINE | Admitting: INTERNAL MEDICINE
Payer: COMMERCIAL

## 2018-08-11 DIAGNOSIS — M54.50 ACUTE MIDLINE LOW BACK PAIN WITHOUT SCIATICA: ICD-10-CM

## 2018-08-11 DIAGNOSIS — R11.2 INTRACTABLE VOMITING WITH NAUSEA, UNSPECIFIED VOMITING TYPE: ICD-10-CM

## 2018-08-11 DIAGNOSIS — T81.89XA LUMBAR SURGICAL WOUND FLUID COLLECTION, INITIAL ENCOUNTER: ICD-10-CM

## 2018-08-11 DIAGNOSIS — M54.59 INTRACTABLE LOW BACK PAIN: ICD-10-CM

## 2018-08-11 DIAGNOSIS — M06.9 RHEUMATOID ARTHRITIS INVOLVING MULTIPLE SITES, UNSPECIFIED RHEUMATOID FACTOR PRESENCE: ICD-10-CM

## 2018-08-11 PROBLEM — T81.49XA SURGICAL SITE INFECTION: Status: ACTIVE | Noted: 2018-08-11

## 2018-08-11 PROBLEM — E87.1 HYPONATREMIA: Status: ACTIVE | Noted: 2018-08-11

## 2018-08-11 PROBLEM — E87.6 HYPOKALEMIA: Status: ACTIVE | Noted: 2018-08-11

## 2018-08-11 LAB
ALBUMIN SERPL BCP-MCNC: 4 G/DL (ref 3.2–4.9)
ALBUMIN/GLOB SERPL: 1.4 G/DL
ALP SERPL-CCNC: 69 U/L (ref 30–99)
ALT SERPL-CCNC: 7 U/L (ref 2–50)
ANION GAP SERPL CALC-SCNC: 12 MMOL/L (ref 0–11.9)
AST SERPL-CCNC: 16 U/L (ref 12–45)
BASOPHILS # BLD AUTO: 0.2 % (ref 0–1.8)
BASOPHILS # BLD: 0.02 K/UL (ref 0–0.12)
BILIRUB SERPL-MCNC: 0.5 MG/DL (ref 0.1–1.5)
BUN SERPL-MCNC: 12 MG/DL (ref 8–22)
CALCIUM SERPL-MCNC: 8.8 MG/DL (ref 8.5–10.5)
CHLORIDE SERPL-SCNC: 101 MMOL/L (ref 96–112)
CO2 SERPL-SCNC: 21 MMOL/L (ref 20–33)
CREAT SERPL-MCNC: 0.73 MG/DL (ref 0.5–1.4)
EOSINOPHIL # BLD AUTO: 0.01 K/UL (ref 0–0.51)
EOSINOPHIL NFR BLD: 0.1 % (ref 0–6.9)
ERYTHROCYTE [DISTWIDTH] IN BLOOD BY AUTOMATED COUNT: 44.5 FL (ref 35.9–50)
GLOBULIN SER CALC-MCNC: 2.8 G/DL (ref 1.9–3.5)
GLUCOSE SERPL-MCNC: 102 MG/DL (ref 65–99)
HCT VFR BLD AUTO: 39.5 % (ref 37–47)
HGB BLD-MCNC: 13.2 G/DL (ref 12–16)
IMM GRANULOCYTES # BLD AUTO: 0.03 K/UL (ref 0–0.11)
IMM GRANULOCYTES NFR BLD AUTO: 0.3 % (ref 0–0.9)
LIPASE SERPL-CCNC: 6 U/L (ref 11–82)
LYMPHOCYTES # BLD AUTO: 1.17 K/UL (ref 1–4.8)
LYMPHOCYTES NFR BLD: 10.2 % (ref 22–41)
MCH RBC QN AUTO: 32.4 PG (ref 27–33)
MCHC RBC AUTO-ENTMCNC: 33.4 G/DL (ref 33.6–35)
MCV RBC AUTO: 97.1 FL (ref 81.4–97.8)
MONOCYTES # BLD AUTO: 0.93 K/UL (ref 0–0.85)
MONOCYTES NFR BLD AUTO: 8.1 % (ref 0–13.4)
NEUTROPHILS # BLD AUTO: 9.31 K/UL (ref 2–7.15)
NEUTROPHILS NFR BLD: 81.1 % (ref 44–72)
NRBC # BLD AUTO: 0 K/UL
NRBC BLD-RTO: 0 /100 WBC
PLATELET # BLD AUTO: 277 K/UL (ref 164–446)
PMV BLD AUTO: 10.4 FL (ref 9–12.9)
POTASSIUM SERPL-SCNC: 3.5 MMOL/L (ref 3.6–5.5)
PROT SERPL-MCNC: 6.8 G/DL (ref 6–8.2)
RBC # BLD AUTO: 4.07 M/UL (ref 4.2–5.4)
SODIUM SERPL-SCNC: 134 MMOL/L (ref 135–145)
WBC # BLD AUTO: 11.5 K/UL (ref 4.8–10.8)

## 2018-08-11 PROCEDURE — 700102 HCHG RX REV CODE 250 W/ 637 OVERRIDE(OP): Performed by: INTERNAL MEDICINE

## 2018-08-11 PROCEDURE — 700117 HCHG RX CONTRAST REV CODE 255: Performed by: EMERGENCY MEDICINE

## 2018-08-11 PROCEDURE — 85025 COMPLETE CBC W/AUTO DIFF WBC: CPT

## 2018-08-11 PROCEDURE — 99285 EMERGENCY DEPT VISIT HI MDM: CPT

## 2018-08-11 PROCEDURE — 700111 HCHG RX REV CODE 636 W/ 250 OVERRIDE (IP): Performed by: EMERGENCY MEDICINE

## 2018-08-11 PROCEDURE — 700105 HCHG RX REV CODE 258: Performed by: PHARMACIST

## 2018-08-11 PROCEDURE — 770001 HCHG ROOM/CARE - MED/SURG/GYN PRIV*

## 2018-08-11 PROCEDURE — 700105 HCHG RX REV CODE 258: Performed by: INTERNAL MEDICINE

## 2018-08-11 PROCEDURE — 83690 ASSAY OF LIPASE: CPT

## 2018-08-11 PROCEDURE — 700111 HCHG RX REV CODE 636 W/ 250 OVERRIDE (IP): Performed by: INTERNAL MEDICINE

## 2018-08-11 PROCEDURE — 700101 HCHG RX REV CODE 250: Performed by: INTERNAL MEDICINE

## 2018-08-11 PROCEDURE — 96374 THER/PROPH/DIAG INJ IV PUSH: CPT

## 2018-08-11 PROCEDURE — 70450 CT HEAD/BRAIN W/O DYE: CPT

## 2018-08-11 PROCEDURE — 99223 1ST HOSP IP/OBS HIGH 75: CPT | Performed by: INTERNAL MEDICINE

## 2018-08-11 PROCEDURE — A9270 NON-COVERED ITEM OR SERVICE: HCPCS | Performed by: INTERNAL MEDICINE

## 2018-08-11 PROCEDURE — 74177 CT ABD & PELVIS W/CONTRAST: CPT

## 2018-08-11 PROCEDURE — 700111 HCHG RX REV CODE 636 W/ 250 OVERRIDE (IP): Performed by: PHARMACIST

## 2018-08-11 PROCEDURE — 96361 HYDRATE IV INFUSION ADD-ON: CPT

## 2018-08-11 PROCEDURE — 700105 HCHG RX REV CODE 258: Performed by: EMERGENCY MEDICINE

## 2018-08-11 PROCEDURE — 80053 COMPREHEN METABOLIC PANEL: CPT

## 2018-08-11 PROCEDURE — 96375 TX/PRO/DX INJ NEW DRUG ADDON: CPT

## 2018-08-11 PROCEDURE — 72131 CT LUMBAR SPINE W/O DYE: CPT

## 2018-08-11 RX ORDER — ONDANSETRON 4 MG/1
4 TABLET, ORALLY DISINTEGRATING ORAL EVERY 4 HOURS PRN
Status: DISCONTINUED | OUTPATIENT
Start: 2018-08-11 | End: 2018-08-23 | Stop reason: HOSPADM

## 2018-08-11 RX ORDER — SODIUM CHLORIDE 9 MG/ML
INJECTION, SOLUTION INTRAVENOUS CONTINUOUS
Status: DISCONTINUED | OUTPATIENT
Start: 2018-08-11 | End: 2018-08-23 | Stop reason: HOSPADM

## 2018-08-11 RX ORDER — CEPHALEXIN 500 MG/1
500 CAPSULE ORAL 4 TIMES DAILY
COMMUNITY
End: 2018-08-23

## 2018-08-11 RX ORDER — HYDROMORPHONE HYDROCHLORIDE 4 MG/1
4 TABLET ORAL
Status: DISCONTINUED | OUTPATIENT
Start: 2018-08-11 | End: 2018-08-23 | Stop reason: HOSPADM

## 2018-08-11 RX ORDER — POLYETHYLENE GLYCOL 3350 17 G/17G
1 POWDER, FOR SOLUTION ORAL
Status: DISCONTINUED | OUTPATIENT
Start: 2018-08-11 | End: 2018-08-15

## 2018-08-11 RX ORDER — HYDROMORPHONE HYDROCHLORIDE 4 MG/1
4 TABLET ORAL
Status: ON HOLD | COMMUNITY
End: 2018-08-22

## 2018-08-11 RX ORDER — HYDROMORPHONE HYDROCHLORIDE 2 MG/ML
1 INJECTION, SOLUTION INTRAMUSCULAR; INTRAVENOUS; SUBCUTANEOUS ONCE
Status: COMPLETED | OUTPATIENT
Start: 2018-08-11 | End: 2018-08-11

## 2018-08-11 RX ORDER — HYDROXYCHLOROQUINE SULFATE 200 MG/1
200 TABLET, FILM COATED ORAL 2 TIMES DAILY
Status: DISCONTINUED | OUTPATIENT
Start: 2018-08-11 | End: 2018-08-23 | Stop reason: HOSPADM

## 2018-08-11 RX ORDER — ONDANSETRON 2 MG/ML
4 INJECTION INTRAMUSCULAR; INTRAVENOUS ONCE
Status: COMPLETED | OUTPATIENT
Start: 2018-08-11 | End: 2018-08-11

## 2018-08-11 RX ORDER — ACETAMINOPHEN 325 MG/1
650 TABLET ORAL EVERY 6 HOURS PRN
Status: DISCONTINUED | OUTPATIENT
Start: 2018-08-11 | End: 2018-08-23 | Stop reason: HOSPADM

## 2018-08-11 RX ORDER — AMOXICILLIN 250 MG
2 CAPSULE ORAL 2 TIMES DAILY
Status: DISCONTINUED | OUTPATIENT
Start: 2018-08-11 | End: 2018-08-15

## 2018-08-11 RX ORDER — PROMETHAZINE HYDROCHLORIDE 12.5 MG/1
12.5-25 SUPPOSITORY RECTAL EVERY 4 HOURS PRN
Status: DISCONTINUED | OUTPATIENT
Start: 2018-08-11 | End: 2018-08-23 | Stop reason: HOSPADM

## 2018-08-11 RX ORDER — BISACODYL 10 MG
10 SUPPOSITORY, RECTAL RECTAL
Status: DISCONTINUED | OUTPATIENT
Start: 2018-08-11 | End: 2018-08-15

## 2018-08-11 RX ORDER — LIDOCAINE 50 MG/G
1 PATCH TOPICAL EVERY 24 HOURS
Status: DISCONTINUED | OUTPATIENT
Start: 2018-08-11 | End: 2018-08-23 | Stop reason: HOSPADM

## 2018-08-11 RX ORDER — FOLIC ACID 1 MG/1
1 TABLET ORAL DAILY
Status: DISCONTINUED | OUTPATIENT
Start: 2018-08-11 | End: 2018-08-23 | Stop reason: HOSPADM

## 2018-08-11 RX ORDER — ONDANSETRON 2 MG/ML
4 INJECTION INTRAMUSCULAR; INTRAVENOUS EVERY 4 HOURS PRN
Status: DISCONTINUED | OUTPATIENT
Start: 2018-08-11 | End: 2018-08-23 | Stop reason: HOSPADM

## 2018-08-11 RX ORDER — DIAZEPAM 5 MG/1
5 TABLET ORAL EVERY 6 HOURS PRN
COMMUNITY
End: 2019-01-29

## 2018-08-11 RX ORDER — SODIUM CHLORIDE 9 MG/ML
1000 INJECTION, SOLUTION INTRAVENOUS ONCE
Status: COMPLETED | OUTPATIENT
Start: 2018-08-11 | End: 2018-08-11

## 2018-08-11 RX ORDER — PROMETHAZINE HYDROCHLORIDE 25 MG/1
12.5-25 TABLET ORAL EVERY 4 HOURS PRN
Status: DISCONTINUED | OUTPATIENT
Start: 2018-08-11 | End: 2018-08-23 | Stop reason: HOSPADM

## 2018-08-11 RX ORDER — LEVOTHYROXINE SODIUM 0.12 MG/1
125-150 TABLET ORAL
Status: DISCONTINUED | OUTPATIENT
Start: 2018-08-11 | End: 2018-08-11

## 2018-08-11 RX ORDER — HEPARIN SODIUM 5000 [USP'U]/ML
5000 INJECTION, SOLUTION INTRAVENOUS; SUBCUTANEOUS EVERY 8 HOURS
Status: DISCONTINUED | OUTPATIENT
Start: 2018-08-11 | End: 2018-08-12

## 2018-08-11 RX ORDER — DIAZEPAM 2 MG/1
5 TABLET ORAL EVERY 6 HOURS PRN
Status: DISCONTINUED | OUTPATIENT
Start: 2018-08-11 | End: 2018-08-23 | Stop reason: HOSPADM

## 2018-08-11 RX ORDER — LEVOTHYROXINE SODIUM 0.15 MG/1
150 TABLET ORAL
Status: DISCONTINUED | OUTPATIENT
Start: 2018-08-13 | End: 2018-08-23 | Stop reason: HOSPADM

## 2018-08-11 RX ORDER — IBUPROFEN 800 MG/1
800 TABLET ORAL EVERY 8 HOURS PRN
COMMUNITY
End: 2022-03-24

## 2018-08-11 RX ORDER — ONDANSETRON 4 MG/1
4 TABLET, ORALLY DISINTEGRATING ORAL EVERY 6 HOURS PRN
Status: ON HOLD | COMMUNITY
End: 2018-08-22

## 2018-08-11 RX ORDER — CYCLOBENZAPRINE HCL 10 MG
10 TABLET ORAL 3 TIMES DAILY PRN
Status: DISCONTINUED | OUTPATIENT
Start: 2018-08-11 | End: 2018-08-23 | Stop reason: HOSPADM

## 2018-08-11 RX ADMIN — SODIUM CHLORIDE 1000 ML: 9 INJECTION, SOLUTION INTRAVENOUS at 13:44

## 2018-08-11 RX ADMIN — HEPARIN SODIUM 5000 UNITS: 5000 INJECTION, SOLUTION INTRAVENOUS; SUBCUTANEOUS at 21:46

## 2018-08-11 RX ADMIN — ONDANSETRON 4 MG: 2 INJECTION, SOLUTION INTRAMUSCULAR; INTRAVENOUS at 17:17

## 2018-08-11 RX ADMIN — IOHEXOL 100 ML: 350 INJECTION, SOLUTION INTRAVENOUS at 14:53

## 2018-08-11 RX ADMIN — PROCHLORPERAZINE EDISYLATE 10 MG: 5 INJECTION INTRAMUSCULAR; INTRAVENOUS at 20:10

## 2018-08-11 RX ADMIN — ONDANSETRON 4 MG: 2 INJECTION, SOLUTION INTRAMUSCULAR; INTRAVENOUS at 13:43

## 2018-08-11 RX ADMIN — SODIUM CHLORIDE: 9 INJECTION, SOLUTION INTRAVENOUS at 20:14

## 2018-08-11 RX ADMIN — HYDROXYCHLOROQUINE SULFATE 200 MG: 200 TABLET ORAL at 21:45

## 2018-08-11 RX ADMIN — PIPERACILLIN SODIUM AND TAZOBACTAM SODIUM 3.38 G: 3; .375 INJECTION, POWDER, FOR SOLUTION INTRAVENOUS at 21:44

## 2018-08-11 RX ADMIN — DIAZEPAM 5 MG: 2 TABLET ORAL at 20:04

## 2018-08-11 RX ADMIN — HYDROMORPHONE HYDROCHLORIDE 4 MG: 4 TABLET ORAL at 20:04

## 2018-08-11 RX ADMIN — HYDROMORPHONE HYDROCHLORIDE 1 MG: 2 INJECTION, SOLUTION INTRAMUSCULAR; INTRAVENOUS; SUBCUTANEOUS at 13:43

## 2018-08-11 RX ADMIN — LIDOCAINE 1 PATCH: 50 PATCH TOPICAL at 21:45

## 2018-08-11 RX ADMIN — VANCOMYCIN HYDROCHLORIDE 2000 MG: 100 INJECTION, POWDER, LYOPHILIZED, FOR SOLUTION INTRAVENOUS at 22:21

## 2018-08-11 ASSESSMENT — ENCOUNTER SYMPTOMS
EYE PAIN: 0
HEARTBURN: 0
NECK PAIN: 0
EYE DISCHARGE: 0
STRIDOR: 0
HEADACHES: 0
COUGH: 0
BLURRED VISION: 0
FOCAL WEAKNESS: 0
NERVOUS/ANXIOUS: 0
VOMITING: 0
FEVER: 0
WEIGHT LOSS: 0
BACK PAIN: 1
EYE REDNESS: 0
INSOMNIA: 0
SPUTUM PRODUCTION: 0
SEIZURES: 0
NAUSEA: 0
ORTHOPNEA: 0
DIZZINESS: 0
MYALGIAS: 0
DIARRHEA: 0
DEPRESSION: 0
SHORTNESS OF BREATH: 0
CHILLS: 1
ABDOMINAL PAIN: 0
PALPITATIONS: 0

## 2018-08-11 ASSESSMENT — PAIN SCALES - GENERAL
PAINLEVEL_OUTOF10: 10
PAINLEVEL_OUTOF10: 4
PAINLEVEL_OUTOF10: 10

## 2018-08-11 NOTE — ED NOTES
"Pt had discectomy and lumbar fusion L2-5 on 7/19. States everything was going okay, but then she started \"feeling crappy\" a few days ago. Pt states she hasn't been able to take her medication for muscle spasms because she's been nauseous, and Zofran isn't working. PIV placed and POC rv'wd with Pt. ERP at bedside now.   "

## 2018-08-11 NOTE — ED NOTES
Pt medicated per orders. Had some nausea/dry heaving after Dilaudid. Declines additional anti-emetic at this time. IV bolus infusing. Will re-assess.

## 2018-08-11 NOTE — ED NOTES
The Medication Reconciliation process has been completed by interviewing the patient    Allergies have been reviewed  Antibiotic use in 30 days - none    Home Pharmacy:  [facility] [place]

## 2018-08-11 NOTE — H&P
Hospital Medicine History and Physical      Date of Service  8/11/2018    Chief Complaint  Chief Complaint   Patient presents with   • Head Ache   • Muscle Spasms     low back and down leg   • Vomiting       History of Presenting Illness  Jeffrey is a 58 y.o. female recent history of lumbar spine surgery done on July 19, 2018 by Dr. Doran, who presents with intractable low back pain.  She stated that it started about a few days ago with worsening back pain associated with yellowish greenish discharge from the surgical site.  She also complained about some chills as well.  The patient denied any weakness in her lower extremity.  In the ER she was found to have greenish /yellowish pus discharge around the incision site with surrounding skin red and tender to touch.  CT lumbar spine was done and showed Recent instrumentation at L2-5 levels with a 10.7 cm fluid collection in the posterior paraspinal soft tissues. Differential includes postsurgical seroma, pseudomeningocele and the resolving hematoma. Infection should be excluded clinicallyneurosurgery was consulted in ER.  Antibiotic was given.    Primary Care Physician  Tish Smallwood M.D.      Code Status  Full code    Review of Systems  Review of Systems   Constitutional: Positive for chills. Negative for fever and weight loss.   HENT: Negative for congestion and nosebleeds.    Eyes: Negative for blurred vision, pain, discharge and redness.   Respiratory: Negative for cough, sputum production, shortness of breath and stridor.    Cardiovascular: Negative for chest pain, palpitations and orthopnea.   Gastrointestinal: Negative for abdominal pain, diarrhea, heartburn, nausea and vomiting.   Genitourinary: Negative for dysuria, frequency and urgency.   Musculoskeletal: Positive for back pain. Negative for myalgias and neck pain.   Skin: Negative for itching and rash.   Neurological: Negative for dizziness, focal weakness, seizures and headaches.   Psychiatric/Behavioral:  "Negative for depression. The patient is not nervous/anxious and does not have insomnia.      Please see HPI, all other systems were reviewed and are negative (AMA/CMS criteria)     Past Medical History  Past Medical History:   Diagnosis Date   • Pain 07/13/2018    back, hands, feet, knees, ankles, 5/10   • Cough 07/13/2018    \"from allergies\"   • Asthma 02/21/2018    Inhaler use during allergy season.   • Bronchitis 2016   • Pneumonia 01/2015   • Genital herpes in women 2/11/2014   • High risk medications (not anticoagulants) long-term use 8/29/2012   • Vitamin d deficiency 5/15/2012   • Menopause 9/15/2011   • Tobacco abuse, episodic 9/14/2011   • Hypothyroid 9/14/2011   • Anesthesia     hx of difficulty waking up   • Breath shortness    • Environmental and seasonal allergies    • Heart burn    • Kidney stones    • Macrocytosis without anemia med effect   • Post-cholecystectomy syndrome     \"dumping syndrome\"   • Rheumatoid arthritis(714.0)    • Snoring     no sleep study       Surgical History  Past Surgical History:   Procedure Laterality Date   • LUMBAR FUSION POSTERIOR  7/19/2018    Procedure: LUMBAR FUSION POSTERIOR- L4-5 TLIF;  Surgeon: Antonio Doran M.D.;  Location: [facility];  Service: Orthopedics   • LUMBAR DECOMPRESSION  7/19/2018    Procedure: LUMBAR DECOMPRESSION- L2-S1;  Surgeon: Antonio Doran M.D.;  Location: [facility];  Service: Orthopedics   • KYPHOPLASTY  2/22/2018    Procedure: KYPHOPLASTY - L1 and Biopsy;  Surgeon: Antonio Doran M.D.;  Location: [facility];  Service: Orthopedics   • APPENDECTOMY  2002   • CHOLECYSTECTOMY  2002   • OTHER ORTHOPEDIC SURGERY Left 1974/1981    left knee surgery x 2   • TMJ RECONSTRUCTION BILATERAL     • TONSILLECTOMY         Medications  No current facility-administered medications on file prior to encounter.      Current Outpatient Prescriptions on File Prior to Encounter   Medication Sig Dispense Refill   • raNITidine " (ZANTAC) 150 MG Tab TAKE 1 TAB BY MOUTH 2 TIMES A  Tab 2   • acetaminophen (TYLENOL) 500 MG Tab Take 500-1,000 mg by mouth every 6 hours as needed.     • alendronate (FOSAMAX) 70 MG Tab Take 70 mg by mouth every Sunday.     • levothyroxine (SYNTHROID) 125 MCG Tab Take 125-150 mcg by mouth Every morning on an empty stomach. M/W/F 150 mcg  All other days 125 mcg     • Magnesium 500 MG Cap Take 1 Cap by mouth every day.     • Multiple Vitamins-Minerals (CENTRUM WOMEN) Tab Take 1 Tab by mouth every day.     • methylPREDNISolone (MEDROL) 4 MG Tab 6 tabs po one day then 5 tabs po one day then 4 tabs po one day then 3 tabs po one day then 2 tabs po one day then 1 tab po for one day 21 Tab 0   • hydroxychloroquine (PLAQUENIL) 200 MG Tab TAKE 1 TABLET BY MOUTH TWICE A  Tab 0   • Naproxen Sodium 220 MG Cap Take 220 mg by mouth 2 Times a Day.     • fluticasone (FLONASE) 50 MCG/ACT nasal spray Spray 1 Spray in nose every day.     • Cholecalciferol (VITAMIN D3) 2000 UNIT CAPS Take 1 Cap by mouth every day.     • folic acid (FOLVITE) 1 MG TABS Take 1 mg by mouth every day.       Family History  Family History   Problem Relation Age of Onset   • Lung Disease Mother         COPD   • Hyperlipidemia Mother    • Hypertension Mother    • Cancer Father         Prostate   • Diabetes Father    • Heart Disease Father    • Genetic Sister          Social History  Social History   Substance Use Topics   • Smoking status: Current Every Day Smoker     Packs/day: 0.50     Years: 40.00     Types: Cigarettes   • Smokeless tobacco: Never Used   • Alcohol use 0.6 oz/week     1 Glasses of wine per week      Comment: occasional, maybe 1 per month       Allergies  Allergies   Allergen Reactions   • Morphine Anaphylaxis     Morphine and morphine derivatives. (demerol)   • Aspirin      Stomach pain   • Codeine      Stomach pain   • Tramadol Vomiting     SEVERE HEADACHE   • Sulfamethoxazole W-Trimethoprim Shortness of Breath     Rxn -  flushing, SOB  Late 80's     • Other Drug      Muscle relaxants cause dizziness          Physical Exam  Laboratory   Hemodynamics  Temp (24hrs), Av.3 °C (97.4 °F), Min:36.3 °C (97.4 °F), Max:36.3 °C (97.4 °F)   Temperature: 36.3 °C (97.4 °F)  Pulse  Av  Min: 94  Max: 94    Blood Pressure: 129/82      Respiratory      Respiration: 16, Pulse Oximetry: 95 %             Physical Exam   Constitutional: She is oriented to person, place, and time. No distress.   HENT:   Head: Normocephalic and atraumatic.   Mouth/Throat: Oropharynx is clear and moist.   Eyes: Pupils are equal, round, and reactive to light. Conjunctivae and EOM are normal.   Neck: Normal range of motion. Neck supple. No tracheal deviation present. No thyromegaly present.   Cardiovascular: Normal rate and regular rhythm.    No murmur heard.  Pulmonary/Chest: Effort normal and breath sounds normal. No respiratory distress. She has no wheezes.   Abdominal: Soft. Bowel sounds are normal. She exhibits no distension. There is no tenderness.   Musculoskeletal: She exhibits no edema or tenderness.   Low back pain with pus discharge at the incision site with surrounding skin redness   Neurological: She is alert and oriented to person, place, and time. No cranial nerve deficit.   Skin: Skin is warm and dry. She is not diaphoretic. No erythema.   Psychiatric: She has a normal mood and affect. Her behavior is normal. Thought content normal.       Recent Labs      18   1347   WBC  11.5*   RBC  4.07*   HEMOGLOBIN  13.2   HEMATOCRIT  39.5   MCV  97.1   MCH  32.4   MCHC  33.4*   RDW  44.5   PLATELETCT  277   MPV  10.4     Recent Labs      18   1347   SODIUM  134*   POTASSIUM  3.5*   CHLORIDE  101   CO2  21   GLUCOSE  102*   BUN  12   CREATININE  0.73   CALCIUM  8.8     Recent Labs      18   1347   ALTSGPT  7   ASTSGOT  16   ALKPHOSPHAT  69   TBILIRUBIN  0.5   LIPASE  6*   GLUCOSE  102*                 Lab Results   Component Value Date     TROPONINI <0.01 08/05/2013       Imaging  CT-ABDOMEN-PELVIS WITH   Final Result      1.  Postoperative changes noted in the lumbar spine. Large postoperative fluid collection is identified at the L2-L3 level that also extends above and below these levels as described above. This could represent postoperative seroma or meningocele. No air    is noted within the collection but infection cannot be excluded.      2.  Post cholecystectomy. There is dilatation of the common bile duct and intrahepatic ducts. Dilatation is increased from the prior CT.         CT-LSPINE W/O PLUS RECONS   Final Result         1. Recent instrumentation at L2-5 levels with a 10.7 cm fluid collection in the posterior paraspinal soft tissues. Differential includes postsurgical seroma, pseudomeningocele and the resolving hematoma. Infection should be excluded clinically.   2. Prior augmentation of L1 compression deformity. No new fracture or listhesis.      CT-HEAD W/O   Final Result         NO ACUTE ABNORMALITIES ARE NOTED ON CT SCAN OF THE HEAD.                Assessment/Plan     I anticipate this patient will require at least two midnights for appropriate medical management, necessitating inpatient admission.    Surgical site infection- (present on admission)   Assessment & Plan    Recent lumbar surgery  Please see above for details        Hyponatremia- (present on admission)   Assessment & Plan    On IV fluid        Hypokalemia- (present on admission)   Assessment & Plan    Replete as needed        Intractable low back pain- (present on admission)   Assessment & Plan    Recent lumbar surgery on July 19  Now with complication with wound infection with pus discharge  CT Lumbar: Recent instrumentation at L2-5 levels with a 10.7 cm fluid collection in the posterior paraspinal soft tissues. Differential includes postsurgical seroma, pseudomeningocele and the resolving hematoma. Infection should be excluded clinically  Started on IV vancomycin and  Jagdish  Collected wound culture  Wound care  Neurosurgery consulted  Pain management            Prophylaxis:  sc heparin

## 2018-08-11 NOTE — ED PROVIDER NOTES
"ED Provider Note    CHIEF COMPLAINT  Chief Complaint   Patient presents with   • Head Ache   • Muscle Spasms     low back and down leg   • Vomiting       HPI  Clifford Murphy is a 58 y.o. female who presents for evaluation of back pain and headache as well as vomiting.  The patient had lumbar spine surgery performed on 7/19/18 by Dr. Doran.  Patient presents here stating that after he took some of the staples out last week she is \"gone downhill\".  Patient complains of increased back pain along with development of headache with nausea and vomiting yesterday.  Patient states she feels hot and cold intermittently.  She is unsure whether she is actually had a fever.  She denies: Nasal congestion, purulent rhinorrhea, sore throat, cough, sputum, hematemesis, melena hematochezia, hematuria, dysuria, loss of bowel or bladder function.  No other acute symptomatology or complaints.    REVIEW OF SYSTEMS  See HPI for further details. All other systems negative.    PAST MEDICAL HISTORY  Past Medical History:   Diagnosis Date   • Anesthesia     hx of difficulty waking up   • Asthma 02/21/2018    Inhaler use during allergy season.   • Breath shortness    • Bronchitis 2016   • Cough 07/13/2018    \"from allergies\"   • Environmental and seasonal allergies    • Genital herpes in women 2/11/2014   • Heart burn    • High risk medications (not anticoagulants) long-term use 8/29/2012   • Hypothyroid 9/14/2011   • Kidney stones    • Macrocytosis without anemia med effect   • Menopause 9/15/2011   • Pain 07/13/2018    back, hands, feet, knees, ankles, 5/10   • Pneumonia 01/2015   • Post-cholecystectomy syndrome     \"dumping syndrome\"   • Rheumatoid arthritis(714.0)    • Snoring     no sleep study   • Tobacco abuse, episodic 9/14/2011   • Vitamin d deficiency 5/15/2012       FAMILY HISTORY  Family History   Problem Relation Age of Onset   • Lung Disease Mother         COPD   • Hyperlipidemia Mother    • Hypertension Mother    • Cancer " "Father         Prostate   • Diabetes Father    • Heart Disease Father    • Genetic Sister        SOCIAL HISTORY  Positive tobacco use; positive alcohol use;    SURGICAL HISTORY  Past Surgical History:   Procedure Laterality Date   • LUMBAR FUSION POSTERIOR  7/19/2018    Procedure: LUMBAR FUSION POSTERIOR- L4-5 TLIF;  Surgeon: Antonio Doran M.D.;  Location: [facility];  Service: Orthopedics   • LUMBAR DECOMPRESSION  7/19/2018    Procedure: LUMBAR DECOMPRESSION- L2-S1;  Surgeon: Antonio Doran M.D.;  Location: [facility];  Service: Orthopedics   • KYPHOPLASTY  2/22/2018    Procedure: KYPHOPLASTY - L1 and Biopsy;  Surgeon: Antonio Doran M.D.;  Location: [facility];  Service: Orthopedics   • APPENDECTOMY  2002   • CHOLECYSTECTOMY  2002   • OTHER ORTHOPEDIC SURGERY Left 1974/1981    left knee surgery x 2   • TMJ RECONSTRUCTION BILATERAL     • TONSILLECTOMY         CURRENT MEDICATIONS  See nurse's notes    ALLERGIES  Allergies   Allergen Reactions   • Morphine Anaphylaxis     Morphine and morphine derivatives. (demerol)   • Aspirin      Stomach pain   • Codeine      Stomach pain   • Tramadol Vomiting     SEVERE HEADACHE   • Sulfamethoxazole W-Trimethoprim Shortness of Breath     Rxn - flushing, SOB  Late 80's     • Other Drug      Muscle relaxants cause dizziness         PHYSICAL EXAM  VITAL SIGNS: /82   Pulse 94   Temp 36.3 °C (97.4 °F) (Temporal)   Resp 16   Ht 1.626 m (5' 4\")   Wt 78.5 kg (173 lb)   LMP  (LMP Unknown)   SpO2 95%   BMI 29.70 kg/m²    Constitutional: 58-year-old female, appears uncomfortable and weak, oriented ×3 dry  HENT: Normocephalic, Atraumatic, Nares:Clear, Oropharynx: Dry, posterior pharynx:clear   Eyes: PERRL, EOMI, Conjunctiva normal, No discharge.   Neck: Normal range of motion, No tenderness, Supple, No stridor.   Lymphatic: No lymphadenopathy noted.   Cardiovascular: Regular rate and rhythm without mumurs, gallups, rubs   Thorax & Lungs: " Normal Equal breath sounds, No respiratory distress, No wheezing, no stridor, no rales. No chest tenderness.   Abdomen: Soft, nontender, nondistended, no organomegaly, positive bowel sounds normal in quality. No guarding or rebound.  Skin: Decreased skin turgor, pink, warm, dry. No rashes, petechiae, purpura. Normal capillary refill.   Back: Surgical scar identified over the lumbar spine area with some mild erythema, edema, and very minimal wound dehiscence with associated staples in place, No CVA tenderness.   Extremities: Intact distal pulses, No edema, No tenderness, No cyanosis,  Vascular: Pulses are 2+, symmetric in the upper and lower extremities.  Musculoskeletal: Good range of motion in all major joints. No tenderness to palpation or major deformities noted.   Neurologic: Alert & oriented x 3,  No gross focal deficits noted.   Psychiatric: Affect normal, Judgment normal, Mood normal.       RADIOLOGY/PROCEDURES  CT-ABDOMEN-PELVIS WITH   Final Result      1.  Postoperative changes noted in the lumbar spine. Large postoperative fluid collection is identified at the L2-L3 level that also extends above and below these levels as described above. This could represent postoperative seroma or meningocele. No air    is noted within the collection but infection cannot be excluded.      2.  Post cholecystectomy. There is dilatation of the common bile duct and intrahepatic ducts. Dilatation is increased from the prior CT.         CT-LSPINE W/O PLUS RECONS   Final Result         1. Recent instrumentation at L2-5 levels with a 10.7 cm fluid collection in the posterior paraspinal soft tissues. Differential includes postsurgical seroma, pseudomeningocele and the resolving hematoma. Infection should be excluded clinically.   2. Prior augmentation of L1 compression deformity. No new fracture or listhesis.      CT-HEAD W/O   Final Result         NO ACUTE ABNORMALITIES ARE NOTED ON CT SCAN OF THE HEAD.             COURSE &  MEDICAL DECISION MAKING  Pertinent Labs & Imaging studies reviewed. (See chart for details)  1.  Monitor  2.  IV normal saline; IV fluids administered for clinical dehydration as well as active vomiting; reevaluation after IV fluids revealed improved status;  3.  Zofran, titrated  4.  Morphine, titrated    Laboratory studies: CBC shows white count of 11.5, 81% neutrophils, 10% lymphocytes, 8% monocytes, hemoglobin 13.2, hematocrit 39.5; lipase 6; CMP shows sodium 134, potassium 3.5, otherwise within normal;    Discussion/consultation: At this time, patient presents with increasing back pain.  Patient has a fluid collection identified on CT scanning.  This could represent seroma or possible hematoma or infection.  Patient does have associated redness.  She also has been having intractable pain along with vomiting and inability to hold down her anti-emetics or her analgesics.  Therefore, I spoke with the hospitalist on call.  Patient will be admitted for further monitoring, treatment, and care.    FINAL IMPRESSION  1. Acute midline low back pain without sciatica    2. Lumbar surgical wound fluid collection, initial encounter    3. Intractable vomiting with nausea, unspecified vomiting type    4. Rheumatoid arthritis involving multiple sites, unspecified rheumatoid factor presence (HCC)           PLAN  1.  Patient will be admitted for further monitoring, treatment, and care.    Electronically signed by: Guy G Gansert, 8/11/2018 1:04 PM

## 2018-08-11 NOTE — ED NOTES
Called up to Neuro floor to give report; Jennifer VELIZ unavailable, will call back. ED tech transporting Pt upstairs via [employer].

## 2018-08-11 NOTE — ED TRIAGE NOTES
Pt to triage in wheelchair  Chief Complaint   Patient presents with   • Head Ache   • Muscle Spasms     low back and down leg   • Vomiting   last took pain medication at 1500 yesterday  Pt asked to wait in lobby, pt updated on triage process and pt asked to inform RN of any changes.

## 2018-08-12 LAB
ALBUMIN SERPL BCP-MCNC: 3.2 G/DL (ref 3.2–4.9)
ALBUMIN/GLOB SERPL: 1.4 G/DL
ALP SERPL-CCNC: 54 U/L (ref 30–99)
ALT SERPL-CCNC: 7 U/L (ref 2–50)
ANION GAP SERPL CALC-SCNC: 9 MMOL/L (ref 0–11.9)
AST SERPL-CCNC: 16 U/L (ref 12–45)
BILIRUB SERPL-MCNC: 0.5 MG/DL (ref 0.1–1.5)
BUN SERPL-MCNC: 12 MG/DL (ref 8–22)
CALCIUM SERPL-MCNC: 7.8 MG/DL (ref 8.5–10.5)
CHLORIDE SERPL-SCNC: 107 MMOL/L (ref 96–112)
CO2 SERPL-SCNC: 19 MMOL/L (ref 20–33)
CREAT SERPL-MCNC: 0.58 MG/DL (ref 0.5–1.4)
ERYTHROCYTE [DISTWIDTH] IN BLOOD BY AUTOMATED COUNT: 45 FL (ref 35.9–50)
GLOBULIN SER CALC-MCNC: 2.3 G/DL (ref 1.9–3.5)
GLUCOSE SERPL-MCNC: 72 MG/DL (ref 65–99)
GRAM STN SPEC: NORMAL
HCT VFR BLD AUTO: 34.3 % (ref 37–47)
HGB BLD-MCNC: 11.1 G/DL (ref 12–16)
MCH RBC QN AUTO: 31.7 PG (ref 27–33)
MCHC RBC AUTO-ENTMCNC: 32.4 G/DL (ref 33.6–35)
MCV RBC AUTO: 98 FL (ref 81.4–97.8)
PLATELET # BLD AUTO: 225 K/UL (ref 164–446)
PMV BLD AUTO: 10.1 FL (ref 9–12.9)
POTASSIUM SERPL-SCNC: 3.5 MMOL/L (ref 3.6–5.5)
PROT SERPL-MCNC: 5.5 G/DL (ref 6–8.2)
RBC # BLD AUTO: 3.5 M/UL (ref 4.2–5.4)
SIGNIFICANT IND 70042: NORMAL
SITE SITE: NORMAL
SODIUM SERPL-SCNC: 135 MMOL/L (ref 135–145)
SOURCE SOURCE: NORMAL
VANCOMYCIN TROUGH SERPL-MCNC: 22.6 UG/ML (ref 10–20)
WBC # BLD AUTO: 8.3 K/UL (ref 4.8–10.8)

## 2018-08-12 PROCEDURE — 87205 SMEAR GRAM STAIN: CPT

## 2018-08-12 PROCEDURE — 700102 HCHG RX REV CODE 250 W/ 637 OVERRIDE(OP): Performed by: INTERNAL MEDICINE

## 2018-08-12 PROCEDURE — 87070 CULTURE OTHR SPECIMN AEROBIC: CPT

## 2018-08-12 PROCEDURE — 700105 HCHG RX REV CODE 258: Performed by: INTERNAL MEDICINE

## 2018-08-12 PROCEDURE — 700111 HCHG RX REV CODE 636 W/ 250 OVERRIDE (IP): Performed by: INTERNAL MEDICINE

## 2018-08-12 PROCEDURE — A9270 NON-COVERED ITEM OR SERVICE: HCPCS | Performed by: INTERNAL MEDICINE

## 2018-08-12 PROCEDURE — 80053 COMPREHEN METABOLIC PANEL: CPT

## 2018-08-12 PROCEDURE — 87186 SC STD MICRODIL/AGAR DIL: CPT

## 2018-08-12 PROCEDURE — 99232 SBSQ HOSP IP/OBS MODERATE 35: CPT | Performed by: INTERNAL MEDICINE

## 2018-08-12 PROCEDURE — 87040 BLOOD CULTURE FOR BACTERIA: CPT

## 2018-08-12 PROCEDURE — 80202 ASSAY OF VANCOMYCIN: CPT

## 2018-08-12 PROCEDURE — 770001 HCHG ROOM/CARE - MED/SURG/GYN PRIV*

## 2018-08-12 PROCEDURE — 87077 CULTURE AEROBIC IDENTIFY: CPT

## 2018-08-12 PROCEDURE — 85027 COMPLETE CBC AUTOMATED: CPT

## 2018-08-12 PROCEDURE — 36415 COLL VENOUS BLD VENIPUNCTURE: CPT

## 2018-08-12 RX ORDER — POTASSIUM CHLORIDE 20 MEQ/1
40 TABLET, EXTENDED RELEASE ORAL DAILY
Status: DISCONTINUED | OUTPATIENT
Start: 2018-08-12 | End: 2018-08-23 | Stop reason: HOSPADM

## 2018-08-12 RX ADMIN — PROMETHAZINE HYDROCHLORIDE 25 MG: 25 TABLET ORAL at 21:33

## 2018-08-12 RX ADMIN — HYDROXYCHLOROQUINE SULFATE 200 MG: 200 TABLET ORAL at 10:12

## 2018-08-12 RX ADMIN — HYDROMORPHONE HYDROCHLORIDE 4 MG: 4 TABLET ORAL at 10:10

## 2018-08-12 RX ADMIN — LEVOTHYROXINE SODIUM 125 MCG: 150 TABLET ORAL at 06:17

## 2018-08-12 RX ADMIN — DIAZEPAM 5 MG: 2 TABLET ORAL at 06:16

## 2018-08-12 RX ADMIN — HYDROMORPHONE HYDROCHLORIDE 4 MG: 4 TABLET ORAL at 06:16

## 2018-08-12 RX ADMIN — PIPERACILLIN SODIUM AND TAZOBACTAM SODIUM 3.38 G: 3; .375 INJECTION, POWDER, FOR SOLUTION INTRAVENOUS at 05:07

## 2018-08-12 RX ADMIN — VANCOMYCIN HYDROCHLORIDE 1000 MG: 100 INJECTION, POWDER, LYOPHILIZED, FOR SOLUTION INTRAVENOUS at 12:55

## 2018-08-12 RX ADMIN — HYDROMORPHONE HYDROCHLORIDE 4 MG: 4 TABLET ORAL at 21:33

## 2018-08-12 RX ADMIN — HYDROXYCHLOROQUINE SULFATE 200 MG: 200 TABLET ORAL at 17:38

## 2018-08-12 RX ADMIN — PIPERACILLIN SODIUM AND TAZOBACTAM SODIUM 3.38 G: 3; .375 INJECTION, POWDER, FOR SOLUTION INTRAVENOUS at 00:53

## 2018-08-12 RX ADMIN — SODIUM CHLORIDE: 9 INJECTION, SOLUTION INTRAVENOUS at 17:36

## 2018-08-12 RX ADMIN — PROMETHAZINE HYDROCHLORIDE 25 MG: 25 TABLET ORAL at 16:24

## 2018-08-12 RX ADMIN — PIPERACILLIN SODIUM AND TAZOBACTAM SODIUM 3.38 G: 3; .375 INJECTION, POWDER, FOR SOLUTION INTRAVENOUS at 12:59

## 2018-08-12 RX ADMIN — ONDANSETRON 4 MG: 2 INJECTION, SOLUTION INTRAMUSCULAR; INTRAVENOUS at 05:07

## 2018-08-12 RX ADMIN — PIPERACILLIN SODIUM AND TAZOBACTAM SODIUM 3.38 G: 3; .375 INJECTION, POWDER, FOR SOLUTION INTRAVENOUS at 21:43

## 2018-08-12 RX ADMIN — HYDROMORPHONE HYDROCHLORIDE 4 MG: 4 TABLET ORAL at 17:38

## 2018-08-12 RX ADMIN — HYDROMORPHONE HYDROCHLORIDE 4 MG: 4 TABLET ORAL at 12:53

## 2018-08-12 RX ADMIN — POTASSIUM CHLORIDE 40 MEQ: 1500 TABLET, EXTENDED RELEASE ORAL at 17:39

## 2018-08-12 RX ADMIN — HYDROMORPHONE HYDROCHLORIDE 4 MG: 4 TABLET ORAL at 00:53

## 2018-08-12 RX ADMIN — HEPARIN SODIUM 5000 UNITS: 5000 INJECTION, SOLUTION INTRAVENOUS; SUBCUTANEOUS at 06:17

## 2018-08-12 RX ADMIN — ONDANSETRON 4 MG: 2 INJECTION, SOLUTION INTRAMUSCULAR; INTRAVENOUS at 10:10

## 2018-08-12 RX ADMIN — DIAZEPAM 5 MG: 2 TABLET ORAL at 12:52

## 2018-08-12 ASSESSMENT — ENCOUNTER SYMPTOMS
PSYCHIATRIC NEGATIVE: 1
EYES NEGATIVE: 1
FEVER: 0
HEADACHES: 1
CARDIOVASCULAR NEGATIVE: 1
CHILLS: 1
RESPIRATORY NEGATIVE: 1
BACK PAIN: 1
GASTROINTESTINAL NEGATIVE: 1

## 2018-08-12 ASSESSMENT — PAIN SCALES - GENERAL
PAINLEVEL_OUTOF10: 7
PAINLEVEL_OUTOF10: 5
PAINLEVEL_OUTOF10: 7
PAINLEVEL_OUTOF10: 7
PAINLEVEL_OUTOF10: 6

## 2018-08-12 NOTE — PROGRESS NOTES
RN SHIFT NOTE    Significant Events: No significant events to report this shift. Pt has been stable. This main goals of care this shift have been pain and nausea control      CARE PLAN    Communication: This patient has been able to make needs known, and has been cooperative with the current plan of care.    Safety: Patient has been appropriate and compliant with call light to call for assistance. Interventions including no slip socks, bed in low position and locked, belongings within reach and hourly rounding implemented this shift. Bed alarm on and operational for additional safety.    VTE: Patient has been compliant with ordered interventions, including SCDs. Patient educated on rational for interventions.     Discharge Planning: pending cultures, labs    Mobility: Patient able to participate with PT/OT: n/a  Highest level of activity achieved: ambulation  Assistance: SBA  Equipment required: walker  Distance ambulated: 50  Number of times: 3+    Skin Integrity: Skin check completed. See flow sheet documentation for complete assessment. Interventions including frequent repositoining have been implemented to prevent breakdown.    Pain Management:: Pain has been somewhat controlled with current interventions, including PO medications. Patient has been able to participate in care and rest comfortably following interventions.

## 2018-08-12 NOTE — ASSESSMENT & PLAN NOTE
Hx of lumbar spinal stenosis, spondylolisthesis, DDD with  Radiculopathy  s/p lumbar decompression and instrumentation (7/19)  purulent discharge from surgical site/wound post-operatively  Lumbar CT illustrates fluid collection (pseudomeningocele v. Seroma v. Hematoma v. Infection/abscess)  Bld Cxs NGTD; Wnd Cx growing Pseudomonas  Active Orders     Ordered     Ordering Provider       Tue Aug 21, 2018  8:50 AM    08/21/18 0850  cefepime (MAXIPIME) 2 g in  mL IVPB  EVERY 12 HOURS      Codie Jackson M.D.       Sat Aug 11, 2018  3:57 PM    08/11/18 1557  hydroxychloroquine (PLAQUENIL) tablet 200 mg  2 TIMES DAILY      Parmjit Sanchez M.D.      Through at least 9/16

## 2018-08-12 NOTE — ASSESSMENT & PLAN NOTE
Recent lumbar surgery  Aspiration unsuccessful 08/14/2018,   s/p irrigation and debribement 08/16/2018, No gross infection as per surgery note, but wound cultures show pseudomonas  Abx through 9/16

## 2018-08-12 NOTE — PROGRESS NOTES
[facility] Hospitalist Progress Note    Date of Service: 2018    Chief Complaint  58 y.o. female admitted 2018 with intractable headache and back pain and found to have lumbar spine surgical wound discharge suggestive of possible infection.    Interval Problem Update  Pt states headache and back pain much improved.  Had episode of chills overnight.    Consultants/Specialty  Neurosurgery    Disposition  TBD      Review of Systems   Constitutional: Positive for chills and malaise/fatigue. Negative for fever.   HENT: Negative.    Eyes: Negative.    Respiratory: Negative.    Cardiovascular: Negative.    Gastrointestinal: Negative.    Musculoskeletal: Positive for back pain (improving).   Skin: Negative.    Neurological: Positive for headaches (improving).   Psychiatric/Behavioral: Negative.       Physical Exam  Laboratory/Imaging   Hemodynamics  Temp (24hrs), Av.9 °C (98.4 °F), Min:36.2 °C (97.1 °F), Max:38.2 °C (100.8 °F)   Temperature: 36.2 °C (97.1 °F)  Pulse  Av.2  Min: 69  Max: 94    Blood Pressure: 123/58      Respiratory      Respiration: 18, Pulse Oximetry: 90 %             Fluids  No intake or output data in the 24 hours ending 18 0840    Nutrition  Orders Placed This Encounter   Procedures   • Diet NPO     Standing Status:   Standing     Number of Occurrences:   1     Order Specific Question:   Restrict to:     Answer:   Ice Chips [2]     Physical Exam   Constitutional: She is oriented to person, place, and time. She appears well-developed and well-nourished. No distress.   HENT:   Head: Normocephalic and atraumatic.   Mouth/Throat: No oropharyngeal exudate.   Eyes: Pupils are equal, round, and reactive to light. Conjunctivae and EOM are normal. No scleral icterus.   Neck: Normal range of motion. Neck supple.   Cardiovascular: Exam reveals no gallop and no friction rub.    No murmur heard.  Pulmonary/Chest: Effort normal and breath sounds normal. No respiratory distress.   Abdominal: Soft.  Bowel sounds are normal.   Musculoskeletal: Normal range of motion. She exhibits no edema, tenderness or deformity.   Lumbar spine wound with surrounding erythema and purulent discharge noted.  Staples in place   Neurological: She is alert and oriented to person, place, and time. She has normal reflexes. No cranial nerve deficit.   Skin: Skin is warm and dry. There is erythema (aroud lumbar spine wound with staples in place).   Psychiatric: She has a normal mood and affect.   Nursing note and vitals reviewed.      Recent Labs      08/11/18   1347  08/12/18   0156   WBC  11.5*  8.3   RBC  4.07*  3.50*   HEMOGLOBIN  13.2  11.1*   HEMATOCRIT  39.5  34.3*   MCV  97.1  98.0*   MCH  32.4  31.7   MCHC  33.4*  32.4*   RDW  44.5  45.0   PLATELETCT  277  225   MPV  10.4  10.1     Recent Labs      08/11/18   1347  08/12/18   0156   SODIUM  134*  135   POTASSIUM  3.5*  3.5*   CHLORIDE  101  107   CO2  21  19*   GLUCOSE  102*  72   BUN  12  12   CREATININE  0.73  0.58   CALCIUM  8.8  7.8*                      Assessment/Plan     * Intractable low back pain- (present on admission)   Assessment & Plan    - Hx of lumbar spinal stenosis, spondylolisthesis, DDD with  Radiculopathy  - s/p lumbar decompression and instrumentation (7/19)  - now with purulent discharge from surgical site/wound  - Lumbar CT illustrates fluid collection (pseudomeningocele v. Seroma v. Hematoma v. Infection/abscess)  - NSG consulted; awaiting further recs  - Wound and Bld Cxs pending; on IV abx (Vanc and Zosyn)  - Wound care consult and recs appreciated  - cont prn analgesics in meantime        Surgical site infection- (present on admission)   Assessment & Plan    Recent lumbar surgery  Please see above for details        Hyponatremia- (present on admission)   Assessment & Plan    - improving with IVFs; monitor        Hypokalemia- (present on admission)   Assessment & Plan    Replete as needed; monitoring        Acquired hypothyroidism- (present on  admission)   Assessment & Plan    - cont Synthroid per outpt regimen          Quality-Core Measures

## 2018-08-12 NOTE — PROGRESS NOTES
"Pharmacy Kinetics 58 y.o. female on vancomycin day # 1 2018    Currently on Vancomycin 2000 mg iv LD given at 2221    Indication for Treatment: SSTI    Pertinent history per medical record: Admitted on 2018 for intractable back pain. Pt presents to ED with c/o HA, chills, and muscle spasms. Pt s/p lumbar spine surgery (2018) with redness and discharged noted around incision site.      Other antibiotics: Zosyn 3.375 gm iv q8h    Allergies: Morphine; Aspirin; Codeine; Tramadol; Sulfamethoxazole w-trimethoprim; and Other drug     List concerns for renal function: CT with contrast, BMI ~30    Pertinent cultures to date:   None at this time    Recent Labs      18   1347   WBC  11.5*   NEUTSPOLYS  81.10*     Recent Labs      18   1347   BUN  12   CREATININE  0.73   ALBUMIN  4.0     Blood pressure 135/66, pulse 90, temperature 36.9 °C (98.5 °F), resp. rate 18, height 1.626 m (5' 4\"), weight 78.5 kg (173 lb), SpO2 93 %. Temp (24hrs), Av.2 °C (98.9 °F), Min:36.3 °C (97.4 °F), Max:38.2 °C (100.8 °F)      A/P   1. Vancomycin dose change: 1200 mg (15 mg/kg x 78.5 kg) iv q12h (1030, 2230)  2. Next vancomycin level: VT at 1000 on   3. Goal trough: 12-16 mcg/ml  4. Comments: Minimal concern for renal function. Loading dose 2000 mg (25 mg/kg x 78.5 kg) iv once given at 2221. Will start scheduled doses. VT ordered. Pharmacy will monitor and make adjustments when appropriate.    Ana Valentino, PharmD    "

## 2018-08-12 NOTE — ASSESSMENT & PLAN NOTE
Goal > 4 mEq/L  Lab Results   Component Value Date/Time    POTASSIUM 3.3 (L) 08/21/2018 02:34 AM

## 2018-08-12 NOTE — PROGRESS NOTES
"Pharmacy Kinetics 58 y.o. female on vancomycin day # 2 2018    Currently on Vancomycin 1,000mg iv q12hr (6847, 8890)    Indication for Treatment: SSTI     Pertinent history per medical record: Admitted on 2018 for intractable back pain. Pt presents to ED with c/o HA, chills, and muscle spasms s/p lumbar surgery last month. Redness and discharged noted around incision site.      CT scan of L spine revealed - 1. Recent instrumentation at L2-5 levels with a 10.7 cm fluid collection in the posterior paraspinal soft tissues. Differential includes postsurgical seroma, pseudomeningocele and the resolving hematoma. Infection should be excluded clinically.     Other antibiotics: Zosyn 3.375 gm iv q8h     Allergies: Morphine; Aspirin; Codeine; Tramadol; Sulfamethoxazole w-trimethoprim; and Other drug      List concerns for renal function: CT with contrast, BMI ~30     Pertinent cultures to date:   18: Wound Cx = in process   18: Blood cultures ordered - need to be collected     Recent Labs      18   1347  18   0156   WBC  11.5*  8.3   NEUTSPOLYS  81.10*   --      Recent Labs      18   1347  18   0156   BUN  12  12   CREATININE  0.73  0.58   ALBUMIN  4.0  3.2     No results for input(s): VANCOTROUGH, VANCOPEAK, VANCORANDOM in the last 72 hours.No intake or output data in the 24 hours ending 18 1156   Blood pressure 134/63, pulse 60, temperature 36.8 °C (98.3 °F), resp. rate 18, height 1.626 m (5' 4\"), weight 78.5 kg (173 lb), SpO2 94 %. Temp (24hrs), Av °C (98.6 °F), Min:36.2 °C (97.1 °F), Max:38.2 °C (100.8 °F)      A/P   1. Vancomycin dose change: Yes. Reduced dose empirically to 1,000mg IV q12h   2. Next vancomycin level: Tomorrow, , at 1200   3. Goal trough: 16 - 20 mcg/mL - target upper end of goal range for paraspinal abscess   4. Comments: Random vancomycin level was drawn this morning post loading dose. Level of 22 mcg/mL reflects adequate loading and clearance. " Reduced dose empirically to account for accumulation as vancomycin therapy continues. Renal function stable, trend downward in SCr. Wound culture in process. Blood cultures need to be collected. Tmax 100.8 F with resolved leukocytosis. Repeat a trough level tomorrow afternoon, when drug should be approaching steady state concentrations.     Evelina James, PharmD

## 2018-08-13 LAB
ANION GAP SERPL CALC-SCNC: 6 MMOL/L (ref 0–11.9)
BASOPHILS # BLD AUTO: 0.7 % (ref 0–1.8)
BASOPHILS # BLD: 0.05 K/UL (ref 0–0.12)
BUN SERPL-MCNC: 8 MG/DL (ref 8–22)
CALCIUM SERPL-MCNC: 8 MG/DL (ref 8.5–10.5)
CHLORIDE SERPL-SCNC: 107 MMOL/L (ref 96–112)
CO2 SERPL-SCNC: 22 MMOL/L (ref 20–33)
CREAT SERPL-MCNC: 0.58 MG/DL (ref 0.5–1.4)
EOSINOPHIL # BLD AUTO: 0 K/UL (ref 0–0.51)
EOSINOPHIL NFR BLD: 0 % (ref 0–6.9)
ERYTHROCYTE [DISTWIDTH] IN BLOOD BY AUTOMATED COUNT: 44.3 FL (ref 35.9–50)
GLUCOSE SERPL-MCNC: 88 MG/DL (ref 65–99)
HCT VFR BLD AUTO: 34.6 % (ref 37–47)
HGB BLD-MCNC: 11.1 G/DL (ref 12–16)
IMM GRANULOCYTES # BLD AUTO: 0.01 K/UL (ref 0–0.11)
IMM GRANULOCYTES NFR BLD AUTO: 0.1 % (ref 0–0.9)
LYMPHOCYTES # BLD AUTO: 1.56 K/UL (ref 1–4.8)
LYMPHOCYTES NFR BLD: 23.4 % (ref 22–41)
MCH RBC QN AUTO: 31.7 PG (ref 27–33)
MCHC RBC AUTO-ENTMCNC: 32.1 G/DL (ref 33.6–35)
MCV RBC AUTO: 98.9 FL (ref 81.4–97.8)
MONOCYTES # BLD AUTO: 0.91 K/UL (ref 0–0.85)
MONOCYTES NFR BLD AUTO: 13.6 % (ref 0–13.4)
NEUTROPHILS # BLD AUTO: 4.15 K/UL (ref 2–7.15)
NEUTROPHILS NFR BLD: 62.2 % (ref 44–72)
NRBC # BLD AUTO: 0 K/UL
NRBC BLD-RTO: 0 /100 WBC
PLATELET # BLD AUTO: 180 K/UL (ref 164–446)
PMV BLD AUTO: 10.5 FL (ref 9–12.9)
POTASSIUM SERPL-SCNC: 3.8 MMOL/L (ref 3.6–5.5)
RBC # BLD AUTO: 3.5 M/UL (ref 4.2–5.4)
SODIUM SERPL-SCNC: 135 MMOL/L (ref 135–145)
VANCOMYCIN TROUGH SERPL-MCNC: 14.5 UG/ML (ref 10–20)
WBC # BLD AUTO: 6.7 K/UL (ref 4.8–10.8)

## 2018-08-13 PROCEDURE — 700111 HCHG RX REV CODE 636 W/ 250 OVERRIDE (IP): Performed by: INTERNAL MEDICINE

## 2018-08-13 PROCEDURE — 36415 COLL VENOUS BLD VENIPUNCTURE: CPT

## 2018-08-13 PROCEDURE — 700101 HCHG RX REV CODE 250: Performed by: INTERNAL MEDICINE

## 2018-08-13 PROCEDURE — 80202 ASSAY OF VANCOMYCIN: CPT

## 2018-08-13 PROCEDURE — A9270 NON-COVERED ITEM OR SERVICE: HCPCS | Performed by: INTERNAL MEDICINE

## 2018-08-13 PROCEDURE — 700105 HCHG RX REV CODE 258: Performed by: INTERNAL MEDICINE

## 2018-08-13 PROCEDURE — 770001 HCHG ROOM/CARE - MED/SURG/GYN PRIV*

## 2018-08-13 PROCEDURE — 700101 HCHG RX REV CODE 250: Performed by: PHYSICIAN ASSISTANT

## 2018-08-13 PROCEDURE — 700102 HCHG RX REV CODE 250 W/ 637 OVERRIDE(OP): Performed by: INTERNAL MEDICINE

## 2018-08-13 PROCEDURE — 99232 SBSQ HOSP IP/OBS MODERATE 35: CPT | Performed by: INTERNAL MEDICINE

## 2018-08-13 PROCEDURE — 85025 COMPLETE CBC W/AUTO DIFF WBC: CPT

## 2018-08-13 PROCEDURE — 80048 BASIC METABOLIC PNL TOTAL CA: CPT

## 2018-08-13 RX ORDER — BACITRACIN ZINC AND POLYMYXIN B SULFATE 500; 1000 [USP'U]/G; [USP'U]/G
OINTMENT TOPICAL 2 TIMES DAILY
Status: DISCONTINUED | OUTPATIENT
Start: 2018-08-13 | End: 2018-08-15

## 2018-08-13 RX ORDER — MIDAZOLAM HYDROCHLORIDE 1 MG/ML
2 INJECTION INTRAMUSCULAR; INTRAVENOUS ONCE
Status: DISCONTINUED | OUTPATIENT
Start: 2018-08-13 | End: 2018-08-13

## 2018-08-13 RX ORDER — BACITRACIN ZINC 500 [USP'U]/G
OINTMENT TOPICAL DAILY
Status: DISCONTINUED | OUTPATIENT
Start: 2018-08-13 | End: 2018-08-13

## 2018-08-13 RX ADMIN — HYDROXYCHLOROQUINE SULFATE 200 MG: 200 TABLET ORAL at 16:24

## 2018-08-13 RX ADMIN — PROMETHAZINE HYDROCHLORIDE 25 MG: 25 TABLET ORAL at 12:06

## 2018-08-13 RX ADMIN — HYDROXYCHLOROQUINE SULFATE 200 MG: 200 TABLET ORAL at 06:48

## 2018-08-13 RX ADMIN — Medication 2 TABLET: at 06:48

## 2018-08-13 RX ADMIN — PIPERACILLIN SODIUM AND TAZOBACTAM SODIUM 3.38 G: 3; .375 INJECTION, POWDER, FOR SOLUTION INTRAVENOUS at 05:22

## 2018-08-13 RX ADMIN — PROMETHAZINE HYDROCHLORIDE 25 MG: 25 TABLET ORAL at 16:28

## 2018-08-13 RX ADMIN — Medication 2 TABLET: at 16:22

## 2018-08-13 RX ADMIN — PIPERACILLIN SODIUM AND TAZOBACTAM SODIUM 3.38 G: 3; .375 INJECTION, POWDER, FOR SOLUTION INTRAVENOUS at 14:14

## 2018-08-13 RX ADMIN — CYCLOBENZAPRINE 10 MG: 10 TABLET, FILM COATED ORAL at 20:32

## 2018-08-13 RX ADMIN — HYDROMORPHONE HYDROCHLORIDE 4 MG: 4 TABLET ORAL at 05:22

## 2018-08-13 RX ADMIN — PROMETHAZINE HYDROCHLORIDE 25 MG: 25 TABLET ORAL at 01:48

## 2018-08-13 RX ADMIN — LIDOCAINE 1 PATCH: 50 PATCH TOPICAL at 16:22

## 2018-08-13 RX ADMIN — SODIUM CHLORIDE: 9 INJECTION, SOLUTION INTRAVENOUS at 16:21

## 2018-08-13 RX ADMIN — VANCOMYCIN HYDROCHLORIDE 1000 MG: 100 INJECTION, POWDER, LYOPHILIZED, FOR SOLUTION INTRAVENOUS at 01:48

## 2018-08-13 RX ADMIN — HYDROMORPHONE HYDROCHLORIDE 4 MG: 4 TABLET ORAL at 08:24

## 2018-08-13 RX ADMIN — CYCLOBENZAPRINE 10 MG: 10 TABLET, FILM COATED ORAL at 11:20

## 2018-08-13 RX ADMIN — HYDROMORPHONE HYDROCHLORIDE 4 MG: 4 TABLET ORAL at 01:41

## 2018-08-13 RX ADMIN — LEVOTHYROXINE SODIUM 150 MCG: 150 TABLET ORAL at 06:49

## 2018-08-13 RX ADMIN — PIPERACILLIN SODIUM AND TAZOBACTAM SODIUM 3.38 G: 3; .375 INJECTION, POWDER, FOR SOLUTION INTRAVENOUS at 20:28

## 2018-08-13 RX ADMIN — FOLIC ACID 1 MG: 1 TABLET ORAL at 06:48

## 2018-08-13 RX ADMIN — PROMETHAZINE HYDROCHLORIDE 25 MG: 25 TABLET ORAL at 06:50

## 2018-08-13 RX ADMIN — VANCOMYCIN HYDROCHLORIDE 1000 MG: 100 INJECTION, POWDER, LYOPHILIZED, FOR SOLUTION INTRAVENOUS at 12:06

## 2018-08-13 RX ADMIN — POTASSIUM CHLORIDE 40 MEQ: 1500 TABLET, EXTENDED RELEASE ORAL at 06:51

## 2018-08-13 RX ADMIN — Medication 1 EACH: at 20:28

## 2018-08-13 RX ADMIN — HYDROMORPHONE HYDROCHLORIDE 4 MG: 4 TABLET ORAL at 12:06

## 2018-08-13 RX ADMIN — HYDROMORPHONE HYDROCHLORIDE 4 MG: 4 TABLET ORAL at 20:33

## 2018-08-13 RX ADMIN — HYDROMORPHONE HYDROCHLORIDE 4 MG: 4 TABLET ORAL at 16:28

## 2018-08-13 RX ADMIN — PROMETHAZINE HYDROCHLORIDE 25 MG: 25 TABLET ORAL at 20:32

## 2018-08-13 ASSESSMENT — COGNITIVE AND FUNCTIONAL STATUS - GENERAL
CLIMB 3 TO 5 STEPS WITH RAILING: A LITTLE
WALKING IN HOSPITAL ROOM: A LITTLE
STANDING UP FROM CHAIR USING ARMS: A LITTLE
DRESSING REGULAR UPPER BODY CLOTHING: A LITTLE
DAILY ACTIVITIY SCORE: 20
HELP NEEDED FOR BATHING: A LITTLE
TURNING FROM BACK TO SIDE WHILE IN FLAT BAD: A LITTLE
DRESSING REGULAR LOWER BODY CLOTHING: A LITTLE
SUGGESTED CMS G CODE MODIFIER DAILY ACTIVITY: CJ
SUGGESTED CMS G CODE MODIFIER MOBILITY: CK
MOVING TO AND FROM BED TO CHAIR: A LITTLE
TOILETING: A LITTLE
MOBILITY SCORE: 18
MOVING FROM LYING ON BACK TO SITTING ON SIDE OF FLAT BED: A LITTLE

## 2018-08-13 ASSESSMENT — ENCOUNTER SYMPTOMS
EYES NEGATIVE: 1
FEVER: 0
GASTROINTESTINAL NEGATIVE: 1
CARDIOVASCULAR NEGATIVE: 1
CHILLS: 0
RESPIRATORY NEGATIVE: 1
BACK PAIN: 1
PSYCHIATRIC NEGATIVE: 1

## 2018-08-13 ASSESSMENT — PAIN SCALES - GENERAL
PAINLEVEL_OUTOF10: 9
PAINLEVEL_OUTOF10: 8
PAINLEVEL_OUTOF10: 5
PAINLEVEL_OUTOF10: 5
PAINLEVEL_OUTOF10: 8
PAINLEVEL_OUTOF10: 5

## 2018-08-13 NOTE — CONSULTS
ID consult requested by Dr. De Jesus  Chart reviewed. Pt not seen  57 yo woman with lumbar spondylolisthesis s/p L4-5 laminectomy and hardware placement on 07/19/18 by Dr. Doran  Admitted for DALTON and back pain found to have drainage at the surgical site  CT reveals 10.7 cm fluid collection ? Seroma ? Hematoma vs infection  Pt febrile on admission with leukocytosis so c/f surgical site infection with infection possibly down to the hardware  Wound gram stain +GPC and GNR; cultures pending  Recommend IR drainage if feasible  Continue current abx for now  Full consult to follow

## 2018-08-13 NOTE — PROGRESS NOTES
[facility] Hospitalist Progress Note    Date of Service: 2018    Chief Complaint  58 y.o. female admitted 2018 with intractable headache and back pain and found to have lumbar spine surgical wound discharge suggestive of possible infection.    Interval Problem Update  Pt states back pain continues to improve.  Discussed wound with wound care who states drainage also improving.    Consultants/Specialty  Neurosurgery  ID    Disposition  TBD      Review of Systems   Constitutional: Positive for malaise/fatigue. Negative for chills and fever.   HENT: Negative.    Eyes: Negative.    Respiratory: Negative.    Cardiovascular: Negative.    Gastrointestinal: Negative.    Musculoskeletal: Positive for back pain (improving).   Skin: Negative.    Psychiatric/Behavioral: Negative.       Physical Exam  Laboratory/Imaging   Hemodynamics  Temp (24hrs), Av.4 °C (97.6 °F), Min:36.2 °C (97.1 °F), Max:36.7 °C (98 °F)   Temperature: 36.7 °C (98 °F)  Pulse  Av.1  Min: 60  Max: 94    Blood Pressure: 153/72      Respiratory      Respiration: 18, Pulse Oximetry: 98 %             Fluids  No intake or output data in the 24 hours ending 18 1216    Nutrition  Orders Placed This Encounter   Procedures   • Diet Order Regular     Standing Status:   Standing     Number of Occurrences:   1     Order Specific Question:   Diet:     Answer:   Regular [1]     Physical Exam   Constitutional: She is oriented to person, place, and time. She appears well-developed and well-nourished. No distress.   HENT:   Head: Normocephalic and atraumatic.   Mouth/Throat: No oropharyngeal exudate.   Eyes: Pupils are equal, round, and reactive to light. Conjunctivae and EOM are normal. No scleral icterus.   Neck: Normal range of motion. Neck supple.   Cardiovascular: Exam reveals no gallop and no friction rub.    No murmur heard.  Pulmonary/Chest: Effort normal and breath sounds normal. No respiratory distress.   Abdominal: Soft. Bowel sounds are  normal.   Musculoskeletal: Normal range of motion. She exhibits no edema, tenderness or deformity.   Lumbar spine wound with surrounding erythema and purulent discharge noted.  Staples in place   Neurological: She is alert and oriented to person, place, and time. She has normal reflexes. No cranial nerve deficit.   Skin: Skin is warm and dry. There is erythema (aroud lumbar spine wound with staples in place).   Psychiatric: She has a normal mood and affect.   Nursing note and vitals reviewed.      Recent Labs      08/11/18   1347  08/12/18   0156  08/13/18   0236   WBC  11.5*  8.3  6.7   RBC  4.07*  3.50*  3.50*   HEMOGLOBIN  13.2  11.1*  11.1*   HEMATOCRIT  39.5  34.3*  34.6*   MCV  97.1  98.0*  98.9*   MCH  32.4  31.7  31.7   MCHC  33.4*  32.4*  32.1*   RDW  44.5  45.0  44.3   PLATELETCT  277  225  180   MPV  10.4  10.1  10.5     Recent Labs      08/11/18   1347  08/12/18   0156  08/13/18   0236   SODIUM  134*  135  135   POTASSIUM  3.5*  3.5*  3.8   CHLORIDE  101  107  107   CO2  21  19*  22   GLUCOSE  102*  72  88   BUN  12  12  8   CREATININE  0.73  0.58  0.58   CALCIUM  8.8  7.8*  8.0*                      Assessment/Plan     * Intractable low back pain- (present on admission)   Assessment & Plan    - Hx of lumbar spinal stenosis, spondylolisthesis, DDD with  Radiculopathy  - s/p lumbar decompression and instrumentation (7/19)  - now with purulent discharge from surgical site/wound  - Lumbar CT illustrates fluid collection (pseudomeningocele v. Seroma v. Hematoma v. Infection/abscess)  - NSG consulted; awaiting further recs  - Bld Cxs NGTD; Wnd Cx growing ; on IV abx (Vanc and Zosyn)  - Wound care consult and recs appreciated  - cont prn analgesics in meantime        Surgical site infection- (present on admission)   Assessment & Plan    Recent lumbar surgery  Please see above for details        Hyponatremia- (present on admission)   Assessment & Plan    - improving with IVFs; monitor        Hypokalemia-  (present on admission)   Assessment & Plan    Replete as needed; monitoring        Acquired hypothyroidism- (present on admission)   Assessment & Plan    - cont Synthroid per outpt regimen          Quality-Core Measures

## 2018-08-13 NOTE — WOUND TEAM
"[facility]  Inpatient Services  Initial Wound and Skin Care Evaluation    Admission Date:  8/11/2018   HPI, PMH, SH: Reviewed  Unit where seen by Wound Team: [id number]    WOUND CONSULT RELATED TO:  Evaluation of draining spinal incision     SUBJECTIVE:  \"He told me to change the dressing every day which I have been doing\"      Self Report / Pain Level:  Pain with movement but not with wound care       OBJECTIVE:  No absorbent layer of ABD dressing over wound.  Minimal drainage noted on bed pad or gown.  Patient reports she was seen by surgeon last Tuesday and some of the staples removed and drainage increased which MD informed patient to be expected and to change dry dressing daily    WOUND TYPE, LOCATION, CHARACTERISTICS (Pressure ulcers: location, stage, POA or date identified)    Location and type of wound: surgical incision mid to lower spine         Periwound:    Pink, intact      Drainage:     Minimal serous tan on old dressing      Tissue Type and %:    Brown/yellow/pink 100%     Wound Edges:   Approximated with areas of slough  Odor:     none  Exposed structure(s):             staples  S&S of Infection:   erythema      Measurements:  (taken 8/13/18)  Length:    13.5cm  Width:     0.3cm   Depth:     0cm  Tracts/Undermining:  none    Lab Values:    WBC:       WBC   Date/Time Value Ref Range Status   08/13/2018 02:36 AM 6.7 4.8 - 10.8 K/uL Final   10/20/2011 12:00 AM 5.7 4.0 - 10.5 x10E3/uL Final     AIC:      Lab Results   Component Value Date/Time    HBA1C 5.3 11/23/2016 09:46 AM         Culture:  pending    INTERVENTIONS BY WOUND TEAM:   Patient turned to side and note no drainage on linen.  Removed old dressing and cleansed wound with NS gauze.  Measurements and photo taken.  Covered wound with folded gauze and secured with hypafix tape.  Discussed with staff RN.    Dressing selection:  Dry gauze         Interdisciplinary consultation:  RN, patient      EVALUATION:  Clean and intact incision " which drainage has decreased with IVAB; neuro MD pending; no advanced wound care indicated at this time      Factors affecting wound healing:  infection  Goals:  Steady decrease in wound area and depth weekly     NURSING PLAN OF CARE ORDERS (X):    Dressing changes: See Dressing Care orders:     Skin care: See Skin Care orders:   Rectal tube care: See Rectal Tube Care orders:   Other orders:    RSKIN: CURRENT (X) ORDERED (O)  Q shift Fernando:  X  Q shift pressure point assessments:  X  Pressure redistribution mattress    X    Waffle overlay  DEJON      Bariatric DEJON      Bariatric foam        Heel float boots     Independent with bed mobility  Heels floated on pillows      Barrier wipes      Barrier Cream      Barrier paste      Sacral silicone dressing      Silicone O2 tubing      Anchorfast      Trach with Optifoam split foam       Waffle cushion      Rectal tube or BMS      Antifungal tx    Turn q 2 hours   See above  Up to chair     Ambulate X  PT/OT     Dietician      PO X    TF   TPN   NPO   # days   Other       WOUND TEAM PLAN OF CARE (X):   NPWT change 3 x week:        Dressing changes by wound team:       Follow up as needed:     X  Other (explain):    Anticipated discharge plans (X):  SNF:           Home Care:           Outpatient Wound Center:            Self Care:            Other:    TBD

## 2018-08-14 LAB
BACTERIA WND AEROBE CULT: ABNORMAL
BACTERIA WND AEROBE CULT: ABNORMAL
GRAM STN SPEC: ABNORMAL
SIGNIFICANT IND 70042: ABNORMAL
SITE SITE: ABNORMAL
SOURCE SOURCE: ABNORMAL

## 2018-08-14 PROCEDURE — 700111 HCHG RX REV CODE 636 W/ 250 OVERRIDE (IP): Performed by: INTERNAL MEDICINE

## 2018-08-14 PROCEDURE — 99232 SBSQ HOSP IP/OBS MODERATE 35: CPT | Performed by: INTERNAL MEDICINE

## 2018-08-14 PROCEDURE — 700102 HCHG RX REV CODE 250 W/ 637 OVERRIDE(OP): Performed by: INTERNAL MEDICINE

## 2018-08-14 PROCEDURE — 99255 IP/OBS CONSLTJ NEW/EST HI 80: CPT | Performed by: INTERNAL MEDICINE

## 2018-08-14 PROCEDURE — 700105 HCHG RX REV CODE 258: Performed by: INTERNAL MEDICINE

## 2018-08-14 PROCEDURE — A9270 NON-COVERED ITEM OR SERVICE: HCPCS | Performed by: INTERNAL MEDICINE

## 2018-08-14 PROCEDURE — 770001 HCHG ROOM/CARE - MED/SURG/GYN PRIV*

## 2018-08-14 PROCEDURE — 302118 SHAMPOO,NO RINSE: Performed by: INTERNAL MEDICINE

## 2018-08-14 PROCEDURE — 700101 HCHG RX REV CODE 250: Performed by: PHYSICIAN ASSISTANT

## 2018-08-14 PROCEDURE — 0JJT3ZZ INSPECTION OF TRUNK SUBCUTANEOUS TISSUE AND FASCIA, PERCUTANEOUS APPROACH: ICD-10-PCS | Performed by: ORTHOPAEDIC SURGERY

## 2018-08-14 RX ADMIN — CYCLOBENZAPRINE 10 MG: 10 TABLET, FILM COATED ORAL at 05:12

## 2018-08-14 RX ADMIN — HYDROMORPHONE HYDROCHLORIDE 4 MG: 4 TABLET ORAL at 23:53

## 2018-08-14 RX ADMIN — Medication 2 TABLET: at 16:40

## 2018-08-14 RX ADMIN — PIPERACILLIN AND TAZOBACTAM 4.5 G: 4; .5 INJECTION, POWDER, LYOPHILIZED, FOR SOLUTION INTRAVENOUS; PARENTERAL at 20:39

## 2018-08-14 RX ADMIN — VANCOMYCIN HYDROCHLORIDE 1600 MG: 100 INJECTION, POWDER, LYOPHILIZED, FOR SOLUTION INTRAVENOUS at 09:52

## 2018-08-14 RX ADMIN — PROMETHAZINE HYDROCHLORIDE 25 MG: 25 TABLET ORAL at 09:45

## 2018-08-14 RX ADMIN — PIPERACILLIN SODIUM AND TAZOBACTAM SODIUM 3.38 G: 3; .375 INJECTION, POWDER, FOR SOLUTION INTRAVENOUS at 05:11

## 2018-08-14 RX ADMIN — PIPERACILLIN AND TAZOBACTAM 4.5 G: 4; .5 INJECTION, POWDER, LYOPHILIZED, FOR SOLUTION INTRAVENOUS; PARENTERAL at 16:46

## 2018-08-14 RX ADMIN — HYDROMORPHONE HYDROCHLORIDE 4 MG: 4 TABLET ORAL at 13:05

## 2018-08-14 RX ADMIN — HYDROXYCHLOROQUINE SULFATE 200 MG: 200 TABLET ORAL at 05:12

## 2018-08-14 RX ADMIN — DIAZEPAM 5 MG: 2 TABLET ORAL at 20:39

## 2018-08-14 RX ADMIN — FOLIC ACID 1 MG: 1 TABLET ORAL at 05:12

## 2018-08-14 RX ADMIN — DIAZEPAM 5 MG: 2 TABLET ORAL at 11:40

## 2018-08-14 RX ADMIN — Medication 1 EACH: at 16:41

## 2018-08-14 RX ADMIN — HYDROMORPHONE HYDROCHLORIDE 4 MG: 4 TABLET ORAL at 09:45

## 2018-08-14 RX ADMIN — HYDROXYCHLOROQUINE SULFATE 200 MG: 200 TABLET ORAL at 16:40

## 2018-08-14 RX ADMIN — PROMETHAZINE HYDROCHLORIDE 25 MG: 25 TABLET ORAL at 00:55

## 2018-08-14 RX ADMIN — Medication 1 EACH: at 05:15

## 2018-08-14 RX ADMIN — LEVOTHYROXINE SODIUM 125 MCG: 150 TABLET ORAL at 05:12

## 2018-08-14 RX ADMIN — ACETAMINOPHEN 650 MG: 325 TABLET, FILM COATED ORAL at 11:40

## 2018-08-14 RX ADMIN — POTASSIUM CHLORIDE 40 MEQ: 1500 TABLET, EXTENDED RELEASE ORAL at 05:11

## 2018-08-14 RX ADMIN — PROMETHAZINE HYDROCHLORIDE 25 MG: 25 TABLET ORAL at 05:11

## 2018-08-14 RX ADMIN — HYDROMORPHONE HYDROCHLORIDE 4 MG: 4 TABLET ORAL at 05:12

## 2018-08-14 RX ADMIN — HYDROMORPHONE HYDROCHLORIDE 4 MG: 4 TABLET ORAL at 20:39

## 2018-08-14 RX ADMIN — HYDROMORPHONE HYDROCHLORIDE 4 MG: 4 TABLET ORAL at 16:40

## 2018-08-14 RX ADMIN — CYCLOBENZAPRINE 10 MG: 10 TABLET, FILM COATED ORAL at 16:40

## 2018-08-14 RX ADMIN — Medication 2 TABLET: at 05:11

## 2018-08-14 RX ADMIN — HYDROMORPHONE HYDROCHLORIDE 4 MG: 4 TABLET ORAL at 00:55

## 2018-08-14 ASSESSMENT — PAIN SCALES - GENERAL
PAINLEVEL_OUTOF10: 6
PAINLEVEL_OUTOF10: 7
PAINLEVEL_OUTOF10: 5
PAINLEVEL_OUTOF10: 7
PAINLEVEL_OUTOF10: 8
PAINLEVEL_OUTOF10: 8
PAINLEVEL_OUTOF10: 7

## 2018-08-14 ASSESSMENT — ENCOUNTER SYMPTOMS
CARDIOVASCULAR NEGATIVE: 1
BACK PAIN: 1
RESPIRATORY NEGATIVE: 1
CHILLS: 0
PSYCHIATRIC NEGATIVE: 1
FEVER: 0
EYES NEGATIVE: 1
GASTROINTESTINAL NEGATIVE: 1

## 2018-08-14 NOTE — PROGRESS NOTES
Progress Note               Author: Antonio Doran Date & Time created: 2018  2:14 PM     Interval History:  Attempted aspiration of fluid from lumbar wound    Review of Systems:  ROS    Physical Exam:  Physical Exam    Labs:        Invalid input(s): [id number]      Recent Labs      18   0236   SODIUM  135  135   POTASSIUM  3.5*  3.8   CHLORIDE  107  107   CO2  19*  22   BUN  12  8   CREATININE  0.58  0.58   CALCIUM  7.8*  8.0*     Recent Labs      18   0236   ALTSGPT  7   --    ASTSGOT  16   --    ALKPHOSPHAT  54   --    TBILIRUBIN  0.5   --    GLUCOSE  72  88     Recent Labs      18   0236   RBC  3.50*  3.50*   HEMOGLOBIN  11.1*  11.1*   HEMATOCRIT  34.3*  34.6*   PLATELETCT  225  180     Recent Labs      18   0236   WBC  8.3  6.7   NEUTSPOLYS   --   62.20   LYMPHOCYTES   --   23.40   MONOCYTES   --   13.60*   EOSINOPHILS   --   0.00   BASOPHILS   --   0.70   ASTSGOT  16   --    ALTSGPT  7   --    ALKPHOSPHAT  54   --    TBILIRUBIN  0.5   --      Hemodynamics:  Temp (24hrs), Av.6 °C (97.8 °F), Min:36.2 °C (97.1 °F), Max:36.8 °C (98.3 °F)  Temperature: 36.8 °C (98.3 °F)  Pulse  Av.1  Min: 60  Max: 94   Blood Pressure: 142/95     Respiratory:    Respiration: 16, Pulse Oximetry: 98 %           Fluids:    Intake/Output Summary (Last 24 hours) at 18 1414  Last data filed at 18   Gross per 24 hour   Intake              240 ml   Output                0 ml   Net              240 ml        GI/Nutrition:  Orders Placed This Encounter   Procedures   • Diet Order Regular     Standing Status:   Standing     Number of Occurrences:   1     Order Specific Question:   Diet:     Answer:   Regular [1]     Medical Decision Making, by Problem:  Active Hospital Problems    Diagnosis   • *Intractable low back pain [M54.5]   • Surgical site infection [T81.4XXA]   • Hypokalemia [E87.6]   • Hyponatremia [E87.1]   •  Acquired hypothyroidism [E03.9]       Plan:  Attempted aspiration. I placed needle in at least 12 different trajectories with no evidence of any fluid pockets. Nothing to aspirate.  No further recs from my standpoint. If infect dz would like me to do wound exploration irrig and debridement with deep wound cultures i would be happy to do it  Pt does report continued improvement    Quality-Core Measures

## 2018-08-14 NOTE — PROGRESS NOTES
Assumed pt care at 1900.  Received report from day RN.  Assessment completed.  Pt AOx4 .  Pt comlaints of pain at this time in back 8/10, continues with pain management as ordered.  No other s/s of discomfort or distress. Pt ambulating with staff when needed. Skin intact/ slightly red, surgical incision dressing CDI. Pt needs frequent dressing changes due to drainage, MD aware. Bed in lowest position, locked, and bed alarm on. Pt voiding appropriately with no issues to note.  Pt educated on safety and POC, states understanding.  Pt calls appropriately.  Treaded socks in place, SCDs refused, call light within reach and staff numbers provided.  Pt needs met at this time.

## 2018-08-14 NOTE — CONSULTS
DATE OF SERVICE:  08/12/2018    CHIEF COMPLAINT:  Headaches, low back pain, and muscle spasms in the lower   back as well, and vomiting.    HISTORY OF PRESENT ILLNESS:  This patient is a 58-year-old female, who is well   known to us, previously had surgery performed on 07/19/2018 of her lumbar   spine of a complex and large procedure, and she presents to the ED on   08/11/2018 with evaluation for back pain and headache as well as vomiting.  We   took some staples out and after we took staples out, she started getting   headaches, nausea, and vomiting, more lumbar pain.  Complains of back pain   with headaches, nausea and vomiting.  She does feel hot and cold   intermittently.  She never had a fever at home.  She never had a fever   whatsoever in the hospital every time they have checked.  She denies any nasal   congestion, any purulent fluid out of her nose or throat, cough, sputum.  No   blood or anything like that in her urine or stool.  No diarrhea.  No loss of   bowel or bladder function, just some nausea and vomiting.    ALLERGIES:  She has had several allergies, especially to MORPHINE, ASPIRIN,   CODEINE, TRAMADOL, SULFA DRUGS, and particularly BACTRIM, OTHER MUSCLE   RELAXERS as well as ___, but presently I have her on diazepam and she is not   having any problems.    PHYSICAL EXAMINATION:  VITAL SIGNS:  Blood pressure 149/82 consistently, pulse 94, temperature again   97.4, respirations 16.  She is a 173-pound female.  O2 sat 95%.  BMI is around   29, so she is not even morbidly obese.  GENERAL:  She appears uncomfortable.  When I see her, rounding on her, she was   asleep and out of pain due to analgesic medications more than anything.  HEENT:  Eyes are completely normal.  Normocephalic, atraumatic.  Eyes are   PERRL.  No discharge.  Nonicterus.  NECK:  Normal range of motion.  No tenderness, no lymphedema present.  CARDIOVASCULAR:  Regular rate and rhythm.  LUNGS:  No respiratory distress.  Clear.  Good  air entry.  ABDOMEN:  Seems soft, nontender.  SKIN:  She does seem a little paler than I usually seen her in the office, but   other than that, she does still have good skin color, but it does not look   like her baseline that is here in the office.  Looking at the surgical scar   itself, most of the staples are out.  There are still a couple of areas of   staples that were consistent with a scab formation, but there is a little more   mild erythema present on exam, specifically where the staples were at before,   and there is subcuticular fluid right under the skin layer which was present   when I took the staples out as well.  I do not see any wound dehiscence   whatsoever.  Staples are still in place in a couple of areas that are still   kind of healing a little bit.  EXTREMITIES:  Show no edema, no tenderness.  Vascular 2+.  Still has good   range of motion in all joints.  BACK:  Significantly decreased range of motion due to pain.  NEUROLOGIC:  She is alert and oriented x3.  No deficits focally.  PSYCHIATRIC:  Normal affect.  ___.  Maybe a little irritable in mood, but that   is probably maybe a little bit more than her baseline.    IMAGING:  CT results showed postoperative changes that were consistent with   most postoperative             , showing a seroma postoperatively along the   spinal canal consistent with where we did the laminectomy.  Of course, they   always want to suspect it can be ruled out, but this was actually pretty   normal on CT.  Instrumentation at L2-L5 level, a 10           cm fluid   collection along an incisional                  decompression, and previous L1   deformity that was corrected by kyphoplasty seen as well.  Head CT is   perfectly normal as well.    ASSESSMENT AND PLAN:  Right now, we are just doing some monitoring, giving her   some IV fluids for dehydration, vomiting                    Zofran titrated   with morphine as well.  Zofran still kind of makes her nauseated                       , so, I mentioned to the nurse that I wanted her to try Phenergan, so   they are going to try that, that might be one of the culprits.  I think it is   a combination of polypharmacy to an extent.  More than anything, I think   because she has severely dosed her Dilaudid in half; previously, I think she   was on 4 mg every 3 hours, I think now her family put her down to 2, so half   that.  I really think that this presents as headaches, nausea and vomiting,   increased pain, almost presents like kind of withdrawal symptoms, combination   of postoperative pain as well, but she also has a severe autoimmune disease,   rheumatoid arthritis, question if she has another autoimmune disease like   fibromyalgia.  Definitely has a higher pain threshold than most patients that   I take care of, but she is consistent with a fibromyalgia diagnosis, which I   believe she probably has to an extent due to all the other autoimmune   diseases.  She just has basically a higher pain threshold, unfortunately, than   most patients, so they might have started dosing her down off her Dilaudid   early, worried about                                 considering any type of   drug addiction, but, unfortunately, she had a large spine surgery, which   high-dose narcotics will have to be taken for some time before she gets out of   pain from the healing process, but, at this time, monitor and treat the back   pain with morphine.  We are going to give her some Zosyn and antibiotics for   reassurance prophylactically.  I have looked on the notes in the   hospitalization and they said it was purulent fluid, but it is really serous   fluid from what I have seen from the wound, I do not see any purulent fluid,   definitely no pus rolling out.  If they ask me to aspirate, I will do an   aspiration on it, be happy to, but at this point, I think it would be   unwarranted because it introduces infection into a sterile environment deep    within the wound, which I would like to wait until absolutely necessary.  I   definitely see a little redness, a little postoperative swelling, intractable   pain with vomiting, but the thing is she has never had a fever whatsoever.    Her white cell count when she was admitted was 11.5, now it is 8.3, but her H   and H was normal when she was admitted and now is down as well, so definitely   taking some different labs                  blood culture.  The wound culture,   I would not necessarily take seriously because that could be contaminated   with normal skin farida              in that area                         I   mean I have even seen E. coli before                  because it is around the   buttocks area                    site of infection.  The wound itself looks   like irritation where the staples were.  Of course, 3-4 days ago, I took the   staples out, everything looked                          was healing quite   well.  The patient was doing very, very well.  So, she does not think it is   the flu, she might have some kind of gastrointestinal virus, a stomach source,   but, I think that is more a polypharmacy.  Again, she does have multiple   allergies, so we will need to take that in consideration.  I also think that   she was weaning herself off Dilaudid way too early, definitely getting signs   of withdrawal.  But, basically we have to keep kind of a medical tune up or   just kind of monitor everything and make sure, reassurance, that there is not   a deep wound infection developing, control her nausea and vomiting more than   anything, that is why I recommended Phenergan.  Then, keep her on the   morphine, but at some point they have got to try to wean her back,                               try to keep some food and fluids down at this point, try to   get her under control, very well known patient of ours, so we just want to   make sure that she is not taking a turn for any type of infection  or big GI   problems to that extent as well, but we will keep an eye on her over the next   couple of days to see if there is anything more I could recommend, but, at   this point, I imagine she is probably going to find a way to keep her food and   fluids down and then put her back on oral pain meds, rule out any type of   infection of course, and discharge her probably in about 2 days, I imagine.       ____________________________________     BARRINGTON Tafoya / MERISSA    DD:  08/12/2018 15:07:33  DT:  08/12/2018 20:24:29    D#:  [id number]  Job#:  [id number]

## 2018-08-14 NOTE — PROGRESS NOTES
Physician at bedside, lidocaine patch removed by physician redness aaround lidocaine patch site. Rapid response nurse called for IVP versed order

## 2018-08-14 NOTE — DISCHARGE PLANNING
Anticipated Discharge Disposition:   Home  Pt indicated that she might need IV abx and a PICC line.   Pt also indicated the need for homehealth    Action:   Met with pt and her sister Sena.   Sena, apparently, is pt's caregiver   Per Sena, pt has all the DMEs that she needs.     Pt is scheduled for surgery.    Barriers to Discharge:   Surgery pending.   ID recommendations pending.     Plan:   MD kindly upload recommendation in Media.   Will follow with MD.  Follow up with pt and her .

## 2018-08-14 NOTE — CONSULTS
INFECTIOUS DISEASES INPATIENT CONSULT NOTE     Date of Service: 8/14/2018    Consult Requested By: Gerardo De Jesus M.D.    Reason for Consultation: Surgical site infection    History of Present Illness:   Clifford Murphy is a 58 y.o. woman with history of rheumatoid arthritis on Plaquenil, and previously leflunomide and lumbar spondylolisthesis status post L4 through 5 laminectomy and hardware placement on 07/19/18 by Dr. Doran admitted 8/11/2018 for increasing lower back pain and drainage from her surgical site.  Patient states that she was discharged home several days after her procedure when she returned home she was doing quite well.  Last Tuesday she followed up with Dr. Doran in his clinic and staples from the superior aspect were removed.  She was doing well and her pain was relatively controlled until the Thursday prior to admission when she began to experience headache and worsening lower back pain.  Family also noticed yellowish green discharge from her prior surgical site and surrounding erythema.  He was experiencing chills and vomiting but denies abdominal pain or diarrhea.  She has also been increasingly more weak.  She denies any recent antibiotic use since her discharge.  On presentation, she was febrile with a T-max of 100.8 with a leukocytosis of 11.5.  She was noted to have significant drainage from her surgical site.  CT of the spine revealed a 10.7 cm fluid collection in the posterior paraspinal soft tissues concerning for possible postsurgical seroma, pseudomeningocele or resolving hematoma.  Blood cultures are currently negative to date.  Wound cultures are positive for pseudomonas aeruginosa.  At this time, patient also is complaining of right upper extremity pain in addition to left calf pain.  Infectious disease service consulted for further antibiotic recommendations and management.    Review Of Systems:  All other ROS were reviewed and are negative except as noted above in the HPI.    PMH:  "  Past Medical History:   Diagnosis Date   • Anesthesia     hx of difficulty waking up   • Asthma 02/21/2018    Inhaler use during allergy season.   • Breath shortness    • Bronchitis 2016   • Cough 07/13/2018    \"from allergies\"   • Environmental and seasonal allergies    • Genital herpes in women 2/11/2014   • Heart burn    • High risk medications (not anticoagulants) long-term use 8/29/2012   • Hypothyroid 9/14/2011   • Kidney stones    • Macrocytosis without anemia med effect   • Menopause 9/15/2011   • Pain 07/13/2018    back, hands, feet, knees, ankles, 5/10   • Pneumonia 01/2015   • Post-cholecystectomy syndrome     \"dumping syndrome\"   • Rheumatoid arthritis(714.0)    • Snoring     no sleep study   • Tobacco abuse, episodic 9/14/2011   • Vitamin d deficiency 5/15/2012       PSH:  Past Surgical History:   Procedure Laterality Date   • LUMBAR FUSION POSTERIOR  7/19/2018    Procedure: LUMBAR FUSION POSTERIOR- L4-5 TLIF;  Surgeon: Antonio Doran M.D.;  Location: SURGERY [facility];  Service: Orthopedics   • LUMBAR DECOMPRESSION  7/19/2018    Procedure: LUMBAR DECOMPRESSION- L2-S1;  Surgeon: Antonio Doran M.D.;  Location: [facility];  Service: Orthopedics   • KYPHOPLASTY  2/22/2018    Procedure: KYPHOPLASTY - L1 and Biopsy;  Surgeon: Antonio Doran M.D.;  Location: [facility];  Service: Orthopedics   • APPENDECTOMY  2002   • CHOLECYSTECTOMY  2002   • OTHER ORTHOPEDIC SURGERY Left 1974/1981    left knee surgery x 2   • TMJ RECONSTRUCTION BILATERAL     • TONSILLECTOMY         FAMILY HX:  Family History   Problem Relation Age of Onset   • Lung Disease Mother         COPD   • Hyperlipidemia Mother    • Hypertension Mother    • Cancer Father         Prostate   • Diabetes Father    • Heart Disease Father    • Genetic Sister        SOCIAL HX:  Social History     Social History   • Marital status:      Spouse name: N/A   • Number of children: N/A   • Years of education: N/A "     Occupational History   • Not on file.     Social History Main Topics   • Smoking status: Current Every Day Smoker     Packs/day: 0.50     Years: 40.00     Types: Cigarettes   • Smokeless tobacco: Never Used   • Alcohol use 0.6 oz/week     1 Glasses of wine per week      Comment: occasional, maybe 1 per month   • Drug use: No   • Sexual activity: Yes     Partners: Male     Other Topics Concern   • Not on file     Social History Narrative        Travels a lot    . 1 child     History   Smoking Status   • Current Every Day Smoker   • Packs/day: 0.50   • Years: 40.00   • Types: Cigarettes   Smokeless Tobacco   • Never Used     History   Alcohol Use   • 0.6 oz/week   • 1 Glasses of wine per week     Comment: occasional, maybe 1 per month       Allergies/Intolerances:  Allergies   Allergen Reactions   • Morphine Anaphylaxis     Morphine and morphine derivatives. (demerol)   • Aspirin      Stomach pain   • Codeine      Stomach pain   • Tramadol Vomiting     SEVERE HEADACHE   • Sulfamethoxazole W-Trimethoprim Shortness of Breath     Rxn - flushing, SOB  Late 80's     • Other Drug      Muscle relaxants cause dizziness         History reviewed with the patient     Other Current Medications:    Current Facility-Administered Medications:   •  bacitracin-polymyxin b (POLYSPORIN) 500-21057 UNIT/GM ointment, , Topical, BID, Stan Abel, P.A., 1 Each at 08/14/18 0515  •  potassium chloride SA (Kdur) tablet 40 mEq, 40 mEq, Oral, DAILY, Eb De Jesus M.D., 40 mEq at 08/14/18 0511  •  folic acid (FOLVITE) tablet 1 mg, 1 mg, Oral, DAILY, Parmjit Sanchez M.D., 1 mg at 08/14/18 0512  •  hydroxychloroquine (PLAQUENIL) tablet 200 mg, 200 mg, Oral, BID, Parmjit Sanchez M.D., 200 mg at 08/14/18 0512  •  senna-docusate (PERICOLACE or SENOKOT S) 8.6-50 MG per tablet 2 Tab, 2 Tab, Oral, BID, 2 Tab at 08/14/18 0511 **AND** polyethylene glycol/lytes (MIRALAX) PACKET 1 Packet, 1 Packet, Oral, QDAY PRN **AND** magnesium hydroxide (MILK  OF MAGNESIA) suspension 30 mL, 30 mL, Oral, QDAY PRN **AND** bisacodyl (DULCOLAX) suppository 10 mg, 10 mg, Rectal, QDAY PRN, Parmjit Sanchez M.D.  •  NS infusion, , Intravenous, Continuous, Parmjit Sanchez M.D., Last Rate: 100 mL/hr at 08/13/18 1621  •  acetaminophen (TYLENOL) tablet 650 mg, 650 mg, Oral, Q6HRS PRN, Parmjit Sanchez M.D.  •  ondansetron (ZOFRAN) syringe/vial injection 4 mg, 4 mg, Intravenous, Q4HRS PRN, Parmjit Sanchez M.D., 4 mg at 08/12/18 1010  •  ondansetron (ZOFRAN ODT) dispertab 4 mg, 4 mg, Oral, Q4HRS PRN, Parmjit Sanchez M.D.  •  promethazine (PHENERGAN) tablet 12.5-25 mg, 12.5-25 mg, Oral, Q4HRS PRN, Parmjit Sanchez M.D., 25 mg at 08/14/18 0945  •  promethazine (PHENERGAN) suppository 12.5-25 mg, 12.5-25 mg, Rectal, Q4HRS PRN, Parmjit Sanchez M.D.  •  prochlorperazine (COMPAZINE) injection 5-10 mg, 5-10 mg, Intravenous, Q4HRS PRN, Parmjit Sanchez M.D., 10 mg at 08/11/18 2010  •  lidocaine (LIDODERM) 5 % 1 Patch, 1 Patch, Transdermal, Q24HR, Parmjit Sanchez M.D., Stopped at 08/14/18 0422  •  levothyroxine (SYNTHROID) tablet 150 mcg, 150 mcg, Oral, Once per day on Mon Wed Fri, 150 mcg at 08/13/18 0649 **AND** levothyroxine (SYNTHROID) tablet 125 mcg, 125 mcg, Oral, Once per day on Sun Tue Thu Sat, Parmjit Sanchez M.D., 125 mcg at 08/14/18 0512  •  diazePAM (VALIUM) tablet 5 mg, 5 mg, Oral, Q6HRS PRN, Parmjit Sanchez M.D., 5 mg at 08/12/18 1252  •  HYDROmorphone (DILAUDID) tablet 4 mg, 4 mg, Oral, Q3HRS PRN, Parmjit Sanchez M.D., 4 mg at 08/14/18 0945  •  [COMPLETED] piperacillin-tazobactam (ZOSYN) 3.375 g in  mL IVPB, 3.375 g, Intravenous, Once, Stopped at 08/11/18 2214 **AND** piperacillin-tazobactam (ZOSYN) 3.375 g in  mL IVPB, 3.375 g, Intravenous, Q8HRS, Parmjit Sanchez M.D., Stopped at 08/14/18 0911  •  cyclobenzaprine (FLEXERIL) tablet 10 mg, 10 mg, Oral, TID PRN, Parmjit Sanchez M.D., 10 mg at 08/14/18 0512  [unfilled]    Most Recent Vital Signs:  /95   Pulse 61   Temp 36.8 °C (98.3 °F)   Resp 16   Ht 1.626 m  "(5' 4\")   Wt 78.5 kg (173 lb)   LMP  (LMP Unknown)   SpO2 98%   Breastfeeding? No   BMI 29.70 kg/m²   Temp  Av.7 °C (98 °F)  Min: 36.2 °C (97.1 °F)  Max: 38.2 °C (100.8 °F)    Physical Exam:  General: well-appearing, no acute distress, nontoxic  HEENT: sclera anicteric, PERRL, EOMI, MMM, no oral lesions  Neck: supple, no lymphadenopathy  Chest: CTAB, no r/r/w, normal work of breathing.  Cardiac: RRR, normal S1 S2, no m/r/g   Abdomen: + bowel sounds, soft, non-tender, non-distended, no HSM  Back: Type of surgical site with Steri-Strips in place with some slight superficial dehiscence, remainder of surgical site with staples in place.  Some serosanguineous drainage present and surrounding erythema around the surgical site  Extremities: WWP, no edema, 2+ pulses  Skin: no rashes or worrisome lesions  Neuro: Alert and oriented times 3, non-focal exam, speech fluent, moves all extremities    Pertinent Lab Results:  Recent Labs      18   1347  18   0156  18   0236   WBC  11.5*  8.3  6.7      Recent Labs      18   1347  18   0156  18   0236   HEMOGLOBIN  13.2  11.1*  11.1*   HEMATOCRIT  39.5  34.3*  34.6*   MCV  97.1  98.0*  98.9*   MCH  32.4  31.7  31.7   PLATELETCT  277  225  180         Recent Labs      18   1347  18   0156  18   0236   SODIUM  134*  135  135   POTASSIUM  3.5*  3.5*  3.8   CHLORIDE  101  107  107   CO2  21  19*  22   CREATININE  0.73  0.58  0.58        Recent Labs      18   1347  18   0156   ALBUMIN  4.0  3.2        Pertinent Micro:  Results     Procedure Component Value Units Date/Time    CULTURE WOUND W/ GRAM STAIN [[id number]]  (Abnormal)  (Susceptibility) Collected:  18 1031    Order Status:  Completed Specimen:  Wound from Back Updated:  18 0821     Significant Indicator POS (POS)     Source WND     Site BACK     Culture Result Wound -- (A)     Gram Stain Result Many WBCs.  Rare Gram positive cocci.  Rare Gram " "negative rods.       Culture Result Wound Pseudomonas aeruginosa  Moderate growth  P.aeruginosa may develop resistance during prolonged therapy  with all antibiotics. Isolates that are initially susceptible  may become resistant within three to four days after  initiation of therapy. Testing of repeat isolates may be  warranted.   (A)    Culture & Susceptibility     PSEUDOMONAS AERUGINOSA     Antibiotic Sensitivity Microscan Unit Status    Amikacin Sensitive <=16 mcg/mL Final    Method: SENSITIVITY, MENDOZA    Cefepime Sensitive <=8 mcg/mL Final    Method: SENSITIVITY, MENDOZA    Ceftazidime Sensitive 4 mcg/mL Final    Method: SENSITIVITY, MENDOZA    Ciprofloxacin Sensitive <=1 mcg/mL Final    Method: SENSITIVITY, MENDOZA    Gentamicin Sensitive <=4 mcg/mL Final    Method: SENSITIVITY, MENDOZA    Imipenem Sensitive <=1 mcg/mL Final    Method: SENSITIVITY, MENDOZA    Meropenem Sensitive <=1 mcg/mL Final    Method: SENSITIVITY, MENDOZA    Pip/Tazobactam Sensitive <=16 mcg/mL Final    Method: SENSITIVITY, MENDOZA    Tobramycin Sensitive <=4 mcg/mL Final    Method: SENSITIVITY, MENDOZA                       BLOOD CULTURE [[id number]] Collected:  08/12/18 1115    Order Status:  Completed Specimen:  Blood from Peripheral Updated:  08/13/18 0920     Significant Indicator NEG     Source BLD     Site PERIPHERAL     Blood Culture No Growth    Note: Blood cultures are incubated for 5 days and  are monitored continuously.Positive blood cultures  are called to the RN and reported as soon as  they are identified.      Narrative:       Per Hospital Policy: Only change Specimen Src: to \"Line\" if  specified by physician order.    BLOOD CULTURE [[id number]] Collected:  08/12/18 1108    Order Status:  Completed Specimen:  Blood from Peripheral Updated:  08/13/18 0920     Significant Indicator NEG     Source BLD     Site PERIPHERAL     Blood Culture No Growth    Note: Blood cultures are incubated for 5 days and  are monitored continuously.Positive blood cultures  are " "called to the RN and reported as soon as  they are identified.      Narrative:       Per Hospital Policy: Only change Specimen Src: to \"Line\" if  specified by physician order.    GRAM STAIN [[id number]] Collected:  08/12/18 1031    Order Status:  Completed Specimen:  Wound Updated:  08/12/18 1654     Significant Indicator .     Source WND     Site BACK     Gram Stain Result Many WBCs.  Rare Gram positive cocci.  Rare Gram negative rods.      Urinalysis [[id number]]     Order Status:  Sent Specimen:  Urine from Urine, Clean Catch         Blood Culture   Date Value Ref Range Status   08/12/2018   Preliminary    No Growth    Note: Blood cultures are incubated for 5 days and  are monitored continuously.Positive blood cultures  are called to the RN and reported as soon as  they are identified.          Studies:  Ct-abdomen-pelvis With    Result Date: 8/11/2018 8/11/2018 2:26 PM HISTORY/REASON FOR EXAM:  Abdominal pain and vomiting back pain TECHNIQUE/EXAM DESCRIPTION: CT scan of the abdomen and pelvis with contrast. Contrast-enhanced helical scanning was obtained from the diaphragmatic domes through the pubic symphysis following the bolus administration of 100 mL of Optiray 350 nonionic contrast without complication. Low dose optimization technique was utilized for this CT exam including automated exposure control and adjustment of the mA and/or kV according to patient size. COMPARISON: 02/16/2010 Limited images FINDINGS: CT Abdomen: There is no evidence of focal consolidation or pleural effusion seen within the lung bases. The liver is normal in appearance. The spleen is normal. The kidneys are normal. The adrenal glands are normal. The pancreas is normal. The aorta is normal in caliber.  No evidence of retroperitoneal adenopathy. Post cholecystectomy changes are noted. The common bile duct measures up to 2.4 cm in diameter. There is moderate intrahepatic biliary ductal dilatation. Postoperative changes are noted in the " lumbar spine. Postoperative fluid collection occupies laminectomy region at the L2-L3 level and extends into the subcutaneous fat measuring approximately 6.1 x 5.7 cm in maximum axial dimensions and 15 cm in vertical dimension. CT Pelvis: There is no evidence of bowel obstruction or inflammatory change. Normal appendix is not identified. There is no evidence of free fluid.     1.  Postoperative changes noted in the lumbar spine. Large postoperative fluid collection is identified at the L2-L3 level that also extends above and below these levels as described above. This could represent postoperative seroma or meningocele. No air  is noted within the collection but infection cannot be excluded. 2.  Post cholecystectomy. There is dilatation of the common bile duct and intrahepatic ducts. Dilatation is increased from the prior CT.     Ct-head W/o    Result Date: 8/11/2018 8/11/2018 2:26 PM HISTORY/REASON FOR EXAM:  Headache and vomiting. TECHNIQUE/EXAM DESCRIPTION AND NUMBER OF VIEWS: CT of the head without contrast. Contiguous 5 mm axial sections were obtained from the skull base through the vertex. Up to date radiation dose reduction adjustments have been utilized to meet ALARA standards for radiation dose reduction. COMPARISON:  None available FINDINGS: There is no evidence of extra-axial hemorrhage. No intra-axial hemorrhage is noted. There is no brain swelling or edema. No mass effect or midline shift is noted.     NO ACUTE ABNORMALITIES ARE NOTED ON CT SCAN OF THE HEAD.     Ct-lspine W/o Plus Recons    Result Date: 8/11/2018 8/11/2018 2:26 PM HISTORY/REASON FOR EXAM:  Recent lumbar instrumentation, pain. TECHNIQUE/EXAM DESCRIPTION AND NUMBER OF VIEWS: CT lumbar spine without contrast, with reconstructions. The study was performed on a helical multidetector CT scanner. Thin-section helical scanning was performed from T12-L1 to the sacrum. Sagittal and coronal multiplanar reconstructions were generated from the  axial images. Low dose optimization technique was utilized for this CT exam including automated exposure control and adjustment of the mA and/or kV according to patient size. COMPARISON: Lumbar spine radiograph dated 7/19/2018. FINDINGS: Preserved lumbar lordosis. Sequela of recent instrumentation at the L4-5 level with placement of bilateral paraspinal rods and transpedicular screws. The left L5 screw breaches the cortex anterior cortex of the L5 vertebra, otherwise, screws are well-positioned. Replaced L4-5 disc. Laminectomy defects at L3-5 levels. Partial resection of L2 spinous process. Cement in the posterior paraspinal soft tissues. There is a 6.4 x 6.1 x 10.9 cm fluid collection posterior to the region of spinal instrumentation with peripheral calcification. Anteriorly, the collection closely abuts the thecal sac. Sequela of L1 vertebral body augmentation with mild compression deformity. No new compression fractures. Spinal canal is patent. Visualized intra-abdominal organs are unremarkable. Atherosclerosis.     1. Recent instrumentation at L2-5 levels with a 10.7 cm fluid collection in the posterior paraspinal soft tissues. Differential includes postsurgical seroma, pseudomeningocele and the resolving hematoma. Infection should be excluded clinically. 2. Prior augmentation of L1 compression deformity. No new fracture or listhesis.    Dx-lumbar Spine-2 Or 3 Views    Result Date: 7/19/2018 7/19/2018 7:30 AM HISTORY/REASON FOR EXAM:  Lumbar fusion at L4-5 TECHNIQUE/ EXAM DESCRIPTION AND NUMBER OF VIEWS:  2 views of the lumbar spine. Digitized Intraoperative Radiograph FINDINGS: Fixation hardware projecting over the lumbar spine Fluoroscopic time: 7 seconds Fluoroscopy dose 1.769mGy    Digitized intraoperative radiograph is submitted for review.  This examination is not for diagnostic purpose but for guidance during a surgical procedure.    Dx-portable Fluoro > 1 Hour    Result Date: 7/19/2018 7/19/2018 7:30  AM HISTORY/REASON FOR EXAM:  Lumbar fusion L4-5, Main OR TECHNIQUE/EXAM DESCRIPTION AND NUMBER OF VIEWS: Portable fluoroscopy for greater than one hour FINDINGS:      The portable fluoroscopy unit was obligated to the procedure for greater than one hour.   Actual fluoro time was 7 seconds.     Portable fluoroscopy utilized for 7 seconds.       IMPRESSION:   1.  Surgical site infection   2.  Concern for hardware infection  3.  Pseudomonas aeruginosa infection  4.  Rheumatoid arthritis      PLAN:   Clifford Murphy is a 58 y.o. woman with a history of rheumatoid arthritis currently on Plaquenil but previously on leflunomide and recent L4-5 laminectomy with hardware placement on 07/19/18 by Dr. Doran admitted for back pain and purulent drainage from her surgical site.  Patient clearly has evidence of surgical site infection in the fluid collection noted on imaging is concerning for an abscess in light of positive blood cultures with pseudomonas aeruginosa.  Recommend IR drainage for source control.  Will discontinue vancomycin as no MRSA isolated and continue Zosyn.  I am concerned about the infection extending down to the hardware.  Anticipate 6 week course of IV antibiotics due to concerns for possible hardware infection.  Further recommendations per clinical course.    Will continue to follow    Ramona Dick M.D.

## 2018-08-14 NOTE — PROGRESS NOTES
[facility] Progress Note    Date of Service: 2018    Chief Complaint  58 y.o. female admitted 2018 with intractable headache and back pain and found to have lumbar spine surgical wound discharge suggestive of possible infection.    Interval Problem Update  No acute vent overnight , back pain continues to improve.     Consultants/Specialty  Neurosurgery  ID    Disposition  TBD      Review of Systems   Constitutional: Positive for malaise/fatigue. Negative for chills and fever.   HENT: Negative.    Eyes: Negative.    Respiratory: Negative.    Cardiovascular: Negative.    Gastrointestinal: Negative.    Musculoskeletal: Positive for back pain (improving).   Skin: Negative.    Psychiatric/Behavioral: Negative.       Physical Exam  Laboratory/Imaging   Hemodynamics  Temp (24hrs), Av.6 °C (97.8 °F), Min:36.2 °C (97.1 °F), Max:36.8 °C (98.3 °F)   Temperature: 36.8 °C (98.3 °F)  Pulse  Av.1  Min: 60  Max: 94    Blood Pressure: 142/95      Respiratory      Respiration: 16, Pulse Oximetry: 98 %             Fluids    Intake/Output Summary (Last 24 hours) at 18 1358  Last data filed at 18   Gross per 24 hour   Intake              240 ml   Output                0 ml   Net              240 ml       Nutrition  Orders Placed This Encounter   Procedures   • Diet Order Regular     Standing Status:   Standing     Number of Occurrences:   1     Order Specific Question:   Diet:     Answer:   Regular [1]     Physical Exam   Constitutional: She is oriented to person, place, and time. She appears well-developed and well-nourished. No distress.   HENT:   Head: Normocephalic and atraumatic.   Mouth/Throat: No oropharyngeal exudate.   Eyes: Pupils are equal, round, and reactive to light. Conjunctivae and EOM are normal. No scleral icterus.   Neck: Normal range of motion. Neck supple.   Cardiovascular: Exam reveals no gallop and no friction rub.    No murmur heard.  Pulmonary/Chest: Effort normal and  breath sounds normal. No respiratory distress.   Abdominal: Soft. Bowel sounds are normal.   Musculoskeletal: Normal range of motion. She exhibits no edema, tenderness or deformity.   Lumbar spine wound with surrounding erythema and purulent discharge noted.  Staples in place   Neurological: She is alert and oriented to person, place, and time. She has normal reflexes. No cranial nerve deficit.   Skin: Skin is warm and dry. No erythema (aroud lumbar spine wound with staples in place).   Psychiatric: She has a normal mood and affect.   Nursing note and vitals reviewed.      Recent Labs      08/12/18   0156  08/13/18   0236   WBC  8.3  6.7   RBC  3.50*  3.50*   HEMOGLOBIN  11.1*  11.1*   HEMATOCRIT  34.3*  34.6*   MCV  98.0*  98.9*   MCH  31.7  31.7   MCHC  32.4*  32.1*   RDW  45.0  44.3   PLATELETCT  225  180   MPV  10.1  10.5     Recent Labs      08/12/18   0156  08/13/18   0236   SODIUM  135  135   POTASSIUM  3.5*  3.8   CHLORIDE  107  107   CO2  19*  22   GLUCOSE  72  88   BUN  12  8   CREATININE  0.58  0.58   CALCIUM  7.8*  8.0*                      Assessment/Plan     * Intractable low back pain- (present on admission)   Assessment & Plan    - Hx of lumbar spinal stenosis, spondylolisthesis, DDD with  Radiculopathy  - s/p lumbar decompression and instrumentation (7/19)  - purulent discharge from surgical site/wound  - Lumbar CT illustrates fluid collection (pseudomeningocele v. Seroma v. Hematoma v. Infection/abscess)  - NSG consulted; awaiting further recs  - Bld Cxs NGTD; Wnd Cx growing Pseudomonas ; on IV abx (Vanc and Zosyn); ID on board   - Wound care consult and recs appreciated  - cont prn analgesics in meantime        Surgical site infection- (present on admission)   Assessment & Plan    Recent lumbar surgery  Please see above for details        Hyponatremia- (present on admission)   Assessment & Plan    - improving with IVFs; monitor        Hypokalemia- (present on admission)   Assessment & Plan     Replete as needed; monitoring        Acquired hypothyroidism- (present on admission)   Assessment & Plan    - cont Synthroid per outpt regimen          Quality-Core Measures   Reviewed items::  Medications reviewed, Labs reviewed and Radiology images reviewed  DVT prophylaxis - mechanical:  SCDs

## 2018-08-14 NOTE — PROGRESS NOTES
"Pharmacy Kinetics 58 y.o. female on vancomycin day # 3 2018    Currently on Vancomycin 1,000mg iv q12hr (0030, 1230)  ·  2000 mg @ 2221  ·    ---> Vt = 22.6 @ 1115 (~13 hour level)  ·  1000 mg @ 1255  ·  1000 mg @ 0148  ·     ---> Vt = 14.5 @ 1151 (~10 hour level)  ·  1000 mg @ 1206    Indication for Treatment: possible spinal abscess      Pertinent history per medical record: 59 y/o women, admitted on 2018 for intractable back pain, c/o HA, chills, muscle spasms. Hx of Lumbar spondylolisthesis s/p L4-5 laminectomy and hardware placement on 18 by Dr. Doran. Redness and discharged noted around incision site.       CT lumbar - hardware at L2-5 levels with a 10.7 cm fluid collection in the posterior paraspinal soft tissues. Postsurgical seroma vs  pseudomeningocele and resolving hematoma vs infectious.      Other antibiotics:   · Zosyn 3.375 gm iv q8h (--     Allergies: Morphine; Aspirin; Codeine; Tramadol; Sulfamethoxazole w-trimethoprim; and Other drug      List concerns for renal function: CT with contrast on , BMI ~30     Pertinent cultures to date:   · 18: Wound Cx = GPC,GNR - Pseudomonas species  · 18: pBCx 2/2 NGTD   · 18: pBCx 2/2 NGTD     Recent Labs      18   1347  18   0156  18   0236   WBC  11.5*  8.3  6.7   NEUTSPOLYS  81.10*   --   62.20     Recent Labs      18   1347  18   0156  18   0236   BUN  12  12  8   CREATININE  0.73  0.58  0.58   ALBUMIN  4.0  3.2   --      Recent Labs      18   1115  18   1151   VANCOTROUGH  22.6*  14.5     Blood pressure 130/76, pulse 69, temperature 36.7 °C (98 °F), resp. rate 18, height 1.626 m (5' 4\"), weight 78.5 kg (173 lb), SpO2 98 %. Temp (24hrs), Av.5 °C (97.7 °F), Min:36.2 °C (97.1 °F), Max:36.7 °C (98 °F)    A/P   1. Vancomycin dose change: increase to 1200 mg IV Q12H   2. Next vancomycin level: 8/15/18 @ 0930  (prior to 4th of new regimen)  3. Goal " trough: 18-22 mcg/mL   4. Comments:  · AFVSS, WBC trending down now wnl, renal function stable- UOP not documented. Wound cultures growing GNR, GPC - pending speciation.   · Vancomycin level drawn today prior to 4th dose (~10 hour level), is  below goal range, however patient is not yet at steady state and with her BMI~30, I would anticipate her to continue to accumulate some additional drug. Goal trough changed due possible epidural abscess/ spinal hardware infection.   · Will increase dose slightly to 15 mg/kg IV Q12H to start 10 hours after previous dose. Repeat level in 2 days to ensure patient is within goal range. Pharmacist will continue to monitor daily for changes in clinical status or renal function and adjust accordingly.  · RID consulted: Continue on current antibiotics for now. Recommending IR drainage of feasible.     Lisette Urias, PharmD

## 2018-08-15 LAB
ANION GAP SERPL CALC-SCNC: 10 MMOL/L (ref 0–11.9)
BASOPHILS # BLD AUTO: 0.5 % (ref 0–1.8)
BASOPHILS # BLD: 0.03 K/UL (ref 0–0.12)
BUN SERPL-MCNC: 4 MG/DL (ref 8–22)
CALCIUM SERPL-MCNC: 8.8 MG/DL (ref 8.5–10.5)
CHLORIDE SERPL-SCNC: 107 MMOL/L (ref 96–112)
CO2 SERPL-SCNC: 22 MMOL/L (ref 20–33)
CREAT SERPL-MCNC: 0.83 MG/DL (ref 0.5–1.4)
EOSINOPHIL # BLD AUTO: 0 K/UL (ref 0–0.51)
EOSINOPHIL NFR BLD: 0 % (ref 0–6.9)
ERYTHROCYTE [DISTWIDTH] IN BLOOD BY AUTOMATED COUNT: 44.5 FL (ref 35.9–50)
GLUCOSE SERPL-MCNC: 88 MG/DL (ref 65–99)
HCT VFR BLD AUTO: 37.2 % (ref 37–47)
HGB BLD-MCNC: 12 G/DL (ref 12–16)
IMM GRANULOCYTES # BLD AUTO: 0.03 K/UL (ref 0–0.11)
IMM GRANULOCYTES NFR BLD AUTO: 0.5 % (ref 0–0.9)
LYMPHOCYTES # BLD AUTO: 1.42 K/UL (ref 1–4.8)
LYMPHOCYTES NFR BLD: 22.7 % (ref 22–41)
MCH RBC QN AUTO: 31.3 PG (ref 27–33)
MCHC RBC AUTO-ENTMCNC: 32.3 G/DL (ref 33.6–35)
MCV RBC AUTO: 97.1 FL (ref 81.4–97.8)
MONOCYTES # BLD AUTO: 0.58 K/UL (ref 0–0.85)
MONOCYTES NFR BLD AUTO: 9.3 % (ref 0–13.4)
NEUTROPHILS # BLD AUTO: 4.2 K/UL (ref 2–7.15)
NEUTROPHILS NFR BLD: 67 % (ref 44–72)
NRBC # BLD AUTO: 0 K/UL
NRBC BLD-RTO: 0 /100 WBC
PLATELET # BLD AUTO: 193 K/UL (ref 164–446)
PMV BLD AUTO: 10.6 FL (ref 9–12.9)
POTASSIUM SERPL-SCNC: 3.4 MMOL/L (ref 3.6–5.5)
RBC # BLD AUTO: 3.83 M/UL (ref 4.2–5.4)
SODIUM SERPL-SCNC: 139 MMOL/L (ref 135–145)
WBC # BLD AUTO: 6.3 K/UL (ref 4.8–10.8)

## 2018-08-15 PROCEDURE — 85025 COMPLETE CBC W/AUTO DIFF WBC: CPT

## 2018-08-15 PROCEDURE — 770001 HCHG ROOM/CARE - MED/SURG/GYN PRIV*

## 2018-08-15 PROCEDURE — A9270 NON-COVERED ITEM OR SERVICE: HCPCS | Performed by: INTERNAL MEDICINE

## 2018-08-15 PROCEDURE — 99232 SBSQ HOSP IP/OBS MODERATE 35: CPT | Performed by: INTERNAL MEDICINE

## 2018-08-15 PROCEDURE — 700101 HCHG RX REV CODE 250: Performed by: PHYSICIAN ASSISTANT

## 2018-08-15 PROCEDURE — 700111 HCHG RX REV CODE 636 W/ 250 OVERRIDE (IP): Performed by: INTERNAL MEDICINE

## 2018-08-15 PROCEDURE — 36415 COLL VENOUS BLD VENIPUNCTURE: CPT

## 2018-08-15 PROCEDURE — 700102 HCHG RX REV CODE 250 W/ 637 OVERRIDE(OP): Performed by: INTERNAL MEDICINE

## 2018-08-15 PROCEDURE — 80048 BASIC METABOLIC PNL TOTAL CA: CPT

## 2018-08-15 PROCEDURE — 700105 HCHG RX REV CODE 258: Performed by: INTERNAL MEDICINE

## 2018-08-15 PROCEDURE — 99233 SBSQ HOSP IP/OBS HIGH 50: CPT | Performed by: INTERNAL MEDICINE

## 2018-08-15 RX ADMIN — HYDROMORPHONE HYDROCHLORIDE 4 MG: 4 TABLET ORAL at 16:55

## 2018-08-15 RX ADMIN — HYDROMORPHONE HYDROCHLORIDE 4 MG: 4 TABLET ORAL at 23:56

## 2018-08-15 RX ADMIN — HYDROMORPHONE HYDROCHLORIDE 4 MG: 4 TABLET ORAL at 09:35

## 2018-08-15 RX ADMIN — CYCLOBENZAPRINE 10 MG: 10 TABLET, FILM COATED ORAL at 03:59

## 2018-08-15 RX ADMIN — PIPERACILLIN AND TAZOBACTAM 4.5 G: 4; .5 INJECTION, POWDER, LYOPHILIZED, FOR SOLUTION INTRAVENOUS; PARENTERAL at 03:58

## 2018-08-15 RX ADMIN — Medication 1 EACH: at 04:14

## 2018-08-15 RX ADMIN — DIAZEPAM 5 MG: 2 TABLET ORAL at 23:22

## 2018-08-15 RX ADMIN — HYDROMORPHONE HYDROCHLORIDE 4 MG: 4 TABLET ORAL at 03:59

## 2018-08-15 RX ADMIN — PIPERACILLIN AND TAZOBACTAM 4.5 G: 4; .5 INJECTION, POWDER, LYOPHILIZED, FOR SOLUTION INTRAVENOUS; PARENTERAL at 21:38

## 2018-08-15 RX ADMIN — CYCLOBENZAPRINE 10 MG: 10 TABLET, FILM COATED ORAL at 21:38

## 2018-08-15 RX ADMIN — PIPERACILLIN AND TAZOBACTAM 4.5 G: 4; .5 INJECTION, POWDER, LYOPHILIZED, FOR SOLUTION INTRAVENOUS; PARENTERAL at 13:03

## 2018-08-15 RX ADMIN — ACETAMINOPHEN 650 MG: 325 TABLET, FILM COATED ORAL at 18:33

## 2018-08-15 RX ADMIN — HYDROMORPHONE HYDROCHLORIDE 4 MG: 4 TABLET ORAL at 20:55

## 2018-08-15 RX ADMIN — PROMETHAZINE HYDROCHLORIDE 25 MG: 25 TABLET ORAL at 05:34

## 2018-08-15 RX ADMIN — LEVOTHYROXINE SODIUM 150 MCG: 150 TABLET ORAL at 04:13

## 2018-08-15 RX ADMIN — POTASSIUM CHLORIDE 40 MEQ: 1500 TABLET, EXTENDED RELEASE ORAL at 04:11

## 2018-08-15 RX ADMIN — HYDROMORPHONE HYDROCHLORIDE 4 MG: 4 TABLET ORAL at 12:56

## 2018-08-15 RX ADMIN — HYDROXYCHLOROQUINE SULFATE 200 MG: 200 TABLET ORAL at 18:31

## 2018-08-15 RX ADMIN — HYDROXYCHLOROQUINE SULFATE 200 MG: 200 TABLET ORAL at 04:12

## 2018-08-15 RX ADMIN — FOLIC ACID 1 MG: 1 TABLET ORAL at 04:12

## 2018-08-15 ASSESSMENT — ENCOUNTER SYMPTOMS
BACK PAIN: 1
PSYCHIATRIC NEGATIVE: 1
CHILLS: 0
VOMITING: 0
NAUSEA: 0
CARDIOVASCULAR NEGATIVE: 1
SHORTNESS OF BREATH: 0
COUGH: 0
DIARRHEA: 1
FEVER: 0
RESPIRATORY NEGATIVE: 1
ABDOMINAL PAIN: 0
EYES NEGATIVE: 1

## 2018-08-15 ASSESSMENT — PAIN SCALES - GENERAL
PAINLEVEL_OUTOF10: 3
PAINLEVEL_OUTOF10: 4
PAINLEVEL_OUTOF10: 2
PAINLEVEL_OUTOF10: 9
PAINLEVEL_OUTOF10: 6
PAINLEVEL_OUTOF10: 10
PAINLEVEL_OUTOF10: 6
PAINLEVEL_OUTOF10: 7

## 2018-08-15 NOTE — PROGRESS NOTES
[facility]    Date of Service: 8/15/2018    Chief Complaint  58 y.o. female admitted 2018 with intractable headache and back pain and found to have lumbar spine surgical wound discharge suggestive of possible infection.    Interval Problem Update  No acute vent overnight , back pain continues to improve , attempted aspiration surgical  Fluid failed,might need irrigation and debribement     Consultants/Specialty  Neurosurgery  ID    Disposition  TBD      Review of Systems   Constitutional: Positive for malaise/fatigue. Negative for chills and fever.   HENT: Negative.    Eyes: Negative.    Respiratory: Negative.    Cardiovascular: Negative.    Gastrointestinal: Positive for diarrhea.   Musculoskeletal: Positive for back pain (improving).   Skin: Negative.    Psychiatric/Behavioral: Negative.       Physical Exam  Laboratory/Imaging   Hemodynamics  Temp (24hrs), Av.4 °C (97.5 °F), Min:36.3 °C (97.3 °F), Max:36.6 °C (97.8 °F)   Temperature: 36.6 °C (97.8 °F)  Pulse  Av.9  Min: 59  Max: 94    Blood Pressure: 147/86      Respiratory      Respiration: 18, Pulse Oximetry: 99 %             Fluids  No intake or output data in the 24 hours ending 08/15/18 1429    Nutrition  Orders Placed This Encounter   Procedures   • Diet Order Regular     Standing Status:   Standing     Number of Occurrences:   1     Order Specific Question:   Diet:     Answer:   Regular [1]     Physical Exam   Constitutional: She is oriented to person, place, and time. She appears well-developed and well-nourished. No distress.   HENT:   Head: Normocephalic and atraumatic.   Mouth/Throat: No oropharyngeal exudate.   Eyes: Pupils are equal, round, and reactive to light. Conjunctivae and EOM are normal. No scleral icterus.   Neck: Normal range of motion. Neck supple.   Cardiovascular: Exam reveals no gallop and no friction rub.    No murmur heard.  Pulmonary/Chest: Effort normal and breath sounds normal. No respiratory  distress.   Abdominal: Soft. Bowel sounds are normal.   Musculoskeletal: Normal range of motion. She exhibits no edema, tenderness or deformity.   Lumbar spine wound with surrounding erythema and purulent discharge noted.  Staples in place   Neurological: She is alert and oriented to person, place, and time. She has normal reflexes. No cranial nerve deficit.   Skin: Skin is warm and dry. No erythema (aroud lumbar spine wound with staples in place).   Psychiatric: She has a normal mood and affect.   Nursing note and vitals reviewed.      Recent Labs      08/13/18   0236  08/15/18   0154   WBC  6.7  6.3   RBC  3.50*  3.83*   HEMOGLOBIN  11.1*  12.0   HEMATOCRIT  34.6*  37.2   MCV  98.9*  97.1   MCH  31.7  31.3   MCHC  32.1*  32.3*   RDW  44.3  44.5   PLATELETCT  180  193   MPV  10.5  10.6     Recent Labs      08/13/18   0236  08/15/18   0154   SODIUM  135  139   POTASSIUM  3.8  3.4*   CHLORIDE  107  107   CO2  22  22   GLUCOSE  88  88   BUN  8  4*   CREATININE  0.58  0.83   CALCIUM  8.0*  8.8                      Assessment/Plan     * Intractable low back pain- (present on admission)   Assessment & Plan    - Hx of lumbar spinal stenosis, spondylolisthesis, DDD with  Radiculopathy  - s/p lumbar decompression and instrumentation (7/19)  - purulent discharge from surgical site/wound  - Lumbar CT illustrates fluid collection (pseudomeningocele v. Seroma v. Hematoma v. Infection/abscess)  - NSG consulted; awaiting further recs  - Bld Cxs NGTD; Wnd Cx growing Pseudomonas ; on IV abx (Vanc and Zosyn); ID on board   - Wound care consult and recs appreciated  - cont prn analgesics in meantime        Surgical site infection- (present on admission)   Assessment & Plan    Recent lumbar surgery  Please see above for details  Aspiration unsuccessful 08/14/2018, might need irrigation and debribement         Hyponatremia- (present on admission)   Assessment & Plan    - improving with IVFs; monitor        Hypokalemia- (present on  admission)   Assessment & Plan    Replete as needed; monitoring        Diarrhea- (present on admission)   Assessment & Plan    Stool softner d/c   If it continues , will check c-diff         Acquired hypothyroidism- (present on admission)   Assessment & Plan    - cont Synthroid per outpt regimen          Quality-Core Measures   Reviewed items::  Medications reviewed, Labs reviewed and Radiology images reviewed  DVT prophylaxis - mechanical:  SCDs

## 2018-08-15 NOTE — PROGRESS NOTES
Infectious Disease Progress Note    Author: Ramona Dick M.D. Date & Time of service: 8/15/2018  10:27 AM    Chief Complaint:  FU surgical site infection    Interval History:  8/15 AF WBC 6.3 pt c/o diarrhea x 3 episodes since last night, thinks it's due to abx, aspiration attempted yesterday w/ no fluid pockets, surgical site still draining  Labs Reviewed, Medications Reviewed, Radiology Reviewed and Wound Reviewed.    Review of Systems:  Review of Systems   Constitutional: Negative for chills and fever.   Respiratory: Negative for cough and shortness of breath.    Gastrointestinal: Positive for diarrhea. Negative for abdominal pain, nausea and vomiting.   Musculoskeletal: Positive for back pain.       Hemodynamics:  Temp (24hrs), Av.4 °C (97.5 °F), Min:36.3 °C (97.3 °F), Max:36.6 °C (97.8 °F)  Temperature: 36.6 °C (97.8 °F)  Pulse  Av.9  Min: 59  Max: 94   Blood Pressure: 147/86       Physical Exam:  Physical Exam   Constitutional: She is oriented to person, place, and time. She appears well-developed and well-nourished.   HENT:   Head: Normocephalic and atraumatic.   Eyes: Pupils are equal, round, and reactive to light. EOM are normal.   Neck: Neck supple.   Cardiovascular: Normal rate and regular rhythm.    Pulmonary/Chest: Effort normal. She has no wheezes.   Abdominal: Soft. There is no tenderness.   Musculoskeletal: She exhibits no edema.   Superior portion of surgical site with steri-strips in place,   with some slight superficial dehiscence, remainder of surgical site with staples in place. +surrounding erythema around the surgical site   Neurological: She is alert and oriented to person, place, and time.       Meds:    Current Facility-Administered Medications:   •  [COMPLETED] piperacillin-tazobactam **AND** piperacillin-tazobactam  •  bacitracin-polymyxin b  •  potassium chloride SA  •  folic acid  •  hydroxychloroquine  •  senna-docusate **AND** polyethylene glycol/lytes **AND** magnesium  hydroxide **AND** bisacodyl  •  NS  •  acetaminophen  •  ondansetron  •  ondansetron  •  promethazine  •  promethazine  •  prochlorperazine  •  lidocaine  •  levothyroxine **AND** levothyroxine  •  diazePAM  •  HYDROmorphone  •  cyclobenzaprine    Labs:  Recent Labs      08/13/18   0236  08/15/18   0154   WBC  6.7  6.3   RBC  3.50*  3.83*   HEMOGLOBIN  11.1*  12.0   HEMATOCRIT  34.6*  37.2   MCV  98.9*  97.1   MCH  31.7  31.3   RDW  44.3  44.5   PLATELETCT  180  193   MPV  10.5  10.6   NEUTSPOLYS  62.20  67.00   LYMPHOCYTES  23.40  22.70   MONOCYTES  13.60*  9.30   EOSINOPHILS  0.00  0.00   BASOPHILS  0.70  0.50     Recent Labs      08/13/18   0236  08/15/18   0154   SODIUM  135  139   POTASSIUM  3.8  3.4*   CHLORIDE  107  107   CO2  22  22   GLUCOSE  88  88   BUN  8  4*     Recent Labs      08/13/18   0236  08/15/18   0154   CREATININE  0.58  0.83       Imaging:  Ct-abdomen-pelvis With    Result Date: 8/11/2018 8/11/2018 2:26 PM HISTORY/REASON FOR EXAM:  Abdominal pain and vomiting back pain TECHNIQUE/EXAM DESCRIPTION: CT scan of the abdomen and pelvis with contrast. Contrast-enhanced helical scanning was obtained from the diaphragmatic domes through the pubic symphysis following the bolus administration of 100 mL of Optiray 350 nonionic contrast without complication. Low dose optimization technique was utilized for this CT exam including automated exposure control and adjustment of the mA and/or kV according to patient size. COMPARISON: 02/16/2010 Limited images FINDINGS: CT Abdomen: There is no evidence of focal consolidation or pleural effusion seen within the lung bases. The liver is normal in appearance. The spleen is normal. The kidneys are normal. The adrenal glands are normal. The pancreas is normal. The aorta is normal in caliber.  No evidence of retroperitoneal adenopathy. Post cholecystectomy changes are noted. The common bile duct measures up to 2.4 cm in diameter. There is moderate intrahepatic  biliary ductal dilatation. Postoperative changes are noted in the lumbar spine. Postoperative fluid collection occupies laminectomy region at the L2-L3 level and extends into the subcutaneous fat measuring approximately 6.1 x 5.7 cm in maximum axial dimensions and 15 cm in vertical dimension. CT Pelvis: There is no evidence of bowel obstruction or inflammatory change. Normal appendix is not identified. There is no evidence of free fluid.     1.  Postoperative changes noted in the lumbar spine. Large postoperative fluid collection is identified at the L2-L3 level that also extends above and below these levels as described above. This could represent postoperative seroma or meningocele. No air  is noted within the collection but infection cannot be excluded. 2.  Post cholecystectomy. There is dilatation of the common bile duct and intrahepatic ducts. Dilatation is increased from the prior CT.     Ct-head W/o    Result Date: 8/11/2018 8/11/2018 2:26 PM HISTORY/REASON FOR EXAM:  Headache and vomiting. TECHNIQUE/EXAM DESCRIPTION AND NUMBER OF VIEWS: CT of the head without contrast. Contiguous 5 mm axial sections were obtained from the skull base through the vertex. Up to date radiation dose reduction adjustments have been utilized to meet ALARA standards for radiation dose reduction. COMPARISON:  None available FINDINGS: There is no evidence of extra-axial hemorrhage. No intra-axial hemorrhage is noted. There is no brain swelling or edema. No mass effect or midline shift is noted.     NO ACUTE ABNORMALITIES ARE NOTED ON CT SCAN OF THE HEAD.     Ct-lspine W/o Plus Recons    Result Date: 8/11/2018 8/11/2018 2:26 PM HISTORY/REASON FOR EXAM:  Recent lumbar instrumentation, pain. TECHNIQUE/EXAM DESCRIPTION AND NUMBER OF VIEWS: CT lumbar spine without contrast, with reconstructions. The study was performed on a helical multidetector CT scanner. Thin-section helical scanning was performed from T12-L1 to the sacrum. Sagittal  and coronal multiplanar reconstructions were generated from the axial images. Low dose optimization technique was utilized for this CT exam including automated exposure control and adjustment of the mA and/or kV according to patient size. COMPARISON: Lumbar spine radiograph dated 7/19/2018. FINDINGS: Preserved lumbar lordosis. Sequela of recent instrumentation at the L4-5 level with placement of bilateral paraspinal rods and transpedicular screws. The left L5 screw breaches the cortex anterior cortex of the L5 vertebra, otherwise, screws are well-positioned. Replaced L4-5 disc. Laminectomy defects at L3-5 levels. Partial resection of L2 spinous process. Cement in the posterior paraspinal soft tissues. There is a 6.4 x 6.1 x 10.9 cm fluid collection posterior to the region of spinal instrumentation with peripheral calcification. Anteriorly, the collection closely abuts the thecal sac. Sequela of L1 vertebral body augmentation with mild compression deformity. No new compression fractures. Spinal canal is patent. Visualized intra-abdominal organs are unremarkable. Atherosclerosis.     1. Recent instrumentation at L2-5 levels with a 10.7 cm fluid collection in the posterior paraspinal soft tissues. Differential includes postsurgical seroma, pseudomeningocele and the resolving hematoma. Infection should be excluded clinically. 2. Prior augmentation of L1 compression deformity. No new fracture or listhesis.    Dx-lumbar Spine-2 Or 3 Views    Result Date: 7/19/2018 7/19/2018 7:30 AM HISTORY/REASON FOR EXAM:  Lumbar fusion at L4-5 TECHNIQUE/ EXAM DESCRIPTION AND NUMBER OF VIEWS:  2 views of the lumbar spine. Digitized Intraoperative Radiograph FINDINGS: Fixation hardware projecting over the lumbar spine Fluoroscopic time: 7 seconds Fluoroscopy dose 1.769mGy    Digitized intraoperative radiograph is submitted for review.  This examination is not for diagnostic purpose but for guidance during a surgical  procedure.    Dx-portable Fluoro > 1 Hour    Result Date: 7/19/2018 7/19/2018 7:30 AM HISTORY/REASON FOR EXAM:  Lumbar fusion L4-5, Main OR TECHNIQUE/EXAM DESCRIPTION AND NUMBER OF VIEWS: Portable fluoroscopy for greater than one hour FINDINGS:      The portable fluoroscopy unit was obligated to the procedure for greater than one hour.   Actual fluoro time was 7 seconds.     Portable fluoroscopy utilized for 7 seconds.       Micro:  Results     Procedure Component Value Units Date/Time    CULTURE WOUND W/ GRAM STAIN [[id number]]  (Abnormal)  (Susceptibility) Collected:  08/12/18 1031    Order Status:  Completed Specimen:  Wound from Back Updated:  08/14/18 0821     Significant Indicator POS (POS)     Source WND     Site BACK     Culture Result Wound -- (A)     Gram Stain Result Many WBCs.  Rare Gram positive cocci.  Rare Gram negative rods.       Culture Result Wound Pseudomonas aeruginosa  Moderate growth  P.aeruginosa may develop resistance during prolonged therapy  with all antibiotics. Isolates that are initially susceptible  may become resistant within three to four days after  initiation of therapy. Testing of repeat isolates may be  warranted.   (A)    Culture & Susceptibility     PSEUDOMONAS AERUGINOSA     Antibiotic Sensitivity Microscan Unit Status    Amikacin Sensitive <=16 mcg/mL Final    Method: SENSITIVITY, MENDOZA    Cefepime Sensitive <=8 mcg/mL Final    Method: SENSITIVITY, MENDOZA    Ceftazidime Sensitive 4 mcg/mL Final    Method: SENSITIVITY, MENDOZA    Ciprofloxacin Sensitive <=1 mcg/mL Final    Method: SENSITIVITY, MENDOZA    Gentamicin Sensitive <=4 mcg/mL Final    Method: SENSITIVITY, MENDOZA    Imipenem Sensitive <=1 mcg/mL Final    Method: SENSITIVITY, MENDOZA    Meropenem Sensitive <=1 mcg/mL Final    Method: SENSITIVITY, MENDOZA    Pip/Tazobactam Sensitive <=16 mcg/mL Final    Method: SENSITIVITY, MENDOZA    Tobramycin Sensitive <=4 mcg/mL Final    Method: SENSITIVITY, MENDOZA                       BLOOD CULTURE [[id number]]  "Collected:  08/12/18 1115    Order Status:  Completed Specimen:  Blood from Peripheral Updated:  08/13/18 0920     Significant Indicator NEG     Source BLD     Site PERIPHERAL     Blood Culture No Growth    Note: Blood cultures are incubated for 5 days and  are monitored continuously.Positive blood cultures  are called to the RN and reported as soon as  they are identified.      Narrative:       Per Hospital Policy: Only change Specimen Src: to \"Line\" if  specified by physician order.    BLOOD CULTURE [[id number]] Collected:  08/12/18 1108    Order Status:  Completed Specimen:  Blood from Peripheral Updated:  08/13/18 0920     Significant Indicator NEG     Source BLD     Site PERIPHERAL     Blood Culture No Growth    Note: Blood cultures are incubated for 5 days and  are monitored continuously.Positive blood cultures  are called to the RN and reported as soon as  they are identified.      Narrative:       Per Hospital Policy: Only change Specimen Src: to \"Line\" if  specified by physician order.    GRAM STAIN [[id number]] Collected:  08/12/18 1031    Order Status:  Completed Specimen:  Wound Updated:  08/12/18 1654     Significant Indicator .     Source WND     Site BACK     Gram Stain Result Many WBCs.  Rare Gram positive cocci.  Rare Gram negative rods.      Urinalysis [[id number]] Collected:  08/11/18 0000    Order Status:  Canceled Specimen:  Urine from Urine, Clean Catch           Assessment:  Active Hospital Problems    Diagnosis   • *Intractable low back pain [M54.5]   • Surgical site infection [T81.4XXA]   • Hypokalemia [E87.6]   • Hyponatremia [E87.1]   • Acquired hypothyroidism [E03.9]       Plan:  Surgical site infection - additional work  Concern for hardware infection  Fever resolved  Leukocytosis resolved  Wound cx +PSAR  Bcx - NGTD  Recommend washout for source control given persistent drainage and to assess extent of infection  Continue zosyn    Diarrhea, new  DC stool softeners  If persists, check " Cdiff    S/p recent lumbar laminectomy with hardware placement on 07/19/18 for lumbar spondylolisthesis     Rheumatoid arthritis  On plaquenil  Previously on leflunomide prior to surgery above    Discussed with internal medicine.

## 2018-08-16 LAB
ANION GAP SERPL CALC-SCNC: 9 MMOL/L (ref 0–11.9)
BUN SERPL-MCNC: 5 MG/DL (ref 8–22)
CALCIUM SERPL-MCNC: 8.9 MG/DL (ref 8.5–10.5)
CHLORIDE SERPL-SCNC: 108 MMOL/L (ref 96–112)
CO2 SERPL-SCNC: 22 MMOL/L (ref 20–33)
CREAT SERPL-MCNC: 0.84 MG/DL (ref 0.5–1.4)
ERYTHROCYTE [DISTWIDTH] IN BLOOD BY AUTOMATED COUNT: 46 FL (ref 35.9–50)
GLUCOSE SERPL-MCNC: 101 MG/DL (ref 65–99)
HCT VFR BLD AUTO: 36.6 % (ref 37–47)
HGB BLD-MCNC: 11.9 G/DL (ref 12–16)
MAGNESIUM SERPL-MCNC: 1.5 MG/DL (ref 1.5–2.5)
MCH RBC QN AUTO: 32.1 PG (ref 27–33)
MCHC RBC AUTO-ENTMCNC: 32.5 G/DL (ref 33.6–35)
MCV RBC AUTO: 98.7 FL (ref 81.4–97.8)
PLATELET # BLD AUTO: 184 K/UL (ref 164–446)
PMV BLD AUTO: 10.6 FL (ref 9–12.9)
POTASSIUM SERPL-SCNC: 3.3 MMOL/L (ref 3.6–5.5)
RBC # BLD AUTO: 3.71 M/UL (ref 4.2–5.4)
SODIUM SERPL-SCNC: 139 MMOL/L (ref 135–145)
WBC # BLD AUTO: 5.8 K/UL (ref 4.8–10.8)

## 2018-08-16 PROCEDURE — 700105 HCHG RX REV CODE 258: Performed by: INTERNAL MEDICINE

## 2018-08-16 PROCEDURE — 36415 COLL VENOUS BLD VENIPUNCTURE: CPT

## 2018-08-16 PROCEDURE — A9270 NON-COVERED ITEM OR SERVICE: HCPCS | Performed by: INTERNAL MEDICINE

## 2018-08-16 PROCEDURE — A6223 GAUZE >16<=48 NO W/SAL W/O B: HCPCS | Performed by: ORTHOPAEDIC SURGERY

## 2018-08-16 PROCEDURE — 00BT0ZZ EXCISION OF SPINAL MENINGES, OPEN APPROACH: ICD-10-PCS | Performed by: ORTHOPAEDIC SURGERY

## 2018-08-16 PROCEDURE — 700101 HCHG RX REV CODE 250

## 2018-08-16 PROCEDURE — 87186 SC STD MICRODIL/AGAR DIL: CPT

## 2018-08-16 PROCEDURE — 700105 HCHG RX REV CODE 258

## 2018-08-16 PROCEDURE — A9270 NON-COVERED ITEM OR SERVICE: HCPCS

## 2018-08-16 PROCEDURE — 503051 HCHG CLOSURE PRINEO SKIN: Performed by: ORTHOPAEDIC SURGERY

## 2018-08-16 PROCEDURE — 87205 SMEAR GRAM STAIN: CPT

## 2018-08-16 PROCEDURE — 770001 HCHG ROOM/CARE - MED/SURG/GYN PRIV*

## 2018-08-16 PROCEDURE — 99232 SBSQ HOSP IP/OBS MODERATE 35: CPT | Performed by: INTERNAL MEDICINE

## 2018-08-16 PROCEDURE — 700111 HCHG RX REV CODE 636 W/ 250 OVERRIDE (IP)

## 2018-08-16 PROCEDURE — 87075 CULTR BACTERIA EXCEPT BLOOD: CPT | Mod: 91

## 2018-08-16 PROCEDURE — 87077 CULTURE AEROBIC IDENTIFY: CPT

## 2018-08-16 PROCEDURE — 99233 SBSQ HOSP IP/OBS HIGH 50: CPT | Performed by: INTERNAL MEDICINE

## 2018-08-16 PROCEDURE — 501838 HCHG SUTURE GENERAL: Performed by: ORTHOPAEDIC SURGERY

## 2018-08-16 PROCEDURE — 700102 HCHG RX REV CODE 250 W/ 637 OVERRIDE(OP): Performed by: INTERNAL MEDICINE

## 2018-08-16 PROCEDURE — 83735 ASSAY OF MAGNESIUM: CPT

## 2018-08-16 PROCEDURE — 160029 HCHG SURGERY MINUTES - 1ST 30 MINS LEVEL 4: Performed by: ORTHOPAEDIC SURGERY

## 2018-08-16 PROCEDURE — 87070 CULTURE OTHR SPECIMN AEROBIC: CPT | Mod: 91

## 2018-08-16 PROCEDURE — 700111 HCHG RX REV CODE 636 W/ 250 OVERRIDE (IP): Performed by: INTERNAL MEDICINE

## 2018-08-16 PROCEDURE — 85027 COMPLETE CBC AUTOMATED: CPT

## 2018-08-16 PROCEDURE — 160041 HCHG SURGERY MINUTES - EA ADDL 1 MIN LEVEL 4: Performed by: ORTHOPAEDIC SURGERY

## 2018-08-16 PROCEDURE — 160036 HCHG PACU - EA ADDL 30 MINS PHASE I: Performed by: ORTHOPAEDIC SURGERY

## 2018-08-16 PROCEDURE — 160035 HCHG PACU - 1ST 60 MINS PHASE I: Performed by: ORTHOPAEDIC SURGERY

## 2018-08-16 PROCEDURE — 160009 HCHG ANES TIME/MIN: Performed by: ORTHOPAEDIC SURGERY

## 2018-08-16 PROCEDURE — 160048 HCHG OR STATISTICAL LEVEL 1-5: Performed by: ORTHOPAEDIC SURGERY

## 2018-08-16 PROCEDURE — 700102 HCHG RX REV CODE 250 W/ 637 OVERRIDE(OP)

## 2018-08-16 PROCEDURE — 160002 HCHG RECOVERY MINUTES (STAT): Performed by: ORTHOPAEDIC SURGERY

## 2018-08-16 PROCEDURE — 80048 BASIC METABOLIC PNL TOTAL CA: CPT

## 2018-08-16 PROCEDURE — 500367 HCHG DRAIN KIT, HEMOVAC: Performed by: ORTHOPAEDIC SURGERY

## 2018-08-16 RX ORDER — OXYCODONE HYDROCHLORIDE AND ACETAMINOPHEN 5; 325 MG/1; MG/1
TABLET ORAL
Status: COMPLETED
Start: 2018-08-16 | End: 2018-08-16

## 2018-08-16 RX ORDER — SODIUM CHLORIDE 9 MG/ML
INJECTION, SOLUTION INTRAVENOUS
Status: DISPENSED
Start: 2018-08-16 | End: 2018-08-17

## 2018-08-16 RX ORDER — TOBRAMYCIN 1.2 G/30ML
INJECTION, POWDER, LYOPHILIZED, FOR SOLUTION INTRAVENOUS
Status: DISCONTINUED | OUTPATIENT
Start: 2018-08-16 | End: 2018-08-16 | Stop reason: HOSPADM

## 2018-08-16 RX ADMIN — DIAZEPAM 5 MG: 2 TABLET ORAL at 23:06

## 2018-08-16 RX ADMIN — ACETAMINOPHEN 650 MG: 325 TABLET, FILM COATED ORAL at 23:06

## 2018-08-16 RX ADMIN — FENTANYL CITRATE 75 MCG: 50 INJECTION, SOLUTION INTRAMUSCULAR; INTRAVENOUS at 18:15

## 2018-08-16 RX ADMIN — FOLIC ACID 1 MG: 1 TABLET ORAL at 06:01

## 2018-08-16 RX ADMIN — POTASSIUM CHLORIDE 40 MEQ: 1500 TABLET, EXTENDED RELEASE ORAL at 06:00

## 2018-08-16 RX ADMIN — HYDROMORPHONE HYDROCHLORIDE 4 MG: 4 TABLET ORAL at 09:37

## 2018-08-16 RX ADMIN — PIPERACILLIN AND TAZOBACTAM 4.5 G: 4; .5 INJECTION, POWDER, LYOPHILIZED, FOR SOLUTION INTRAVENOUS; PARENTERAL at 06:00

## 2018-08-16 RX ADMIN — SODIUM CHLORIDE: 9 INJECTION, SOLUTION INTRAVENOUS at 06:08

## 2018-08-16 RX ADMIN — OXYCODONE AND ACETAMINOPHEN 2 TABLET: 5; 325 TABLET ORAL at 18:30

## 2018-08-16 RX ADMIN — HYDROXYCHLOROQUINE SULFATE 200 MG: 200 TABLET ORAL at 21:09

## 2018-08-16 RX ADMIN — HYDROMORPHONE HYDROCHLORIDE 0.5 MG: 10 INJECTION, SOLUTION INTRAMUSCULAR; INTRAVENOUS; SUBCUTANEOUS at 18:30

## 2018-08-16 RX ADMIN — HYDROMORPHONE HYDROCHLORIDE 0.5 MG: 10 INJECTION, SOLUTION INTRAMUSCULAR; INTRAVENOUS; SUBCUTANEOUS at 18:43

## 2018-08-16 RX ADMIN — HYDROMORPHONE HYDROCHLORIDE 4 MG: 4 TABLET ORAL at 21:09

## 2018-08-16 RX ADMIN — HYDROMORPHONE HYDROCHLORIDE 4 MG: 4 TABLET ORAL at 06:01

## 2018-08-16 RX ADMIN — CYCLOBENZAPRINE 10 MG: 10 TABLET, FILM COATED ORAL at 21:09

## 2018-08-16 RX ADMIN — PIPERACILLIN AND TAZOBACTAM 4.5 G: 4; .5 INJECTION, POWDER, LYOPHILIZED, FOR SOLUTION INTRAVENOUS; PARENTERAL at 14:39

## 2018-08-16 RX ADMIN — SODIUM CHLORIDE: 9 INJECTION, SOLUTION INTRAVENOUS at 20:00

## 2018-08-16 RX ADMIN — FENTANYL CITRATE 25 MCG: 50 INJECTION, SOLUTION INTRAMUSCULAR; INTRAVENOUS at 18:21

## 2018-08-16 RX ADMIN — HYDROMORPHONE HYDROCHLORIDE 0.5 MG: 10 INJECTION, SOLUTION INTRAMUSCULAR; INTRAVENOUS; SUBCUTANEOUS at 18:37

## 2018-08-16 RX ADMIN — HYDROMORPHONE HYDROCHLORIDE 4 MG: 4 TABLET ORAL at 13:56

## 2018-08-16 RX ADMIN — HYDROMORPHONE HYDROCHLORIDE 0.5 MG: 10 INJECTION, SOLUTION INTRAMUSCULAR; INTRAVENOUS; SUBCUTANEOUS at 19:17

## 2018-08-16 RX ADMIN — PIPERACILLIN AND TAZOBACTAM 4.5 G: 4; .5 INJECTION, POWDER, LYOPHILIZED, FOR SOLUTION INTRAVENOUS; PARENTERAL at 21:08

## 2018-08-16 RX ADMIN — HYDROMORPHONE HYDROCHLORIDE 0.5 MG: 10 INJECTION, SOLUTION INTRAMUSCULAR; INTRAVENOUS; SUBCUTANEOUS at 19:05

## 2018-08-16 RX ADMIN — HYDROMORPHONE HYDROCHLORIDE 4 MG: 4 TABLET ORAL at 03:09

## 2018-08-16 RX ADMIN — HYDROXYCHLOROQUINE SULFATE 200 MG: 200 TABLET ORAL at 06:03

## 2018-08-16 RX ADMIN — LEVOTHYROXINE SODIUM 125 MCG: 150 TABLET ORAL at 06:01

## 2018-08-16 RX ADMIN — HYDROMORPHONE HYDROCHLORIDE 0.5 MG: 10 INJECTION, SOLUTION INTRAMUSCULAR; INTRAVENOUS; SUBCUTANEOUS at 18:59

## 2018-08-16 ASSESSMENT — PAIN SCALES - GENERAL
PAINLEVEL_OUTOF10: 9
PAINLEVEL_OUTOF10: 5
PAINLEVEL_OUTOF10: 9
PAINLEVEL_OUTOF10: 8
PAINLEVEL_OUTOF10: 8
PAINLEVEL_OUTOF10: 6
PAINLEVEL_OUTOF10: 8
PAINLEVEL_OUTOF10: 10
PAINLEVEL_OUTOF10: 8
PAINLEVEL_OUTOF10: 7
PAINLEVEL_OUTOF10: 5
PAINLEVEL_OUTOF10: 7
PAINLEVEL_OUTOF10: 6

## 2018-08-16 ASSESSMENT — ENCOUNTER SYMPTOMS
ABDOMINAL PAIN: 0
DIARRHEA: 0
SHORTNESS OF BREATH: 0
CARDIOVASCULAR NEGATIVE: 1
FEVER: 0
RESPIRATORY NEGATIVE: 1
EYES NEGATIVE: 1
NAUSEA: 0
BACK PAIN: 1
COUGH: 0
CHILLS: 0
VOMITING: 0
PSYCHIATRIC NEGATIVE: 1

## 2018-08-16 NOTE — PROGRESS NOTES
Patient A+Ox4, sitting up in bed, C/O 5/10 pain to back at this time. Dressing changed, light yellow drainage noted to bottom of dressing. On room air. VSS. Ambulating walker 1 assist. No distress, using call bell, whiteboard updated, hourly rounding in place, bed locked and in lowest position

## 2018-08-16 NOTE — CARE PLAN
Problem: Mobility  Goal: Risk for activity intolerance will decrease  Outcome: MET Date Met: 08/15/18  Patient ambulating walker 1 assist, steady gait

## 2018-08-16 NOTE — PROGRESS NOTES
Received report at 1900, a&ox4, family at bedside, denies n/v/sob, iv fluids in fusing, dressing c/d/i, bed low/locked with upper side rails in place, call light and personal belongings in reach, reminded to call for assistance and of purposeful rounding.      Dressing changed at 2100, incision dressed with tefla island dressing and covered with aquaguard so pt can shower.  Pt tolerated dressing change well.

## 2018-08-16 NOTE — CARE PLAN
Problem: Communication  Goal: The ability to communicate needs accurately and effectively will improve  Outcome: MET Date Met: 08/15/18  Patient calls approp for needs, A+Ox4, english speaking

## 2018-08-16 NOTE — PROGRESS NOTES
[facility] Hospitalist Progress Note    Date of Service: 2018    Chief Complaint  58 y.o. female admitted 2018 with intractable headache and back pain and found to have lumbar spine surgical wound discharge suggestive of possible infection.    Interval Problem Update  No acute vent overnight ,  attempted aspiration surgical  Fluid failed, so going for irrigation and debribement later today     Consultants/Specialty  Neurosurgery  ID    Disposition  TBD      Review of Systems   Constitutional: Positive for malaise/fatigue. Negative for chills and fever.   HENT: Negative.    Eyes: Negative.    Respiratory: Negative.    Cardiovascular: Negative.    Gastrointestinal: Negative for diarrhea.   Musculoskeletal: Positive for back pain (improving).   Skin: Negative.    Psychiatric/Behavioral: Negative.       Physical Exam  Laboratory/Imaging   Hemodynamics  Temp (24hrs), Av.7 °C (98 °F), Min:36.2 °C (97.2 °F), Max:36.9 °C (98.4 °F)   Temperature: 36.9 °C (98.4 °F)  Pulse  Av.5  Min: 59  Max: 94    Blood Pressure: 143/74, NIBP: (!) 172/79      Respiratory      Respiration: 20, Pulse Oximetry: 95 %             Fluids  No intake or output data in the 24 hours ending 18 1539    Nutrition  Orders Placed This Encounter   Procedures   • DIET NPO     Standing Status:   Standing     Number of Occurrences:   1     Order Specific Question:   Restrict to:     Answer:   Sips with Medications [3]     Physical Exam   Constitutional: She is oriented to person, place, and time. She appears well-developed and well-nourished. No distress.   HENT:   Head: Normocephalic and atraumatic.   Mouth/Throat: No oropharyngeal exudate.   Eyes: Pupils are equal, round, and reactive to light. Conjunctivae and EOM are normal. No scleral icterus.   Neck: Normal range of motion. Neck supple.   Cardiovascular: Exam reveals no gallop and no friction rub.    No murmur heard.  Pulmonary/Chest: Effort normal and breath sounds normal. No  respiratory distress.   Abdominal: Soft. Bowel sounds are normal.   Musculoskeletal: Normal range of motion. She exhibits no edema, tenderness or deformity.   Lumbar spine wound with surrounding erythema and purulent discharge noted.  Staples in place   Neurological: She is alert and oriented to person, place, and time. She has normal reflexes. No cranial nerve deficit.   Skin: Skin is warm and dry. No erythema (aroud lumbar spine wound with staples in place).   Psychiatric: She has a normal mood and affect.   Nursing note and vitals reviewed.      Recent Labs      08/15/18   0154  08/16/18   0249   WBC  6.3  5.8   RBC  3.83*  3.71*   HEMOGLOBIN  12.0  11.9*   HEMATOCRIT  37.2  36.6*   MCV  97.1  98.7*   MCH  31.3  32.1   MCHC  32.3*  32.5*   RDW  44.5  46.0   PLATELETCT  193  184   MPV  10.6  10.6     Recent Labs      08/15/18   0154  08/16/18   0249   SODIUM  139  139   POTASSIUM  3.4*  3.3*   CHLORIDE  107  108   CO2  22  22   GLUCOSE  88  101*   BUN  4*  5*   CREATININE  0.83  0.84   CALCIUM  8.8  8.9                      Assessment/Plan     * Intractable low back pain- (present on admission)   Assessment & Plan    - Hx of lumbar spinal stenosis, spondylolisthesis, DDD with  Radiculopathy  - s/p lumbar decompression and instrumentation (7/19)  - purulent discharge from surgical site/wound  - Lumbar CT illustrates fluid collection (pseudomeningocele v. Seroma v. Hematoma v. Infection/abscess)  - NSG on board   - Bld Cxs NGTD; Wnd Cx growing Pseudomonas ; on IV abx ; ID on board   - Wound care consult and recs appreciated  - cont prn analgesics in meantime        Surgical site infection- (present on admission)   Assessment & Plan    Recent lumbar surgery  Please see above for details  Aspiration unsuccessful 08/14/2018,   Going for irrigation and debribement  Today 08/16/2018        Hyponatremia- (present on admission)   Assessment & Plan    - improving with IVFs; monitor        Hypokalemia- (present on admission)    Assessment & Plan    Replete as needed; monitoring        Diarrhea- (present on admission)   Assessment & Plan    Stool softner d/c   If it continues , will check c-diff         Acquired hypothyroidism- (present on admission)   Assessment & Plan    - cont Synthroid per outpt regimen          Quality-Core Measures   Reviewed items::  Medications reviewed, Labs reviewed and Radiology images reviewed  DVT prophylaxis - mechanical:  SCDs

## 2018-08-16 NOTE — PROGRESS NOTES
Infectious Disease Progress Note    Author: Ramona Dick M.D. Date & Time of service: 2018  11:12 AM    Chief Complaint:  FU surgical site infection    Interval History:  8/15 AF WBC 6.3 pt c/o diarrhea x 3 episodes since last night, thinks it's due to abx, aspiration attempted yesterday w/ no fluid pockets, surgical site still draining   AF WBC 5.8 patient states the drainage has decreased since yesterday, she states Dr. Doran will see her later today and will likely have surgical washout, diarrhea improved  Labs Reviewed, Medications Reviewed, Radiology Reviewed and Wound Reviewed.    Review of Systems:  Review of Systems   Constitutional: Negative for chills and fever.   Respiratory: Negative for cough and shortness of breath.    Gastrointestinal: Negative for abdominal pain, nausea and vomiting.   Musculoskeletal: Positive for back pain.       Hemodynamics:  Temp (24hrs), Av.7 °C (98 °F), Min:36.2 °C (97.2 °F), Max:36.9 °C (98.4 °F)  Temperature: 36.9 °C (98.4 °F)  Pulse  Av.3  Min: 59  Max: 94   Blood Pressure: 143/74       Physical Exam:  Physical Exam   Constitutional: She is oriented to person, place, and time. She appears well-developed and well-nourished.   HENT:   Head: Normocephalic and atraumatic.   Eyes: Pupils are equal, round, and reactive to light. EOM are normal.   Neck: Neck supple.   Cardiovascular: Normal rate and regular rhythm.    Pulmonary/Chest: Effort normal. She has no wheezes.   Abdominal: Soft. There is no tenderness.   Musculoskeletal: She exhibits no edema.   Superior portion of surgical site with steri-strips in place,   with some slight superficial dehiscence, remainder of surgical site with staples in place.    Neurological: She is alert and oriented to person, place, and time.       Meds:    Current Facility-Administered Medications:   •  [COMPLETED] piperacillin-tazobactam **AND** piperacillin-tazobactam  •  potassium chloride SA  •  folic acid  •   hydroxychloroquine  •  NS  •  acetaminophen  •  ondansetron  •  ondansetron  •  promethazine  •  promethazine  •  prochlorperazine  •  lidocaine  •  levothyroxine **AND** levothyroxine  •  diazePAM  •  HYDROmorphone  •  cyclobenzaprine    Labs:  Recent Labs      08/15/18   0154  08/16/18   0249   WBC  6.3  5.8   RBC  3.83*  3.71*   HEMOGLOBIN  12.0  11.9*   HEMATOCRIT  37.2  36.6*   MCV  97.1  98.7*   MCH  31.3  32.1   RDW  44.5  46.0   PLATELETCT  193  184   MPV  10.6  10.6   NEUTSPOLYS  67.00   --    LYMPHOCYTES  22.70   --    MONOCYTES  9.30   --    EOSINOPHILS  0.00   --    BASOPHILS  0.50   --      Recent Labs      08/15/18   0154  08/16/18   0249   SODIUM  139  139   POTASSIUM  3.4*  3.3*   CHLORIDE  107  108   CO2  22  22   GLUCOSE  88  101*   BUN  4*  5*     Recent Labs      08/15/18   0154  08/16/18   0249   CREATININE  0.83  0.84       Imaging:  Ct-abdomen-pelvis With    Result Date: 8/11/2018 8/11/2018 2:26 PM HISTORY/REASON FOR EXAM:  Abdominal pain and vomiting back pain TECHNIQUE/EXAM DESCRIPTION: CT scan of the abdomen and pelvis with contrast. Contrast-enhanced helical scanning was obtained from the diaphragmatic domes through the pubic symphysis following the bolus administration of 100 mL of Optiray 350 nonionic contrast without complication. Low dose optimization technique was utilized for this CT exam including automated exposure control and adjustment of the mA and/or kV according to patient size. COMPARISON: 02/16/2010 Limited images FINDINGS: CT Abdomen: There is no evidence of focal consolidation or pleural effusion seen within the lung bases. The liver is normal in appearance. The spleen is normal. The kidneys are normal. The adrenal glands are normal. The pancreas is normal. The aorta is normal in caliber.  No evidence of retroperitoneal adenopathy. Post cholecystectomy changes are noted. The common bile duct measures up to 2.4 cm in diameter. There is moderate intrahepatic biliary ductal  dilatation. Postoperative changes are noted in the lumbar spine. Postoperative fluid collection occupies laminectomy region at the L2-L3 level and extends into the subcutaneous fat measuring approximately 6.1 x 5.7 cm in maximum axial dimensions and 15 cm in vertical dimension. CT Pelvis: There is no evidence of bowel obstruction or inflammatory change. Normal appendix is not identified. There is no evidence of free fluid.     1.  Postoperative changes noted in the lumbar spine. Large postoperative fluid collection is identified at the L2-L3 level that also extends above and below these levels as described above. This could represent postoperative seroma or meningocele. No air  is noted within the collection but infection cannot be excluded. 2.  Post cholecystectomy. There is dilatation of the common bile duct and intrahepatic ducts. Dilatation is increased from the prior CT.     Ct-head W/o    Result Date: 8/11/2018 8/11/2018 2:26 PM HISTORY/REASON FOR EXAM:  Headache and vomiting. TECHNIQUE/EXAM DESCRIPTION AND NUMBER OF VIEWS: CT of the head without contrast. Contiguous 5 mm axial sections were obtained from the skull base through the vertex. Up to date radiation dose reduction adjustments have been utilized to meet ALARA standards for radiation dose reduction. COMPARISON:  None available FINDINGS: There is no evidence of extra-axial hemorrhage. No intra-axial hemorrhage is noted. There is no brain swelling or edema. No mass effect or midline shift is noted.     NO ACUTE ABNORMALITIES ARE NOTED ON CT SCAN OF THE HEAD.     Ct-lspine W/o Plus Recons    Result Date: 8/11/2018 8/11/2018 2:26 PM HISTORY/REASON FOR EXAM:  Recent lumbar instrumentation, pain. TECHNIQUE/EXAM DESCRIPTION AND NUMBER OF VIEWS: CT lumbar spine without contrast, with reconstructions. The study was performed on a helical multidetector CT scanner. Thin-section helical scanning was performed from T12-L1 to the sacrum. Sagittal and coronal  multiplanar reconstructions were generated from the axial images. Low dose optimization technique was utilized for this CT exam including automated exposure control and adjustment of the mA and/or kV according to patient size. COMPARISON: Lumbar spine radiograph dated 7/19/2018. FINDINGS: Preserved lumbar lordosis. Sequela of recent instrumentation at the L4-5 level with placement of bilateral paraspinal rods and transpedicular screws. The left L5 screw breaches the cortex anterior cortex of the L5 vertebra, otherwise, screws are well-positioned. Replaced L4-5 disc. Laminectomy defects at L3-5 levels. Partial resection of L2 spinous process. Cement in the posterior paraspinal soft tissues. There is a 6.4 x 6.1 x 10.9 cm fluid collection posterior to the region of spinal instrumentation with peripheral calcification. Anteriorly, the collection closely abuts the thecal sac. Sequela of L1 vertebral body augmentation with mild compression deformity. No new compression fractures. Spinal canal is patent. Visualized intra-abdominal organs are unremarkable. Atherosclerosis.     1. Recent instrumentation at L2-5 levels with a 10.7 cm fluid collection in the posterior paraspinal soft tissues. Differential includes postsurgical seroma, pseudomeningocele and the resolving hematoma. Infection should be excluded clinically. 2. Prior augmentation of L1 compression deformity. No new fracture or listhesis.    Dx-lumbar Spine-2 Or 3 Views    Result Date: 7/19/2018 7/19/2018 7:30 AM HISTORY/REASON FOR EXAM:  Lumbar fusion at L4-5 TECHNIQUE/ EXAM DESCRIPTION AND NUMBER OF VIEWS:  2 views of the lumbar spine. Digitized Intraoperative Radiograph FINDINGS: Fixation hardware projecting over the lumbar spine Fluoroscopic time: 7 seconds Fluoroscopy dose 1.769mGy    Digitized intraoperative radiograph is submitted for review.  This examination is not for diagnostic purpose but for guidance during a surgical procedure.    Dx-portable Fluoro  > 1 Hour    Result Date: 7/19/2018 7/19/2018 7:30 AM HISTORY/REASON FOR EXAM:  Lumbar fusion L4-5, Main OR TECHNIQUE/EXAM DESCRIPTION AND NUMBER OF VIEWS: Portable fluoroscopy for greater than one hour FINDINGS:      The portable fluoroscopy unit was obligated to the procedure for greater than one hour.   Actual fluoro time was 7 seconds.     Portable fluoroscopy utilized for 7 seconds.       Micro:  Results     Procedure Component Value Units Date/Time    CULTURE WOUND W/ GRAM STAIN [[id number]]  (Abnormal)  (Susceptibility) Collected:  08/12/18 1031    Order Status:  Completed Specimen:  Wound from Back Updated:  08/14/18 0821     Significant Indicator POS (POS)     Source WND     Site BACK     Culture Result Wound -- (A)     Gram Stain Result Many WBCs.  Rare Gram positive cocci.  Rare Gram negative rods.       Culture Result Wound Pseudomonas aeruginosa  Moderate growth  P.aeruginosa may develop resistance during prolonged therapy  with all antibiotics. Isolates that are initially susceptible  may become resistant within three to four days after  initiation of therapy. Testing of repeat isolates may be  warranted.   (A)    Culture & Susceptibility     PSEUDOMONAS AERUGINOSA     Antibiotic Sensitivity Microscan Unit Status    Amikacin Sensitive <=16 mcg/mL Final    Method: SENSITIVITY, MENDOZA    Cefepime Sensitive <=8 mcg/mL Final    Method: SENSITIVITY, MENDOZA    Ceftazidime Sensitive 4 mcg/mL Final    Method: SENSITIVITY, MENDOZA    Ciprofloxacin Sensitive <=1 mcg/mL Final    Method: SENSITIVITY, MENDOZA    Gentamicin Sensitive <=4 mcg/mL Final    Method: SENSITIVITY, MENDOZA    Imipenem Sensitive <=1 mcg/mL Final    Method: SENSITIVITY, MENDOZA    Meropenem Sensitive <=1 mcg/mL Final    Method: SENSITIVITY, MENDOZA    Pip/Tazobactam Sensitive <=16 mcg/mL Final    Method: SENSITIVITY, MENDOZA    Tobramycin Sensitive <=4 mcg/mL Final    Method: SENSITIVITY, MENDOZA                       BLOOD CULTURE [[id number]] Collected:  08/12/18 1115     "Order Status:  Completed Specimen:  Blood from Peripheral Updated:  08/13/18 0920     Significant Indicator NEG     Source BLD     Site PERIPHERAL     Blood Culture No Growth    Note: Blood cultures are incubated for 5 days and  are monitored continuously.Positive blood cultures  are called to the RN and reported as soon as  they are identified.      Narrative:       Per Hospital Policy: Only change Specimen Src: to \"Line\" if  specified by physician order.    BLOOD CULTURE [[id number]] Collected:  08/12/18 1108    Order Status:  Completed Specimen:  Blood from Peripheral Updated:  08/13/18 0920     Significant Indicator NEG     Source BLD     Site PERIPHERAL     Blood Culture No Growth    Note: Blood cultures are incubated for 5 days and  are monitored continuously.Positive blood cultures  are called to the RN and reported as soon as  they are identified.      Narrative:       Per Hospital Policy: Only change Specimen Src: to \"Line\" if  specified by physician order.    GRAM STAIN [[id number]] Collected:  08/12/18 1031    Order Status:  Completed Specimen:  Wound Updated:  08/12/18 1654     Significant Indicator .     Source WND     Site BACK     Gram Stain Result Many WBCs.  Rare Gram positive cocci.  Rare Gram negative rods.      Urinalysis [[id number]] Collected:  08/11/18 0000    Order Status:  Canceled Specimen:  Urine from Urine, Clean Catch           Assessment:  Active Hospital Problems    Diagnosis   • *Intractable low back pain [M54.5]   • Surgical site infection [T81.4XXA]   • Hypokalemia [E87.6]   • Hyponatremia [E87.1]   • Acquired hypothyroidism [E03.9]       Plan:  Surgical site infection - additional work  Concern for hardware infection  Fever resolved  Leukocytosis resolved  Wound cx +PSAR  Bcx - NGTD  Recommend washout for source control given persistent drainage and to assess extent of infection  Continue zosyn  Duration of antibiotics will depend on extent of surgery and operative " findings    Diarrhea, improved  DC'd stool softeners  If persists, check Cdiff    S/p recent lumbar laminectomy with hardware placement on 07/19/18 for lumbar spondylolisthesis     Rheumatoid arthritis  On plaquenil  Previously on leflunomide prior to surgery above    Discussed with internal medicine.

## 2018-08-16 NOTE — PROGRESS NOTES
Progress Note               Author: Antonio Doran Date & Time created: 2018  3:45 PM     Interval History:  Medicine service has recommended surgical irrigation  debridement    Review of Systems:  ROS    Physical Exam:  Physical Exam    Labs:        Invalid input(s): [id number]      Recent Labs      08/15/18   0154  18   0249   SODIUM  139  139   POTASSIUM  3.4*  3.3*   CHLORIDE  107  108   CO2  22  22   BUN  4*  5*   CREATININE  0.83  0.84   MAGNESIUM   --   1.5   CALCIUM  8.8  8.9     Recent Labs      08/15/18   0154  18   0249   GLUCOSE  88  101*     Recent Labs      08/15/18   0154  08/16/18   0249   RBC  3.83*  3.71*   HEMOGLOBIN  12.0  11.9*   HEMATOCRIT  37.2  36.6*   PLATELETCT  193  184     Recent Labs      08/15/18   0154  08/16/18   0249   WBC  6.3  5.8   NEUTSPOLYS  67.00   --    LYMPHOCYTES  22.70   --    MONOCYTES  9.30   --    EOSINOPHILS  0.00   --    BASOPHILS  0.50   --      Hemodynamics:  Temp (24hrs), Av.7 °C (98 °F), Min:36.2 °C (97.2 °F), Max:36.9 °C (98.4 °F)  Temperature: 36.9 °C (98.4 °F)  Pulse  Av.9  Min: 51  Max: 96   Blood Pressure: 143/74, NIBP: (!) 172/79     Respiratory:    Respiration: 20, Pulse Oximetry: 95 %           Fluids:  No intake or output data in the 24 hours ending 18 1545     GI/Nutrition:  Orders Placed This Encounter   Procedures   • DIET NPO     Standing Status:   Standing     Number of Occurrences:   1     Order Specific Question:   Restrict to:     Answer:   Sips with Medications [3]     Medical Decision Making, by Problem:  Active Hospital Problems    Diagnosis   • *Intractable low back pain [M54.5]   • Surgical site infection [T81.4XXA]   • Hypokalemia [E87.6]   • Hyponatremia [E87.1]   • Diarrhea [R19.7]   • Acquired hypothyroidism [E03.9]       Plan:  Lumbar wound irrigation and debridement    Quality-Core Measures

## 2018-08-17 LAB
ANION GAP SERPL CALC-SCNC: 8 MMOL/L (ref 0–11.9)
BACTERIA BLD CULT: NORMAL
BACTERIA BLD CULT: NORMAL
BUN SERPL-MCNC: 6 MG/DL (ref 8–22)
CALCIUM SERPL-MCNC: 8.8 MG/DL (ref 8.5–10.5)
CHLORIDE SERPL-SCNC: 108 MMOL/L (ref 96–112)
CO2 SERPL-SCNC: 22 MMOL/L (ref 20–33)
CREAT SERPL-MCNC: 0.9 MG/DL (ref 0.5–1.4)
ERYTHROCYTE [DISTWIDTH] IN BLOOD BY AUTOMATED COUNT: 44 FL (ref 35.9–50)
GLUCOSE SERPL-MCNC: 132 MG/DL (ref 65–99)
GRAM STN SPEC: NORMAL
GRAM STN SPEC: NORMAL
HCT VFR BLD AUTO: 35.2 % (ref 37–47)
HGB BLD-MCNC: 11.8 G/DL (ref 12–16)
MCH RBC QN AUTO: 32 PG (ref 27–33)
MCHC RBC AUTO-ENTMCNC: 33.5 G/DL (ref 33.6–35)
MCV RBC AUTO: 95.4 FL (ref 81.4–97.8)
PLATELET # BLD AUTO: 205 K/UL (ref 164–446)
PMV BLD AUTO: 11 FL (ref 9–12.9)
POTASSIUM SERPL-SCNC: 3.9 MMOL/L (ref 3.6–5.5)
RBC # BLD AUTO: 3.69 M/UL (ref 4.2–5.4)
SIGNIFICANT IND 70042: NORMAL
SITE SITE: NORMAL
SODIUM SERPL-SCNC: 138 MMOL/L (ref 135–145)
SOURCE SOURCE: NORMAL
WBC # BLD AUTO: 4.4 K/UL (ref 4.8–10.8)

## 2018-08-17 PROCEDURE — 85027 COMPLETE CBC AUTOMATED: CPT

## 2018-08-17 PROCEDURE — 700102 HCHG RX REV CODE 250 W/ 637 OVERRIDE(OP): Performed by: INTERNAL MEDICINE

## 2018-08-17 PROCEDURE — 99232 SBSQ HOSP IP/OBS MODERATE 35: CPT | Performed by: INTERNAL MEDICINE

## 2018-08-17 PROCEDURE — A9270 NON-COVERED ITEM OR SERVICE: HCPCS | Performed by: INTERNAL MEDICINE

## 2018-08-17 PROCEDURE — 700111 HCHG RX REV CODE 636 W/ 250 OVERRIDE (IP): Performed by: INTERNAL MEDICINE

## 2018-08-17 PROCEDURE — 80048 BASIC METABOLIC PNL TOTAL CA: CPT

## 2018-08-17 PROCEDURE — 770001 HCHG ROOM/CARE - MED/SURG/GYN PRIV*

## 2018-08-17 PROCEDURE — 36415 COLL VENOUS BLD VENIPUNCTURE: CPT

## 2018-08-17 PROCEDURE — 700105 HCHG RX REV CODE 258: Performed by: INTERNAL MEDICINE

## 2018-08-17 PROCEDURE — 99233 SBSQ HOSP IP/OBS HIGH 50: CPT | Performed by: INTERNAL MEDICINE

## 2018-08-17 RX ADMIN — HYDROMORPHONE HYDROCHLORIDE 4 MG: 4 TABLET ORAL at 02:28

## 2018-08-17 RX ADMIN — PIPERACILLIN AND TAZOBACTAM 4.5 G: 4; .5 INJECTION, POWDER, LYOPHILIZED, FOR SOLUTION INTRAVENOUS; PARENTERAL at 06:44

## 2018-08-17 RX ADMIN — PIPERACILLIN AND TAZOBACTAM 4.5 G: 4; .5 INJECTION, POWDER, LYOPHILIZED, FOR SOLUTION INTRAVENOUS; PARENTERAL at 21:20

## 2018-08-17 RX ADMIN — LEVOTHYROXINE SODIUM 150 MCG: 150 TABLET ORAL at 06:44

## 2018-08-17 RX ADMIN — HYDROMORPHONE HYDROCHLORIDE 4 MG: 4 TABLET ORAL at 21:20

## 2018-08-17 RX ADMIN — HYDROMORPHONE HYDROCHLORIDE 4 MG: 4 TABLET ORAL at 17:35

## 2018-08-17 RX ADMIN — FOLIC ACID 1 MG: 1 TABLET ORAL at 06:44

## 2018-08-17 RX ADMIN — PROCHLORPERAZINE EDISYLATE 10 MG: 5 INJECTION INTRAMUSCULAR; INTRAVENOUS at 03:17

## 2018-08-17 RX ADMIN — CYCLOBENZAPRINE 10 MG: 10 TABLET, FILM COATED ORAL at 06:44

## 2018-08-17 RX ADMIN — HYDROMORPHONE HYDROCHLORIDE 4 MG: 4 TABLET ORAL at 09:51

## 2018-08-17 RX ADMIN — HYDROXYCHLOROQUINE SULFATE 200 MG: 200 TABLET ORAL at 17:35

## 2018-08-17 RX ADMIN — HYDROMORPHONE HYDROCHLORIDE 4 MG: 4 TABLET ORAL at 13:12

## 2018-08-17 RX ADMIN — CYCLOBENZAPRINE 10 MG: 10 TABLET, FILM COATED ORAL at 13:12

## 2018-08-17 RX ADMIN — POTASSIUM CHLORIDE 40 MEQ: 1500 TABLET, EXTENDED RELEASE ORAL at 06:47

## 2018-08-17 RX ADMIN — HYDROMORPHONE HYDROCHLORIDE 4 MG: 4 TABLET ORAL at 06:45

## 2018-08-17 RX ADMIN — HYDROXYCHLOROQUINE SULFATE 200 MG: 200 TABLET ORAL at 06:00

## 2018-08-17 RX ADMIN — PIPERACILLIN AND TAZOBACTAM 4.5 G: 4; .5 INJECTION, POWDER, LYOPHILIZED, FOR SOLUTION INTRAVENOUS; PARENTERAL at 13:12

## 2018-08-17 ASSESSMENT — ENCOUNTER SYMPTOMS
RESPIRATORY NEGATIVE: 1
PSYCHIATRIC NEGATIVE: 1
SHORTNESS OF BREATH: 0
VOMITING: 0
GASTROINTESTINAL NEGATIVE: 1
DIARRHEA: 0
ABDOMINAL PAIN: 0
FEVER: 0
EYES NEGATIVE: 1
CARDIOVASCULAR NEGATIVE: 1
CHILLS: 0
NAUSEA: 0
BACK PAIN: 1
COUGH: 0

## 2018-08-17 ASSESSMENT — PAIN SCALES - GENERAL
PAINLEVEL_OUTOF10: 5
PAINLEVEL_OUTOF10: 7
PAINLEVEL_OUTOF10: 8
PAINLEVEL_OUTOF10: 7
PAINLEVEL_OUTOF10: 5
PAINLEVEL_OUTOF10: 3

## 2018-08-17 NOTE — CARE PLAN
Problem: Safety  Goal: Will remain free from injury    Intervention: Provide assistance with mobility  Provide assistance when ambulating.

## 2018-08-17 NOTE — PROGRESS NOTES
[facility]    Date of Service: 2018    Chief Complaint  58 y.o. female admitted 2018 with intractable headache and back pain and found to have lumbar spine surgical wound discharge suggestive of possible infection.    Interval Problem Update  No acute vent overnight , pain stable on current regiment     Consultants/Specialty  Neurosurgery  ID    Disposition  TBD      Review of Systems   Constitutional: Positive for malaise/fatigue. Negative for chills and fever.   HENT: Negative.    Eyes: Negative.    Respiratory: Negative.    Cardiovascular: Negative.    Gastrointestinal: Negative.    Musculoskeletal: Positive for back pain (improving).   Skin: Negative.    Psychiatric/Behavioral: Negative.       Physical Exam  Laboratory/Imaging   Hemodynamics  Temp (24hrs), Av.3 °C (97.4 °F), Min:36.2 °C (97.1 °F), Max:36.6 °C (97.9 °F)   Temperature: 36.3 °C (97.4 °F)  Pulse  Av.9  Min: 59  Max: 94 Heart Rate (Monitored): 65  Blood Pressure: 127/55, NIBP: 128/74      Respiratory      Respiration: 18, Pulse Oximetry: 96 %             Fluids    Intake/Output Summary (Last 24 hours) at 18 1620  Last data filed at 18 1200   Gross per 24 hour   Intake             2400 ml   Output              185 ml   Net             2215 ml       Nutrition  Orders Placed This Encounter   Procedures   • Diet Order Clear Liquid     Standing Status:   Standing     Number of Occurrences:   1     Order Specific Question:   Diet:     Answer:   Clear Liquid [10]     Physical Exam   Constitutional: She is oriented to person, place, and time. She appears well-developed and well-nourished. No distress.   HENT:   Head: Normocephalic and atraumatic.   Mouth/Throat: No oropharyngeal exudate.   Eyes: Pupils are equal, round, and reactive to light. Conjunctivae and EOM are normal. No scleral icterus.   Neck: Normal range of motion. Neck supple.   Cardiovascular: Exam reveals no gallop and no friction rub.    No  murmur heard.  Pulmonary/Chest: Effort normal and breath sounds normal. No respiratory distress.   Abdominal: Soft. Bowel sounds are normal.   Musculoskeletal: Normal range of motion. She exhibits no edema, tenderness or deformity.   Neurological: She is alert and oriented to person, place, and time. She has normal reflexes. No cranial nerve deficit.   Skin: Skin is warm and dry. Erythema: aroud lumbar spine wound with staples in place.   2 Hemovac in-place    Psychiatric: She has a normal mood and affect.   Nursing note and vitals reviewed.      Recent Labs      08/15/18   0154  08/16/18   0249  08/17/18   0209   WBC  6.3  5.8  4.4*   RBC  3.83*  3.71*  3.69*   HEMOGLOBIN  12.0  11.9*  11.8*   HEMATOCRIT  37.2  36.6*  35.2*   MCV  97.1  98.7*  95.4   MCH  31.3  32.1  32.0   MCHC  32.3*  32.5*  33.5*   RDW  44.5  46.0  44.0   PLATELETCT  193  184  205   MPV  10.6  10.6  11.0     Recent Labs      08/15/18   0154  08/16/18   0249  08/17/18   0209   SODIUM  139  139  138   POTASSIUM  3.4*  3.3*  3.9   CHLORIDE  107  108  108   CO2  22  22  22   GLUCOSE  88  101*  132*   BUN  4*  5*  6*   CREATININE  0.83  0.84  0.90   CALCIUM  8.8  8.9  8.8                      Assessment/Plan     * Intractable low back pain- (present on admission)   Assessment & Plan    - Hx of lumbar spinal stenosis, spondylolisthesis, DDD with  Radiculopathy  - s/p lumbar decompression and instrumentation (7/19)  - purulent discharge from surgical site/wound  - Lumbar CT illustrates fluid collection (pseudomeningocele v. Seroma v. Hematoma v. Infection/abscess)  - NSG on board   - Bld Cxs NGTD; Wnd Cx growing Pseudomonas ; on IV abx ; ID on board   - Wound care consult and recs appreciated  - cont prn analgesic        Surgical site infection- (present on admission)   Assessment & Plan    Recent lumbar surgery  Please see above for details  Aspiration unsuccessful 08/14/2018,   s/p irrigation and debribement   08/16/2018        Hyponatremia- (present  on admission)   Assessment & Plan    - improving with IVFs; monitor        Hypokalemia- (present on admission)   Assessment & Plan    Replete as needed; monitoring        Diarrhea- (present on admission)   Assessment & Plan    Better s/p d/c Stool softner    If it continues , will check c-diff         Acquired hypothyroidism- (present on admission)   Assessment & Plan    - cont Synthroid per outpt regimen          Quality-Core Measures   Reviewed items::  Medications reviewed, Labs reviewed and Radiology images reviewed  DVT prophylaxis - mechanical:  SCDs

## 2018-08-17 NOTE — OR SURGEON
Immediate Post OP Note    PreOp Diagnosis: post operative wound infection lumbar spine    PostOp Diagnosis: no clinical evidence of gross wound infection  Procedure(s):  IRRIGATION & DEBRIDEMENT NEURO - LUMBAR WOUND POST-FUSION  Cultures x2 of deep and superficial layer- Wound Class: Dirty or Infected    Surgeon(s):  Antonio Doran M.D.    Anesthesiologist/Type of Anesthesia:  Anesthesiologist: Andrez Espinosa M.D./General    Surgical Staff:  Assistant: CHERYL Prater  Circulator: Tessy Herron R.N.  Scrub Person: AMARILIS Sheridan IV    Specimens removed if any:  Cultures deep and superficial    Estimated Blood Loss: 30cc    Findings: no definitive clinical evidence of gross wound infection    Complications: none        8/16/2018 5:34 PM Antonio Doran M.D.

## 2018-08-17 NOTE — OP REPORT
DATE OF SERVICE:  08/16/2018    SERVICE:  Spinal surgery.    PREOPERATIVE DIAGNOSIS:  Suspected postoperative lumbar spinal wound   infection.    POSTOPERATIVE DIAGNOSIS:  No clinical evidence of gross infection.    PROCEDURE:  1.  Lumbar spinal wound irrigation and debridement.  2.  Procurement of superficial aerobic and anaerobic cultures.  3.  Procurement of deep aerobic and anaerobic cultures.    SURGEON:  Antonio Doran MD    ASSISTANT SURGEON:  Stan Abel PA-C CFA.    ANESTHETIC:  General.    ANESTHESIOLOGIST:  Andrez Espinosa MD    COMPLICATIONS:  None.    BLOOD LOSS:  30 mL    HISTORY AND INDICATIONS:  The patient is a 58-year-old female who is several   weeks out of the lumbar decompression and fusion.  She underwent a significant   change in physiological parameters including pain and some drainage from her   wound as well.  She was hospitalized, placed on IV antibiotics; internal   medicine and infectious disease was consulted.  These services recommended   irrigation and debridement of wound.    DESCRIPTION OF PROCEDURE:  After informed consent was obtained, the patient   was brought to the operating room and given general anesthetic.  Her lumbar   spine was prepped and draped in usual sterile fashion.  Antibiotics were held   until after the cultures were taken.  After the field was draped, a time-out   was called correctly identifying the patient and procedure to be done.  We   then made a longitudinal incision through the patient's previous incision.  We   did remove the staples as well.  There was significant amount of seromatous   fluid in this layer.  This was essentially for the most part clear fluid and   there was no definitive evidence of gross infection.  We did take an excellent   surgical sample from the superficial site looking for possible wound   infection.  After cultures were taken, we then thoroughly debrided this layer   removing all nonviable or necrotic-looking tissue.   This was done with Lacy   elevators, curettes, rongeurs, etc.  We then incised the fascia and exposed   the deep layer.  We immediately took an excellent sample from the deep   including aerobic and anaerobic cultures.  The deep retractors were then   placed.  We then irrigated and debrided thoroughly with copious amounts of   pulsatile lavage.  We used rongeurs, curettes, Morris to remove any nonviable   looking tissue.  Several rounds of this were performed along with several   rounds of pulsatile lavage.  We then thoroughly inspected the wound, placed   Gelfoam over the area of the dura mater and then we placed 1000 mg of   tobramycin and vancomycin in the wound.  This distributed equally below and   above the fascia.  A deep Hemovac was placed.  The fascial layer was closed   with interrupted #1 Vicryl sutures, subcutaneous tissue was closed with 2-0   Vicryl and skin was closed with staples and Dermabond.  We did inject 20 mL of   0.5% Marcaine with epinephrine into the wound as local anesthetic.  No   complications were experienced.  Blood loss approximately 30 mL.       ____________________________________     MD JASON ALLEN / MERISSA    DD:  08/16/2018 17:33:37  DT:  08/16/2018 18:02:54    D#:  [id number]  Job#:  [id number]

## 2018-08-17 NOTE — PROGRESS NOTES
Infectious Disease Progress Note    Author: Ramona Dick M.D. Date & Time of service: 2018  10:19 AM    Chief Complaint:  FU surgical site infection    Interval History:  8/15 AF WBC 6.3 pt c/o diarrhea x 3 episodes since last night, thinks it's due to abx, aspiration attempted yesterday w/ no fluid pockets, surgical site still draining   AF WBC 5.8 patient states the drainage has decreased since yesterday, she states Dr. Doran will see her later today and will likely have surgical washout, diarrhea improved   AF WBC 4.4 s/p washout yesterday, no evidence of gross wound infection per OP notes, OR cx pending, pain controlled  Labs Reviewed, Medications Reviewed, Radiology Reviewed and Wound Reviewed.    Review of Systems:  Review of Systems   Constitutional: Negative for chills and fever.   Respiratory: Negative for cough and shortness of breath.    Gastrointestinal: Negative for abdominal pain, diarrhea, nausea and vomiting.   Musculoskeletal: Positive for back pain.       Hemodynamics:  Temp (24hrs), Av.3 °C (97.4 °F), Min:36.2 °C (97.1 °F), Max:36.6 °C (97.9 °F)  Temperature: 36.2 °C (97.2 °F)  Pulse  Av.8  Min: 59  Max: 94Heart Rate (Monitored): 65  Blood Pressure: 156/77, NIBP: 128/74       Physical Exam:  Physical Exam   Constitutional: She is oriented to person, place, and time. She appears well-developed and well-nourished.   HENT:   Head: Normocephalic and atraumatic.   Eyes: Pupils are equal, round, and reactive to light. EOM are normal.   Neck: Neck supple.   Cardiovascular: Normal rate and regular rhythm.    Pulmonary/Chest: Effort normal. She has no wheezes.   Abdominal: Soft. There is no tenderness.   Musculoskeletal: She exhibits no edema.   Back dressed   +Hemovac x 2   Neurological: She is alert and oriented to person, place, and time.       Meds:    Current Facility-Administered Medications:   •  [COMPLETED] piperacillin-tazobactam **AND** piperacillin-tazobactam  •   potassium chloride SA  •  folic acid  •  hydroxychloroquine  •  NS  •  acetaminophen  •  ondansetron  •  ondansetron  •  promethazine  •  promethazine  •  prochlorperazine  •  lidocaine  •  levothyroxine **AND** levothyroxine  •  diazePAM  •  HYDROmorphone  •  cyclobenzaprine    Labs:  Recent Labs      08/15/18   0154  08/16/18   0249 08/17/18   0209   WBC  6.3  5.8  4.4*   RBC  3.83*  3.71*  3.69*   HEMOGLOBIN  12.0  11.9*  11.8*   HEMATOCRIT  37.2  36.6*  35.2*   MCV  97.1  98.7*  95.4   MCH  31.3  32.1  32.0   RDW  44.5  46.0  44.0   PLATELETCT  193  184  205   MPV  10.6  10.6  11.0   NEUTSPOLYS  67.00   --    --    LYMPHOCYTES  22.70   --    --    MONOCYTES  9.30   --    --    EOSINOPHILS  0.00   --    --    BASOPHILS  0.50   --    --      Recent Labs      08/15/18   0154  08/16/18   0249  08/17/18   0209   SODIUM  139  139  138   POTASSIUM  3.4*  3.3*  3.9   CHLORIDE  107  108  108   CO2  22  22  22   GLUCOSE  88  101*  132*   BUN  4*  5*  6*     Recent Labs      08/15/18   0154  08/16/18   0249 08/17/18   0209   CREATININE  0.83  0.84  0.90       Imaging:  Ct-abdomen-pelvis With    Result Date: 8/11/2018 8/11/2018 2:26 PM HISTORY/REASON FOR EXAM:  Abdominal pain and vomiting back pain TECHNIQUE/EXAM DESCRIPTION: CT scan of the abdomen and pelvis with contrast. Contrast-enhanced helical scanning was obtained from the diaphragmatic domes through the pubic symphysis following the bolus administration of 100 mL of Optiray 350 nonionic contrast without complication. Low dose optimization technique was utilized for this CT exam including automated exposure control and adjustment of the mA and/or kV according to patient size. COMPARISON: 02/16/2010 Limited images FINDINGS: CT Abdomen: There is no evidence of focal consolidation or pleural effusion seen within the lung bases. The liver is normal in appearance. The spleen is normal. The kidneys are normal. The adrenal glands are normal. The pancreas is normal. The  aorta is normal in caliber.  No evidence of retroperitoneal adenopathy. Post cholecystectomy changes are noted. The common bile duct measures up to 2.4 cm in diameter. There is moderate intrahepatic biliary ductal dilatation. Postoperative changes are noted in the lumbar spine. Postoperative fluid collection occupies laminectomy region at the L2-L3 level and extends into the subcutaneous fat measuring approximately 6.1 x 5.7 cm in maximum axial dimensions and 15 cm in vertical dimension. CT Pelvis: There is no evidence of bowel obstruction or inflammatory change. Normal appendix is not identified. There is no evidence of free fluid.     1.  Postoperative changes noted in the lumbar spine. Large postoperative fluid collection is identified at the L2-L3 level that also extends above and below these levels as described above. This could represent postoperative seroma or meningocele. No air  is noted within the collection but infection cannot be excluded. 2.  Post cholecystectomy. There is dilatation of the common bile duct and intrahepatic ducts. Dilatation is increased from the prior CT.     Ct-head W/o    Result Date: 8/11/2018 8/11/2018 2:26 PM HISTORY/REASON FOR EXAM:  Headache and vomiting. TECHNIQUE/EXAM DESCRIPTION AND NUMBER OF VIEWS: CT of the head without contrast. Contiguous 5 mm axial sections were obtained from the skull base through the vertex. Up to date radiation dose reduction adjustments have been utilized to meet ALARA standards for radiation dose reduction. COMPARISON:  None available FINDINGS: There is no evidence of extra-axial hemorrhage. No intra-axial hemorrhage is noted. There is no brain swelling or edema. No mass effect or midline shift is noted.     NO ACUTE ABNORMALITIES ARE NOTED ON CT SCAN OF THE HEAD.     Ct-lspine W/o Plus Recons    Result Date: 8/11/2018 8/11/2018 2:26 PM HISTORY/REASON FOR EXAM:  Recent lumbar instrumentation, pain. TECHNIQUE/EXAM DESCRIPTION AND NUMBER OF VIEWS:  CT lumbar spine without contrast, with reconstructions. The study was performed on a helical multidetector CT scanner. Thin-section helical scanning was performed from T12-L1 to the sacrum. Sagittal and coronal multiplanar reconstructions were generated from the axial images. Low dose optimization technique was utilized for this CT exam including automated exposure control and adjustment of the mA and/or kV according to patient size. COMPARISON: Lumbar spine radiograph dated 7/19/2018. FINDINGS: Preserved lumbar lordosis. Sequela of recent instrumentation at the L4-5 level with placement of bilateral paraspinal rods and transpedicular screws. The left L5 screw breaches the cortex anterior cortex of the L5 vertebra, otherwise, screws are well-positioned. Replaced L4-5 disc. Laminectomy defects at L3-5 levels. Partial resection of L2 spinous process. Cement in the posterior paraspinal soft tissues. There is a 6.4 x 6.1 x 10.9 cm fluid collection posterior to the region of spinal instrumentation with peripheral calcification. Anteriorly, the collection closely abuts the thecal sac. Sequela of L1 vertebral body augmentation with mild compression deformity. No new compression fractures. Spinal canal is patent. Visualized intra-abdominal organs are unremarkable. Atherosclerosis.     1. Recent instrumentation at L2-5 levels with a 10.7 cm fluid collection in the posterior paraspinal soft tissues. Differential includes postsurgical seroma, pseudomeningocele and the resolving hematoma. Infection should be excluded clinically. 2. Prior augmentation of L1 compression deformity. No new fracture or listhesis.    Dx-lumbar Spine-2 Or 3 Views    Result Date: 7/19/2018 7/19/2018 7:30 AM HISTORY/REASON FOR EXAM:  Lumbar fusion at L4-5 TECHNIQUE/ EXAM DESCRIPTION AND NUMBER OF VIEWS:  2 views of the lumbar spine. Digitized Intraoperative Radiograph FINDINGS: Fixation hardware projecting over the lumbar spine Fluoroscopic time: 7  seconds Fluoroscopy dose 1.769mGy    Digitized intraoperative radiograph is submitted for review.  This examination is not for diagnostic purpose but for guidance during a surgical procedure.    Dx-portable Fluoro > 1 Hour    Result Date: 7/19/2018 7/19/2018 7:30 AM HISTORY/REASON FOR EXAM:  Lumbar fusion L4-5, Main OR TECHNIQUE/EXAM DESCRIPTION AND NUMBER OF VIEWS: Portable fluoroscopy for greater than one hour FINDINGS:      The portable fluoroscopy unit was obligated to the procedure for greater than one hour.   Actual fluoro time was 7 seconds.     Portable fluoroscopy utilized for 7 seconds.       Micro:  Results     Procedure Component Value Units Date/Time    GRAM STAIN [[id number]] Collected:  08/16/18 1705    Order Status:  Completed Specimen:  Wound Updated:  08/17/18 0829     Significant Indicator .     Source WND     Site Lumbar Wound     Gram Stain Result Few WBCs.  No organisms seen.      GRAM STAIN [[id number]] Collected:  08/16/18 1709    Order Status:  Completed Specimen:  Wound Updated:  08/17/18 0829     Significant Indicator .     Source WND     Site Lumbar Wound Deep     Gram Stain Result Few WBCs.  No organisms seen.      ANAEROBIC CULTURE [[id number]] Collected:  08/16/18 1709    Order Status:  Completed Specimen:  Other Updated:  08/16/18 1853    CULTURE WOUND W/ GRAM STAIN [[id number]] Collected:  08/16/18 1709    Order Status:  Completed Specimen:  Other Updated:  08/16/18 1852    CULTURE WOUND W/ GRAM STAIN [[id number]] Collected:  08/16/18 1705    Order Status:  Completed Specimen:  Other Updated:  08/16/18 1852    ANAEROBIC CULTURE [[id number]] Collected:  08/16/18 1705    Order Status:  Completed Specimen:  Other Updated:  08/16/18 1852    CULTURE WOUND W/ GRAM STAIN [[id number]]  (Abnormal)  (Susceptibility) Collected:  08/12/18 1031    Order Status:  Completed Specimen:  Wound from Back Updated:  08/14/18 0821     Significant Indicator POS (POS)     Source WND     Site BACK     Culture  "Result Wound -- (A)     Gram Stain Result Many WBCs.  Rare Gram positive cocci.  Rare Gram negative rods.       Culture Result Wound Pseudomonas aeruginosa  Moderate growth  P.aeruginosa may develop resistance during prolonged therapy  with all antibiotics. Isolates that are initially susceptible  may become resistant within three to four days after  initiation of therapy. Testing of repeat isolates may be  warranted.   (A)    Culture & Susceptibility     PSEUDOMONAS AERUGINOSA     Antibiotic Sensitivity Microscan Unit Status    Amikacin Sensitive <=16 mcg/mL Final    Method: SENSITIVITY, MENDOZA    Cefepime Sensitive <=8 mcg/mL Final    Method: SENSITIVITY, MENDOZA    Ceftazidime Sensitive 4 mcg/mL Final    Method: SENSITIVITY, MENDOZA    Ciprofloxacin Sensitive <=1 mcg/mL Final    Method: SENSITIVITY, MENDOZA    Gentamicin Sensitive <=4 mcg/mL Final    Method: SENSITIVITY, MENDOZA    Imipenem Sensitive <=1 mcg/mL Final    Method: SENSITIVITY, MENDOZA    Meropenem Sensitive <=1 mcg/mL Final    Method: SENSITIVITY, MENDOZA    Pip/Tazobactam Sensitive <=16 mcg/mL Final    Method: SENSITIVITY, MENDOZA    Tobramycin Sensitive <=4 mcg/mL Final    Method: SENSITIVITY, MENDOZA                       BLOOD CULTURE [[id number]] Collected:  08/12/18 1115    Order Status:  Completed Specimen:  Blood from Peripheral Updated:  08/13/18 0920     Significant Indicator NEG     Source BLD     Site PERIPHERAL     Blood Culture No Growth    Note: Blood cultures are incubated for 5 days and  are monitored continuously.Positive blood cultures  are called to the RN and reported as soon as  they are identified.      Narrative:       Per Hospital Policy: Only change Specimen Src: to \"Line\" if  specified by physician order.    BLOOD CULTURE [[id number]] Collected:  08/12/18 1108    Order Status:  Completed Specimen:  Blood from Peripheral Updated:  08/13/18 0920     Significant Indicator NEG     Source BLD     Site PERIPHERAL     Blood Culture No Growth    Note: Blood cultures " "are incubated for 5 days and  are monitored continuously.Positive blood cultures  are called to the RN and reported as soon as  they are identified.      Narrative:       Per Hospital Policy: Only change Specimen Src: to \"Line\" if  specified by physician order.    GRAM STAIN [[id number]] Collected:  08/12/18 1031    Order Status:  Completed Specimen:  Wound Updated:  08/12/18 1654     Significant Indicator .     Source WND     Site BACK     Gram Stain Result Many WBCs.  Rare Gram positive cocci.  Rare Gram negative rods.      Urinalysis [[id number]] Collected:  08/11/18 0000    Order Status:  Canceled Specimen:  Urine from Urine, Clean Catch           Assessment:  Active Hospital Problems    Diagnosis   • *Intractable low back pain [M54.5]   • Surgical site infection [T81.4XXA]   • Hypokalemia [E87.6]   • Hyponatremia [E87.1]   • Acquired hypothyroidism [E03.9]       Plan:  Surgical site infection - additional work  Concern for hardware infection  Fever resolved  Leukocytosis resolved  Wound cx +PSAR (S-cipro)  Bcx - NGTD  S/p I&D on 8/16 by Dr. Doran. No definitive clinical evidence of gross wound infection per OP notes  OR deep and superficial cx - pending  Continue zosyn  If deep cultures negative, pt may only require 2 week course of abx  Hold PICC for now    Diarrhea, improved overall  DC'd stool softeners  If persists, check Cdiff    S/p recent lumbar laminectomy with hardware placement on 07/19/18 for lumbar spondylolisthesis     Rheumatoid arthritis  On plaquenil  Previously on leflunomide prior to surgery above    Discussed with internal medicine RN  "

## 2018-08-17 NOTE — OR NURSING
The pt is awake and oriented. Respirations are regular and easy. Pain is controlled, the pt is comfortable. Dressing dry and intact.Hemovac x 2 patent and draining.No neuro deficits noted.

## 2018-08-17 NOTE — PROGRESS NOTES
Patient A&O x 4, up with one and a wheeled walker. VS stable. No new complaints of pain overnight. Hemovac x 2. Patient resting comfortably at this time, will continue to monitor.

## 2018-08-17 NOTE — DISCHARGE PLANNING
Anticipated Discharge Disposition:   Pt prefers home with HH    Action:   Per MD, poc is pending.   Waiting for surgery recommendations on treatments/poc    Barriers to Discharge:   Pending surgical and medical clearance.     Plan:   Follow up on Monday.

## 2018-08-17 NOTE — DISCHARGE PLANNING
Anticipated Discharge Disposition:  outpt IV and woundcare    Action:   Talked to Dahlia Kyle NP who confirmed that she can write IV abx order as long as there is recommendation from ID. Fax notes to  [phone]. First dose of Dapto needs to be given here prior to pt dc. She also confirmed that woundcare can be done by PT at the hospital     Barriers to Discharge:   Pending ID recommendations  Medical clearance    Plan:   Follow up with ID and hospitalist

## 2018-08-18 ENCOUNTER — APPOINTMENT (OUTPATIENT)
Dept: RADIOLOGY | Facility: MEDICAL CENTER | Age: 58
DRG: 857 | End: 2018-08-18
Attending: INTERNAL MEDICINE
Payer: COMMERCIAL

## 2018-08-18 LAB
ANION GAP SERPL CALC-SCNC: 7 MMOL/L (ref 0–11.9)
BUN SERPL-MCNC: 11 MG/DL (ref 8–22)
CALCIUM SERPL-MCNC: 8.3 MG/DL (ref 8.5–10.5)
CHLORIDE SERPL-SCNC: 109 MMOL/L (ref 96–112)
CO2 SERPL-SCNC: 23 MMOL/L (ref 20–33)
CREAT SERPL-MCNC: 1 MG/DL (ref 0.5–1.4)
ERYTHROCYTE [DISTWIDTH] IN BLOOD BY AUTOMATED COUNT: 44.9 FL (ref 35.9–50)
GLUCOSE SERPL-MCNC: 113 MG/DL (ref 65–99)
HCT VFR BLD AUTO: 31.2 % (ref 37–47)
HGB BLD-MCNC: 10.2 G/DL (ref 12–16)
MAGNESIUM SERPL-MCNC: 1.7 MG/DL (ref 1.5–2.5)
MCH RBC QN AUTO: 31.8 PG (ref 27–33)
MCHC RBC AUTO-ENTMCNC: 32.7 G/DL (ref 33.6–35)
MCV RBC AUTO: 97.2 FL (ref 81.4–97.8)
PLATELET # BLD AUTO: 184 K/UL (ref 164–446)
PMV BLD AUTO: 11.1 FL (ref 9–12.9)
POTASSIUM SERPL-SCNC: 3.5 MMOL/L (ref 3.6–5.5)
RBC # BLD AUTO: 3.21 M/UL (ref 4.2–5.4)
SODIUM SERPL-SCNC: 139 MMOL/L (ref 135–145)
WBC # BLD AUTO: 7.9 K/UL (ref 4.8–10.8)

## 2018-08-18 PROCEDURE — A9270 NON-COVERED ITEM OR SERVICE: HCPCS | Performed by: INTERNAL MEDICINE

## 2018-08-18 PROCEDURE — 700105 HCHG RX REV CODE 258: Performed by: INTERNAL MEDICINE

## 2018-08-18 PROCEDURE — 80048 BASIC METABOLIC PNL TOTAL CA: CPT

## 2018-08-18 PROCEDURE — 770001 HCHG ROOM/CARE - MED/SURG/GYN PRIV*

## 2018-08-18 PROCEDURE — 83735 ASSAY OF MAGNESIUM: CPT

## 2018-08-18 PROCEDURE — 99232 SBSQ HOSP IP/OBS MODERATE 35: CPT | Performed by: INTERNAL MEDICINE

## 2018-08-18 PROCEDURE — 700102 HCHG RX REV CODE 250 W/ 637 OVERRIDE(OP): Performed by: INTERNAL MEDICINE

## 2018-08-18 PROCEDURE — 36415 COLL VENOUS BLD VENIPUNCTURE: CPT

## 2018-08-18 PROCEDURE — 700111 HCHG RX REV CODE 636 W/ 250 OVERRIDE (IP): Performed by: INTERNAL MEDICINE

## 2018-08-18 PROCEDURE — 85027 COMPLETE CBC AUTOMATED: CPT

## 2018-08-18 RX ADMIN — PIPERACILLIN AND TAZOBACTAM 4.5 G: 4; .5 INJECTION, POWDER, LYOPHILIZED, FOR SOLUTION INTRAVENOUS; PARENTERAL at 20:42

## 2018-08-18 RX ADMIN — CYCLOBENZAPRINE 10 MG: 10 TABLET, FILM COATED ORAL at 04:26

## 2018-08-18 RX ADMIN — FOLIC ACID 1 MG: 1 TABLET ORAL at 04:26

## 2018-08-18 RX ADMIN — LEVOTHYROXINE SODIUM 125 MCG: 150 TABLET ORAL at 04:26

## 2018-08-18 RX ADMIN — PIPERACILLIN AND TAZOBACTAM 4.5 G: 4; .5 INJECTION, POWDER, LYOPHILIZED, FOR SOLUTION INTRAVENOUS; PARENTERAL at 14:50

## 2018-08-18 RX ADMIN — ACETAMINOPHEN 650 MG: 325 TABLET, FILM COATED ORAL at 14:55

## 2018-08-18 RX ADMIN — HYDROXYCHLOROQUINE SULFATE 200 MG: 200 TABLET ORAL at 16:47

## 2018-08-18 RX ADMIN — PIPERACILLIN AND TAZOBACTAM 4.5 G: 4; .5 INJECTION, POWDER, LYOPHILIZED, FOR SOLUTION INTRAVENOUS; PARENTERAL at 04:25

## 2018-08-18 RX ADMIN — HYDROMORPHONE HYDROCHLORIDE 4 MG: 4 TABLET ORAL at 20:41

## 2018-08-18 RX ADMIN — HYDROMORPHONE HYDROCHLORIDE 4 MG: 4 TABLET ORAL at 09:16

## 2018-08-18 RX ADMIN — POTASSIUM CHLORIDE 40 MEQ: 1500 TABLET, EXTENDED RELEASE ORAL at 04:26

## 2018-08-18 RX ADMIN — HYDROMORPHONE HYDROCHLORIDE 4 MG: 4 TABLET ORAL at 01:48

## 2018-08-18 RX ADMIN — HYDROMORPHONE HYDROCHLORIDE 4 MG: 4 TABLET ORAL at 12:28

## 2018-08-18 RX ADMIN — HYDROXYCHLOROQUINE SULFATE 200 MG: 200 TABLET ORAL at 04:26

## 2018-08-18 RX ADMIN — HYDROMORPHONE HYDROCHLORIDE 4 MG: 4 TABLET ORAL at 15:30

## 2018-08-18 ASSESSMENT — PAIN SCALES - GENERAL
PAINLEVEL_OUTOF10: 6
PAINLEVEL_OUTOF10: 10
PAINLEVEL_OUTOF10: 8
PAINLEVEL_OUTOF10: 7
PAINLEVEL_OUTOF10: 6
PAINLEVEL_OUTOF10: 7
PAINLEVEL_OUTOF10: 7
PAINLEVEL_OUTOF10: 8
PAINLEVEL_OUTOF10: 5

## 2018-08-18 ASSESSMENT — ENCOUNTER SYMPTOMS
CHILLS: 0
DIARRHEA: 0
FEVER: 0
EYES NEGATIVE: 1
VOMITING: 0
RESPIRATORY NEGATIVE: 1
CARDIOVASCULAR NEGATIVE: 1
GASTROINTESTINAL NEGATIVE: 1
COUGH: 0
NAUSEA: 0
SHORTNESS OF BREATH: 0
BACK PAIN: 1
PSYCHIATRIC NEGATIVE: 1
ABDOMINAL PAIN: 0
MYALGIAS: 1

## 2018-08-18 NOTE — PROGRESS NOTES
Pt rec'd in report at bedside. Pt in no distress, voices no complaints. Dressing on back dry and intact, hemovacs in place and patent. Bed in low/locked position, call bell in place, bed alarm on, pt instructed for assistance.

## 2018-08-18 NOTE — PROGRESS NOTES
Infectious Disease Progress Note    Author: Codie Jackson M.D. Date & Time of service: 2018  4:59 PM    Chief Complaint:  FU surgical site infection    Interval History:  8/15 AF WBC 6.3 pt c/o diarrhea x 3 episodes since last night, thinks it's due to abx, aspiration attempted yesterday w/ no fluid pockets, surgical site still draining   AF WBC 5.8 patient states the drainage has decreased since yesterday, she states Dr. Doran will see her later today and will likely have surgical washout, diarrhea improved   AF WBC 4.4 s/p washout yesterday, no evidence of gross wound infection per OP notes, OR cx pending, pain controlled  2018 no fevers.  Ongoing back pain.  WBC 8 platelets 184  Labs Reviewed, Medications Reviewed, Radiology Reviewed and Wound Reviewed.    Review of Systems:  Review of Systems   Constitutional: Negative for chills and fever.   Respiratory: Negative for cough and shortness of breath.    Gastrointestinal: Negative for abdominal pain, diarrhea, nausea and vomiting.   Musculoskeletal: Positive for back pain and myalgias.       Hemodynamics:  Temp (24hrs), Av.8 °C (98.3 °F), Min:36.6 °C (97.8 °F), Max:37.1 °C (98.8 °F)  Temperature: 36.8 °C (98.3 °F)  Pulse  Av  Min: 58  Max: 94   Blood Pressure: 105/70       Physical Exam:  Physical Exam   Constitutional: She is oriented to person, place, and time. She appears well-developed and well-nourished.   HENT:   Head: Normocephalic and atraumatic.   Eyes: Pupils are equal, round, and reactive to light. EOM are normal.   Neck: Neck supple.   Cardiovascular: Normal rate and regular rhythm.    Pulmonary/Chest: Effort normal. She has no wheezes.   Abdominal: Soft. There is no tenderness.   Musculoskeletal: She exhibits no edema.   Back dressed   +Hemovac x 2   Neurological: She is alert and oriented to person, place, and time.       Meds:    Current Facility-Administered Medications:   •  [COMPLETED] piperacillin-tazobactam **AND**  piperacillin-tazobactam  •  potassium chloride SA  •  folic acid  •  hydroxychloroquine  •  NS  •  acetaminophen  •  ondansetron  •  ondansetron  •  promethazine  •  promethazine  •  prochlorperazine  •  lidocaine  •  levothyroxine **AND** levothyroxine  •  diazePAM  •  HYDROmorphone  •  cyclobenzaprine    Labs:  Recent Labs      08/16/18   0249  08/17/18   0209  08/18/18   0217   WBC  5.8  4.4*  7.9   RBC  3.71*  3.69*  3.21*   HEMOGLOBIN  11.9*  11.8*  10.2*   HEMATOCRIT  36.6*  35.2*  31.2*   MCV  98.7*  95.4  97.2   MCH  32.1  32.0  31.8   RDW  46.0  44.0  44.9   PLATELETCT  184  205  184   MPV  10.6  11.0  11.1     Recent Labs      08/16/18   0249  08/17/18   0209  08/18/18   0217   SODIUM  139  138  139   POTASSIUM  3.3*  3.9  3.5*   CHLORIDE  108  108  109   CO2  22  22  23   GLUCOSE  101*  132*  113*   BUN  5*  6*  11     Recent Labs      08/16/18   0249  08/17/18   0209  08/18/18   0217   CREATININE  0.84  0.90  1.00       Imaging:  Ct-abdomen-pelvis With    Result Date: 8/11/2018 8/11/2018 2:26 PM HISTORY/REASON FOR EXAM:  Abdominal pain and vomiting back pain TECHNIQUE/EXAM DESCRIPTION: CT scan of the abdomen and pelvis with contrast. Contrast-enhanced helical scanning was obtained from the diaphragmatic domes through the pubic symphysis following the bolus administration of 100 mL of Optiray 350 nonionic contrast without complication. Low dose optimization technique was utilized for this CT exam including automated exposure control and adjustment of the mA and/or kV according to patient size. COMPARISON: 02/16/2010 Limited images FINDINGS: CT Abdomen: There is no evidence of focal consolidation or pleural effusion seen within the lung bases. The liver is normal in appearance. The spleen is normal. The kidneys are normal. The adrenal glands are normal. The pancreas is normal. The aorta is normal in caliber.  No evidence of retroperitoneal adenopathy. Post cholecystectomy changes are noted. The common  bile duct measures up to 2.4 cm in diameter. There is moderate intrahepatic biliary ductal dilatation. Postoperative changes are noted in the lumbar spine. Postoperative fluid collection occupies laminectomy region at the L2-L3 level and extends into the subcutaneous fat measuring approximately 6.1 x 5.7 cm in maximum axial dimensions and 15 cm in vertical dimension. CT Pelvis: There is no evidence of bowel obstruction or inflammatory change. Normal appendix is not identified. There is no evidence of free fluid.     1.  Postoperative changes noted in the lumbar spine. Large postoperative fluid collection is identified at the L2-L3 level that also extends above and below these levels as described above. This could represent postoperative seroma or meningocele. No air  is noted within the collection but infection cannot be excluded. 2.  Post cholecystectomy. There is dilatation of the common bile duct and intrahepatic ducts. Dilatation is increased from the prior CT.     Ct-head W/o    Result Date: 8/11/2018 8/11/2018 2:26 PM HISTORY/REASON FOR EXAM:  Headache and vomiting. TECHNIQUE/EXAM DESCRIPTION AND NUMBER OF VIEWS: CT of the head without contrast. Contiguous 5 mm axial sections were obtained from the skull base through the vertex. Up to date radiation dose reduction adjustments have been utilized to meet ALARA standards for radiation dose reduction. COMPARISON:  None available FINDINGS: There is no evidence of extra-axial hemorrhage. No intra-axial hemorrhage is noted. There is no brain swelling or edema. No mass effect or midline shift is noted.     NO ACUTE ABNORMALITIES ARE NOTED ON CT SCAN OF THE HEAD.     Ct-lspine W/o Plus Recons    Result Date: 8/11/2018 8/11/2018 2:26 PM HISTORY/REASON FOR EXAM:  Recent lumbar instrumentation, pain. TECHNIQUE/EXAM DESCRIPTION AND NUMBER OF VIEWS: CT lumbar spine without contrast, with reconstructions. The study was performed on a helical multidetector CT scanner.  Thin-section helical scanning was performed from T12-L1 to the sacrum. Sagittal and coronal multiplanar reconstructions were generated from the axial images. Low dose optimization technique was utilized for this CT exam including automated exposure control and adjustment of the mA and/or kV according to patient size. COMPARISON: Lumbar spine radiograph dated 7/19/2018. FINDINGS: Preserved lumbar lordosis. Sequela of recent instrumentation at the L4-5 level with placement of bilateral paraspinal rods and transpedicular screws. The left L5 screw breaches the cortex anterior cortex of the L5 vertebra, otherwise, screws are well-positioned. Replaced L4-5 disc. Laminectomy defects at L3-5 levels. Partial resection of L2 spinous process. Cement in the posterior paraspinal soft tissues. There is a 6.4 x 6.1 x 10.9 cm fluid collection posterior to the region of spinal instrumentation with peripheral calcification. Anteriorly, the collection closely abuts the thecal sac. Sequela of L1 vertebral body augmentation with mild compression deformity. No new compression fractures. Spinal canal is patent. Visualized intra-abdominal organs are unremarkable. Atherosclerosis.     1. Recent instrumentation at L2-5 levels with a 10.7 cm fluid collection in the posterior paraspinal soft tissues. Differential includes postsurgical seroma, pseudomeningocele and the resolving hematoma. Infection should be excluded clinically. 2. Prior augmentation of L1 compression deformity. No new fracture or listhesis.    Dx-lumbar Spine-2 Or 3 Views    Result Date: 7/19/2018 7/19/2018 7:30 AM HISTORY/REASON FOR EXAM:  Lumbar fusion at L4-5 TECHNIQUE/ EXAM DESCRIPTION AND NUMBER OF VIEWS:  2 views of the lumbar spine. Digitized Intraoperative Radiograph FINDINGS: Fixation hardware projecting over the lumbar spine Fluoroscopic time: 7 seconds Fluoroscopy dose 1.769mGy    Digitized intraoperative radiograph is submitted for review.  This examination is not  for diagnostic purpose but for guidance during a surgical procedure.    Dx-portable Fluoro > 1 Hour    Result Date: 7/19/2018 7/19/2018 7:30 AM HISTORY/REASON FOR EXAM:  Lumbar fusion L4-5, Main OR TECHNIQUE/EXAM DESCRIPTION AND NUMBER OF VIEWS: Portable fluoroscopy for greater than one hour FINDINGS:      The portable fluoroscopy unit was obligated to the procedure for greater than one hour.   Actual fluoro time was 7 seconds.     Portable fluoroscopy utilized for 7 seconds.       Micro:  Results     Procedure Component Value Units Date/Time    CULTURE WOUND W/ GRAM STAIN [[id number]] Collected:  08/16/18 1709    Order Status:  Completed Specimen:  Wound Updated:  08/18/18 1026     Significant Indicator NEG     Source WND     Site Lumbar Wound Deep     Culture Result Wound No growth at 48 hours.     Gram Stain Result Few WBCs.  No organisms seen.      ANAEROBIC CULTURE [[id number]] Collected:  08/16/18 1709    Order Status:  Completed Specimen:  Wound Updated:  08/18/18 1026     Significant Indicator NEG     Source WND     Site Lumbar Wound Deep     Anaerobic Culture, Culture Res Culture in progress.    CULTURE WOUND W/ GRAM STAIN [[id number]]  (Abnormal) Collected:  08/16/18 1705    Order Status:  Completed Specimen:  Wound Updated:  08/18/18 1026     Significant Indicator POS (POS)     Source WND     Site Lumbar Wound     Culture Result Wound Growth noted after further incubation, see below for  organism identification.   (A)     Gram Stain Result Few WBCs.  No organisms seen.       Culture Result Wound Pseudomonas species  Rare growth   (A)    ANAEROBIC CULTURE [[id number]] Collected:  08/16/18 1705    Order Status:  Completed Specimen:  Wound Updated:  08/18/18 1026     Significant Indicator NEG     Source WND     Site Lumbar Wound     Anaerobic Culture, Culture Res Culture in progress.    BLOOD CULTURE [[id number]] Collected:  08/12/18 1115    Order Status:  Completed Specimen:  Blood from Peripheral Updated:   "08/17/18 1300     Significant Indicator NEG     Source BLD     Site PERIPHERAL     Blood Culture No growth after 5 days of incubation.    Narrative:       Per Hospital Policy: Only change Specimen Src: to \"Line\" if  specified by physician order.    BLOOD CULTURE [[id number]] Collected:  08/12/18 1108    Order Status:  Completed Specimen:  Blood from Peripheral Updated:  08/17/18 1300     Significant Indicator NEG     Source BLD     Site PERIPHERAL     Blood Culture No growth after 5 days of incubation.    Narrative:       Per Hospital Policy: Only change Specimen Src: to \"Line\" if  specified by physician order.    GRAM STAIN [[id number]] Collected:  08/16/18 1705    Order Status:  Completed Specimen:  Wound Updated:  08/17/18 0829     Significant Indicator .     Source WND     Site Lumbar Wound     Gram Stain Result Few WBCs.  No organisms seen.      GRAM STAIN [[id number]] Collected:  08/16/18 1709    Order Status:  Completed Specimen:  Wound Updated:  08/17/18 0829     Significant Indicator .     Source WND     Site Lumbar Wound Deep     Gram Stain Result Few WBCs.  No organisms seen.      CULTURE WOUND W/ GRAM STAIN [[id number]]  (Abnormal)  (Susceptibility) Collected:  08/12/18 1031    Order Status:  Completed Specimen:  Wound from Back Updated:  08/14/18 0821     Significant Indicator POS (POS)     Source WND     Site BACK     Culture Result Wound -- (A)     Gram Stain Result Many WBCs.  Rare Gram positive cocci.  Rare Gram negative rods.       Culture Result Wound Pseudomonas aeruginosa  Moderate growth  P.aeruginosa may develop resistance during prolonged therapy  with all antibiotics. Isolates that are initially susceptible  may become resistant within three to four days after  initiation of therapy. Testing of repeat isolates may be  warranted.   (A)    Culture & Susceptibility     PSEUDOMONAS AERUGINOSA     Antibiotic Sensitivity Microscan Unit Status    Amikacin Sensitive <=16 mcg/mL Final    Method: " SENSITIVITY, MENDOZA    Cefepime Sensitive <=8 mcg/mL Final    Method: SENSITIVITY, MENDOZA    Ceftazidime Sensitive 4 mcg/mL Final    Method: SENSITIVITY, MENDOZA    Ciprofloxacin Sensitive <=1 mcg/mL Final    Method: SENSITIVITY, MENDOZA    Gentamicin Sensitive <=4 mcg/mL Final    Method: SENSITIVITY, MENDOZA    Imipenem Sensitive <=1 mcg/mL Final    Method: SENSITIVITY, MENDOZA    Meropenem Sensitive <=1 mcg/mL Final    Method: SENSITIVITY, MENDOZA    Pip/Tazobactam Sensitive <=16 mcg/mL Final    Method: SENSITIVITY, MENDOZA    Tobramycin Sensitive <=4 mcg/mL Final    Method: SENSITIVITY, MENDOZA                       GRAM STAIN [[id number]] Collected:  08/12/18 1031    Order Status:  Completed Specimen:  Wound Updated:  08/12/18 2519     Significant Indicator .     Source WND     Site BACK     Gram Stain Result Many WBCs.  Rare Gram positive cocci.  Rare Gram negative rods.            Assessment:  Active Hospital Problems    Diagnosis   • *Intractable low back pain [M54.5]   • Surgical site infection [T81.4XXA]   • Hypokalemia [E87.6]   • Hyponatremia [E87.1]   • Acquired hypothyroidism [E03.9]       Plan:  Surgical site infection - additional work  Concern for hardware infection  Fever resolved  Leukocytosis resolved  Wound cx +PSAR (S-cipro)  Bcx - NGTD  S/p I&D on 8/16 by Dr. Doran. No definitive clinical evidence of gross wound infection per OP notes  OR cultures Pseudomonas  Continue zosyn  Aim for at least 2-4 weeks      Diarrhea, improved overall  DC'd stool softeners  If persists, check Cdiff    S/p recent lumbar laminectomy with hardware placement on 07/19/18 for lumbar spondylolisthesis     Rheumatoid arthritis  On plaquenil  Previously on leflunomide prior to surgery above    Discussed with internal medicine RN

## 2018-08-18 NOTE — PROGRESS NOTES
Received report from NOC RN, pt A&Ox4, all questions answered, call light within reach, bed locked and in lowest position, bed alarm on. Hourly rounding in place.

## 2018-08-18 NOTE — PROGRESS NOTES
[facility]    Date of Service: 2018    Chief Complaint  58 y.o. female admitted 2018 with intractable headache and back pain and found to have lumbar spine surgical wound discharge suggestive of possible infection.    Interval Problem Update  No acute vent overnight , pain stable 8/10    Consultants/Specialty  Neurosurgery  ID    Disposition  TBD      Review of Systems   Constitutional: Positive for malaise/fatigue. Negative for chills and fever.   HENT: Negative.    Eyes: Negative.    Respiratory: Negative.    Cardiovascular: Negative.    Gastrointestinal: Negative.    Musculoskeletal: Positive for back pain (improving).   Skin: Negative.    Psychiatric/Behavioral: Negative.       Physical Exam  Laboratory/Imaging   Hemodynamics  Temp (24hrs), Av.7 °C (98.1 °F), Min:36.3 °C (97.4 °F), Max:37.1 °C (98.8 °F)   Temperature: 36.8 °C (98.3 °F)  Pulse  Av.8  Min: 58  Max: 94    Blood Pressure: 148/79      Respiratory      Respiration: 17, Pulse Oximetry: 96 %             Fluids    Intake/Output Summary (Last 24 hours) at 18 1236  Last data filed at 18 1059   Gross per 24 hour   Intake                0 ml   Output               70 ml   Net              -70 ml       Nutrition  Orders Placed This Encounter   Procedures   • Diet Order Regular     Standing Status:   Standing     Number of Occurrences:   1     Order Specific Question:   Diet:     Answer:   Regular [1]     Physical Exam   Constitutional: She is oriented to person, place, and time. She appears well-developed and well-nourished. No distress.   HENT:   Head: Normocephalic and atraumatic.   Mouth/Throat: No oropharyngeal exudate.   Eyes: Pupils are equal, round, and reactive to light. Conjunctivae and EOM are normal. No scleral icterus.   Neck: Normal range of motion. Neck supple.   Cardiovascular: Exam reveals no gallop and no friction rub.    No murmur heard.  Pulmonary/Chest: Effort normal and breath sounds  normal. No respiratory distress.   Abdominal: Soft. Bowel sounds are normal.   Musculoskeletal: Normal range of motion. She exhibits no edema, tenderness or deformity.   Neurological: She is alert and oriented to person, place, and time. She has normal reflexes. No cranial nerve deficit.   Skin: No erythema (aroud lumbar spine wound with staples in place).   2 Hemovac in-place , wound dressing in-place    Psychiatric: She has a normal mood and affect.   Nursing note and vitals reviewed.      Recent Labs      08/16/18 0249 08/17/18 0209 08/18/18 0217   WBC  5.8  4.4*  7.9   RBC  3.71*  3.69*  3.21*   HEMOGLOBIN  11.9*  11.8*  10.2*   HEMATOCRIT  36.6*  35.2*  31.2*   MCV  98.7*  95.4  97.2   MCH  32.1  32.0  31.8   MCHC  32.5*  33.5*  32.7*   RDW  46.0  44.0  44.9   PLATELETCT  184  205  184   MPV  10.6  11.0  11.1     Recent Labs      08/16/18 0249 08/17/18 0209 08/18/18 0217   SODIUM  139  138  139   POTASSIUM  3.3*  3.9  3.5*   CHLORIDE  108  108  109   CO2  22  22  23   GLUCOSE  101*  132*  113*   BUN  5*  6*  11   CREATININE  0.84  0.90  1.00   CALCIUM  8.9  8.8  8.3*                      Assessment/Plan     * Intractable low back pain- (present on admission)   Assessment & Plan    - Hx of lumbar spinal stenosis, spondylolisthesis, DDD with  Radiculopathy  - s/p lumbar decompression and instrumentation (7/19)  - purulent discharge from surgical site/wound  - Lumbar CT illustrates fluid collection (pseudomeningocele v. Seroma v. Hematoma v. Infection/abscess)  - NSG on board   - Bld Cxs NGTD; Wnd Cx growing Pseudomonas ; on IV abx ; ID on board   - Wound care consult and recs appreciated  - cont prn analgesic        Surgical site infection- (present on admission)   Assessment & Plan    Recent lumbar surgery  Please see above for details  Aspiration unsuccessful 08/14/2018,   s/p irrigation and debribement   08/16/2018        Hyponatremia- (present on admission)   Assessment & Plan    - improving  with IVFs; monitor        Hypokalemia- (present on admission)   Assessment & Plan    Replete as needed; monitoring        Diarrhea- (present on admission)   Assessment & Plan    Better s/p d/c Stool softner    If it continues , will check c-diff         Acquired hypothyroidism- (present on admission)   Assessment & Plan    - cont Synthroid per outpt regimen          Quality-Core Measures   Reviewed items::  Medications reviewed, Labs reviewed and Radiology images reviewed  DVT prophylaxis - mechanical:  SCDs

## 2018-08-19 LAB
ANION GAP SERPL CALC-SCNC: 9 MMOL/L (ref 0–11.9)
BACTERIA WND AEROBE CULT: ABNORMAL
BACTERIA WND AEROBE CULT: ABNORMAL
BACTERIA WND AEROBE CULT: NORMAL
BUN SERPL-MCNC: 13 MG/DL (ref 8–22)
CALCIUM SERPL-MCNC: 8.5 MG/DL (ref 8.5–10.5)
CHLORIDE SERPL-SCNC: 110 MMOL/L (ref 96–112)
CO2 SERPL-SCNC: 24 MMOL/L (ref 20–33)
CREAT SERPL-MCNC: 1.1 MG/DL (ref 0.5–1.4)
GLUCOSE SERPL-MCNC: 98 MG/DL (ref 65–99)
GRAM STN SPEC: ABNORMAL
GRAM STN SPEC: NORMAL
POTASSIUM SERPL-SCNC: 3.3 MMOL/L (ref 3.6–5.5)
SIGNIFICANT IND 70042: ABNORMAL
SIGNIFICANT IND 70042: NORMAL
SITE SITE: ABNORMAL
SITE SITE: NORMAL
SODIUM SERPL-SCNC: 143 MMOL/L (ref 135–145)
SOURCE SOURCE: ABNORMAL
SOURCE SOURCE: NORMAL

## 2018-08-19 PROCEDURE — 770021 HCHG ROOM/CARE - ISO PRIVATE

## 2018-08-19 PROCEDURE — A9270 NON-COVERED ITEM OR SERVICE: HCPCS | Performed by: HOSPITALIST

## 2018-08-19 PROCEDURE — 80048 BASIC METABOLIC PNL TOTAL CA: CPT

## 2018-08-19 PROCEDURE — 87493 C DIFF AMPLIFIED PROBE: CPT

## 2018-08-19 PROCEDURE — 99232 SBSQ HOSP IP/OBS MODERATE 35: CPT | Performed by: INTERNAL MEDICINE

## 2018-08-19 PROCEDURE — A9270 NON-COVERED ITEM OR SERVICE: HCPCS | Performed by: INTERNAL MEDICINE

## 2018-08-19 PROCEDURE — 700111 HCHG RX REV CODE 636 W/ 250 OVERRIDE (IP): Performed by: INTERNAL MEDICINE

## 2018-08-19 PROCEDURE — 36415 COLL VENOUS BLD VENIPUNCTURE: CPT

## 2018-08-19 PROCEDURE — 700102 HCHG RX REV CODE 250 W/ 637 OVERRIDE(OP): Performed by: INTERNAL MEDICINE

## 2018-08-19 PROCEDURE — 700102 HCHG RX REV CODE 250 W/ 637 OVERRIDE(OP): Performed by: HOSPITALIST

## 2018-08-19 PROCEDURE — 700105 HCHG RX REV CODE 258: Performed by: INTERNAL MEDICINE

## 2018-08-19 RX ORDER — ALENDRONATE SODIUM 70 MG/1
70 TABLET ORAL
Status: DISCONTINUED | OUTPATIENT
Start: 2018-08-19 | End: 2018-08-23 | Stop reason: HOSPADM

## 2018-08-19 RX ADMIN — ACETAMINOPHEN 650 MG: 325 TABLET, FILM COATED ORAL at 13:20

## 2018-08-19 RX ADMIN — HYDROMORPHONE HYDROCHLORIDE 4 MG: 4 TABLET ORAL at 18:11

## 2018-08-19 RX ADMIN — ALENDRONATE SODIUM 70 MG: 70 TABLET ORAL at 06:31

## 2018-08-19 RX ADMIN — PIPERACILLIN AND TAZOBACTAM 4.5 G: 4; .5 INJECTION, POWDER, LYOPHILIZED, FOR SOLUTION INTRAVENOUS; PARENTERAL at 13:21

## 2018-08-19 RX ADMIN — FOLIC ACID 1 MG: 1 TABLET ORAL at 05:08

## 2018-08-19 RX ADMIN — PIPERACILLIN AND TAZOBACTAM 4.5 G: 4; .5 INJECTION, POWDER, LYOPHILIZED, FOR SOLUTION INTRAVENOUS; PARENTERAL at 20:10

## 2018-08-19 RX ADMIN — POTASSIUM CHLORIDE 40 MEQ: 1500 TABLET, EXTENDED RELEASE ORAL at 05:08

## 2018-08-19 RX ADMIN — LEVOTHYROXINE SODIUM 125 MCG: 150 TABLET ORAL at 05:07

## 2018-08-19 RX ADMIN — HYDROMORPHONE HYDROCHLORIDE 4 MG: 4 TABLET ORAL at 14:50

## 2018-08-19 RX ADMIN — HYDROMORPHONE HYDROCHLORIDE 4 MG: 4 TABLET ORAL at 11:08

## 2018-08-19 RX ADMIN — HYDROMORPHONE HYDROCHLORIDE 4 MG: 4 TABLET ORAL at 00:10

## 2018-08-19 RX ADMIN — ACETAMINOPHEN 650 MG: 325 TABLET, FILM COATED ORAL at 20:10

## 2018-08-19 RX ADMIN — HYDROMORPHONE HYDROCHLORIDE 4 MG: 4 TABLET ORAL at 08:06

## 2018-08-19 RX ADMIN — HYDROXYCHLOROQUINE SULFATE 200 MG: 200 TABLET ORAL at 18:11

## 2018-08-19 RX ADMIN — HYDROXYCHLOROQUINE SULFATE 200 MG: 200 TABLET ORAL at 05:09

## 2018-08-19 RX ADMIN — HYDROMORPHONE HYDROCHLORIDE 4 MG: 4 TABLET ORAL at 05:08

## 2018-08-19 RX ADMIN — PIPERACILLIN AND TAZOBACTAM 4.5 G: 4; .5 INJECTION, POWDER, LYOPHILIZED, FOR SOLUTION INTRAVENOUS; PARENTERAL at 05:06

## 2018-08-19 RX ADMIN — HYDROMORPHONE HYDROCHLORIDE 4 MG: 4 TABLET ORAL at 22:12

## 2018-08-19 RX ADMIN — CYCLOBENZAPRINE 10 MG: 10 TABLET, FILM COATED ORAL at 00:10

## 2018-08-19 ASSESSMENT — ENCOUNTER SYMPTOMS
CARDIOVASCULAR NEGATIVE: 1
DIARRHEA: 1
DIZZINESS: 0
VOMITING: 0
RESPIRATORY NEGATIVE: 1
DIARRHEA: 0
BLURRED VISION: 0
CHILLS: 0
EYES NEGATIVE: 1
FEVER: 0
BRUISES/BLEEDS EASILY: 0
ABDOMINAL PAIN: 0
PSYCHIATRIC NEGATIVE: 1
MYALGIAS: 1
SHORTNESS OF BREATH: 0
NEUROLOGICAL NEGATIVE: 1
DEPRESSION: 0
BACK PAIN: 1
COUGH: 0
NAUSEA: 0

## 2018-08-19 ASSESSMENT — PAIN SCALES - GENERAL
PAINLEVEL_OUTOF10: 9
PAINLEVEL_OUTOF10: 8
PAINLEVEL_OUTOF10: 7
PAINLEVEL_OUTOF10: 8
PAINLEVEL_OUTOF10: 5
PAINLEVEL_OUTOF10: 8
PAINLEVEL_OUTOF10: 6
PAINLEVEL_OUTOF10: 8
PAINLEVEL_OUTOF10: 4
PAINLEVEL_OUTOF10: 7
PAINLEVEL_OUTOF10: 4
PAINLEVEL_OUTOF10: 7

## 2018-08-19 NOTE — PROGRESS NOTES
[facility]    Date of Service: 2018    Chief Complaint  58 y.o. female admitted 2018  with intractable headache and back pain and found to have lumbar spine surgical wound discharge suggestive of possible infection.    Interval Problem Update  Diarrhea    Consultants/Specialty  ID/Neurosurgery     Disposition  TBD         Review of Systems   Constitutional: Positive for malaise/fatigue.   HENT: Negative.  Negative for hearing loss.    Eyes: Negative.  Negative for blurred vision.   Respiratory: Negative.    Cardiovascular: Negative.  Negative for chest pain.   Gastrointestinal: Positive for diarrhea.   Genitourinary: Negative.  Negative for dysuria.   Musculoskeletal: Positive for back pain.   Skin: Negative.    Neurological: Negative.  Negative for dizziness.   Endo/Heme/Allergies: Negative.  Does not bruise/bleed easily.   Psychiatric/Behavioral: Negative.  Negative for depression.      Physical Exam  Laboratory/Imaging   Hemodynamics  Temp (24hrs), Av.8 °C (98.2 °F), Min:36.4 °C (97.5 °F), Max:37 °C (98.6 °F)   Temperature: 37 °C (98.6 °F)  Pulse  Av.9  Min: 58  Max: 94    Blood Pressure: 138/56      Respiratory      Respiration: 16, Pulse Oximetry: 96 %             Fluids    Intake/Output Summary (Last 24 hours) at 18 1331  Last data filed at 18 1120   Gross per 24 hour   Intake                0 ml   Output               40 ml   Net              -40 ml       Nutrition  Orders Placed This Encounter   Procedures   • Diet Order Regular     Standing Status:   Standing     Number of Occurrences:   1     Order Specific Question:   Diet:     Answer:   Regular [1]     Physical Exam   Constitutional: She is oriented to person, place, and time. She appears well-nourished.   HENT:   Head: Normocephalic and atraumatic.   Eyes: Pupils are equal, round, and reactive to light.   Neck: Normal range of motion.   Cardiovascular: Normal rate and regular rhythm.     Pulmonary/Chest: Effort normal and breath sounds normal.   Abdominal: Soft. Bowel sounds are normal.   Musculoskeletal: Normal range of motion.   Neurological: She is alert and oriented to person, place, and time.   Skin: Skin is warm.   Back surgical dressing and 2 drains in -place        Recent Labs      08/17/18   0209  08/18/18   0217   WBC  4.4*  7.9   RBC  3.69*  3.21*   HEMOGLOBIN  11.8*  10.2*   HEMATOCRIT  35.2*  31.2*   MCV  95.4  97.2   MCH  32.0  31.8   MCHC  33.5*  32.7*   RDW  44.0  44.9   PLATELETCT  205  184   MPV  11.0  11.1     Recent Labs      08/17/18   0209  08/18/18   0217  08/19/18   0249   SODIUM  138  139  143   POTASSIUM  3.9  3.5*  3.3*   CHLORIDE  108  109  110   CO2  22  23  24   GLUCOSE  132*  113*  98   BUN  6*  11  13   CREATININE  0.90  1.00  1.10   CALCIUM  8.8  8.3*  8.5                      Assessment/Plan     * Intractable low back pain- (present on admission)   Assessment & Plan    - Hx of lumbar spinal stenosis, spondylolisthesis, DDD with  Radiculopathy  - s/p lumbar decompression and instrumentation (7/19)  - purulent discharge from surgical site/wound  - Lumbar CT illustrates fluid collection (pseudomeningocele v. Seroma v. Hematoma v. Infection/abscess)  - NSG on board   - Bld Cxs NGTD; Wnd Cx growing Pseudomonas ; on IV abx ; ID on board   - Wound care consult and recs appreciated  - cont prn analgesic        Surgical site infection- (present on admission)   Assessment & Plan    Recent lumbar surgery  Please see above for details  Aspiration unsuccessful 08/14/2018,   s/p irrigation and debribement   08/16/2018        Hyponatremia- (present on admission)   Assessment & Plan    - resolved  with IVFs; monitor        Hypokalemia- (present on admission)   Assessment & Plan    Replete as needed; monitoring        Diarrhea- (present on admission)   Assessment & Plan    Still having diarrhea s/p d/c Stool softner     will check c-diff         Acquired hypothyroidism- (present  on admission)   Assessment & Plan    - cont Synthroid per outpt regimen          Quality-Core Measures

## 2018-08-19 NOTE — CARE PLAN
Problem: Infection  Goal: Will remain free from infection  Outcome: PROGRESSING AS EXPECTED  Pt reports having diarrhea x2 days. Placed on isolation for c. Diff r/o. Awaiting stool sample.     Problem: Bowel/Gastric:  Goal: Normal bowel function is maintained or improved  Outcome: PROGRESSING AS EXPECTED  Diarrhea x 2 days.     Problem: Pain Management  Goal: Pain level will decrease to patient's comfort goal  Outcome: PROGRESSING AS EXPECTED  Pt reports pain as tolerable today. Getting prn dilaudid q3.

## 2018-08-19 NOTE — PROGRESS NOTES
Shift report from PAMELLA Lea and KEREN Swartz. AOx4. very painful upon standing from bedside; pain managed with PRN opioids, muscle relaxants. Back dressings C/D/I. Had a large, soft, loose BM. Hold stool softeners.

## 2018-08-19 NOTE — PROGRESS NOTES
Infectious Disease Progress Note    Author: Codie Jackson M.D. Date & Time of service: 2018  11:37 AM    Chief Complaint:  FU surgical site infection    Interval History:  8/15 AF WBC 6.3 pt c/o diarrhea x 3 episodes since last night, thinks it's due to abx, aspiration attempted yesterday w/ no fluid pockets, surgical site still draining   AF WBC 5.8 patient states the drainage has decreased since yesterday, she states Dr. Doran will see her later today and will likely have surgical washout, diarrhea improved   AF WBC 4.4 s/p washout yesterday, no evidence of gross wound infection per OP notes, OR cx pending, pain controlled  2018 no fevers.  Ongoing back pain.  WBC 8 platelets 184  2018 T-max 98.6 WBC 8 platelets 184 creatinine 1.10 ongoing back pain    Labs Reviewed, Medications Reviewed, Radiology Reviewed and Wound Reviewed.    Review of Systems:  Review of Systems   Constitutional: Negative for chills and fever.   Respiratory: Negative for cough and shortness of breath.    Gastrointestinal: Negative for abdominal pain, diarrhea, nausea and vomiting.   Musculoskeletal: Positive for back pain and myalgias.       Hemodynamics:  Temp (24hrs), Av.8 °C (98.2 °F), Min:36.4 °C (97.5 °F), Max:37 °C (98.6 °F)  Temperature: 37 °C (98.6 °F)  Pulse  Av.9  Min: 58  Max: 94   Blood Pressure: 138/56       Physical Exam:  Physical Exam   Constitutional: She is oriented to person, place, and time. She appears well-developed and well-nourished.   HENT:   Head: Normocephalic and atraumatic.   Eyes: Pupils are equal, round, and reactive to light. EOM are normal.   Neck: Neck supple.   Cardiovascular: Normal rate and regular rhythm.    Pulmonary/Chest: Effort normal. She has no wheezes.   Abdominal: Soft. There is no tenderness.   Musculoskeletal: She exhibits no edema.   Back dressed   +Hemovac x 2   Neurological: She is alert and oriented to person, place, and time.       Meds:    Current  Facility-Administered Medications:   •  alendronate  •  [COMPLETED] piperacillin-tazobactam **AND** piperacillin-tazobactam  •  potassium chloride SA  •  folic acid  •  hydroxychloroquine  •  NS  •  acetaminophen  •  ondansetron  •  ondansetron  •  promethazine  •  promethazine  •  prochlorperazine  •  lidocaine  •  levothyroxine **AND** levothyroxine  •  diazePAM  •  HYDROmorphone  •  cyclobenzaprine    Labs:  Recent Labs      08/17/18   0209  08/18/18   0217   WBC  4.4*  7.9   RBC  3.69*  3.21*   HEMOGLOBIN  11.8*  10.2*   HEMATOCRIT  35.2*  31.2*   MCV  95.4  97.2   MCH  32.0  31.8   RDW  44.0  44.9   PLATELETCT  205  184   MPV  11.0  11.1     Recent Labs      08/17/18   0209  08/18/18   0217  08/19/18   0249   SODIUM  138  139  143   POTASSIUM  3.9  3.5*  3.3*   CHLORIDE  108  109  110   CO2  22  23  24   GLUCOSE  132*  113*  98   BUN  6*  11  13     Recent Labs      08/17/18   0209  08/18/18   0217  08/19/18   0249   CREATININE  0.90  1.00  1.10       Imaging:  Ct-abdomen-pelvis With    Result Date: 8/11/2018 8/11/2018 2:26 PM HISTORY/REASON FOR EXAM:  Abdominal pain and vomiting back pain TECHNIQUE/EXAM DESCRIPTION: CT scan of the abdomen and pelvis with contrast. Contrast-enhanced helical scanning was obtained from the diaphragmatic domes through the pubic symphysis following the bolus administration of 100 mL of Optiray 350 nonionic contrast without complication. Low dose optimization technique was utilized for this CT exam including automated exposure control and adjustment of the mA and/or kV according to patient size. COMPARISON: 02/16/2010 Limited images FINDINGS: CT Abdomen: There is no evidence of focal consolidation or pleural effusion seen within the lung bases. The liver is normal in appearance. The spleen is normal. The kidneys are normal. The adrenal glands are normal. The pancreas is normal. The aorta is normal in caliber.  No evidence of retroperitoneal adenopathy. Post cholecystectomy changes  are noted. The common bile duct measures up to 2.4 cm in diameter. There is moderate intrahepatic biliary ductal dilatation. Postoperative changes are noted in the lumbar spine. Postoperative fluid collection occupies laminectomy region at the L2-L3 level and extends into the subcutaneous fat measuring approximately 6.1 x 5.7 cm in maximum axial dimensions and 15 cm in vertical dimension. CT Pelvis: There is no evidence of bowel obstruction or inflammatory change. Normal appendix is not identified. There is no evidence of free fluid.     1.  Postoperative changes noted in the lumbar spine. Large postoperative fluid collection is identified at the L2-L3 level that also extends above and below these levels as described above. This could represent postoperative seroma or meningocele. No air  is noted within the collection but infection cannot be excluded. 2.  Post cholecystectomy. There is dilatation of the common bile duct and intrahepatic ducts. Dilatation is increased from the prior CT.     Ct-head W/o    Result Date: 8/11/2018 8/11/2018 2:26 PM HISTORY/REASON FOR EXAM:  Headache and vomiting. TECHNIQUE/EXAM DESCRIPTION AND NUMBER OF VIEWS: CT of the head without contrast. Contiguous 5 mm axial sections were obtained from the skull base through the vertex. Up to date radiation dose reduction adjustments have been utilized to meet ALARA standards for radiation dose reduction. COMPARISON:  None available FINDINGS: There is no evidence of extra-axial hemorrhage. No intra-axial hemorrhage is noted. There is no brain swelling or edema. No mass effect or midline shift is noted.     NO ACUTE ABNORMALITIES ARE NOTED ON CT SCAN OF THE HEAD.     Ct-lspine W/o Plus Recons    Result Date: 8/11/2018 8/11/2018 2:26 PM HISTORY/REASON FOR EXAM:  Recent lumbar instrumentation, pain. TECHNIQUE/EXAM DESCRIPTION AND NUMBER OF VIEWS: CT lumbar spine without contrast, with reconstructions. The study was performed on a helical  multidetector CT scanner. Thin-section helical scanning was performed from T12-L1 to the sacrum. Sagittal and coronal multiplanar reconstructions were generated from the axial images. Low dose optimization technique was utilized for this CT exam including automated exposure control and adjustment of the mA and/or kV according to patient size. COMPARISON: Lumbar spine radiograph dated 7/19/2018. FINDINGS: Preserved lumbar lordosis. Sequela of recent instrumentation at the L4-5 level with placement of bilateral paraspinal rods and transpedicular screws. The left L5 screw breaches the cortex anterior cortex of the L5 vertebra, otherwise, screws are well-positioned. Replaced L4-5 disc. Laminectomy defects at L3-5 levels. Partial resection of L2 spinous process. Cement in the posterior paraspinal soft tissues. There is a 6.4 x 6.1 x 10.9 cm fluid collection posterior to the region of spinal instrumentation with peripheral calcification. Anteriorly, the collection closely abuts the thecal sac. Sequela of L1 vertebral body augmentation with mild compression deformity. No new compression fractures. Spinal canal is patent. Visualized intra-abdominal organs are unremarkable. Atherosclerosis.     1. Recent instrumentation at L2-5 levels with a 10.7 cm fluid collection in the posterior paraspinal soft tissues. Differential includes postsurgical seroma, pseudomeningocele and the resolving hematoma. Infection should be excluded clinically. 2. Prior augmentation of L1 compression deformity. No new fracture or listhesis.    Dx-lumbar Spine-2 Or 3 Views    Result Date: 7/19/2018 7/19/2018 7:30 AM HISTORY/REASON FOR EXAM:  Lumbar fusion at L4-5 TECHNIQUE/ EXAM DESCRIPTION AND NUMBER OF VIEWS:  2 views of the lumbar spine. Digitized Intraoperative Radiograph FINDINGS: Fixation hardware projecting over the lumbar spine Fluoroscopic time: 7 seconds Fluoroscopy dose 1.769mGy    Digitized intraoperative radiograph is submitted for  review.  This examination is not for diagnostic purpose but for guidance during a surgical procedure.    Dx-portable Fluoro > 1 Hour    Result Date: 7/19/2018 7/19/2018 7:30 AM HISTORY/REASON FOR EXAM:  Lumbar fusion L4-5, Main OR TECHNIQUE/EXAM DESCRIPTION AND NUMBER OF VIEWS: Portable fluoroscopy for greater than one hour FINDINGS:      The portable fluoroscopy unit was obligated to the procedure for greater than one hour.   Actual fluoro time was 7 seconds.     Portable fluoroscopy utilized for 7 seconds.       Micro:  Results     Procedure Component Value Units Date/Time    CULTURE WOUND W/ GRAM STAIN [[id number]] Collected:  08/16/18 1709    Order Status:  Completed Specimen:  Wound Updated:  08/18/18 1026     Significant Indicator NEG     Source WND     Site Lumbar Wound Deep     Culture Result Wound No growth at 48 hours.     Gram Stain Result Few WBCs.  No organisms seen.      ANAEROBIC CULTURE [[id number]] Collected:  08/16/18 1709    Order Status:  Completed Specimen:  Wound Updated:  08/18/18 1026     Significant Indicator NEG     Source WND     Site Lumbar Wound Deep     Anaerobic Culture, Culture Res Culture in progress.    CULTURE WOUND W/ GRAM STAIN [[id number]]  (Abnormal) Collected:  08/16/18 1705    Order Status:  Completed Specimen:  Wound Updated:  08/18/18 1026     Significant Indicator POS (POS)     Source WND     Site Lumbar Wound     Culture Result Wound Growth noted after further incubation, see below for  organism identification.   (A)     Gram Stain Result Few WBCs.  No organisms seen.       Culture Result Wound Pseudomonas species  Rare growth   (A)    ANAEROBIC CULTURE [[id number]] Collected:  08/16/18 1705    Order Status:  Completed Specimen:  Wound Updated:  08/18/18 1026     Significant Indicator NEG     Source WND     Site Lumbar Wound     Anaerobic Culture, Culture Res Culture in progress.    BLOOD CULTURE [[id number]] Collected:  08/12/18 1115    Order Status:  Completed Specimen:   "Blood from Peripheral Updated:  08/17/18 1300     Significant Indicator NEG     Source BLD     Site PERIPHERAL     Blood Culture No growth after 5 days of incubation.    Narrative:       Per Hospital Policy: Only change Specimen Src: to \"Line\" if  specified by physician order.    BLOOD CULTURE [[id number]] Collected:  08/12/18 1108    Order Status:  Completed Specimen:  Blood from Peripheral Updated:  08/17/18 1300     Significant Indicator NEG     Source BLD     Site PERIPHERAL     Blood Culture No growth after 5 days of incubation.    Narrative:       Per Hospital Policy: Only change Specimen Src: to \"Line\" if  specified by physician order.    GRAM STAIN [[id number]] Collected:  08/16/18 1705    Order Status:  Completed Specimen:  Wound Updated:  08/17/18 0829     Significant Indicator .     Source WND     Site Lumbar Wound     Gram Stain Result Few WBCs.  No organisms seen.      GRAM STAIN [[id number]] Collected:  08/16/18 1709    Order Status:  Completed Specimen:  Wound Updated:  08/17/18 0829     Significant Indicator .     Source WND     Site Lumbar Wound Deep     Gram Stain Result Few WBCs.  No organisms seen.      CULTURE WOUND W/ GRAM STAIN [[id number]]  (Abnormal)  (Susceptibility) Collected:  08/12/18 1031    Order Status:  Completed Specimen:  Wound from Back Updated:  08/14/18 0821     Significant Indicator POS (POS)     Source WND     Site BACK     Culture Result Wound -- (A)     Gram Stain Result Many WBCs.  Rare Gram positive cocci.  Rare Gram negative rods.       Culture Result Wound Pseudomonas aeruginosa  Moderate growth  P.aeruginosa may develop resistance during prolonged therapy  with all antibiotics. Isolates that are initially susceptible  may become resistant within three to four days after  initiation of therapy. Testing of repeat isolates may be  warranted.   (A)    Culture & Susceptibility     PSEUDOMONAS AERUGINOSA     Antibiotic Sensitivity Microscan Unit Status    Amikacin Sensitive <=16 " mcg/mL Final    Method: SENSITIVITY, MENDOZA    Cefepime Sensitive <=8 mcg/mL Final    Method: SENSITIVITY, MENDOZA    Ceftazidime Sensitive 4 mcg/mL Final    Method: SENSITIVITY, MENDOZA    Ciprofloxacin Sensitive <=1 mcg/mL Final    Method: SENSITIVITY, MENDOZA    Gentamicin Sensitive <=4 mcg/mL Final    Method: SENSITIVITY, MENDOZA    Imipenem Sensitive <=1 mcg/mL Final    Method: SENSITIVITY, MENDOZA    Meropenem Sensitive <=1 mcg/mL Final    Method: SENSITIVITY, MENDOZA    Pip/Tazobactam Sensitive <=16 mcg/mL Final    Method: SENSITIVITY, MENDOZA    Tobramycin Sensitive <=4 mcg/mL Final    Method: SENSITIVITY, MENDOZA                       GRAM STAIN [[id number]] Collected:  08/12/18 1031    Order Status:  Completed Specimen:  Wound Updated:  08/12/18 7632     Significant Indicator .     Source WND     Site BACK     Gram Stain Result Many WBCs.  Rare Gram positive cocci.  Rare Gram negative rods.            Assessment:  Active Hospital Problems    Diagnosis   • *Intractable low back pain [M54.5]   • Surgical site infection [T81.4XXA]   • Hypokalemia [E87.6]   • Hyponatremia [E87.1]   • Acquired hypothyroidism [E03.9]       Plan:  Surgical site infection - additional work  Concern for hardware infection  Fever resolved  Leukocytosis resolved  Wound cx +PSAR (S-cipro)  Bcx - NGTD  S/p I&D on 8/16 by Dr. Doran. No definitive clinical evidence of gross wound infection per OP notes  OR cultures Pseudomonas  Continue zosyn  In view of the hardware being there I would recommend at least 4 weeks of treatment  Check sed rate and CRP in a.m.        Diarrhea, improved overall  DC'd stool softeners  If persists, check Cdiff    S/p recent lumbar laminectomy with hardware placement on 07/19/18 for lumbar spondylolisthesis     Rheumatoid arthritis  On plaquenil  Previously on leflunomide prior to surgery above    Discussed with internal medicine RN

## 2018-08-19 NOTE — CARE PLAN
Problem: Safety  Goal: Will remain free from falls  Outcome: PROGRESSING AS EXPECTED  Hourly rounding. Bed alarm, bed low, locked, call bell within reach. x1 assist, walker.     Problem: Pain Management  Goal: Pain level will decrease to patient's comfort goal  Outcome: PROGRESSING AS EXPECTED  Intermittent administration of opioid, muscle relaxants. Continuous monitoring of VS, O2 stats.

## 2018-08-20 LAB
ANION GAP SERPL CALC-SCNC: 8 MMOL/L (ref 0–11.9)
BACTERIA SPEC ANAEROBE CULT: NORMAL
BASOPHILS # BLD AUTO: 0.4 % (ref 0–1.8)
BASOPHILS # BLD: 0.02 K/UL (ref 0–0.12)
BUN SERPL-MCNC: 13 MG/DL (ref 8–22)
C DIFF DNA SPEC QL NAA+PROBE: NEGATIVE
C DIFF TOX GENS STL QL NAA+PROBE: NEGATIVE
CALCIUM SERPL-MCNC: 8.7 MG/DL (ref 8.5–10.5)
CHLORIDE SERPL-SCNC: 108 MMOL/L (ref 96–112)
CO2 SERPL-SCNC: 23 MMOL/L (ref 20–33)
CREAT SERPL-MCNC: 1.02 MG/DL (ref 0.5–1.4)
CRP SERPL HS-MCNC: 0.61 MG/DL (ref 0–0.75)
EOSINOPHIL # BLD AUTO: 0 K/UL (ref 0–0.51)
EOSINOPHIL NFR BLD: 0 % (ref 0–6.9)
ERYTHROCYTE [DISTWIDTH] IN BLOOD BY AUTOMATED COUNT: 45.1 FL (ref 35.9–50)
ERYTHROCYTE [SEDIMENTATION RATE] IN BLOOD BY WESTERGREN METHOD: 46 MM/HOUR (ref 0–30)
GLUCOSE SERPL-MCNC: 94 MG/DL (ref 65–99)
HCT VFR BLD AUTO: 31.8 % (ref 37–47)
HGB BLD-MCNC: 10.5 G/DL (ref 12–16)
IMM GRANULOCYTES # BLD AUTO: 0.03 K/UL (ref 0–0.11)
IMM GRANULOCYTES NFR BLD AUTO: 0.5 % (ref 0–0.9)
LYMPHOCYTES # BLD AUTO: 1.94 K/UL (ref 1–4.8)
LYMPHOCYTES NFR BLD: 35.1 % (ref 22–41)
MCH RBC QN AUTO: 32.1 PG (ref 27–33)
MCHC RBC AUTO-ENTMCNC: 33 G/DL (ref 33.6–35)
MCV RBC AUTO: 97.2 FL (ref 81.4–97.8)
MONOCYTES # BLD AUTO: 0.56 K/UL (ref 0–0.85)
MONOCYTES NFR BLD AUTO: 10.1 % (ref 0–13.4)
NEUTROPHILS # BLD AUTO: 2.98 K/UL (ref 2–7.15)
NEUTROPHILS NFR BLD: 53.9 % (ref 44–72)
NRBC # BLD AUTO: 0 K/UL
NRBC BLD-RTO: 0 /100 WBC
PLATELET # BLD AUTO: 186 K/UL (ref 164–446)
PMV BLD AUTO: 11 FL (ref 9–12.9)
POTASSIUM SERPL-SCNC: 3.2 MMOL/L (ref 3.6–5.5)
RBC # BLD AUTO: 3.27 M/UL (ref 4.2–5.4)
SIGNIFICANT IND 70042: NORMAL
SITE SITE: NORMAL
SODIUM SERPL-SCNC: 139 MMOL/L (ref 135–145)
SOURCE SOURCE: NORMAL
WBC # BLD AUTO: 5.5 K/UL (ref 4.8–10.8)

## 2018-08-20 PROCEDURE — 770021 HCHG ROOM/CARE - ISO PRIVATE

## 2018-08-20 PROCEDURE — 86140 C-REACTIVE PROTEIN: CPT

## 2018-08-20 PROCEDURE — 99232 SBSQ HOSP IP/OBS MODERATE 35: CPT | Performed by: INTERNAL MEDICINE

## 2018-08-20 PROCEDURE — 700102 HCHG RX REV CODE 250 W/ 637 OVERRIDE(OP): Performed by: INTERNAL MEDICINE

## 2018-08-20 PROCEDURE — A9270 NON-COVERED ITEM OR SERVICE: HCPCS | Performed by: INTERNAL MEDICINE

## 2018-08-20 PROCEDURE — 85652 RBC SED RATE AUTOMATED: CPT

## 2018-08-20 PROCEDURE — 85025 COMPLETE CBC W/AUTO DIFF WBC: CPT

## 2018-08-20 PROCEDURE — 80048 BASIC METABOLIC PNL TOTAL CA: CPT

## 2018-08-20 PROCEDURE — 700105 HCHG RX REV CODE 258: Performed by: INTERNAL MEDICINE

## 2018-08-20 PROCEDURE — 700111 HCHG RX REV CODE 636 W/ 250 OVERRIDE (IP): Performed by: INTERNAL MEDICINE

## 2018-08-20 PROCEDURE — 36415 COLL VENOUS BLD VENIPUNCTURE: CPT

## 2018-08-20 RX ADMIN — PIPERACILLIN AND TAZOBACTAM 4.5 G: 4; .5 INJECTION, POWDER, LYOPHILIZED, FOR SOLUTION INTRAVENOUS; PARENTERAL at 04:10

## 2018-08-20 RX ADMIN — PIPERACILLIN AND TAZOBACTAM 4.5 G: 4; .5 INJECTION, POWDER, LYOPHILIZED, FOR SOLUTION INTRAVENOUS; PARENTERAL at 21:03

## 2018-08-20 RX ADMIN — ACETAMINOPHEN 650 MG: 325 TABLET, FILM COATED ORAL at 15:20

## 2018-08-20 RX ADMIN — HYDROXYCHLOROQUINE SULFATE 200 MG: 200 TABLET ORAL at 17:21

## 2018-08-20 RX ADMIN — HYDROMORPHONE HYDROCHLORIDE 4 MG: 4 TABLET ORAL at 15:50

## 2018-08-20 RX ADMIN — HYDROMORPHONE HYDROCHLORIDE 4 MG: 4 TABLET ORAL at 12:47

## 2018-08-20 RX ADMIN — HYDROXYCHLOROQUINE SULFATE 200 MG: 200 TABLET ORAL at 04:10

## 2018-08-20 RX ADMIN — PIPERACILLIN AND TAZOBACTAM 4.5 G: 4; .5 INJECTION, POWDER, LYOPHILIZED, FOR SOLUTION INTRAVENOUS; PARENTERAL at 13:05

## 2018-08-20 RX ADMIN — HYDROMORPHONE HYDROCHLORIDE 4 MG: 4 TABLET ORAL at 09:21

## 2018-08-20 RX ADMIN — HYDROMORPHONE HYDROCHLORIDE 4 MG: 4 TABLET ORAL at 21:03

## 2018-08-20 RX ADMIN — FOLIC ACID 1 MG: 1 TABLET ORAL at 04:07

## 2018-08-20 RX ADMIN — POTASSIUM CHLORIDE 40 MEQ: 1500 TABLET, EXTENDED RELEASE ORAL at 04:06

## 2018-08-20 RX ADMIN — LEVOTHYROXINE SODIUM 150 MCG: 150 TABLET ORAL at 04:07

## 2018-08-20 RX ADMIN — HYDROMORPHONE HYDROCHLORIDE 4 MG: 4 TABLET ORAL at 04:08

## 2018-08-20 ASSESSMENT — PAIN SCALES - GENERAL
PAINLEVEL_OUTOF10: 5
PAINLEVEL_OUTOF10: 6
PAINLEVEL_OUTOF10: 8
PAINLEVEL_OUTOF10: 7
PAINLEVEL_OUTOF10: 5
PAINLEVEL_OUTOF10: 8
PAINLEVEL_OUTOF10: 5
PAINLEVEL_OUTOF10: 5
PAINLEVEL_OUTOF10: 7
PAINLEVEL_OUTOF10: 6
PAINLEVEL_OUTOF10: 8
PAINLEVEL_OUTOF10: 7

## 2018-08-20 ASSESSMENT — ENCOUNTER SYMPTOMS
DEPRESSION: 0
SHORTNESS OF BREATH: 0
FEVER: 0
BACK PAIN: 1
BRUISES/BLEEDS EASILY: 0
BLURRED VISION: 0
CARDIOVASCULAR NEGATIVE: 1
NEUROLOGICAL NEGATIVE: 1
NAUSEA: 0
CHILLS: 0
PSYCHIATRIC NEGATIVE: 1
VOMITING: 0
MYALGIAS: 1
DIARRHEA: 1
EYES NEGATIVE: 1
COUGH: 0
DIZZINESS: 0
ABDOMINAL PAIN: 0
RESPIRATORY NEGATIVE: 1

## 2018-08-20 NOTE — DISCHARGE PLANNING
Anticipated Discharge Disposition:   TBD    Action:   Discussed with IDT :  MD indicated that pt is not ready for dc planning yet.   Waiting for ID recommendations.   Text message to sent to Dr Reyes    Received response from Amy to go ahead and set pt up for Home IV abx.   Pt has no PICC line.   Choice made for [facility] and [facility]   Pt said she does not need any DMes.   Faxed choice to [facility].     Barriers to Discharge:   Pending ID recommendation.     Plan:   Pending receipt of recommendation from ID. I will follow up as soon as received.

## 2018-08-20 NOTE — CARE PLAN
Problem: Safety  Goal: Will remain free from falls  Outcome: PROGRESSING AS EXPECTED  Hourly rounding. Bed alarm, bed low, locked, call bell within reach. SBA, walker.    Problem: Bowel/Gastric:  Goal: Will not experience complications related to bowel motility  Outcome: PROGRESSING AS EXPECTED  Large, watery to loose, soft stools. Stool sent to lab to r/o c-diff vs diarrhea due to antibiotic therapy. Encourage yogurt and probiotic therapy.    Problem: Pain Management  Goal: Pain level will decrease to patient's comfort goal  Outcome: PROGRESSING AS EXPECTED

## 2018-08-20 NOTE — PROGRESS NOTES
Shift report from KEREN Harden. AOx4, pain managed with PRN PO opioids, tylenol. hemovac x2, scant sanguinous drainage. Dressing changed.

## 2018-08-20 NOTE — PROGRESS NOTES
Pt A&Ox4, denies any numbness and tingling, pain is 8/10 (pain medication given).    Bed alarm on, call light within reach, pt educated on importance of using the call light when she needs assistance, pt verbalized understanding.

## 2018-08-20 NOTE — PROGRESS NOTES
1251 - MD Kim paged to notify him of pt's Cdiff PCR test result and to ask if we can dc isolation.    1257 - MD Kim ok to dc isolation.

## 2018-08-20 NOTE — PROGRESS NOTES
Infectious Disease Progress Note    Author: Codie Jackson M.D. Date & Time of service: 2018  1:24 PM    Chief Complaint:  FU surgical site infection    Interval History:  8/15 AF WBC 6.3 pt c/o diarrhea x 3 episodes since last night, thinks it's due to abx, aspiration attempted yesterday w/ no fluid pockets, surgical site still draining   AF WBC 5.8 patient states the drainage has decreased since yesterday, she states Dr. Doran will see her later today and will likely have surgical washout, diarrhea improved   AF WBC 4.4 s/p washout yesterday, no evidence of gross wound infection per OP notes, OR cx pending, pain controlled  2018 no fevers.  Ongoing back pain.  WBC 8 platelets 184  2018 T-max 98.6 WBC 8 platelets 184 creatinine 1.10 ongoing back pain  2018 no fevers.  Complains of some diarrhea which has resolved since.  Ongoing back pain and sitting at the edge of the bed.  WBC 5.5  Labs Reviewed, Medications Reviewed, Radiology Reviewed and Wound Reviewed.    Review of Systems:  Review of Systems   Constitutional: Negative for chills and fever.   Respiratory: Negative for cough and shortness of breath.    Gastrointestinal: Positive for diarrhea. Negative for abdominal pain, nausea and vomiting.        Had some loose stools yesterday but has had no BM since this morning   Musculoskeletal: Positive for back pain and myalgias.       Hemodynamics:  Temp (24hrs), Av.4 °C (97.6 °F), Min:36.1 °C (96.9 °F), Max:36.8 °C (98.3 °F)  Temperature: 36.6 °C (97.9 °F)  Pulse  Av  Min: 58  Max: 94   Blood Pressure: 130/83       Physical Exam:  Physical Exam   Constitutional: She is oriented to person, place, and time. She appears well-developed and well-nourished.   HENT:   Head: Normocephalic and atraumatic.   Eyes: Pupils are equal, round, and reactive to light. EOM are normal.   Neck: Neck supple.   Cardiovascular: Normal rate and regular rhythm.    Pulmonary/Chest: Effort normal. She has  no wheezes.   Abdominal: Soft. There is no tenderness.   Musculoskeletal: She exhibits no edema.   Back dressed   +Hemovac x 2   Neurological: She is alert and oriented to person, place, and time.       Meds:    Current Facility-Administered Medications:   •  alendronate  •  [CANCELED] Special Contact Isolation **AND** C Diff by PCR rflx Toxin **AND** Pharmacy  •  [COMPLETED] piperacillin-tazobactam **AND** piperacillin-tazobactam  •  potassium chloride SA  •  folic acid  •  hydroxychloroquine  •  NS  •  acetaminophen  •  ondansetron  •  ondansetron  •  promethazine  •  promethazine  •  prochlorperazine  •  lidocaine  •  levothyroxine **AND** levothyroxine  •  diazePAM  •  HYDROmorphone  •  cyclobenzaprine    Labs:  Recent Labs      08/18/18 0217  08/20/18   0112   WBC  7.9  5.5   RBC  3.21*  3.27*   HEMOGLOBIN  10.2*  10.5*   HEMATOCRIT  31.2*  31.8*   MCV  97.2  97.2   MCH  31.8  32.1   RDW  44.9  45.1   PLATELETCT  184  186   MPV  11.1  11.0   NEUTSPOLYS   --   53.90   LYMPHOCYTES   --   35.10   MONOCYTES   --   10.10   EOSINOPHILS   --   0.00   BASOPHILS   --   0.40     Recent Labs      08/18/18 0217 08/19/18   0249  08/20/18   0112   SODIUM  139  143  139   POTASSIUM  3.5*  3.3*  3.2*   CHLORIDE  109  110  108   CO2  23  24  23   GLUCOSE  113*  98  94   BUN  11  13  13     Recent Labs      08/18/18 0217  08/19/18   0249  08/20/18   0112   CREATININE  1.00  1.10  1.02       Imaging:  Ct-abdomen-pelvis With    Result Date: 8/11/2018 8/11/2018 2:26 PM HISTORY/REASON FOR EXAM:  Abdominal pain and vomiting back pain TECHNIQUE/EXAM DESCRIPTION: CT scan of the abdomen and pelvis with contrast. Contrast-enhanced helical scanning was obtained from the diaphragmatic domes through the pubic symphysis following the bolus administration of 100 mL of Optiray 350 nonionic contrast without complication. Low dose optimization technique was utilized for this CT exam including automated exposure control and adjustment of  the mA and/or kV according to patient size. COMPARISON: 02/16/2010 Limited images FINDINGS: CT Abdomen: There is no evidence of focal consolidation or pleural effusion seen within the lung bases. The liver is normal in appearance. The spleen is normal. The kidneys are normal. The adrenal glands are normal. The pancreas is normal. The aorta is normal in caliber.  No evidence of retroperitoneal adenopathy. Post cholecystectomy changes are noted. The common bile duct measures up to 2.4 cm in diameter. There is moderate intrahepatic biliary ductal dilatation. Postoperative changes are noted in the lumbar spine. Postoperative fluid collection occupies laminectomy region at the L2-L3 level and extends into the subcutaneous fat measuring approximately 6.1 x 5.7 cm in maximum axial dimensions and 15 cm in vertical dimension. CT Pelvis: There is no evidence of bowel obstruction or inflammatory change. Normal appendix is not identified. There is no evidence of free fluid.     1.  Postoperative changes noted in the lumbar spine. Large postoperative fluid collection is identified at the L2-L3 level that also extends above and below these levels as described above. This could represent postoperative seroma or meningocele. No air  is noted within the collection but infection cannot be excluded. 2.  Post cholecystectomy. There is dilatation of the common bile duct and intrahepatic ducts. Dilatation is increased from the prior CT.     Ct-head W/o    Result Date: 8/11/2018 8/11/2018 2:26 PM HISTORY/REASON FOR EXAM:  Headache and vomiting. TECHNIQUE/EXAM DESCRIPTION AND NUMBER OF VIEWS: CT of the head without contrast. Contiguous 5 mm axial sections were obtained from the skull base through the vertex. Up to date radiation dose reduction adjustments have been utilized to meet ALARA standards for radiation dose reduction. COMPARISON:  None available FINDINGS: There is no evidence of extra-axial hemorrhage. No intra-axial hemorrhage  is noted. There is no brain swelling or edema. No mass effect or midline shift is noted.     NO ACUTE ABNORMALITIES ARE NOTED ON CT SCAN OF THE HEAD.     Ct-lspine W/o Plus Recons    Result Date: 8/11/2018 8/11/2018 2:26 PM HISTORY/REASON FOR EXAM:  Recent lumbar instrumentation, pain. TECHNIQUE/EXAM DESCRIPTION AND NUMBER OF VIEWS: CT lumbar spine without contrast, with reconstructions. The study was performed on a helical multidetector CT scanner. Thin-section helical scanning was performed from T12-L1 to the sacrum. Sagittal and coronal multiplanar reconstructions were generated from the axial images. Low dose optimization technique was utilized for this CT exam including automated exposure control and adjustment of the mA and/or kV according to patient size. COMPARISON: Lumbar spine radiograph dated 7/19/2018. FINDINGS: Preserved lumbar lordosis. Sequela of recent instrumentation at the L4-5 level with placement of bilateral paraspinal rods and transpedicular screws. The left L5 screw breaches the cortex anterior cortex of the L5 vertebra, otherwise, screws are well-positioned. Replaced L4-5 disc. Laminectomy defects at L3-5 levels. Partial resection of L2 spinous process. Cement in the posterior paraspinal soft tissues. There is a 6.4 x 6.1 x 10.9 cm fluid collection posterior to the region of spinal instrumentation with peripheral calcification. Anteriorly, the collection closely abuts the thecal sac. Sequela of L1 vertebral body augmentation with mild compression deformity. No new compression fractures. Spinal canal is patent. Visualized intra-abdominal organs are unremarkable. Atherosclerosis.     1. Recent instrumentation at L2-5 levels with a 10.7 cm fluid collection in the posterior paraspinal soft tissues. Differential includes postsurgical seroma, pseudomeningocele and the resolving hematoma. Infection should be excluded clinically. 2. Prior augmentation of L1 compression deformity. No new fracture or  listhesis.    Dx-lumbar Spine-2 Or 3 Views    Result Date: 7/19/2018 7/19/2018 7:30 AM HISTORY/REASON FOR EXAM:  Lumbar fusion at L4-5 TECHNIQUE/ EXAM DESCRIPTION AND NUMBER OF VIEWS:  2 views of the lumbar spine. Digitized Intraoperative Radiograph FINDINGS: Fixation hardware projecting over the lumbar spine Fluoroscopic time: 7 seconds Fluoroscopy dose 1.769mGy    Digitized intraoperative radiograph is submitted for review.  This examination is not for diagnostic purpose but for guidance during a surgical procedure.    Dx-portable Fluoro > 1 Hour    Result Date: 7/19/2018 7/19/2018 7:30 AM HISTORY/REASON FOR EXAM:  Lumbar fusion L4-5, Main OR TECHNIQUE/EXAM DESCRIPTION AND NUMBER OF VIEWS: Portable fluoroscopy for greater than one hour FINDINGS:      The portable fluoroscopy unit was obligated to the procedure for greater than one hour.   Actual fluoro time was 7 seconds.     Portable fluoroscopy utilized for 7 seconds.       Micro:  Results     Procedure Component Value Units Date/Time    C Diff by PCR rflx Toxin [[id number]] Collected:  08/19/18 2032    Order Status:  Completed Specimen:  Stool from Stool Updated:  08/20/18 1200     C Diff by PCR Negative     Comment: C. difficile NOT detected by PCR.  Treatment not indicated per guidelines.  Repeat testing not indicated within 7 days.          027-NAP1-BI Presumptive Negative     Comment: Presumptive 027/NAP1/BI target DNA sequences are NOT DETECTED.       Narrative:       Special Contact [id number] NICKO BAUTISTA LLUVIA.  Does this patient have risk factors for C-diff?->Yes  Has patient taken stool softeners or laxatives in the last 5  days?->Yes    CULTURE WOUND W/ GRAM STAIN [[id number]] Collected:  08/16/18 1709    Order Status:  Completed Specimen:  Wound Updated:  08/19/18 1218     Gram Stain Result Few WBCs.  No organisms seen.       Significant Indicator NEG     Source WND     Site Lumbar Wound Deep     Culture Result Wound No growth at 72 hours.     ANAEROBIC CULTURE [[id number]] Collected:  08/16/18 1709    Order Status:  Completed Specimen:  Wound Updated:  08/19/18 1218     Significant Indicator NEG     Source WND     Site Lumbar Wound Deep     Anaerobic Culture, Culture Res Culture in progress.    CULTURE WOUND W/ GRAM STAIN [[id number]]  (Abnormal)  (Susceptibility) Collected:  08/16/18 1705    Order Status:  Completed Specimen:  Wound Updated:  08/19/18 1218     Significant Indicator POS (POS)     Source WND     Site Lumbar Wound     Culture Result Wound Growth noted after further incubation, see below for  organism identification.   (A)     Gram Stain Result Few WBCs.  No organisms seen.       Culture Result Wound Pseudomonas aeruginosa  Rare growth  P.aeruginosa may develop resistance during prolonged therapy  with all antibiotics. Isolates that are initially susceptible  may become resistant within three to four days after  initiation of therapy. Testing of repeat isolates may be  warranted.   (A)    Culture & Susceptibility     PSEUDOMONAS AERUGINOSA     Antibiotic Sensitivity Microscan Unit Status    Amikacin Sensitive <=16 mcg/mL Final    Method: SENSITIVITY, MENDOZA    Cefepime Sensitive <=8 mcg/mL Final    Method: SENSITIVITY, MENDOZA    Ceftazidime Sensitive 4 mcg/mL Final    Method: SENSITIVITY, MENDOZA    Ciprofloxacin Sensitive <=1 mcg/mL Final    Method: SENSITIVITY, MENDOZA    Gentamicin Sensitive <=4 mcg/mL Final    Method: SENSITIVITY, MENDOZA    Imipenem Sensitive <=1 mcg/mL Final    Method: SENSITIVITY, MENDOZA    Meropenem Sensitive <=1 mcg/mL Final    Method: SENSITIVITY, MENDOZA    Pip/Tazobactam Sensitive <=16 mcg/mL Final    Method: SENSITIVITY, MENDOZA    Tobramycin Sensitive <=4 mcg/mL Final    Method: SENSITIVITY, MENDOZA                       ANAEROBIC CULTURE [[id number]] Collected:  08/16/18 1705    Order Status:  Completed Specimen:  Wound Updated:  08/19/18 1218     Significant Indicator NEG     Source WND     Site Lumbar Wound     Anaerobic Culture, Culture Res  "Culture in progress.    BLOOD CULTURE [[id number]] Collected:  08/12/18 1115    Order Status:  Completed Specimen:  Blood from Peripheral Updated:  08/17/18 1300     Significant Indicator NEG     Source BLD     Site PERIPHERAL     Blood Culture No growth after 5 days of incubation.    Narrative:       Per Hospital Policy: Only change Specimen Src: to \"Line\" if  specified by physician order.    BLOOD CULTURE [[id number]] Collected:  08/12/18 1108    Order Status:  Completed Specimen:  Blood from Peripheral Updated:  08/17/18 1300     Significant Indicator NEG     Source BLD     Site PERIPHERAL     Blood Culture No growth after 5 days of incubation.    Narrative:       Per Hospital Policy: Only change Specimen Src: to \"Line\" if  specified by physician order.    GRAM STAIN [[id number]] Collected:  08/16/18 1705    Order Status:  Completed Specimen:  Wound Updated:  08/17/18 0829     Significant Indicator .     Source WND     Site Lumbar Wound     Gram Stain Result Few WBCs.  No organisms seen.      GRAM STAIN [[id number]] Collected:  08/16/18 1709    Order Status:  Completed Specimen:  Wound Updated:  08/17/18 0829     Significant Indicator .     Source WND     Site Lumbar Wound Deep     Gram Stain Result Few WBCs.  No organisms seen.      CULTURE WOUND W/ GRAM STAIN [[id number]]  (Abnormal)  (Susceptibility) Collected:  08/12/18 1031    Order Status:  Completed Specimen:  Wound from Back Updated:  08/14/18 0821     Significant Indicator POS (POS)     Source WND     Site BACK     Culture Result Wound -- (A)     Gram Stain Result Many WBCs.  Rare Gram positive cocci.  Rare Gram negative rods.       Culture Result Wound Pseudomonas aeruginosa  Moderate growth  P.aeruginosa may develop resistance during prolonged therapy  with all antibiotics. Isolates that are initially susceptible  may become resistant within three to four days after  initiation of therapy. Testing of repeat isolates may be  warranted.   (A)    Culture & " Susceptibility     PSEUDOMONAS AERUGINOSA     Antibiotic Sensitivity Microscan Unit Status    Amikacin Sensitive <=16 mcg/mL Final    Method: SENSITIVITY, MENDOZA    Cefepime Sensitive <=8 mcg/mL Final    Method: SENSITIVITY, MENDOZA    Ceftazidime Sensitive 4 mcg/mL Final    Method: SENSITIVITY, MENDOZA    Ciprofloxacin Sensitive <=1 mcg/mL Final    Method: SENSITIVITY, MENDOZA    Gentamicin Sensitive <=4 mcg/mL Final    Method: SENSITIVITY, MENDOZA    Imipenem Sensitive <=1 mcg/mL Final    Method: SENSITIVITY, MENDOZA    Meropenem Sensitive <=1 mcg/mL Final    Method: SENSITIVITY, MENDOZA    Pip/Tazobactam Sensitive <=16 mcg/mL Final    Method: SENSITIVITY, MENDOZA    Tobramycin Sensitive <=4 mcg/mL Final    Method: SENSITIVITY, MENDOZA                             Assessment:  Active Hospital Problems    Diagnosis   • *Intractable low back pain [M54.5]   • Surgical site infection [T81.4XXA]   • Hypokalemia [E87.6]   • Hyponatremia [E87.1]   • Acquired hypothyroidism [E03.9]       Plan:  Surgical site infection - additional work  Concern for hardware infection  Fever resolved  Leukocytosis resolved  Wound cx +PSAR (S-cipro)  Bcx - NGTD  S/p I&D on 8/16 by Dr. Doran. No definitive clinical evidence of gross wound infection per OP notes  OR cultures Pseudomonas  Continue zosyn  In view of the hardware being there I would recommend at least 4 weeks of treatment  Sed rate 46 and CRP 0.6        Diarrhea  Off and on  C. difficile has been ordered    S/p recent lumbar laminectomy with hardware placement on 07/19/18 for lumbar spondylolisthesis     Rheumatoid arthritis  On plaquenil  Previously on leflunomide prior to surgery above    Discussed with internal medicine RN

## 2018-08-20 NOTE — PROGRESS NOTES
Family requested that a family member is with the patient when the medical team updates the pt with regards to treatments and plan of care. RN notified charge RN. Per Charge RN, he will address this with the manager.

## 2018-08-20 NOTE — PROGRESS NOTES
[facility] Progress Note    Date of Service: 2018    Chief Complaint  58 y.o. female admitted 2018  with intractable headache and back pain and found to have lumbar spine surgical wound discharge suggestive of possible infection.    Interval Problem Update  Diarrhea    Consultants/Specialty  ID/Neurosurgery     Disposition  TBD         Review of Systems   Constitutional: Positive for malaise/fatigue.   HENT: Negative.  Negative for hearing loss.    Eyes: Negative.  Negative for blurred vision.   Respiratory: Negative.    Cardiovascular: Negative.  Negative for chest pain.   Gastrointestinal: Positive for diarrhea.   Genitourinary: Negative.  Negative for dysuria.   Musculoskeletal: Positive for back pain.   Skin: Negative.    Neurological: Negative.  Negative for dizziness.   Endo/Heme/Allergies: Negative.  Does not bruise/bleed easily.   Psychiatric/Behavioral: Negative.  Negative for depression.      Physical Exam  Laboratory/Imaging   Hemodynamics  Temp (24hrs), Av.4 °C (97.6 °F), Min:36.1 °C (96.9 °F), Max:36.8 °C (98.3 °F)   Temperature: 36.6 °C (97.9 °F)  Pulse  Av  Min: 58  Max: 94    Blood Pressure: 130/83      Respiratory      Respiration: 16, Pulse Oximetry: 96 %             Fluids    Intake/Output Summary (Last 24 hours) at 18 1317  Last data filed at 18 0400   Gross per 24 hour   Intake                0 ml   Output              320 ml   Net             -320 ml       Nutrition  Orders Placed This Encounter   Procedures   • Diet Order Regular     Standing Status:   Standing     Number of Occurrences:   1     Order Specific Question:   Diet:     Answer:   Regular [1]     Physical Exam   Constitutional: She is oriented to person, place, and time. She appears well-nourished.   HENT:   Head: Normocephalic and atraumatic.   Eyes: Pupils are equal, round, and reactive to light.   Neck: Normal range of motion.   Cardiovascular: Normal rate and regular rhythm.     Pulmonary/Chest: Effort normal and breath sounds normal.   Abdominal: Soft. Bowel sounds are normal.   Musculoskeletal: Normal range of motion.   Neurological: She is alert and oriented to person, place, and time.   Skin: Skin is warm.   Back surgical dressing and 2 drains in -place        Recent Labs      08/18/18   0217  08/20/18   0112   WBC  7.9  5.5   RBC  3.21*  3.27*   HEMOGLOBIN  10.2*  10.5*   HEMATOCRIT  31.2*  31.8*   MCV  97.2  97.2   MCH  31.8  32.1   MCHC  32.7*  33.0*   RDW  44.9  45.1   PLATELETCT  184  186   MPV  11.1  11.0     Recent Labs      08/18/18   0217  08/19/18   0249  08/20/18   0112   SODIUM  139  143  139   POTASSIUM  3.5*  3.3*  3.2*   CHLORIDE  109  110  108   CO2  23  24  23   GLUCOSE  113*  98  94   BUN  11  13  13   CREATININE  1.00  1.10  1.02   CALCIUM  8.3*  8.5  8.7                      Assessment/Plan     * Intractable low back pain- (present on admission)   Assessment & Plan    - Hx of lumbar spinal stenosis, spondylolisthesis, DDD with  Radiculopathy  - s/p lumbar decompression and instrumentation (7/19)  - purulent discharge from surgical site/wound  - Lumbar CT illustrates fluid collection (pseudomeningocele v. Seroma v. Hematoma v. Infection/abscess)  - NSG on board   - Bld Cxs NGTD; Wnd Cx growing Pseudomonas ; on IV abx ; ID on board   - Wound care consult and recs appreciated  - cont prn analgesic        Surgical site infection- (present on admission)   Assessment & Plan    Recent lumbar surgery  Please see above for details  Aspiration unsuccessful 08/14/2018,   s/p irrigation and debribement   08/16/2018, No gross infection as per surgery note         Hyponatremia- (present on admission)   Assessment & Plan    - resolved  with IVFs; monitor        Hypokalemia- (present on admission)   Assessment & Plan    Replete as needed; monitoring , due to GI loss        Diarrhea- (present on admission)   Assessment & Plan    Still having diarrhea s/p d/c Stool softner       c-diff NEGATIVE         Acquired hypothyroidism- (present on admission)   Assessment & Plan    - cont Synthroid per outpt regimen          Quality-Core Measures

## 2018-08-21 ENCOUNTER — HOME HEALTH ADMISSION (OUTPATIENT)
Dept: HOME HEALTH SERVICES | Facility: HOME HEALTHCARE | Age: 58
End: 2018-08-21
Payer: COMMERCIAL

## 2018-08-21 PROBLEM — E87.1 HYPONATREMIA: Status: RESOLVED | Noted: 2018-08-11 | Resolved: 2018-08-21

## 2018-08-21 LAB
ANION GAP SERPL CALC-SCNC: 10 MMOL/L (ref 0–11.9)
BACTERIA SPEC ANAEROBE CULT: NORMAL
BUN SERPL-MCNC: 12 MG/DL (ref 8–22)
CALCIUM SERPL-MCNC: 9.1 MG/DL (ref 8.5–10.5)
CHLORIDE SERPL-SCNC: 107 MMOL/L (ref 96–112)
CO2 SERPL-SCNC: 24 MMOL/L (ref 20–33)
CREAT SERPL-MCNC: 1.03 MG/DL (ref 0.5–1.4)
GLUCOSE SERPL-MCNC: 103 MG/DL (ref 65–99)
POTASSIUM SERPL-SCNC: 3.3 MMOL/L (ref 3.6–5.5)
SIGNIFICANT IND 70042: NORMAL
SITE SITE: NORMAL
SODIUM SERPL-SCNC: 141 MMOL/L (ref 135–145)
SOURCE SOURCE: NORMAL

## 2018-08-21 PROCEDURE — 36415 COLL VENOUS BLD VENIPUNCTURE: CPT

## 2018-08-21 PROCEDURE — 700102 HCHG RX REV CODE 250 W/ 637 OVERRIDE(OP): Performed by: INTERNAL MEDICINE

## 2018-08-21 PROCEDURE — 99232 SBSQ HOSP IP/OBS MODERATE 35: CPT | Performed by: INTERNAL MEDICINE

## 2018-08-21 PROCEDURE — 700105 HCHG RX REV CODE 258: Performed by: INTERNAL MEDICINE

## 2018-08-21 PROCEDURE — A9270 NON-COVERED ITEM OR SERVICE: HCPCS | Performed by: INTERNAL MEDICINE

## 2018-08-21 PROCEDURE — 99233 SBSQ HOSP IP/OBS HIGH 50: CPT | Performed by: HOSPITALIST

## 2018-08-21 PROCEDURE — 80048 BASIC METABOLIC PNL TOTAL CA: CPT

## 2018-08-21 PROCEDURE — 700111 HCHG RX REV CODE 636 W/ 250 OVERRIDE (IP): Performed by: INTERNAL MEDICINE

## 2018-08-21 PROCEDURE — 770001 HCHG ROOM/CARE - MED/SURG/GYN PRIV*

## 2018-08-21 RX ADMIN — ACETAMINOPHEN 650 MG: 325 TABLET, FILM COATED ORAL at 21:56

## 2018-08-21 RX ADMIN — ACETAMINOPHEN 650 MG: 325 TABLET, FILM COATED ORAL at 00:56

## 2018-08-21 RX ADMIN — HYDROXYCHLOROQUINE SULFATE 200 MG: 200 TABLET ORAL at 04:09

## 2018-08-21 RX ADMIN — PIPERACILLIN AND TAZOBACTAM 4.5 G: 4; .5 INJECTION, POWDER, LYOPHILIZED, FOR SOLUTION INTRAVENOUS; PARENTERAL at 04:08

## 2018-08-21 RX ADMIN — HYDROMORPHONE HYDROCHLORIDE 4 MG: 4 TABLET ORAL at 23:57

## 2018-08-21 RX ADMIN — HYDROMORPHONE HYDROCHLORIDE 4 MG: 4 TABLET ORAL at 00:56

## 2018-08-21 RX ADMIN — CEFEPIME 2 G: 2 INJECTION, POWDER, FOR SOLUTION INTRAMUSCULAR; INTRAVENOUS at 17:07

## 2018-08-21 RX ADMIN — HYDROMORPHONE HYDROCHLORIDE 4 MG: 4 TABLET ORAL at 18:24

## 2018-08-21 RX ADMIN — CEFEPIME 2 G: 2 INJECTION, POWDER, FOR SOLUTION INTRAMUSCULAR; INTRAVENOUS at 11:16

## 2018-08-21 RX ADMIN — HYDROMORPHONE HYDROCHLORIDE 4 MG: 4 TABLET ORAL at 04:09

## 2018-08-21 RX ADMIN — LEVOTHYROXINE SODIUM 125 MCG: 150 TABLET ORAL at 04:09

## 2018-08-21 RX ADMIN — HYDROMORPHONE HYDROCHLORIDE 4 MG: 4 TABLET ORAL at 08:36

## 2018-08-21 RX ADMIN — HYDROMORPHONE HYDROCHLORIDE 4 MG: 4 TABLET ORAL at 15:00

## 2018-08-21 RX ADMIN — HYDROMORPHONE HYDROCHLORIDE 4 MG: 4 TABLET ORAL at 11:56

## 2018-08-21 RX ADMIN — ACETAMINOPHEN 650 MG: 325 TABLET, FILM COATED ORAL at 11:30

## 2018-08-21 RX ADMIN — HYDROXYCHLOROQUINE SULFATE 200 MG: 200 TABLET ORAL at 17:01

## 2018-08-21 RX ADMIN — POTASSIUM CHLORIDE 40 MEQ: 1500 TABLET, EXTENDED RELEASE ORAL at 04:09

## 2018-08-21 RX ADMIN — FOLIC ACID 1 MG: 1 TABLET ORAL at 04:09

## 2018-08-21 ASSESSMENT — PAIN SCALES - GENERAL
PAINLEVEL_OUTOF10: 4
PAINLEVEL_OUTOF10: 7
PAINLEVEL_OUTOF10: 8
PAINLEVEL_OUTOF10: 8
PAINLEVEL_OUTOF10: 5
PAINLEVEL_OUTOF10: 4
PAINLEVEL_OUTOF10: 8
PAINLEVEL_OUTOF10: 8
PAINLEVEL_OUTOF10: 7
PAINLEVEL_OUTOF10: 8
PAINLEVEL_OUTOF10: 7
PAINLEVEL_OUTOF10: 9
PAINLEVEL_OUTOF10: 5

## 2018-08-21 ASSESSMENT — ENCOUNTER SYMPTOMS
NERVOUS/ANXIOUS: 1
DIARRHEA: 1
MYALGIAS: 1
NAUSEA: 0
DIARRHEA: 0
DIZZINESS: 0
COUGH: 0
CHILLS: 0
WEAKNESS: 1
FOCAL WEAKNESS: 0
ABDOMINAL PAIN: 0
CONSTIPATION: 0
PALPITATIONS: 0
VOMITING: 0
BACK PAIN: 1
MYALGIAS: 0
FEVER: 0
SHORTNESS OF BREATH: 0
HEADACHES: 0

## 2018-08-21 ASSESSMENT — PATIENT HEALTH QUESTIONNAIRE - PHQ9
2. FEELING DOWN, DEPRESSED, IRRITABLE, OR HOPELESS: NOT AT ALL
SUM OF ALL RESPONSES TO PHQ9 QUESTIONS 1 AND 2: 0
1. LITTLE INTEREST OR PLEASURE IN DOING THINGS: NOT AT ALL

## 2018-08-21 NOTE — DISCHARGE PLANNING
ATTN: Case Management  RE: Referral for Home Health                We would like to take this opportunity to thank you for submitting a referral for your patient to continue care with [facility]. Our skilled team is dedicated to helping all patients recover and gain independence in the home setting.            As of 8/21/18, we have accepted the above patient into our service. A [facility] clinician will be out to see the patient within 48 hours to conduct our initial visit. If you have any questions or concerns regarding the patient’s transition to Home Health, please do not hesitate to contact us. We are open for referrals 7 days a week from 8AM to 5PM at [phone].      We look forward to collaborating with you,  [facility] Team

## 2018-08-21 NOTE — PROGRESS NOTES
[facility] Hospitalist Progress Note    Date of Service: 8/21/2018    Chief Complaint  58 y.o. female admitted 8/11/2018  with intractable headache and back pain and found to have lumbar spine surgical wound discharge suggestive of possible infection.    Interval Problem Update  8/21 - discussed with patient, sister, and  at length. She is very anxious about what has happened, what will happen in the future. She is having improving pain (6/10, dull/crampy pain, non-radiating). She is most concerned about whether the hardware would have to come out or not. We also discuss what home abx would look like. Questions answered.     Consultants/Specialty  ID/Neurosurgery     Disposition  Home health vs SNF    I spent a total of 40 minutes during this clinical encounter of which > 50% was devoted to counseling and coordinating care including review of records, pertinent lab data and studies, as well as discussing diagnostic evaluation and work up, planned therapeutic interventions and future disposition of care. Where indicated, the assessment and plan reflect discussion of patient with consultants, other healthcare providers, family members, and additional research needed to obtain further information in formulating the plan of care of this patient.           Review of Systems   Constitutional: Negative for chills and fever.   Respiratory: Negative for cough and shortness of breath.    Cardiovascular: Negative for chest pain and palpitations.   Gastrointestinal: Negative for abdominal pain, constipation, diarrhea, nausea and vomiting.   Genitourinary: Negative for dysuria.   Musculoskeletal: Positive for back pain. Negative for myalgias.   Skin: Negative for itching.   Neurological: Positive for weakness. Negative for dizziness, focal weakness and headaches.   Psychiatric/Behavioral: The patient is nervous/anxious.    All other systems reviewed and are negative.     Physical Exam  Laboratory/Imaging   Hemodynamics  Temp  (24hrs), Av.4 °C (97.5 °F), Min:36.1 °C (97 °F), Max:36.6 °C (97.8 °F)   Temperature: 36.6 °C (97.8 °F)  Pulse  Av  Min: 58  Max: 94    Blood Pressure: 135/76      Respiratory      Respiration: 18, Pulse Oximetry: 95 %             Fluids    Intake/Output Summary (Last 24 hours) at 18 1448  Last data filed at 18 1200   Gross per 24 hour   Intake              240 ml   Output               20 ml   Net              220 ml       Nutrition  Orders Placed This Encounter   Procedures   • Diet Order Regular     Standing Status:   Standing     Number of Occurrences:   1     Order Specific Question:   Diet:     Answer:   Regular [1]     Physical Exam   Constitutional: She is oriented to person, place, and time. She appears well-nourished.   HENT:   Head: Normocephalic and atraumatic.   Eyes: Pupils are equal, round, and reactive to light.   Neck: Normal range of motion.   Cardiovascular: Normal rate and regular rhythm.    Pulmonary/Chest: Effort normal and breath sounds normal.   Abdominal: Soft. Bowel sounds are normal.   Musculoskeletal: Normal range of motion.   Back pain  2 Drains in situ   Neurological: She is alert and oriented to person, place, and time.   Skin: Skin is warm.   Back surgical dressing and 2 drains in -place        Recent Labs      18   0112   WBC  5.5   RBC  3.27*   HEMOGLOBIN  10.5*   HEMATOCRIT  31.8*   MCV  97.2   MCH  32.1   MCHC  33.0*   RDW  45.1   PLATELETCT  186   MPV  11.0     Recent Labs      18   0249  18   0112  18   0234   SODIUM  143  139  141   POTASSIUM  3.3*  3.2*  3.3*   CHLORIDE  110  108  107   CO2  24  23  24   GLUCOSE  98  94  103*   BUN  13  13  12   CREATININE  1.10  1.02  1.03   CALCIUM  8.5  8.7  9.1                      Assessment/Plan     * Intractable low back pain- (present on admission)   Assessment & Plan    Hx of lumbar spinal stenosis, spondylolisthesis, DDD with  Radiculopathy  s/p lumbar decompression and instrumentation  (7/19)  purulent discharge from surgical site/wound post-operatively  Lumbar CT illustrates fluid collection (pseudomeningocele v. Seroma v. Hematoma v. Infection/abscess)  Bld Cxs NGTD; Wnd Cx growing Pseudomonas  Active Orders     Ordered     Ordering Provider       Tue Aug 21, 2018  8:50 AM    08/21/18 0850  cefepime (MAXIPIME) 2 g in  mL IVPB  EVERY 12 HOURS      Codie Jackson M.D.       Sat Aug 11, 2018  3:57 PM    08/11/18 1557  hydroxychloroquine (PLAQUENIL) tablet 200 mg  2 TIMES DAILY      Parmjit Sanchez M.D.      Through at least 9/16         Surgical site infection- (present on admission)   Assessment & Plan    Recent lumbar surgery  Aspiration unsuccessful 08/14/2018,   s/p irrigation and debribement 08/16/2018, No gross infection as per surgery note, but wound cultures show pseudomonas  Abx through 9/16        Hypokalemia- (present on admission)   Assessment & Plan    Goal > 4 mEq/L  Lab Results   Component Value Date/Time    POTASSIUM 3.3 (L) 08/21/2018 02:34 AM             Diarrhea- (present on admission)   Assessment & Plan    Abx-related  Better today  C-diff NEGATIVE         Acquired hypothyroidism- (present on admission)   Assessment & Plan    Synthroid          Quality-Core Measures

## 2018-08-21 NOTE — DISCHARGE PLANNING
Agency/Facility Name: [facility]  Outcome: Infusion order ((in media) Right-faxed) and referral sent per choice form.

## 2018-08-21 NOTE — DISCHARGE PLANNING
Agency/Facility Name: [facility]  Spoke To: Divina  Outcome: Verified estimated discharge is tomorrow and pt does not have PICC yet. Awaiting HH Order. SANIYA Boykin notified.

## 2018-08-21 NOTE — PROGRESS NOTES
Patient informed of POC. Patient verbalized understanding. Patient visited by infectious disease provider during shift who allowed the patient to have questions and concerns heard. Patient states they were visited by neurosurgery and the hospitalist as well. Patient accompanied by family member during shift. Patient informed of change to antibiotic therapy and ABX given per MAR. Patient's pain medicated per MAR.

## 2018-08-21 NOTE — PROGRESS NOTES
Infectious Disease Progress Note    Author: Codie Jackson M.D. Date & Time of service: 2018  11:04 AM    Chief Complaint:  FU surgical site infection    Interval History:  8/15 AF WBC 6.3 pt c/o diarrhea x 3 episodes since last night, thinks it's due to abx, aspiration attempted yesterday w/ no fluid pockets, surgical site still draining   AF WBC 5.8 patient states the drainage has decreased since yesterday, she states Dr. Doran will see her later today and will likely have surgical washout, diarrhea improved   AF WBC 4.4 s/p washout yesterday, no evidence of gross wound infection per OP notes, OR cx pending, pain controlled  2018 no fevers.  Ongoing back pain.  WBC 8 platelets 184  2018 T-max 98.6 WBC 8 platelets 184 creatinine 1.10 ongoing back pain  2018 no fevers.  Complains of some diarrhea which has resolved since.  Ongoing back pain and sitting at the edge of the bed.  WBC 5.5  2018 T-max 97.8.  Ongoing back pain.  The drainage from the drains is minimal    Labs Reviewed, Medications Reviewed, Radiology Reviewed and Wound Reviewed.    Review of Systems:  Review of Systems   Constitutional: Negative for chills and fever.   Respiratory: Negative for cough and shortness of breath.    Gastrointestinal: Positive for diarrhea. Negative for abdominal pain, nausea and vomiting.        Had some loose stools yesterday but has had no BM since this morning   Musculoskeletal: Positive for back pain and myalgias.       Hemodynamics:  Temp (24hrs), Av.4 °C (97.5 °F), Min:36.1 °C (97 °F), Max:36.6 °C (97.8 °F)  Temperature: 36.6 °C (97.8 °F)  Pulse  Av  Min: 58  Max: 94   Blood Pressure: 135/76       Physical Exam:  Physical Exam   Constitutional: She is oriented to person, place, and time. She appears well-developed and well-nourished.   HENT:   Head: Normocephalic and atraumatic.   Eyes: Pupils are equal, round, and reactive to light. EOM are normal.   Neck: Neck supple.    Cardiovascular: Normal rate and regular rhythm.    Pulmonary/Chest: Effort normal. She has no wheezes.   Abdominal: Soft. There is no tenderness.   Musculoskeletal: She exhibits no edema.   Back dressed   +Hemovac x 2-minimal drainage   Neurological: She is alert and oriented to person, place, and time.       Meds:    Current Facility-Administered Medications:   •  cefepime  •  alendronate  •  [CANCELED] Special Contact Isolation **AND** C Diff by PCR rflx Toxin **AND** Pharmacy  •  potassium chloride SA  •  folic acid  •  hydroxychloroquine  •  NS  •  acetaminophen  •  ondansetron  •  ondansetron  •  promethazine  •  promethazine  •  prochlorperazine  •  lidocaine  •  levothyroxine **AND** levothyroxine  •  diazePAM  •  HYDROmorphone  •  cyclobenzaprine    Labs:  Recent Labs      08/20/18   0112   WBC  5.5   RBC  3.27*   HEMOGLOBIN  10.5*   HEMATOCRIT  31.8*   MCV  97.2   MCH  32.1   RDW  45.1   PLATELETCT  186   MPV  11.0   NEUTSPOLYS  53.90   LYMPHOCYTES  35.10   MONOCYTES  10.10   EOSINOPHILS  0.00   BASOPHILS  0.40     Recent Labs      08/19/18   0249  08/20/18   0112  08/21/18   0234   SODIUM  143  139  141   POTASSIUM  3.3*  3.2*  3.3*   CHLORIDE  110  108  107   CO2  24  23  24   GLUCOSE  98  94  103*   BUN  13  13  12     Recent Labs      08/19/18   0249  08/20/18   0112  08/21/18   0234   CREATININE  1.10  1.02  1.03       Imaging:  Ct-abdomen-pelvis With    Result Date: 8/11/2018 8/11/2018 2:26 PM HISTORY/REASON FOR EXAM:  Abdominal pain and vomiting back pain TECHNIQUE/EXAM DESCRIPTION: CT scan of the abdomen and pelvis with contrast. Contrast-enhanced helical scanning was obtained from the diaphragmatic domes through the pubic symphysis following the bolus administration of 100 mL of Optiray 350 nonionic contrast without complication. Low dose optimization technique was utilized for this CT exam including automated exposure control and adjustment of the mA and/or kV according to patient size.  COMPARISON: 02/16/2010 Limited images FINDINGS: CT Abdomen: There is no evidence of focal consolidation or pleural effusion seen within the lung bases. The liver is normal in appearance. The spleen is normal. The kidneys are normal. The adrenal glands are normal. The pancreas is normal. The aorta is normal in caliber.  No evidence of retroperitoneal adenopathy. Post cholecystectomy changes are noted. The common bile duct measures up to 2.4 cm in diameter. There is moderate intrahepatic biliary ductal dilatation. Postoperative changes are noted in the lumbar spine. Postoperative fluid collection occupies laminectomy region at the L2-L3 level and extends into the subcutaneous fat measuring approximately 6.1 x 5.7 cm in maximum axial dimensions and 15 cm in vertical dimension. CT Pelvis: There is no evidence of bowel obstruction or inflammatory change. Normal appendix is not identified. There is no evidence of free fluid.     1.  Postoperative changes noted in the lumbar spine. Large postoperative fluid collection is identified at the L2-L3 level that also extends above and below these levels as described above. This could represent postoperative seroma or meningocele. No air  is noted within the collection but infection cannot be excluded. 2.  Post cholecystectomy. There is dilatation of the common bile duct and intrahepatic ducts. Dilatation is increased from the prior CT.     Ct-head W/o    Result Date: 8/11/2018 8/11/2018 2:26 PM HISTORY/REASON FOR EXAM:  Headache and vomiting. TECHNIQUE/EXAM DESCRIPTION AND NUMBER OF VIEWS: CT of the head without contrast. Contiguous 5 mm axial sections were obtained from the skull base through the vertex. Up to date radiation dose reduction adjustments have been utilized to meet ALARA standards for radiation dose reduction. COMPARISON:  None available FINDINGS: There is no evidence of extra-axial hemorrhage. No intra-axial hemorrhage is noted. There is no brain swelling or  edema. No mass effect or midline shift is noted.     NO ACUTE ABNORMALITIES ARE NOTED ON CT SCAN OF THE HEAD.     Ct-lspine W/o Plus Recons    Result Date: 8/11/2018 8/11/2018 2:26 PM HISTORY/REASON FOR EXAM:  Recent lumbar instrumentation, pain. TECHNIQUE/EXAM DESCRIPTION AND NUMBER OF VIEWS: CT lumbar spine without contrast, with reconstructions. The study was performed on a helical multidetector CT scanner. Thin-section helical scanning was performed from T12-L1 to the sacrum. Sagittal and coronal multiplanar reconstructions were generated from the axial images. Low dose optimization technique was utilized for this CT exam including automated exposure control and adjustment of the mA and/or kV according to patient size. COMPARISON: Lumbar spine radiograph dated 7/19/2018. FINDINGS: Preserved lumbar lordosis. Sequela of recent instrumentation at the L4-5 level with placement of bilateral paraspinal rods and transpedicular screws. The left L5 screw breaches the cortex anterior cortex of the L5 vertebra, otherwise, screws are well-positioned. Replaced L4-5 disc. Laminectomy defects at L3-5 levels. Partial resection of L2 spinous process. Cement in the posterior paraspinal soft tissues. There is a 6.4 x 6.1 x 10.9 cm fluid collection posterior to the region of spinal instrumentation with peripheral calcification. Anteriorly, the collection closely abuts the thecal sac. Sequela of L1 vertebral body augmentation with mild compression deformity. No new compression fractures. Spinal canal is patent. Visualized intra-abdominal organs are unremarkable. Atherosclerosis.     1. Recent instrumentation at L2-5 levels with a 10.7 cm fluid collection in the posterior paraspinal soft tissues. Differential includes postsurgical seroma, pseudomeningocele and the resolving hematoma. Infection should be excluded clinically. 2. Prior augmentation of L1 compression deformity. No new fracture or listhesis.    Dx-lumbar Spine-2 Or 3  Views    Result Date: 7/19/2018 7/19/2018 7:30 AM HISTORY/REASON FOR EXAM:  Lumbar fusion at L4-5 TECHNIQUE/ EXAM DESCRIPTION AND NUMBER OF VIEWS:  2 views of the lumbar spine. Digitized Intraoperative Radiograph FINDINGS: Fixation hardware projecting over the lumbar spine Fluoroscopic time: 7 seconds Fluoroscopy dose 1.769mGy    Digitized intraoperative radiograph is submitted for review.  This examination is not for diagnostic purpose but for guidance during a surgical procedure.    Dx-portable Fluoro > 1 Hour    Result Date: 7/19/2018 7/19/2018 7:30 AM HISTORY/REASON FOR EXAM:  Lumbar fusion L4-5, Main OR TECHNIQUE/EXAM DESCRIPTION AND NUMBER OF VIEWS: Portable fluoroscopy for greater than one hour FINDINGS:      The portable fluoroscopy unit was obligated to the procedure for greater than one hour.   Actual fluoro time was 7 seconds.     Portable fluoroscopy utilized for 7 seconds.       Micro:  Results     Procedure Component Value Units Date/Time    ANAEROBIC CULTURE [[id number]] Collected:  08/16/18 1705    Order Status:  Completed Specimen:  Wound Updated:  08/20/18 1644     Significant Indicator NEG     Source WND     Site Lumbar Wound     Anaerobic Culture, Culture Res No Anaerobes isolated.    CULTURE WOUND W/ GRAM STAIN [[id number]]  (Abnormal)  (Susceptibility) Collected:  08/16/18 1705    Order Status:  Completed Specimen:  Wound Updated:  08/20/18 1644     Significant Indicator POS (POS)     Source WND     Site Lumbar Wound     Culture Result Wound Growth noted after further incubation, see below for  organism identification.   (A)     Gram Stain Result Few WBCs.  No organisms seen.       Culture Result Wound Pseudomonas aeruginosa  Rare growth  P.aeruginosa may develop resistance during prolonged therapy  with all antibiotics. Isolates that are initially susceptible  may become resistant within three to four days after  initiation of therapy. Testing of repeat isolates may be  warranted.   (A)     Culture & Susceptibility     PSEUDOMONAS AERUGINOSA     Antibiotic Sensitivity Microscan Unit Status    Amikacin Sensitive <=16 mcg/mL Final    Method: SENSITIVITY, MENDOZA    Cefepime Sensitive <=8 mcg/mL Final    Method: SENSITIVITY, MENDOZA    Ceftazidime Sensitive 4 mcg/mL Final    Method: SENSITIVITY, MENDOZA    Ciprofloxacin Sensitive <=1 mcg/mL Final    Method: SENSITIVITY, MENDOZA    Gentamicin Sensitive <=4 mcg/mL Final    Method: SENSITIVITY, MENDOZA    Imipenem Sensitive <=1 mcg/mL Final    Method: SENSITIVITY, MENDOZA    Meropenem Sensitive <=1 mcg/mL Final    Method: SENSITIVITY, MENDOZA    Pip/Tazobactam Sensitive <=16 mcg/mL Final    Method: SENSITIVITY, MENDOZA    Tobramycin Sensitive <=4 mcg/mL Final    Method: SENSITIVITY, MENDOZA                       C Diff by PCR rflx Toxin [[id number]] Collected:  08/19/18 2032    Order Status:  Completed Specimen:  Stool from Stool Updated:  08/20/18 1200     C Diff by PCR Negative     Comment: C. difficile NOT detected by PCR.  Treatment not indicated per guidelines.  Repeat testing not indicated within 7 days.          027-NAP1-BI Presumptive Negative     Comment: Presumptive 027/NAP1/BI target DNA sequences are NOT DETECTED.       Narrative:       Special Contact [id number] NICKO TEIXEIRA.  Does this patient have risk factors for C-diff?->Yes  Has patient taken stool softeners or laxatives in the last 5  days?->Yes    CULTURE WOUND W/ GRAM STAIN [[id number]] Collected:  08/16/18 1709    Order Status:  Completed Specimen:  Wound Updated:  08/19/18 1218     Gram Stain Result Few WBCs.  No organisms seen.       Significant Indicator NEG     Source WND     Site Lumbar Wound Deep     Culture Result Wound No growth at 72 hours.    ANAEROBIC CULTURE [[id number]] Collected:  08/16/18 1709    Order Status:  Completed Specimen:  Wound Updated:  08/19/18 1218     Significant Indicator NEG     Source WND     Site Lumbar Wound Deep     Anaerobic Culture, Culture Res Culture in progress.    BLOOD  "CULTURE [[id number]] Collected:  08/12/18 1115    Order Status:  Completed Specimen:  Blood from Peripheral Updated:  08/17/18 1300     Significant Indicator NEG     Source BLD     Site PERIPHERAL     Blood Culture No growth after 5 days of incubation.    Narrative:       Per Hospital Policy: Only change Specimen Src: to \"Line\" if  specified by physician order.    BLOOD CULTURE [[id number]] Collected:  08/12/18 1108    Order Status:  Completed Specimen:  Blood from Peripheral Updated:  08/17/18 1300     Significant Indicator NEG     Source BLD     Site PERIPHERAL     Blood Culture No growth after 5 days of incubation.    Narrative:       Per Hospital Policy: Only change Specimen Src: to \"Line\" if  specified by physician order.    GRAM STAIN [[id number]] Collected:  08/16/18 1705    Order Status:  Completed Specimen:  Wound Updated:  08/17/18 0829     Significant Indicator .     Source WND     Site Lumbar Wound     Gram Stain Result Few WBCs.  No organisms seen.      GRAM STAIN [[id number]] Collected:  08/16/18 1709    Order Status:  Completed Specimen:  Wound Updated:  08/17/18 0829     Significant Indicator .     Source WND     Site Lumbar Wound Deep     Gram Stain Result Few WBCs.  No organisms seen.            Assessment:  Active Hospital Problems    Diagnosis   • *Intractable low back pain [M54.5]   • Surgical site infection [T81.4XXA]   • Hypokalemia [E87.6]   • Hyponatremia [E87.1]   • Acquired hypothyroidism [E03.9]       Plan:  Surgical site infection - additional work  Concern for hardware infection  Fever resolved  Leukocytosis resolved  Wound cx +PSAR (S-cipro)  Bcx - NGTD  S/p I&D on 8/16 by Dr. Doran. No definitive clinical evidence of gross wound infection per OP notes  OR cultures Pseudomonas-deep cultures are negative so far  Change Zosyn to cefepime  In view of the hardware being there I would recommend at least 4 weeks of treatment  Sed rate 46 and CRP 0.6    Orders for cefepime through 9/16/2018 " written and given to the case management    Diarrhea  Off and on  C. difficile has been ordered    S/p recent lumbar laminectomy with hardware placement on 07/19/18 for lumbar spondylolisthesis     Rheumatoid arthritis  On plaquenil  Previously on leflunomide prior to surgery above    Discussed with internal medicine RN

## 2018-08-21 NOTE — DISCHARGE PLANNING
Anticipated Discharge Disposition:   Home with HH and [facility] for IV abx    Action:   [facility] has accepted.   [facility] RN to come in tomorrow at 0900 to meet with pt and family for Home IV education    Barriers to Discharge:   Pending medical clearance    Plan:   Follow up with [facility] RN tomorrow.

## 2018-08-21 NOTE — DISCHARGE PLANNING
Anticipated Discharge Disposition:   Home with HH and IV infusion     Action:   Choice discussed with [facility]. Orders needed.  Talked to Dr. Reyes, she will give me the written order for the Home IV abx later.     Addendum: order received from Dr. Reyes, it has been uploaded in Media. Alerted [facility].     Discussed with IDT :  Asked MD to kindly place homehealth order so [facility] can send the referral.     Received a call from Zita at [facility] who confirmed receipt of the order from Dr. Reyes and so she will be at bedside tomorrow at 0900. Pt notified.     Updated pt and . The family will be here at the bedside tomorrow at 0900.    Barriers to Discharge:   Pending medical clearance  Pending acceptance by [facility] and     Plan:   Follow up with Dr. Reyes and also discuss with Dr. Aguilar.

## 2018-08-21 NOTE — CARE PLAN
"Problem: Safety  Goal: Will remain free from falls  Outcome: PROGRESSING AS EXPECTED                                        Fall Prevention Protocol    To be followed on all patients at risk for fall    Patient Name: Clifford Murphy    Guidelines for patients at risk to fall    1.  Use environmental precautions:       A.  Place treaded slipper socks on patient       B.  Place bed in low position       C.  Assure personal belongings, wastebasket, call bell, etc.  are in easy reach       D.  Transfer patient toward stronger side       E.  Ensure that a report is given to CNA regarding patient's fall risk     Consider the followin.  Make sure the IV pole is on the same side of bed as the bathroom      2.  Assure the side rail closest to bathroom is down      3.  Bed rails:  [facility] Fall program emphasizes bed rail reduction.  Bed rails contribute to patient fall risk by creating barriers to patient transfer in and out of beds.  Use of bed rails must be assessed specific to individual patient needs.  When possible, use alternative pillows and positioning devices to avail the use of  bed rails.  (Remember:  Four (4) side rails are considered a restraint).    Guidelines for high fall risk patients    Fall risk guidelines as outlined above        A.  Place orange armband on patient      B.  Placement of a magnetic \"At Risk for Fall\" sign on door frame for patients at high risk      C.  Placement of an orange Fall Risk sticker on front of chart       D.  Placement of \"Fall protocol in effect\" sign on wall above patient's head of bed as needed        E.  Frequent toileting offered or bedside commode    Consider the following:        A.  Place patient in room near nurse's station      B.  Bed alarm on      C.  Physical Therapy/Occupational Therapy consultation for strengthening and mobility, including assessment of home care needs      D.  Physician and / or pharmacy consultation for discussion of potential " medication modification or discontinuation      E.  Restraints             Problem: Pain Management  Goal: Pain level will decrease to patient's comfort goal  Outcome: PROGRESSING AS EXPECTED  Intermittent opioid administration, continuous monitoring of VS, O2 stats.

## 2018-08-21 NOTE — CARE PLAN
Problem: Infection  Goal: Will remain free from infection    Intervention: Implement standard precautions and perform hand washing before and after patient contact  Standard precautions implemented      Problem: Venous Thromboembolism (VTW)/Deep Vein Thrombosis (DVT) Prevention:  Goal: Patient will participate in Venous Thrombosis (VTE)/Deep Vein Thrombosis (DVT)Prevention Measures    Intervention: Ensure patient wears graduated elastic stockings (EUGENE hose) and/or SCDs, if ordered, when in bed or chair (Remove at least once per shift for skin check)  SCDs on

## 2018-08-21 NOTE — DISCHARGE PLANNING
Agency/Facility Name: [facility]  Outcome: Order has been obtained, HH Referral sent per choice form.

## 2018-08-21 NOTE — FACE TO FACE
Face to Face Supporting Documentation - Home Health    The encounter with this patient was in whole or in part the primary reason for home health admission.    Date of encounter:   Patient:                    MRN:                       YOB: 2020  Clifford Murphy  [id number]  1960     Home health to see patient for:  Skilled Nursing care for assessment, interventions & education and Wound Care    Skilled need for:  Surgical Aftercare back surgery with infection    Skilled nursing interventions to include:  Home IV infusion therapy and Wound Care    Homebound status evidenced by:  Need the aid of supportive devices such as crutches, canes, wheelchairs or walkers or Needs the assistance of another person in order to leave the home. Leaving home requires a considerable and taxing effort. There is a normal inability to leave the home.    Community Physician to provide follow up care: Tish Smallwood M.D.     Optional Interventions? No      I certify the face to face encounter for this home health care referral meets the CMS requirements and the encounter/clinical assessment with the patient was, in whole, or in part, for the medical condition(s) listed above, which is the primary reason for home health care. Based on my clinical findings: the service(s) are medically necessary, support the need for home health care, and the homebound criteria are met.  I certify that this patient has had a face to face encounter by myself.  Maury Aguilar M.D. - NPI: [id number]

## 2018-08-21 NOTE — DISCHARGE PLANNING
Received Choice form at 10:00 AM  Agency/Facility Name: [facility]  Unable to send referral due to no Infusion or HH order. SANIYA Boykin notified.

## 2018-08-22 ENCOUNTER — APPOINTMENT (OUTPATIENT)
Dept: RADIOLOGY | Facility: MEDICAL CENTER | Age: 58
DRG: 857 | End: 2018-08-22
Attending: INTERNAL MEDICINE
Payer: COMMERCIAL

## 2018-08-22 VITALS
DIASTOLIC BLOOD PRESSURE: 74 MMHG | HEART RATE: 72 BPM | SYSTOLIC BLOOD PRESSURE: 134 MMHG | WEIGHT: 173 LBS | TEMPERATURE: 97.8 F | HEIGHT: 64 IN | OXYGEN SATURATION: 98 % | RESPIRATION RATE: 18 BRPM | BODY MASS INDEX: 29.53 KG/M2

## 2018-08-22 PROBLEM — E87.6 HYPOKALEMIA: Status: RESOLVED | Noted: 2018-08-11 | Resolved: 2018-08-22

## 2018-08-22 PROBLEM — M54.59 INTRACTABLE LOW BACK PAIN: Status: RESOLVED | Noted: 2018-08-11 | Resolved: 2018-08-22

## 2018-08-22 PROCEDURE — 99232 SBSQ HOSP IP/OBS MODERATE 35: CPT | Performed by: INTERNAL MEDICINE

## 2018-08-22 PROCEDURE — 700111 HCHG RX REV CODE 636 W/ 250 OVERRIDE (IP): Performed by: INTERNAL MEDICINE

## 2018-08-22 PROCEDURE — A9270 NON-COVERED ITEM OR SERVICE: HCPCS | Performed by: INTERNAL MEDICINE

## 2018-08-22 PROCEDURE — 700105 HCHG RX REV CODE 258: Performed by: INTERNAL MEDICINE

## 2018-08-22 PROCEDURE — G8978 MOBILITY CURRENT STATUS: HCPCS | Mod: CI

## 2018-08-22 PROCEDURE — 05HY33Z INSERTION OF INFUSION DEVICE INTO UPPER VEIN, PERCUTANEOUS APPROACH: ICD-10-PCS | Performed by: INTERNAL MEDICINE

## 2018-08-22 PROCEDURE — 99239 HOSP IP/OBS DSCHRG MGMT >30: CPT | Performed by: HOSPITALIST

## 2018-08-22 PROCEDURE — 36569 INSJ PICC 5 YR+ W/O IMAGING: CPT

## 2018-08-22 PROCEDURE — B54NZZA ULTRASONOGRAPHY OF LEFT UPPER EXTREMITY VEINS, GUIDANCE: ICD-10-PCS | Performed by: INTERNAL MEDICINE

## 2018-08-22 PROCEDURE — G8979 MOBILITY GOAL STATUS: HCPCS | Mod: CI

## 2018-08-22 PROCEDURE — 700102 HCHG RX REV CODE 250 W/ 637 OVERRIDE(OP): Performed by: INTERNAL MEDICINE

## 2018-08-22 PROCEDURE — 97161 PT EVAL LOW COMPLEX 20 MIN: CPT

## 2018-08-22 PROCEDURE — 770001 HCHG ROOM/CARE - MED/SURG/GYN PRIV*

## 2018-08-22 PROCEDURE — G8980 MOBILITY D/C STATUS: HCPCS | Mod: CI

## 2018-08-22 RX ORDER — HYDROMORPHONE HYDROCHLORIDE 4 MG/1
4 TABLET ORAL
Qty: 30 TAB | Refills: 0 | Status: SHIPPED | OUTPATIENT
Start: 2018-08-22 | End: 2018-08-22

## 2018-08-22 RX ORDER — LIDOCAINE HYDROCHLORIDE 10 MG/ML
2 INJECTION, SOLUTION INFILTRATION; PERINEURAL
Status: DISCONTINUED | OUTPATIENT
Start: 2018-08-22 | End: 2018-08-23 | Stop reason: HOSPADM

## 2018-08-22 RX ORDER — ONDANSETRON 4 MG/1
4 TABLET, FILM COATED ORAL EVERY 4 HOURS PRN
Qty: 20 TAB | Refills: 2 | Status: SHIPPED | OUTPATIENT
Start: 2018-08-22 | End: 2019-01-29

## 2018-08-22 RX ORDER — HYDROMORPHONE HYDROCHLORIDE 4 MG/1
4 TABLET ORAL
Qty: 30 TAB | Refills: 0 | Status: SHIPPED | OUTPATIENT
Start: 2018-08-22 | End: 2018-08-27

## 2018-08-22 RX ADMIN — HYDROMORPHONE HYDROCHLORIDE 4 MG: 4 TABLET ORAL at 19:28

## 2018-08-22 RX ADMIN — HYDROMORPHONE HYDROCHLORIDE 4 MG: 4 TABLET ORAL at 06:36

## 2018-08-22 RX ADMIN — CEFEPIME 2 G: 2 INJECTION, POWDER, FOR SOLUTION INTRAMUSCULAR; INTRAVENOUS at 05:34

## 2018-08-22 RX ADMIN — HYDROXYCHLOROQUINE SULFATE 200 MG: 200 TABLET ORAL at 18:00

## 2018-08-22 RX ADMIN — FOLIC ACID 1 MG: 1 TABLET ORAL at 05:33

## 2018-08-22 RX ADMIN — HYDROXYCHLOROQUINE SULFATE 200 MG: 200 TABLET ORAL at 05:33

## 2018-08-22 RX ADMIN — ONDANSETRON 4 MG: 2 INJECTION, SOLUTION INTRAMUSCULAR; INTRAVENOUS at 17:59

## 2018-08-22 RX ADMIN — ACETAMINOPHEN 650 MG: 325 TABLET, FILM COATED ORAL at 14:22

## 2018-08-22 RX ADMIN — CEFEPIME 2 G: 2 INJECTION, POWDER, FOR SOLUTION INTRAMUSCULAR; INTRAVENOUS at 18:00

## 2018-08-22 RX ADMIN — PROMETHAZINE HYDROCHLORIDE 12.5 MG: 25 TABLET ORAL at 03:40

## 2018-08-22 RX ADMIN — HYDROMORPHONE HYDROCHLORIDE 4 MG: 4 TABLET ORAL at 03:40

## 2018-08-22 RX ADMIN — HYDROMORPHONE HYDROCHLORIDE 4 MG: 4 TABLET ORAL at 10:10

## 2018-08-22 RX ADMIN — HYDROMORPHONE HYDROCHLORIDE 4 MG: 4 TABLET ORAL at 16:11

## 2018-08-22 RX ADMIN — ONDANSETRON 4 MG: 2 INJECTION, SOLUTION INTRAMUSCULAR; INTRAVENOUS at 01:46

## 2018-08-22 RX ADMIN — CYCLOBENZAPRINE 10 MG: 10 TABLET, FILM COATED ORAL at 14:23

## 2018-08-22 RX ADMIN — PROMETHAZINE HYDROCHLORIDE 25 MG: 25 TABLET ORAL at 10:10

## 2018-08-22 RX ADMIN — LEVOTHYROXINE SODIUM 150 MCG: 150 TABLET ORAL at 05:33

## 2018-08-22 RX ADMIN — CYCLOBENZAPRINE 10 MG: 10 TABLET, FILM COATED ORAL at 17:52

## 2018-08-22 RX ADMIN — POTASSIUM CHLORIDE 40 MEQ: 1500 TABLET, EXTENDED RELEASE ORAL at 05:32

## 2018-08-22 ASSESSMENT — COGNITIVE AND FUNCTIONAL STATUS - GENERAL
STANDING UP FROM CHAIR USING ARMS: A LITTLE
MOBILITY SCORE: 18
WALKING IN HOSPITAL ROOM: A LITTLE
MOVING FROM LYING ON BACK TO SITTING ON SIDE OF FLAT BED: A LITTLE
MOVING TO AND FROM BED TO CHAIR: A LITTLE
SUGGESTED CMS G CODE MODIFIER MOBILITY: CK
TURNING FROM BACK TO SIDE WHILE IN FLAT BAD: A LITTLE
CLIMB 3 TO 5 STEPS WITH RAILING: A LITTLE

## 2018-08-22 ASSESSMENT — PAIN SCALES - GENERAL
PAINLEVEL_OUTOF10: 5
PAINLEVEL_OUTOF10: 0
PAINLEVEL_OUTOF10: 6
PAINLEVEL_OUTOF10: 4
PAINLEVEL_OUTOF10: 5
PAINLEVEL_OUTOF10: 4
PAINLEVEL_OUTOF10: 5

## 2018-08-22 ASSESSMENT — ENCOUNTER SYMPTOMS
BACK PAIN: 1
ABDOMINAL PAIN: 0
CHILLS: 0
COUGH: 0
VOMITING: 0
DIARRHEA: 1
MYALGIAS: 1
SHORTNESS OF BREATH: 0
FEVER: 0
NAUSEA: 1

## 2018-08-22 ASSESSMENT — GAIT ASSESSMENTS
GAIT LEVEL OF ASSIST: STAND BY ASSIST
ASSISTIVE DEVICE: FRONT WHEEL WALKER
DISTANCE (FEET): 150
DEVIATION: BRADYKINETIC

## 2018-08-22 NOTE — DISCHARGE PLANNING
Agency/Facility Name: [facility]  Spoke To: Florence/Rika  Outcome: Notified [facility]  pt will discharge today, pt will have dose before discharging, we are waiting for PICC to be placed. [facility]  asks that we notify pt of last dose. SANIYA Boykin notified.

## 2018-08-22 NOTE — DISCHARGE PLANNING
Care Transition Team Assessment    Information Source  Orientation : Oriented x 4  Who is responsible for making decisions for patient? : Patient    Readmission Evaluation  Is this a readmission?: No    Elopement Risk  Legal Hold: No  Ambulatory or Self Mobile in Wheelchair: Yes  Disoriented: No  Psychiatric Symptoms: None  History of Wandering: No  Elopement this Admit: No  Vocalizing Wanting to Leave: No  Displays Behaviors, Body Language Wanting to Leave: No-Not at Risk for Elopement  Elopement Risk: Not at Risk for Elopement    Interdisciplinary Discharge Planning  Does Admitting Nurse Feel This Could be a Complex Discharge?: No  Lives with - Patient's Self Care Capacity: Alone and Unable to Care For Self, Unrelated Adult  Patient or legal guardian wants to designate a caregiver (see row info): No  Caregiver name: Sena  Caregiver relationship to patient: sister  Support Systems: Family Member(s), Spouse / Significant Other  Do You Take your Prescribed Medications Regularly: Yes  Able to Return to Previous ADL's: Yes  Mobility Issues: Yes  Prior Services: None  Patient Expects to be Discharged to:: home  Assistance Needed: Yes  Durable Medical Equipment: Walker, Commode    Discharge Preparedness  What is your plan after discharge?: Home with help  What are your discharge supports?: Sibling, Spouse  Prior Functional Level: Total Assist  Difficulity with ADLs: None  Difficulity with IADLs: None    Functional Assesment  Prior Functional Level: Total Assist    Finances  Financial Barriers to Discharge: No  Prescription Coverage: Yes    Vision / Hearing Impairment  Right Eye Vision: Impaired, Wears Glasses  Left Eye Vision: Impaired, Wears Glasses    Values / Beliefs / Concerns  Values / Beliefs Concerns : No                   Discharge Risks or Barriers  Discharge risks or barriers?: No    Anticipated Discharge Information  Anticipated discharge disposition: Infusion / Enteral - home

## 2018-08-22 NOTE — DISCHARGE PLANNING
Anticipated Discharge Disposition:   Home with HH and IV abx    Action:   Talked to Zita VELIZ at [facility] who met and taught pt and family. Zita said that pt and family did well.   However, pt does not have a PICC Line yet. Hospitalist has ordered it and primary RN will follow up.   Talked to pt. She confirmed that Zita RN has given them preliminary teaching and she will call Zita once the PICC line has been inserted. Pt said that she is ready to go home.    Notified [facility] to also notify [facility] that they will be helping with woundcare also.   Although, sister Titus will be taking care of pt also.    Barriers to Discharge:   Pending PICC line insertion    Plan:   Follow up with RN re: PICC insertion  Follow up with Zita VELIZ at [facility].

## 2018-08-22 NOTE — DISCHARGE PLANNING
Anticipated Discharge Disposition:   Home with HH and IV abx    Action:   Per CN, pt will be getting a midline this pm.  Called Zita at [facility] for her to follow up with CN.   CN also given Zita's phone number.   CN aware that pt will need next dose to be given .     Barriers to Discharge:   Do not discharge pt until there is confirmation from Zita at [facility] as pt needs to be educated on how to use midline.    Plan:   CN to follow up with Zita at [facility].    Addendum:  Zita at [facility] cleared pt to be discharge today after midline access is inserted as long as pt gets her IV abx dose at 1800. Per Zita , the [facility]  nurse will see pt tomorrow early morning to teach her and family how to use the midline. Notified CN.

## 2018-08-22 NOTE — PROGRESS NOTES
Pt A/Ox4. Pt is on room air. 2 Hemovacs present but removed; pt tolerated drain removal well. Pt showered and dressings changed. Dermabond present on incision site. Pt has an IV present and patent. Pt denies nausea at this time but had bouts of nausea earlier in the day. Oral care provided. Call light and belongings within reach. Bed alarm on, bed locked and in the lowest position.

## 2018-08-22 NOTE — PROGRESS NOTES
"Orthopaedic Progress Note    Author: Stan Abel Date & Time created: 8/21/2018   7:23 PM     Interval Events:  [pod#4  ROS  Hemodynamics:  Blood pressure 120/67, pulse 70, temperature 37.2 °C (98.9 °F), resp. rate 16, height 1.626 m (5' 4\"), weight 78.5 kg (173 lb), SpO2 99 %, not currently breastfeeding.     No Active Precaution Orders    Respiratory:    Respiration: 16, Pulse Oximetry: 99 %           Fluids:    Intake/Output Summary (Last 24 hours) at 08/21/18 1923  Last data filed at 08/21/18 1800   Gross per 24 hour   Intake             1200 ml   Output               20 ml   Net             1180 ml     Admit Weight: 78.5 kg (173 lb)  Current      Physical Exam  Labs:  Recent Labs      08/20/18   0112   WBC  5.5   RBC  3.27*   HEMOGLOBIN  10.5*   HEMATOCRIT  31.8*   MCV  97.2   MCH  32.1   MCHC  33.0*   RDW  45.1   PLATELETCT  186   MPV  11.0     Recent Labs      08/19/18   0249  08/20/18   0112  08/21/18   0234   SODIUM  143  139  141   POTASSIUM  3.3*  3.2*  3.3*   CHLORIDE  110  108  107   CO2  24  23  24   GLUCOSE  98  94  103*   BUN  13  13  12   CREATININE  1.10  1.02  1.03   CALCIUM  8.5  8.7  9.1       Medical Decision Making/Problem List:    Active Hospital Problems    Diagnosis   • *Intractable low back pain [M54.5]   • Surgical site infection [T81.4XXA]   • Hypokalemia [E87.6]   • Diarrhea [R19.7]   • Acquired hypothyroidism [E03.9]     Core Measures & Quality Metrics:  Current DVT prophylaxis: compression stockings  Discussed patient condition with Family and RN.  Clearance for lovenox/heparin: not recommend spine surgery  Weight Bearing Status: as tolerated with pain     Wounds & Drains: dermabond/mesh intact,  Cdi, drains  Scant discharge  Disposition and Follow-up:   Dc lumbar drains, pt. Can shower, dc pt. Per hospitalists  "

## 2018-08-22 NOTE — PROGRESS NOTES
Infectious Disease Progress Note    Author: Codie Jackson M.D. Date & Time of service: 2018  3:31 PM    Chief Complaint:  FU surgical site infection    Interval History:  8/15 AF WBC 6.3 pt c/o diarrhea x 3 episodes since last night, thinks it's due to abx, aspiration attempted yesterday w/ no fluid pockets, surgical site still draining   AF WBC 5.8 patient states the drainage has decreased since yesterday, she states Dr. Doran will see her later today and will likely have surgical washout, diarrhea improved   AF WBC 4.4 s/p washout yesterday, no evidence of gross wound infection per OP notes, OR cx pending, pain controlled  2018 no fevers.  Ongoing back pain.  WBC 8 platelets 184  2018 T-max 98.6 WBC 8 platelets 184 creatinine 1.10 ongoing back pain  2018 no fevers.  Complains of some diarrhea which has resolved since.  Ongoing back pain and sitting at the edge of the bed.  WBC 5.5  2018 T-max 97.8.  Ongoing back pain.  The drainage from the drains is minimal  2018 T-max 98.9.  Was changed to cefepime and now having significant nausea the drains have been discontinued  Labs Reviewed, Medications Reviewed, Radiology Reviewed and Wound Reviewed.    Review of Systems:  Review of Systems   Constitutional: Negative for chills and fever.   Respiratory: Negative for cough and shortness of breath.    Gastrointestinal: Positive for diarrhea and nausea. Negative for abdominal pain and vomiting.        Continues to have diarrhea   Musculoskeletal: Positive for back pain and myalgias.       Hemodynamics:  Temp (24hrs), Av.6 °C (97.9 °F), Min:36.2 °C (97.2 °F), Max:37.2 °C (98.9 °F)  Temperature: 36.7 °C (98 °F)  Pulse  Av.5  Min: 58  Max: 94   Blood Pressure: 127/75       Physical Exam:  Physical Exam   Constitutional: She is oriented to person, place, and time. She appears well-developed and well-nourished.   HENT:   Head: Normocephalic and atraumatic.   Eyes: Pupils are  equal, round, and reactive to light. EOM are normal.   Neck: Neck supple.   Cardiovascular: Normal rate and regular rhythm.    Pulmonary/Chest: Effort normal. She has no wheezes.   Abdominal: Soft. There is no tenderness.   Musculoskeletal: She exhibits no edema.   Back dressed      Neurological: She is alert and oriented to person, place, and time.       Meds:    Current Facility-Administered Medications:   •  lidocaine  •  cefepime  •  alendronate  •  potassium chloride SA  •  folic acid  •  hydroxychloroquine  •  NS  •  acetaminophen  •  ondansetron  •  ondansetron  •  promethazine  •  promethazine  •  prochlorperazine  •  lidocaine  •  levothyroxine **AND** levothyroxine  •  diazePAM  •  HYDROmorphone  •  cyclobenzaprine    Labs:  Recent Labs      08/20/18 0112   WBC  5.5   RBC  3.27*   HEMOGLOBIN  10.5*   HEMATOCRIT  31.8*   MCV  97.2   MCH  32.1   RDW  45.1   PLATELETCT  186   MPV  11.0   NEUTSPOLYS  53.90   LYMPHOCYTES  35.10   MONOCYTES  10.10   EOSINOPHILS  0.00   BASOPHILS  0.40     Recent Labs      08/20/18   0112  08/21/18   0234   SODIUM  139  141   POTASSIUM  3.2*  3.3*   CHLORIDE  108  107   CO2  23  24   GLUCOSE  94  103*   BUN  13  12     Recent Labs      08/20/18   0112  08/21/18   0234   CREATININE  1.02  1.03       Imaging:  Ct-abdomen-pelvis With    Result Date: 8/11/2018 8/11/2018 2:26 PM HISTORY/REASON FOR EXAM:  Abdominal pain and vomiting back pain TECHNIQUE/EXAM DESCRIPTION: CT scan of the abdomen and pelvis with contrast. Contrast-enhanced helical scanning was obtained from the diaphragmatic domes through the pubic symphysis following the bolus administration of 100 mL of Optiray 350 nonionic contrast without complication. Low dose optimization technique was utilized for this CT exam including automated exposure control and adjustment of the mA and/or kV according to patient size. COMPARISON: 02/16/2010 Limited images FINDINGS: CT Abdomen: There is no evidence of focal consolidation or  pleural effusion seen within the lung bases. The liver is normal in appearance. The spleen is normal. The kidneys are normal. The adrenal glands are normal. The pancreas is normal. The aorta is normal in caliber.  No evidence of retroperitoneal adenopathy. Post cholecystectomy changes are noted. The common bile duct measures up to 2.4 cm in diameter. There is moderate intrahepatic biliary ductal dilatation. Postoperative changes are noted in the lumbar spine. Postoperative fluid collection occupies laminectomy region at the L2-L3 level and extends into the subcutaneous fat measuring approximately 6.1 x 5.7 cm in maximum axial dimensions and 15 cm in vertical dimension. CT Pelvis: There is no evidence of bowel obstruction or inflammatory change. Normal appendix is not identified. There is no evidence of free fluid.     1.  Postoperative changes noted in the lumbar spine. Large postoperative fluid collection is identified at the L2-L3 level that also extends above and below these levels as described above. This could represent postoperative seroma or meningocele. No air  is noted within the collection but infection cannot be excluded. 2.  Post cholecystectomy. There is dilatation of the common bile duct and intrahepatic ducts. Dilatation is increased from the prior CT.     Ct-head W/o    Result Date: 8/11/2018 8/11/2018 2:26 PM HISTORY/REASON FOR EXAM:  Headache and vomiting. TECHNIQUE/EXAM DESCRIPTION AND NUMBER OF VIEWS: CT of the head without contrast. Contiguous 5 mm axial sections were obtained from the skull base through the vertex. Up to date radiation dose reduction adjustments have been utilized to meet ALARA standards for radiation dose reduction. COMPARISON:  None available FINDINGS: There is no evidence of extra-axial hemorrhage. No intra-axial hemorrhage is noted. There is no brain swelling or edema. No mass effect or midline shift is noted.     NO ACUTE ABNORMALITIES ARE NOTED ON CT SCAN OF THE HEAD.      Ct-lspine W/o Plus Recons    Result Date: 8/11/2018 8/11/2018 2:26 PM HISTORY/REASON FOR EXAM:  Recent lumbar instrumentation, pain. TECHNIQUE/EXAM DESCRIPTION AND NUMBER OF VIEWS: CT lumbar spine without contrast, with reconstructions. The study was performed on a helical multidetector CT scanner. Thin-section helical scanning was performed from T12-L1 to the sacrum. Sagittal and coronal multiplanar reconstructions were generated from the axial images. Low dose optimization technique was utilized for this CT exam including automated exposure control and adjustment of the mA and/or kV according to patient size. COMPARISON: Lumbar spine radiograph dated 7/19/2018. FINDINGS: Preserved lumbar lordosis. Sequela of recent instrumentation at the L4-5 level with placement of bilateral paraspinal rods and transpedicular screws. The left L5 screw breaches the cortex anterior cortex of the L5 vertebra, otherwise, screws are well-positioned. Replaced L4-5 disc. Laminectomy defects at L3-5 levels. Partial resection of L2 spinous process. Cement in the posterior paraspinal soft tissues. There is a 6.4 x 6.1 x 10.9 cm fluid collection posterior to the region of spinal instrumentation with peripheral calcification. Anteriorly, the collection closely abuts the thecal sac. Sequela of L1 vertebral body augmentation with mild compression deformity. No new compression fractures. Spinal canal is patent. Visualized intra-abdominal organs are unremarkable. Atherosclerosis.     1. Recent instrumentation at L2-5 levels with a 10.7 cm fluid collection in the posterior paraspinal soft tissues. Differential includes postsurgical seroma, pseudomeningocele and the resolving hematoma. Infection should be excluded clinically. 2. Prior augmentation of L1 compression deformity. No new fracture or listhesis.    Dx-lumbar Spine-2 Or 3 Views    Result Date: 7/19/2018 7/19/2018 7:30 AM HISTORY/REASON FOR EXAM:  Lumbar fusion at L4-5 TECHNIQUE/  EXAM DESCRIPTION AND NUMBER OF VIEWS:  2 views of the lumbar spine. Digitized Intraoperative Radiograph FINDINGS: Fixation hardware projecting over the lumbar spine Fluoroscopic time: 7 seconds Fluoroscopy dose 1.769mGy    Digitized intraoperative radiograph is submitted for review.  This examination is not for diagnostic purpose but for guidance during a surgical procedure.    Dx-portable Fluoro > 1 Hour    Result Date: 7/19/2018 7/19/2018 7:30 AM HISTORY/REASON FOR EXAM:  Lumbar fusion L4-5, Main OR TECHNIQUE/EXAM DESCRIPTION AND NUMBER OF VIEWS: Portable fluoroscopy for greater than one hour FINDINGS:      The portable fluoroscopy unit was obligated to the procedure for greater than one hour.   Actual fluoro time was 7 seconds.     Portable fluoroscopy utilized for 7 seconds.       Micro:  Results     Procedure Component Value Units Date/Time    CULTURE WOUND W/ GRAM STAIN [[id number]] Collected:  08/16/18 1709    Order Status:  Completed Specimen:  Wound Updated:  08/21/18 1532     Gram Stain Result Few WBCs.  No organisms seen.       Significant Indicator NEG     Source WND     Site Lumbar Wound Deep     Culture Result Wound No growth at 72 hours.    ANAEROBIC CULTURE [[id number]] Collected:  08/16/18 1709    Order Status:  Completed Specimen:  Wound Updated:  08/21/18 1532     Significant Indicator NEG     Source WND     Site Lumbar Wound Deep     Anaerobic Culture, Culture Res No Anaerobes isolated.    ANAEROBIC CULTURE [[id number]] Collected:  08/16/18 1705    Order Status:  Completed Specimen:  Wound Updated:  08/20/18 1644     Significant Indicator NEG     Source WND     Site Lumbar Wound     Anaerobic Culture, Culture Res No Anaerobes isolated.    CULTURE WOUND W/ GRAM STAIN [[id number]]  (Abnormal)  (Susceptibility) Collected:  08/16/18 1705    Order Status:  Completed Specimen:  Wound Updated:  08/20/18 1644     Significant Indicator POS (POS)     Source WND     Site Lumbar Wound     Culture Result Wound  Growth noted after further incubation, see below for  organism identification.   (A)     Gram Stain Result Few WBCs.  No organisms seen.       Culture Result Wound Pseudomonas aeruginosa  Rare growth  P.aeruginosa may develop resistance during prolonged therapy  with all antibiotics. Isolates that are initially susceptible  may become resistant within three to four days after  initiation of therapy. Testing of repeat isolates may be  warranted.   (A)    Culture & Susceptibility     PSEUDOMONAS AERUGINOSA     Antibiotic Sensitivity Microscan Unit Status    Amikacin Sensitive <=16 mcg/mL Final    Method: SENSITIVITY, MENDOZA    Cefepime Sensitive <=8 mcg/mL Final    Method: SENSITIVITY, MENDOZA    Ceftazidime Sensitive 4 mcg/mL Final    Method: SENSITIVITY, MENDOZA    Ciprofloxacin Sensitive <=1 mcg/mL Final    Method: SENSITIVITY, MENDOZA    Gentamicin Sensitive <=4 mcg/mL Final    Method: SENSITIVITY, MENDOZA    Imipenem Sensitive <=1 mcg/mL Final    Method: SENSITIVITY, MENDOZA    Meropenem Sensitive <=1 mcg/mL Final    Method: SENSITIVITY, MENDOZA    Pip/Tazobactam Sensitive <=16 mcg/mL Final    Method: SENSITIVITY, MENDOZA    Tobramycin Sensitive <=4 mcg/mL Final    Method: SENSITIVITY, MENDOZA                       C Diff by PCR rflx Toxin [[id number]] Collected:  08/19/18 2032    Order Status:  Completed Specimen:  Stool from Stool Updated:  08/20/18 1200     C Diff by PCR Negative     Comment: C. difficile NOT detected by PCR.  Treatment not indicated per guidelines.  Repeat testing not indicated within 7 days.          027-NAP1-BI Presumptive Negative     Comment: Presumptive 027/NAP1/BI target DNA sequences are NOT DETECTED.       Narrative:       Special Contact [id number] NICKO TEIXEIRALang  Does this patient have risk factors for C-diff?->Yes  Has patient taken stool softeners or laxatives in the last 5  days?->Yes    BLOOD CULTURE [[id number]] Collected:  08/12/18 1115    Order Status:  Completed Specimen:  Blood from Peripheral Updated:  " 08/17/18 1300     Significant Indicator NEG     Source BLD     Site PERIPHERAL     Blood Culture No growth after 5 days of incubation.    Narrative:       Per Hospital Policy: Only change Specimen Src: to \"Line\" if  specified by physician order.    BLOOD CULTURE [[id number]] Collected:  08/12/18 1108    Order Status:  Completed Specimen:  Blood from Peripheral Updated:  08/17/18 1300     Significant Indicator NEG     Source BLD     Site PERIPHERAL     Blood Culture No growth after 5 days of incubation.    Narrative:       Per Hospital Policy: Only change Specimen Src: to \"Line\" if  specified by physician order.    GRAM STAIN [[id number]] Collected:  08/16/18 1705    Order Status:  Completed Specimen:  Wound Updated:  08/17/18 0829     Significant Indicator .     Source WND     Site Lumbar Wound     Gram Stain Result Few WBCs.  No organisms seen.      GRAM STAIN [[id number]] Collected:  08/16/18 1709    Order Status:  Completed Specimen:  Wound Updated:  08/17/18 0829     Significant Indicator .     Source WND     Site Lumbar Wound Deep     Gram Stain Result Few WBCs.  No organisms seen.            Assessment:  Active Hospital Problems    Diagnosis   • *Intractable low back pain [M54.5]   • Surgical site infection [T81.4XXA]   • Hypokalemia [E87.6]   • Hyponatremia [E87.1]   • Acquired hypothyroidism [E03.9]       Plan:  Surgical site infection -   Concern for hardware infection  Fever resolved  Leukocytosis resolved  Wound cx +PSAR (S-cipro)  Bcx - NGTD  S/p I&D on 8/16 by Dr. Doran. No definitive clinical evidence of gross wound infection per OP notes  OR cultures Pseudomonas-deep cultures are negative so far  Changed Zosyn to cefepime  In view of the hardware being there I would recommend at least 4 weeks of treatment  Sed rate 46 and CRP 0.6  Patient is having significant nausea with cefepime.  Will likely give her 2 weeks of cefepime then through 8/31/2018 followed by 2 weeks of p.o. Cipro through 9/16/2018 with " close follow-up.  Instead of a PICC line do a midline-    Diarrhea  Off and on  C. difficile was negative on 8/19/2028    S/p recent lumbar laminectomy with hardware placement on 07/19/18 for lumbar spondylolisthesis     Rheumatoid arthritis  On plaquenil  Previously on leflunomide prior to surgery above    Discussed with internal medicine RN

## 2018-08-22 NOTE — CARE PLAN
Problem: Safety  Goal: Will remain free from injury  Outcome: PROGRESSING AS EXPECTED  Pt calls for assistance when needed. Pt ambulates with FWW. Bed alarm is on. No brace ordered.    Problem: Infection  Goal: Will remain free from infection  Outcome: PROGRESSING AS EXPECTED  Pt being treated for current infection.

## 2018-08-22 NOTE — DISCHARGE SUMMARY
Discharge Summary    CHIEF COMPLAINT ON ADMISSION  Chief Complaint   Patient presents with   • Head Ache   • Muscle Spasms     low back and down leg   • Vomiting       Reason for Admission  Headache; Vomitting      Admission Date  8/11/2018    CODE STATUS  Full Code    HPI & HOSPITAL COURSE  This is a 58 y.o. female here with with intractable headache and back pain and found to have lumbar spine surgical wound discharge suggestive of possible infection. She was admitted and her wound cultures on I&D grew out pseudomonas aeruginosa. Infectious disease was consulted. She was maintained on Zosyn for this. Ultimately after gradual improvement in her pain, she was able to plan for discharge. She did have some antibiotic-associated diarrhea but this resolved and was demonstrated not to be c. diff. For discharge, she was switched to cefepime for its more favorable frequency. She was set up with home health for home infusion. She was anxious for discharge. She was maintained on Dilaudid, and given five days of Dilaudid 4mg PO q3 PRN, which was the only effective dose for her pain. She was advised to follow up with her primary care physician and surgeon.     Therefore, she is discharged in fair and stable condition to home with organized home healthcare and close outpatient follow-up.    The patient met 2-midnight criteria for an inpatient stay at the time of discharge.    Discharge Date  08/22/18    FOLLOW UP ITEMS POST DISCHARGE  Home antibiotics    DISCHARGE DIAGNOSES  Principal Problem (Resolved):    Intractable low back pain POA: Yes  Active Problems:    Surgical site infection POA: Yes    Acquired hypothyroidism POA: Yes      Overview: thyroid med dose increased 9/6/16   Resolved Problems:    Diarrhea POA: Yes    Hypokalemia POA: Yes    Hyponatremia POA: Yes      FOLLOW UP  Future Appointments  Date Time Provider Department Center   8/29/2018 8:45 AM Elizabeth Gómez M.D. [facility] None     Tish Smallwood M.D.  [address]  [address]  [facility] [id number] [phone]    In 2 weeks  For post-hospital follow-up    Codie Jackson M.D.  [address] [facility]  [address]  [facility] [address] [phone]    In 2 weeks  For post-hospital follow-up on antibiotics      MEDICATIONS ON DISCHARGE     Medication List      START taking these medications      Instructions   ondansetron 4 MG Tabs tablet  Commonly known as:  ZOFRAN   Take 1 Tab by mouth every four hours as needed for Nausea/Vomiting.  Dose:  4 mg        CONTINUE taking these medications      Instructions   acetaminophen 500 MG Tabs  Commonly known as:  TYLENOL   Take 500-1,000 mg by mouth every 6 hours as needed.  Dose:  500-1000 mg     alendronate 70 MG Tabs  Commonly known as:  FOSAMAX   Take 70 mg by mouth every Sunday.  Dose:  70 mg     cephALEXin 500 MG Caps  Commonly known as:  KEFLEX   Take 500 mg by mouth 4 times a day. 2nd course post surgery  Dose:  500 mg     diazePAM 5 MG Tabs  Commonly known as:  VALIUM   Take 5 mg by mouth every 6 hours as needed for Anxiety.  Dose:  5 mg     HYDROmorphone 4 MG Tabs  Commonly known as:  DILAUDID   Take 1 Tab by mouth every 3 hours as needed for Severe Pain for up to 5 days.  Dose:  4 mg     hydroxychloroquine 200 MG Tabs  Commonly known as:  PLAQUENIL   TAKE 1 TABLET BY MOUTH TWICE A DAY     ibuprofen 800 MG Tabs  Commonly known as:  MOTRIN   Take 800 mg by mouth every 8 hours as needed.  Dose:  800 mg     levothyroxine 125 MCG Tabs  Commonly known as:  SYNTHROID   Take 125-150 mcg by mouth Every morning on an empty stomach. M/W/F 150 mcg All other days 125 mcg  Dose:  125-150 mcg     raNITidine 150 MG Tabs  Commonly known as:  ZANTAC   TAKE 1 TAB BY MOUTH 2 TIMES A DAY        STOP taking these medications    ondansetron 4 MG Tbdp  Commonly known as:  ZOFRAN ODT            Allergies  Allergies   Allergen Reactions   • Morphine Anaphylaxis     Morphine and morphine derivatives. (demerol)   • Aspirin      Stomach pain   • Codeine       Stomach pain   • Tramadol Vomiting     SEVERE HEADACHE   • Sulfamethoxazole W-Trimethoprim Shortness of Breath     Rxn - flushing, SOB  Late 80's     • Other Drug      Muscle relaxants cause dizziness         DIET  Orders Placed This Encounter   Procedures   • Diet Order Regular     Standing Status:   Standing     Number of Occurrences:   1     Order Specific Question:   Diet:     Answer:   Regular [1]       ACTIVITY  As tolerated.  Weight bearing as tolerated    CONSULTATIONS  Antonio Doran MD - Orthopedics  Ramona Dick MD - infectious diseases    PROCEDURES  PreOp Diagnosis: post operative wound infection lumbar spine  PostOp Diagnosis: no clinical evidence of gross wound infection  Procedure(s): IRRIGATION & DEBRIDEMENT NEURO - LUMBAR WOUND POST-FUSION  Cultures x2 of deep and superficial layer- Wound Class: Dirty or Infected  Surgeon(s):  Antonio Doran M.D.  Anesthesiologist/Type of Anesthesia:  Anesthesiologist: Andrez Espinosa M.D./General  Surgical Staff:  Assistant: CHERYL Prater  Circulator: Tessy Herron R.N.  Scrub Person: AMARILIS Sheridan IV    LABORATORY  Lab Results   Component Value Date    SODIUM 141 08/21/2018    POTASSIUM 3.3 (L) 08/21/2018    CHLORIDE 107 08/21/2018    CO2 24 08/21/2018    GLUCOSE 103 (H) 08/21/2018    BUN 12 08/21/2018    CREATININE 1.03 08/21/2018    CREATININE 0.77 03/26/2013        Lab Results   Component Value Date    WBC 5.5 08/20/2018    WBC 5.7 10/20/2011    HEMOGLOBIN 10.5 (L) 08/20/2018    HEMATOCRIT 31.8 (L) 08/20/2018    PLATELETCT 186 08/20/2018        Total time of the discharge process exceeds 36 minutes.

## 2018-08-23 ENCOUNTER — PATIENT OUTREACH (OUTPATIENT)
Dept: HEALTH INFORMATION MANAGEMENT | Facility: OTHER | Age: 58
End: 2018-08-23

## 2018-08-23 ENCOUNTER — TELEPHONE (OUTPATIENT)
Dept: INFECTIOUS DISEASES | Facility: MEDICAL CENTER | Age: 58
End: 2018-08-23

## 2018-08-23 ENCOUNTER — HOME CARE VISIT (OUTPATIENT)
Dept: HOME HEALTH SERVICES | Facility: HOME HEALTHCARE | Age: 58
End: 2018-08-23
Payer: COMMERCIAL

## 2018-08-23 VITALS
TEMPERATURE: 97.8 F | HEART RATE: 74 BPM | DIASTOLIC BLOOD PRESSURE: 65 MMHG | HEIGHT: 64 IN | RESPIRATION RATE: 18 BRPM | OXYGEN SATURATION: 96 % | BODY MASS INDEX: 29.19 KG/M2 | SYSTOLIC BLOOD PRESSURE: 120 MMHG | WEIGHT: 171 LBS

## 2018-08-23 PROCEDURE — 665001 SOC-HOME HEALTH

## 2018-08-23 PROCEDURE — G0493 RN CARE EA 15 MIN HH/HOSPICE: HCPCS

## 2018-08-23 PROCEDURE — A6219 GAUZE <= 16 SQ IN W/BORDER: HCPCS

## 2018-08-23 SDOH — ECONOMIC STABILITY: HOUSING INSECURITY: UNSAFE COOKING RANGE AREA: 0

## 2018-08-23 SDOH — ECONOMIC STABILITY: HOUSING INSECURITY: UNSAFE APPLIANCES: 0

## 2018-08-23 ASSESSMENT — ACTIVITIES OF DAILY LIVING (ADL)
HOME_HEALTH_OASIS: 02
HOME_HEALTH_OASIS: 01
TRANSPORTATION COMMENTS: FATIGUES EASILY
OASIS_M1830: 03

## 2018-08-23 ASSESSMENT — ENCOUNTER SYMPTOMS
SHORTNESS OF BREATH: T
VOMITING: DENIES

## 2018-08-23 ASSESSMENT — PATIENT HEALTH QUESTIONNAIRE - PHQ9
2. FEELING DOWN, DEPRESSED, IRRITABLE, OR HOPELESS: 00
1. LITTLE INTEREST OR PLEASURE IN DOING THINGS: 00

## 2018-08-23 NOTE — DISCHARGE INSTRUCTIONS
Discharge Instructions    Discharged to home by car with relative. Discharged via wheelchair, hospital escort: Yes.  Special equipment needed: Walker    Be sure to schedule a follow-up appointment with your primary care doctor or any specialists as instructed.     Discharge Plan:   Influenza Vaccine Indication: Patient Refuses    I understand that a diet low in cholesterol, fat, and sodium is recommended for good health. Unless I have been given specific instructions below for another diet, I accept this instruction as my diet prescription.   Other diet: Regular    Special Instructions:  No bending or twisting  No lifting or pushing greater than 10lbs  Follow up with Infectious disease  Call with any questions    · Is patient discharged on Warfarin / Coumadin?   No     Depression / Suicide Risk    As you are discharged from this [facility] facility, it is important to learn how to keep safe from harming yourself.    Recognize the warning signs:  · Abrupt changes in personality, positive or negative- including increase in energy   · Giving away possessions  · Change in eating patterns- significant weight changes-  positive or negative  · Change in sleeping patterns- unable to sleep or sleeping all the time   · Unwillingness or inability to communicate  · Depression  · Unusual sadness, discouragement and loneliness  · Talk of wanting to die  · Neglect of personal appearance   · Rebelliousness- reckless behavior  · Withdrawal from people/activities they love  · Confusion- inability to concentrate     If you or a loved one observes any of these behaviors or has concerns about self-harm, here's what you can do:  · Talk about it- your feelings and reasons for harming yourself  · Remove any means that you might use to hurt yourself (examples: pills, rope, extension cords, firearm)  · Get professional help from the community (Mental Health, Substance Abuse, psychological counseling)  · Do not be alone:Call your Safe  Contact- someone whom you trust who will be there for you.  · Call your local CRISIS HOTLINE 712-2906 or 482-579-7324  · Call your local Children's Mobile Crisis Response Team [place] (499) 510-5267 or www.[employer]  · Call the toll free National Suicide Prevention Hotlines   · National Suicide Prevention Lifeline 172-895-LSVK (9480)  · National [employer] Line Network 800-SUICIDE (611-2600)

## 2018-08-23 NOTE — PROGRESS NOTES
Vascular Access Team    Date of Insertion: 8/22/18  Arm Circumference: 33  Internal length: 15  External Length: 0  Vein Occupancy %: 45  Reason for Midline: IV abx  Labs: WBC 5.5, , INR 1.01, BUN 12, Cr 1.03, GFR 55    Orders confirmed, vessel patency confirmed with ultrasound. Risks and benefits of procedure explained to patient and education regarding line associated bloodstream infections provided. Questions answered.     Power Midline placed in LUE per MD order with ultrasound guidance, initial arm circumference 33cm. 4 Fr, 1 lumen Power Midline placed in right basilic vein after 1 attempt(s). 2 cc's of 1% lidocaine injected intradermally, 21 gauge microintroducer needle and modified Seldinger technique used. 15 cm catheter inserted with good blood return. Secured at 0 cm marker. Each lumen flushed without resistance with 10 mL 0.9% normal saline. Midline secured with Biopatch and Tegaderm.     Midline placement is confirmed by nurse using ultrasound and ability to flush and draw blood. Midline is appropriate for use at this time.  No X-ray is needed for placement confirmation. Pt tolerated procedure well.  Patient condition relayed to unit RN or ordering physician via this post procedure note in the EMR.      BARD Power Midline ref # [id number], Lot # [id number]

## 2018-08-23 NOTE — PROGRESS NOTES
Chart reviewed.  Pt was discharged from [facility] 8/22/18 with home health services through [facility] and does not qualify for discharge outreach phone call.

## 2018-08-24 ENCOUNTER — HOME CARE VISIT (OUTPATIENT)
Dept: HOME HEALTH SERVICES | Facility: HOME HEALTHCARE | Age: 58
End: 2018-08-24
Payer: COMMERCIAL

## 2018-08-24 VITALS
DIASTOLIC BLOOD PRESSURE: 76 MMHG | HEART RATE: 84 BPM | TEMPERATURE: 97.6 F | OXYGEN SATURATION: 98 % | SYSTOLIC BLOOD PRESSURE: 122 MMHG | RESPIRATION RATE: 16 BRPM

## 2018-08-24 PROCEDURE — G0299 HHS/HOSPICE OF RN EA 15 MIN: HCPCS

## 2018-08-24 ASSESSMENT — ENCOUNTER SYMPTOMS
NAUSEA: DENIES
VOMITING: DENIES

## 2018-08-25 ENCOUNTER — HOME CARE VISIT (OUTPATIENT)
Dept: HOME HEALTH SERVICES | Facility: HOME HEALTHCARE | Age: 58
End: 2018-08-25
Payer: COMMERCIAL

## 2018-08-25 VITALS
DIASTOLIC BLOOD PRESSURE: 78 MMHG | SYSTOLIC BLOOD PRESSURE: 132 MMHG | TEMPERATURE: 97.3 F | OXYGEN SATURATION: 99 % | RESPIRATION RATE: 18 BRPM | HEART RATE: 83 BPM

## 2018-08-25 PROCEDURE — G0151 HHCP-SERV OF PT,EA 15 MIN: HCPCS

## 2018-08-25 ASSESSMENT — ACTIVITIES OF DAILY LIVING (ADL)
ADLS_COMMENTS: <!--EPICS-->SEE OT EVAL<!--EPICE-->
IADLS_COMMENTS: <!--EPICS-->SEE OT EVAL<!--EPICE-->

## 2018-08-25 ASSESSMENT — ENCOUNTER SYMPTOMS: DEBILITATING PAIN: 1

## 2018-08-25 NOTE — DOCUMENTATION QUERY
"DOCUMENTATION QUERY    PROVIDERS: Please select “Cosign w/ note”to reply to query.    To better represent the severity of illness of your patient, please review the following information and exercise your independent professional judgment in responding to this query.     Patient admitted with possible surgical wound infection. Debridement was performed on 8/16 and documented in the Operative Report as  \"...We then incised the fascia and exposed   the deep layer...We then irrigated and debrided thoroughly...\"     Based upon the clinical findings, risk factors and treatment can this procedure be further specified?    • Excisional Debridement (if so please include)  1. deepest level of debridement treated  2. instrument  3. nature of the tissue  4. appearance and size of wound  5. technique  • Non-excisional Debridement (please document deepest level)  • Other explanation of clinical findings  • Unable to determine          The medical record reflects the following:   Clinical Findings  Drainage from surgical wound   Treatment  Surgical debridement  IV antibiotics   Risk Factors  long term IV antibiotics  Rheumatoid arthritis and other autoimmune disease   Location within medical record  Progress Notes and Discharge Summary     Thank you,     Josselyn Hawkins  HIM Coder  [phone]        "

## 2018-08-27 ENCOUNTER — HOME CARE VISIT (OUTPATIENT)
Dept: HOME HEALTH SERVICES | Facility: HOME HEALTHCARE | Age: 58
End: 2018-08-27
Payer: COMMERCIAL

## 2018-08-27 ENCOUNTER — PATIENT MESSAGE (OUTPATIENT)
Dept: MEDICAL GROUP | Facility: MEDICAL CENTER | Age: 58
End: 2018-08-27

## 2018-08-27 VITALS
OXYGEN SATURATION: 97 % | DIASTOLIC BLOOD PRESSURE: 78 MMHG | RESPIRATION RATE: 16 BRPM | TEMPERATURE: 98.5 F | HEART RATE: 83 BPM | SYSTOLIC BLOOD PRESSURE: 118 MMHG

## 2018-08-27 DIAGNOSIS — E03.9 ACQUIRED HYPOTHYROIDISM: ICD-10-CM

## 2018-08-27 PROCEDURE — G0299 HHS/HOSPICE OF RN EA 15 MIN: HCPCS

## 2018-08-27 SDOH — ECONOMIC STABILITY: HOUSING INSECURITY: UNSAFE COOKING RANGE AREA: 0

## 2018-08-27 SDOH — ECONOMIC STABILITY: HOUSING INSECURITY: UNSAFE APPLIANCES: 0

## 2018-08-27 ASSESSMENT — ENCOUNTER SYMPTOMS
VOMITING: PT DENIES ANY EMESIS AT THIS TIME
RESPIRATORY SYMPTOMS COMMENTS: PT IS NOT EXPERIENCING ANY RESPIRATORY DISTRESS AT THIS TIME

## 2018-08-28 ENCOUNTER — HOSPITAL ENCOUNTER (OUTPATIENT)
Dept: LAB | Facility: MEDICAL CENTER | Age: 58
End: 2018-08-28
Attending: INTERNAL MEDICINE
Payer: COMMERCIAL

## 2018-08-28 ENCOUNTER — HOSPITAL ENCOUNTER (OUTPATIENT)
Dept: LAB | Facility: MEDICAL CENTER | Age: 58
End: 2018-08-28
Attending: FAMILY MEDICINE
Payer: COMMERCIAL

## 2018-08-28 LAB
ALBUMIN SERPL BCP-MCNC: 3.4 G/DL (ref 3.2–4.9)
ALBUMIN SERPL BCP-MCNC: 3.5 G/DL (ref 3.2–4.9)
ALBUMIN/GLOB SERPL: 1.1 G/DL
ALP SERPL-CCNC: 77 U/L (ref 30–99)
ALP SERPL-CCNC: 84 U/L (ref 30–99)
ALT SERPL-CCNC: 20 U/L (ref 2–50)
ALT SERPL-CCNC: 22 U/L (ref 2–50)
ANION GAP SERPL CALC-SCNC: 9 MMOL/L (ref 0–11.9)
AST SERPL-CCNC: 29 U/L (ref 12–45)
AST SERPL-CCNC: 34 U/L (ref 12–45)
BASOPHILS # BLD AUTO: 1.2 % (ref 0–1.8)
BASOPHILS # BLD AUTO: 1.2 % (ref 0–1.8)
BASOPHILS # BLD: 0.07 K/UL (ref 0–0.12)
BASOPHILS # BLD: 0.07 K/UL (ref 0–0.12)
BILIRUB CONJ SERPL-MCNC: 0.1 MG/DL (ref 0.1–0.5)
BILIRUB INDIRECT SERPL-MCNC: 0.3 MG/DL (ref 0–1)
BILIRUB SERPL-MCNC: 0.4 MG/DL (ref 0.1–1.5)
BILIRUB SERPL-MCNC: 0.5 MG/DL (ref 0.1–1.5)
BUN SERPL-MCNC: 11 MG/DL (ref 8–22)
BUN SERPL-MCNC: 11 MG/DL (ref 8–22)
CALCIUM SERPL-MCNC: 9.4 MG/DL (ref 8.5–10.5)
CALCIUM SERPL-MCNC: 9.5 MG/DL (ref 8.5–10.5)
CHLORIDE SERPL-SCNC: 104 MMOL/L (ref 96–112)
CHLORIDE SERPL-SCNC: 106 MMOL/L (ref 96–112)
CO2 SERPL-SCNC: 26 MMOL/L (ref 20–33)
CO2 SERPL-SCNC: 27 MMOL/L (ref 20–33)
CREAT SERPL-MCNC: 0.75 MG/DL (ref 0.5–1.4)
CREAT SERPL-MCNC: 0.76 MG/DL (ref 0.5–1.4)
EOSINOPHIL # BLD AUTO: 0 K/UL (ref 0–0.51)
EOSINOPHIL # BLD AUTO: 0 K/UL (ref 0–0.51)
EOSINOPHIL NFR BLD: 0 % (ref 0–6.9)
EOSINOPHIL NFR BLD: 0 % (ref 0–6.9)
ERYTHROCYTE [DISTWIDTH] IN BLOOD BY AUTOMATED COUNT: 47.3 FL (ref 35.9–50)
ERYTHROCYTE [DISTWIDTH] IN BLOOD BY AUTOMATED COUNT: 47.5 FL (ref 35.9–50)
ERYTHROCYTE [SEDIMENTATION RATE] IN BLOOD BY WESTERGREN METHOD: 58 MM/HOUR (ref 0–30)
GLOBULIN SER CALC-MCNC: 3.2 G/DL (ref 1.9–3.5)
GLUCOSE SERPL-MCNC: 90 MG/DL (ref 65–99)
GLUCOSE SERPL-MCNC: 91 MG/DL (ref 65–99)
HCT VFR BLD AUTO: 38.4 % (ref 37–47)
HCT VFR BLD AUTO: 38.8 % (ref 37–47)
HGB BLD-MCNC: 12.1 G/DL (ref 12–16)
HGB BLD-MCNC: 12.1 G/DL (ref 12–16)
IMM GRANULOCYTES # BLD AUTO: 0.01 K/UL (ref 0–0.11)
IMM GRANULOCYTES # BLD AUTO: 0.05 K/UL (ref 0–0.11)
IMM GRANULOCYTES NFR BLD AUTO: 0.2 % (ref 0–0.9)
IMM GRANULOCYTES NFR BLD AUTO: 0.8 % (ref 0–0.9)
LYMPHOCYTES # BLD AUTO: 1.35 K/UL (ref 1–4.8)
LYMPHOCYTES # BLD AUTO: 1.4 K/UL (ref 1–4.8)
LYMPHOCYTES NFR BLD: 23.3 % (ref 22–41)
LYMPHOCYTES NFR BLD: 23.6 % (ref 22–41)
MCH RBC QN AUTO: 30.9 PG (ref 27–33)
MCH RBC QN AUTO: 31.1 PG (ref 27–33)
MCHC RBC AUTO-ENTMCNC: 31.2 G/DL (ref 33.6–35)
MCHC RBC AUTO-ENTMCNC: 31.5 G/DL (ref 33.6–35)
MCV RBC AUTO: 98.7 FL (ref 81.4–97.8)
MCV RBC AUTO: 99 FL (ref 81.4–97.8)
MONOCYTES # BLD AUTO: 0.44 K/UL (ref 0–0.85)
MONOCYTES # BLD AUTO: 0.49 K/UL (ref 0–0.85)
MONOCYTES NFR BLD AUTO: 7.7 % (ref 0–13.4)
MONOCYTES NFR BLD AUTO: 8.2 % (ref 0–13.4)
NEUTROPHILS # BLD AUTO: 3.84 K/UL (ref 2–7.15)
NEUTROPHILS # BLD AUTO: 4 K/UL (ref 2–7.15)
NEUTROPHILS NFR BLD: 66.5 % (ref 44–72)
NEUTROPHILS NFR BLD: 67.3 % (ref 44–72)
NRBC # BLD AUTO: 0 K/UL
NRBC # BLD AUTO: 0 K/UL
NRBC BLD-RTO: 0 /100 WBC
NRBC BLD-RTO: 0 /100 WBC
PHOSPHATE SERPL-MCNC: 2.5 MG/DL (ref 2.5–4.5)
PLATELET # BLD AUTO: 238 K/UL (ref 164–446)
PLATELET # BLD AUTO: 240 K/UL (ref 164–446)
PMV BLD AUTO: 11.3 FL (ref 9–12.9)
PMV BLD AUTO: 11.3 FL (ref 9–12.9)
POTASSIUM SERPL-SCNC: 3.9 MMOL/L (ref 3.6–5.5)
POTASSIUM SERPL-SCNC: 4 MMOL/L (ref 3.6–5.5)
PROT SERPL-MCNC: 6.6 G/DL (ref 6–8.2)
PROT SERPL-MCNC: 6.7 G/DL (ref 6–8.2)
RBC # BLD AUTO: 3.89 M/UL (ref 4.2–5.4)
RBC # BLD AUTO: 3.92 M/UL (ref 4.2–5.4)
SODIUM SERPL-SCNC: 140 MMOL/L (ref 135–145)
SODIUM SERPL-SCNC: 140 MMOL/L (ref 135–145)
WBC # BLD AUTO: 5.7 K/UL (ref 4.8–10.8)
WBC # BLD AUTO: 6 K/UL (ref 4.8–10.8)

## 2018-08-28 PROCEDURE — 80053 COMPREHEN METABOLIC PANEL: CPT

## 2018-08-28 PROCEDURE — 84155 ASSAY OF PROTEIN SERUM: CPT

## 2018-08-28 PROCEDURE — 85025 COMPLETE CBC W/AUTO DIFF WBC: CPT

## 2018-08-28 PROCEDURE — 84450 TRANSFERASE (AST) (SGOT): CPT

## 2018-08-28 PROCEDURE — 85652 RBC SED RATE AUTOMATED: CPT

## 2018-08-28 PROCEDURE — 82248 BILIRUBIN DIRECT: CPT

## 2018-08-28 PROCEDURE — 84075 ASSAY ALKALINE PHOSPHATASE: CPT

## 2018-08-28 PROCEDURE — 85025 COMPLETE CBC W/AUTO DIFF WBC: CPT | Mod: 91

## 2018-08-28 PROCEDURE — 36415 COLL VENOUS BLD VENIPUNCTURE: CPT

## 2018-08-28 PROCEDURE — 80069 RENAL FUNCTION PANEL: CPT

## 2018-08-28 PROCEDURE — 82247 BILIRUBIN TOTAL: CPT

## 2018-08-28 PROCEDURE — 84460 ALANINE AMINO (ALT) (SGPT): CPT

## 2018-08-28 RX ORDER — LEVOTHYROXINE SODIUM 137 UG/1
1 CAPSULE ORAL
Qty: 30 CAP | Refills: 3 | Status: SHIPPED | OUTPATIENT
Start: 2018-08-28 | End: 2018-12-20

## 2018-08-28 NOTE — TELEPHONE ENCOUNTER
From: Clifford Murphy  To: Tish Smallwood M.D.  Sent: 8/27/2018 6:46 PM PDT  Subject: Prescription Question    Hi Dr. Smallwood,    [facility] levothyroxzine for dose change you made. I think it's 137mcs but not sure.     Thank you,  Clifford Murphy

## 2018-08-29 ENCOUNTER — OFFICE VISIT (OUTPATIENT)
Dept: RHEUMATOLOGY | Facility: PHYSICIAN GROUP | Age: 58
End: 2018-08-29
Payer: COMMERCIAL

## 2018-08-29 VITALS
TEMPERATURE: 97.5 F | SYSTOLIC BLOOD PRESSURE: 148 MMHG | HEART RATE: 78 BPM | DIASTOLIC BLOOD PRESSURE: 80 MMHG | RESPIRATION RATE: 12 BRPM | WEIGHT: 172 LBS | OXYGEN SATURATION: 97 % | BODY MASS INDEX: 29.52 KG/M2

## 2018-08-29 DIAGNOSIS — M48.061 SPINAL STENOSIS OF LUMBAR REGION, UNSPECIFIED WHETHER NEUROGENIC CLAUDICATION PRESENT: ICD-10-CM

## 2018-08-29 DIAGNOSIS — E03.9 ACQUIRED HYPOTHYROIDISM: ICD-10-CM

## 2018-08-29 DIAGNOSIS — M06.9 RHEUMATOID ARTHRITIS INVOLVING MULTIPLE SITES, UNSPECIFIED RHEUMATOID FACTOR PRESENCE: ICD-10-CM

## 2018-08-29 DIAGNOSIS — Z79.899 LONG-TERM USE OF PLAQUENIL: ICD-10-CM

## 2018-08-29 DIAGNOSIS — M81.0 SENILE OSTEOPOROSIS: ICD-10-CM

## 2018-08-29 DIAGNOSIS — Z72.0 TOBACCO ABUSE: ICD-10-CM

## 2018-08-29 PROCEDURE — 99214 OFFICE O/P EST MOD 30 MIN: CPT | Performed by: INTERNAL MEDICINE

## 2018-08-29 RX ORDER — IBANDRONATE SODIUM 150 MG/1
TABLET, FILM COATED ORAL
Qty: 6 TAB | Refills: 1 | Status: SHIPPED | OUTPATIENT
Start: 2018-08-29 | End: 2019-01-10

## 2018-08-29 NOTE — PROGRESS NOTES
Chief Complaint- joint pain    Subjective:   Clifford Murphy is a 58 y.o. female here today for follow up of rheumatological issues    This is a follow-up visit for this patient with rheumatoid arthritis currently on Plaquenil at 200 mg p.o. twice daily, patient has been off of the leflunomide because patient just recently status post lumbar spine laminectomy and fusion for spinal stenosis with complicating factor of post op infection now on IV antibiotics until about September 16, 2018.  Infectious disease and orthopedic surgeons wanted patient off of the leflunomide until infection resolved.  Of note patient has quit smoking as well.      Patient also with osteoporosis as seen by FRAX score recently status post kyphoplasty currently on Fosamax which patient states is really bothering her stomach and wonders about alternatives.     Additional comorbidities include lumbar spine DDD and DJD seen by MRI now status post lumbar spine laminectomy and fusion, and hypothyroidism      S/p MTX-N/V  S/p Enbrel-sinus infections  S/p Humira-sinus infections and injection site skin breakdown  S/p Actemra-exacerbated pneumonia  S/p sulfasalazine-contradicted, pt with sulfa allergy     CCP neg 9/2015  Quantiferon Gold neg 9/2015  Hep B neg 9/2015  Uric acid 4.6 9/2015  G6PD 11.5 adequate 10/2017  RF 18 6/2009  CHIQUITA neg 6/2009   Hand x-rays 5/2015-no pathology  Feet X-rays 5/2015-indicates DJD, no erosions    LS pine x-rays 7/2009-indicates multilevel DJD     MRI LS spine 2/2018-L1 compression fracture, multilevel multifactorial DJD, neuroforaminal narrowing at multiple sites, severe left neural foraminal narrowing  DEXA 6/5/2018 T scores -0.7, -0.9  FRAX 6/5/2018 major osteoporotic fracture risk 17.8%, hip fracture risk 3.1%  Patient on Fosamax since 5/2018      Current medicines (including changes today)  Current Outpatient Prescriptions   Medication Sig Dispense Refill   • ibandronate (BONIVA) 150 MG tablet 1 tab po in AM q 30  days on an empty stomach, take with 8 oz water and do not lie down for 30 minutes after taking medication 6 Tab 1   • Levothyroxine Sodium 137 MCG Cap Take 1 Tab by mouth every morning before breakfast. 30 Cap 3   • cetirizine (ZYRTEC) 10 MG Tab Take 10 mg by mouth 1 time daily as needed for Allergies.     • Sodium Chloride Flush (SALINE FLUSH IV) 10 mL by Peripheral IV route 2 Times a Day. prior to abx flush and after abx flush - SASH method     • Heparin Lock Flush (HEPARIN, PORCINE, LOCK FLUSH IV) 5 mL by Peripheral IV route 2 Times a Day. after saline flush after abx - SASH method     • cefepime (MAXIPIME) 2 GM Recon Soln 2 g by Peripheral IV route 2 Times a Day.     • raNITidine (ZANTAC) 150 MG Tab TAKE 1 TAB BY MOUTH 2 TIMES A  Tab 2   • acetaminophen (TYLENOL) 500 MG Tab Take 500-1,000 mg by mouth 2 times a day as needed for Mild Pain.     • hydroxychloroquine (PLAQUENIL) 200 MG Tab TAKE 1 TABLET BY MOUTH TWICE A  Tab 0   • Cholecalciferol (VITAMIN D3) 5000 units Tab Take 5,000 Int'l Units by mouth every day at 6 PM.     • Folic Acid 0.8 MG Cap Take 0.8-1.6 mg by mouth every day at 6 PM. takes 1 pill daily and alternates every other day 2 pills daily     • ondansetron (ZOFRAN) 4 MG Tab tablet Take 1 Tab by mouth every four hours as needed for Nausea/Vomiting. 20 Tab 2   • ibuprofen (MOTRIN) 800 MG Tab Take 800 mg by mouth every 8 hours as needed for Mild Pain.     • diazePAM (VALIUM) 5 MG Tab Take 5 mg by mouth every 6 hours as needed for Anxiety.       No current facility-administered medications for this visit.      She  has a past medical history of Anesthesia; Asthma (02/21/2018); Breath shortness; Bronchitis (2016); Cough (07/13/2018); Environmental and seasonal allergies; Genital herpes in women (2/11/2014); Heart burn; High risk medications (not anticoagulants) long-term use (8/29/2012); Hypothyroid (9/14/2011); Kidney stones; Macrocytosis without anemia (med effect); Menopause  (9/15/2011); Pain (07/13/2018); Pneumonia (01/2015); Post-cholecystectomy syndrome; Rheumatoid arthritis(714.0); Snoring; Tobacco abuse, episodic (9/14/2011); and Vitamin d deficiency (5/15/2012).    ROS   Other than what is mentioned in HPI or physical exam, there is no history of headaches, double vision or blurred vision. No temporal tenderness or jaw claudication. No history of cataracts or glaucoma. No trouble swallowing difficulties or sore throats.  No chest complaints including chest pain, cough or sputum production. No GI complaints including nausea, vomiting, change in bowel habits, or past peptic ulcer disease. No history of blood in the stools. No urinary complaints including dysuria or frequency. No history of alopecia, photosensitivity, oral ulcerations, Raynaud's phenomena.       Objective:     Blood pressure 148/80, pulse 78, temperature 36.4 °C (97.5 °F), resp. rate 12, weight 78 kg (172 lb), SpO2 97 %. Body mass index is 29.52 kg/m².   Physical Exam:    Constitutional: Alert and oriented X3, patient is talkative with good eye contact.Skin: Warm, dry, good turgor, no rashes in visible areas, no psoriatic lesions, no petechial lesions, no malar rashes  .Eye: Equal, round and reactive, conjunctiva clear, lids normal EOM intactENMT: Lips without lesions, good dentition, no oropharyngeal ulcers, moist buccal mucosa, pinna without deformityNeck: Trachea midline, no masses, no thyromegaly.Lymph:  No cervical lymphadenopathy, no axillary lymphadenopathy, no supraclavicular lymphadenopathyRespiratory: Unlabored respiratory effort, lungs clear to auscultation, no wheezes, no ronchi.Cardiovascular: Normal S1, S2, no murmur, no edema.Abdomen: Soft, non-tender, no masses, no hepatosplenomegaly.Psych: Alert and oriented x3, normal affect and mood.Neuro: Cranial nerves 2-12 are grossly intact, no loss of sensation LEExt:no joint laxity noted in bilateral arms, no joint laxity noted in bilateral legs, joints  actually look quite good today, did not appreciate any synovitis, no swan-neck or boutonniere deformities, no ulnar deviation, no ulnar styloid prominence, there is crepitus in the left knee but no effusions no lower extremity edema, there are mild cockup toes on most toes of both feet but no evidence of excoriations, no evidence of splay toes or crossover toes    Lab Results   Component Value Date/Time    SODIUM 140 08/28/2018 12:15 PM    SODIUM 140 08/28/2018 12:15 PM    POTASSIUM 4.0 08/28/2018 12:15 PM    POTASSIUM 3.9 08/28/2018 12:15 PM    CHLORIDE 104 08/28/2018 12:15 PM    CHLORIDE 106 08/28/2018 12:15 PM    CO2 27 08/28/2018 12:15 PM    CO2 26 08/28/2018 12:15 PM    GLUCOSE 91 08/28/2018 12:15 PM    GLUCOSE 90 08/28/2018 12:15 PM    BUN 11 08/28/2018 12:15 PM    BUN 11 08/28/2018 12:15 PM    CREATININE 0.75 08/28/2018 12:15 PM    CREATININE 0.76 08/28/2018 12:15 PM    CREATININE 0.77 03/26/2013 12:00 AM    BUNCREATRAT 30 (H) 11/23/2016 09:46 AM    BUNCREATRAT 30 (H) 03/26/2013 12:00 AM      Lab Results   Component Value Date/Time    WBC 5.7 08/28/2018 12:15 PM    WBC 6.0 08/28/2018 12:15 PM    WBC 5.7 10/20/2011 12:00 AM    RBC 3.89 (L) 08/28/2018 12:15 PM    RBC 3.92 (L) 08/28/2018 12:15 PM    RBC 4.32 10/20/2011 12:00 AM    HEMOGLOBIN 12.1 08/28/2018 12:15 PM    HEMOGLOBIN 12.1 08/28/2018 12:15 PM    HEMATOCRIT 38.4 08/28/2018 12:15 PM    HEMATOCRIT 38.8 08/28/2018 12:15 PM    MCV 98.7 (H) 08/28/2018 12:15 PM    MCV 99.0 (H) 08/28/2018 12:15 PM    MCV 99 (H) 10/20/2011 12:00 AM    MCH 31.1 08/28/2018 12:15 PM    MCH 30.9 08/28/2018 12:15 PM    MCH 34.0 10/20/2011 12:00 AM    MCHC 31.5 (L) 08/28/2018 12:15 PM    MCHC 31.2 (L) 08/28/2018 12:15 PM    MPV 11.3 08/28/2018 12:15 PM    MPV 11.3 08/28/2018 12:15 PM    NEUTSPOLYS 67.30 08/28/2018 12:15 PM    NEUTSPOLYS 66.50 08/28/2018 12:15 PM    LYMPHOCYTES 23.60 08/28/2018 12:15 PM    LYMPHOCYTES 23.30 08/28/2018 12:15 PM    MONOCYTES 7.70 08/28/2018 12:15 PM     MONOCYTES 8.20 08/28/2018 12:15 PM    EOSINOPHILS 0.00 08/28/2018 12:15 PM    EOSINOPHILS 0.00 08/28/2018 12:15 PM    BASOPHILS 1.20 08/28/2018 12:15 PM    BASOPHILS 1.20 08/28/2018 12:15 PM      Lab Results   Component Value Date/Time    CALCIUM 9.5 08/28/2018 12:15 PM    CALCIUM 9.4 08/28/2018 12:15 PM    ASTSGOT 34 08/28/2018 12:15 PM    ASTSGOT 29 08/28/2018 12:15 PM    ALTSGPT 22 08/28/2018 12:15 PM    ALTSGPT 20 08/28/2018 12:15 PM    ALKPHOSPHAT 84 08/28/2018 12:15 PM    ALKPHOSPHAT 77 08/28/2018 12:15 PM    TBILIRUBIN 0.5 08/28/2018 12:15 PM    TBILIRUBIN 0.4 08/28/2018 12:15 PM    ALBUMIN 3.4 08/28/2018 12:15 PM    ALBUMIN 3.5 08/28/2018 12:15 PM    TOTPROTEIN 6.6 08/28/2018 12:15 PM    TOTPROTEIN 6.7 08/28/2018 12:15 PM     Lab Results   Component Value Date/Time    RHEUMFACTN 18 (H) 06/15/2009 09:27 AM    ANTINUCAB None Detected 06/15/2009 09:27 AM     Lab Results   Component Value Date/Time    SEDRATEWES 58 (H) 08/28/2018 12:15 PM     Lab Results   Component Value Date/Time    HBA1C 5.3 11/23/2016 09:46 AM     Lab Results   Component Value Date/Time    G6PD 11.5 10/03/2017 09:54 AM     Results for orders placed during the hospital encounter of 02/02/18   DX-JOINT SURVEY-HANDS SINGLE VIEW    Impression 1.  No acute fracture or bone erosion identified.    2.  Mild osteoarthritis.     Results for orders placed during the hospital encounter of 02/02/18   DX-JOINT SURVEY-FEET SINGLE VIEW    Impression 1.  No acute fracture or bone erosion.    2.  Hallux valgus deformities and mild osteoarthritis.     DS-BONE DENSITY STUDY (DEXA)    Impression According to the World Health Organization classification, bone mineral density of this patient is normal.        10-year Probability of Fracture:  Major Osteoporotic     17.8%  Hip     3.1%  Population      [place] ()    Based on left femur neck BMD          INTERPRETING LOCATION:  [address] [place] [address] [address], [place], [id number]     Results for orders placed in visit  on 09/29/14   DX-FOOT-COMPLETE 3+    Impression Subacute nondisplaced fifth proximal phalanx extra-articular fracture     Results for orders placed during the hospital encounter of 07/11/09   DX-PELVIS-1 OR 2 VIEWS    Impression IMPRESSION:     1. VERY MILD DEGENERATIVE CHANGE OF THE HIPS, SLIGHTLY WORSE ON THE RIGHT   THAN THE LEFT.  MINIMAL SCLEROSIS IS SEEN AT THE LATERAL ASPECT OF THE   RIGHT FEMORAL NECK, WHICH IS OF DOUBTFUL CLINICAL SIGNIFICANCE.      2. NO PELVIS FRACTURE.    MM/srd     Read By MARICARMEN ANSARI MD on Jul 13 2009  8:38AM  : SRP Transcription Date: Jul 13 2009 10:42AM  THIS DOCUMENT HAS BEEN ELECTRONICALLY SIGNED BY: MARICARMEN ANSARI MD on Jul 13 2009  4:05PM        Results for orders placed during the hospital encounter of 07/11/09   DX-PELVIS-1 OR 2 VIEWS    Impression IMPRESSION:     1. VERY MILD DEGENERATIVE CHANGE OF THE HIPS, SLIGHTLY WORSE ON THE RIGHT   THAN THE LEFT.  MINIMAL SCLEROSIS IS SEEN AT THE LATERAL ASPECT OF THE   RIGHT FEMORAL NECK, WHICH IS OF DOUBTFUL CLINICAL SIGNIFICANCE.      2. NO PELVIS FRACTURE.    MM/srd     Read By MARICARMEN ANSARI MD on Jul 13 2009  8:38AM  : SRP Transcription Date: Jul 13 2009 10:42AM  THIS DOCUMENT HAS BEEN ELECTRONICALLY SIGNED BY: MARICARMEN ANSARI MD on Jul 13 2009  4:05PM        Results for orders placed during the hospital encounter of 10/10/09   DX-KNEE COMPLETE 4+    Impression IMPRESSION:     3. MODERATE THREE COMPARTMENT ARTHROSIS.     4. QUESTION SMALL EFFUSION.     MPW/vadim         Read By JASSI GREEN MD on Oct 12 2009  9:01AM  : ZPT Transcription Date: Oct 12 2009  2:56PM  THIS DOCUMENT HAS BEEN ELECTRONICALLY SIGNED BY: JASSI GREEN MD on Oct   12 2009  3:47PM        Results for orders placed in visit on 09/02/15   DX-KNEE 2- LEFT    Impression 1. No evidence of acute fracture or dislocation.    2. Increased sclerosis with punctate hyperdensities in the proximal tibial metadiaphysis  anteriorly may be related to prior surgery.    3. Mild left knee osteoarthritis.     Results for orders placed during the hospital encounter of 07/10/12   DX-HAND 3+    Impression No new radiographic evidence of erosive arthropathy    Stable second DIP subchondral cyst versus erosion    Stable mild first MCP osteoarthritis, periarticular calcification and osteopenia     Results for orders placed during the hospital encounter of 02/02/18   MR-LUMBAR SPINE-W/O    Impression 1.  Acute L1 vertebral compression fracture. This would be amenable to percutaneous augmentation with vertebroplasty or kyphoplasty if clinically appropriate. Interventional radiology is available for consultation and therapy.  2.  Multilevel multifactorial degenerative changes  3.  No areas of high-grade central canal narrowing  4.  LEFT neural foraminal narrowing worse at L5-S1  5.  Other areas of central canal and neural foraminal narrowing as described above  6.  Mild intra and extrahepatic biliary dilatation without visualized obstructing lesion. Further imaging assessment could be performed with ultrasound or MRCP if clinically appropriate. I have no relevant prior studies available for comparison.     Results for orders placed during the hospital encounter of 01/03/18   MR-KNEE-W/O LEFT    Impression 1.  No MR explanation for acute symptoms. Ligaments and menisci are intact    2.  Postoperative change related to presumed prior tibial tubercle transfer. There is mild patella timothy    3.  Severe patellofemoral cartilage thinning with areas of bone-on-bone articulation and moderate osteophytes.    4.  Moderate femorotibial osteophytes with minimal cartilage thinning     Results for orders placed during the hospital encounter of 02/21/18   DX-CERVICAL SPINE-2 OR 3 VIEWS    Impression 1.  No evidence of atlantoaxial subluxation with flexion and extension maneuvers of the cervical spine.    2.  Degenerative changes C6-7. No demonstrable range of  motion at this level.     Results for orders placed during the hospital encounter of 08/11/18   CT-LSPINE W/O PLUS RECONS    Impression 1. Recent instrumentation at L2-5 levels with a 10.7 cm fluid collection in the posterior paraspinal soft tissues. Differential includes postsurgical seroma, pseudomeningocele and the resolving hematoma. Infection should be excluded clinically.  2. Prior augmentation of L1 compression deformity. No new fracture or listhesis.     Assessment and Plan:     1. Rheumatoid arthritis involving multiple sites, unspecified rheumatoid factor presence (HCC)  Clinically looks quite good, continue Plaquenil 200 mg p.o. twice daily, patient is currently off of leflunomide because of need to heal from her lumbar spine laminectomy.  - ibandronate (BONIVA) 150 MG tablet; 1 tab po in AM q 30 days on an empty stomach, take with 8 oz water and do not lie down for 30 minutes after taking medication  Dispense: 6 Tab; Refill: 1    2. Long-term use of Plaquenil  Currently on Plaquenil 200 mg p.o. twice daily, of note G6PD levels are adequate, patient will need CBC every 6 months next CBC about February 2019.  Next visit check status of ophthalmology evaluation  - ibandronate (BONIVA) 150 MG tablet; 1 tab po in AM q 30 days on an empty stomach, take with 8 oz water and do not lie down for 30 minutes after taking medication  Dispense: 6 Tab; Refill: 1    3. Senile osteoporosis  Osteoporosis by FRAX score June 2018, patient currently on Fosamax 70 mg p.o. weekly the patient states this is really bothering her stomach, will do a trial Boniva 150 mg p.o. once a month  Next DEXA June 2020   continue calcium 1200 mg by mouth daily and vitamin D about 1000 units by mouth daily and magnesium 400 mg by mouth daily  - ibandronate (BONIVA) 150 MG tablet; 1 tab po in AM q 30 days on an empty stomach, take with 8 oz water and do not lie down for 30 minutes after taking medication  Dispense: 6 Tab; Refill: 1    4. Spinal  stenosis of lumbar region, unspecified whether neurogenic claudication present  Status post laminectomy with postop complications of infection currently on IV antibiotics until at least September 16, 2018.  - ibandronate (BONIVA) 150 MG tablet; 1 tab po in AM q 30 days on an empty stomach, take with 8 oz water and do not lie down for 30 minutes after taking medication  Dispense: 6 Tab; Refill: 1    5. Acquired hypothyroidism  Can exacerbate joint pain, I recommend monitoring thyroid on a regular basis to assure it is not contributing to the patient's joint pain    6. Tobacco abuse  Patient states she is quit smoking this might actually help her rheumatoid arthritis quite a bit and obliterate the need for stronger DMARDS possibly, we will continue to monitor    Followup: Return in about 6 weeks (around 10/10/2018). or sooner amada Murphy was seen 30 minutes face-to-face of which more than 50% of the time was spent counseling the patient (excluding time for procedures)  regarding  rheumatological condition and care. Therapy was discussed in detail.    Please note that this dictation was created using voice recognition software. I have made every reasonable attempt to correct obvious errors, but I expect that there are errors of grammar and possibly content that I did not discover before finalizing the note.

## 2018-08-29 NOTE — LETTER
[facility]   [address], [address]  [place] [address]  Phone: [phone]  Fax: [phone]              Encounter Date: 8/29/2018    Dear Dr. Whitney ref. provider found,    It was a pleasure seeing your patient, Clifford Murphy, on 8/29/2018. Diagnoses of Rheumatoid arthritis involving multiple sites, unspecified rheumatoid factor presence (HCC), Long-term use of Plaquenil, Senile osteoporosis, Spinal stenosis of lumbar region, unspecified whether neurogenic claudication present, Acquired hypothyroidism, and Tobacco abuse were pertinent to this visit.     Please find attached progress note which includes the history I obtained from Ms. Murphy, my physical examination findings, my impression and recommendations.      Once again, it was a pleasure participating in your patient's care.  Please feel free to contact me if you have any questions or if I can be of any further assistance to your patients.      Sincerely,    Elizabeth Gómez M.D.  Electronically Signed          PROGRESS NOTE:  No notes on file

## 2018-08-30 ENCOUNTER — HOME CARE VISIT (OUTPATIENT)
Dept: HOME HEALTH SERVICES | Facility: HOME HEALTHCARE | Age: 58
End: 2018-08-30
Payer: COMMERCIAL

## 2018-08-30 ENCOUNTER — OFFICE VISIT (OUTPATIENT)
Dept: INFECTIOUS DISEASES | Facility: MEDICAL CENTER | Age: 58
End: 2018-08-30
Payer: COMMERCIAL

## 2018-08-30 VITALS
DIASTOLIC BLOOD PRESSURE: 60 MMHG | RESPIRATION RATE: 18 BRPM | HEART RATE: 82 BPM | OXYGEN SATURATION: 98 % | TEMPERATURE: 99 F | SYSTOLIC BLOOD PRESSURE: 125 MMHG

## 2018-08-30 VITALS
OXYGEN SATURATION: 96 % | TEMPERATURE: 98.4 F | HEART RATE: 84 BPM | HEIGHT: 64 IN | DIASTOLIC BLOOD PRESSURE: 80 MMHG | SYSTOLIC BLOOD PRESSURE: 148 MMHG | WEIGHT: 172 LBS | BODY MASS INDEX: 29.37 KG/M2

## 2018-08-30 DIAGNOSIS — A49.8 PSEUDOMONAS INFECTION: ICD-10-CM

## 2018-08-30 DIAGNOSIS — R19.7 DIARRHEA, UNSPECIFIED TYPE: ICD-10-CM

## 2018-08-30 DIAGNOSIS — M06.9 RHEUMATOID ARTHRITIS INVOLVING MULTIPLE SITES, UNSPECIFIED RHEUMATOID FACTOR PRESENCE: ICD-10-CM

## 2018-08-30 PROCEDURE — 99214 OFFICE O/P EST MOD 30 MIN: CPT | Performed by: NURSE PRACTITIONER

## 2018-08-30 PROCEDURE — G0157 HHC PT ASSISTANT EA 15: HCPCS

## 2018-08-30 RX ORDER — CIPROFLOXACIN 500 MG/1
500 TABLET, FILM COATED ORAL 2 TIMES DAILY
Qty: 32 TAB | Refills: 0 | Status: SHIPPED | OUTPATIENT
Start: 2018-08-31 | End: 2018-09-16

## 2018-08-30 NOTE — PROGRESS NOTES
Infectious Disease Clinic    Subjective:     Chief Complaint   Patient presents with   • Hospital Follow-up     Surgical site infection      This is my first time meeting Ms. Murphy.  Accompanied by her sister, Sena.    Interval History: 58 y.o. woman with history of rheumatoid arthritis on Plaquenil, and previously leflunomide and lumbar spondylolisthesis status post L4 through 5 laminectomy and hardware placement on 07/19/18 by Dr. Doran.  Hospitalized from 8/11-8/22/18, admitted for increasing lower back pain and drainage from her surgical site.  Had been doing fine following her surgery up until the Thursday PTA when she began to experience headache and worsening lower back pain.  Family also noticed yellowish green discharge from her prior surgical site and surrounding erythema.  On presentation, she was febrile with a T-max of 100.8 with a leukocytosis of 11.5.  CT of the spine revealed a 10.7 cm fluid collection in the posterior paraspinal soft tissues concerning for possible postsurgical seroma, pseudomeningocele or resolving hematoma.  Blood cultures were negative.  Wound cultures on 8/12 +pseudomonas aeruginosa.  Underwent I&D on 8/16 with Dr. Doran. No definitive clinical evidence of gross wound infection per OP notes.  One OR culture +Pseudomonas; however, deep lumbar wound cultures from the OR negative.  Discharged home on IV cefepime through 8/31, to be followed by p.o. ciprofloxacin through 9/16/18.     Hospital records reviewed    Today, 8/30/2018: Patient reports feeling well and has been tolerating the IV cefepime with minimal adverse effect.  Still has nausea with each dose, denies any vomiting.  Says that the nausea is getting a little bit better.  Has had a decreased appetite.  Has lost approximately 9 pounds since her initial surgery.  Has been drinking plenty of water, does not feel dehydrated.  Diarrhea has resolved.  Denies feeling generally ill, fevers/chills, general malaise, headache,  "abdominal pain, chest pain or shortness of breath.  Pt stating that the wound is healing well. Pt being followed by the [facility]. Wound pictures reviewed in EPIC. Denies drainage, odor, redness, pain/tenderness or swelling.  Had a follow-up with Dr. Doran this past week, states that she is making good progress, will hold off on PT for at least another 2 weeks, follow-up again in 2 weeks.  Patient sister reports that the lumbar surgical site has not had any drainage and it is well-healed.  Patient reports her pain on average is a 3/10 but can get up to a 7-8/10, it is a chronic dull ache that will occasionally become \"roaring,\" and occasionally sharp.    ROS  As documented above in my HPI.    Past Medical History:   Diagnosis Date   • Anesthesia     hx of difficulty waking up   • Asthma 02/21/2018    Inhaler use during allergy season.   • Breath shortness    • Bronchitis 2016   • Cough 07/13/2018    \"from allergies\"   • Environmental and seasonal allergies    • Genital herpes in women 2/11/2014   • Heart burn    • High risk medications (not anticoagulants) long-term use 8/29/2012   • Hypothyroid 9/14/2011   • Kidney stones    • Macrocytosis without anemia med effect   • Menopause 9/15/2011   • Pain 07/13/2018    back, hands, feet, knees, ankles, 5/10   • Pneumonia 01/2015   • Post-cholecystectomy syndrome     \"dumping syndrome\"   • Rheumatoid arthritis(714.0)    • Snoring     no sleep study   • Tobacco abuse, episodic 9/14/2011   • Vitamin d deficiency 5/15/2012       Social History   Substance Use Topics   • Smoking status: Current Every Day Smoker     Packs/day: 0.50     Years: 40.00     Types: Cigarettes   • Smokeless tobacco: Never Used   • Alcohol use 0.6 oz/week     1 Glasses of wine per week      Comment: occasional, maybe 1 per month       Allergies: Morphine; Aspirin; Codeine; Tramadol; Sulfamethoxazole w-trimethoprim; and Other drug    Pt's medication and problem list reviewed.     Objective: " "    PE:  /80   Pulse 84   Temp 36.9 °C (98.4 °F)   Ht 1.626 m (5' 4\")   Wt 78 kg (172 lb)   LMP  (LMP Unknown)   SpO2 96%   BMI 29.52 kg/m²     Vital signs reviewed    Constitutional: Appears well-developed and well-nourished. No acute distress.  Speech fluent.  Overweight.  Eyes: Conjunctivae normal and EOM are normal. Pupils are equal, round, and reactive to light.   Neck: Trachea midline. Normal range of motion. Neck supple.  No JVD.  Cardiovascular: Normal rate, regular rhythm, normal heart sounds. No murmur, gallop, or friction rub. No edema.  Respiratory: No respiratory distress, unlabored respiratory effort.  Lungs clear to auscultation bilaterally. No wheezes or rales.   Abdomen: Soft, non tender, non-distended. BS + x 4. No masses.   Musculoskeletal: Fairly steady gait, FWW use for ambulatory assistance.  Normal range of motion.   Lumbar spine-well approximated, covered dressing over top of surgical site, no active drainage, no erythema, no maceration, nontender to palpitation, no bony prominences or protrusions, no induration or fluctuance.  LUE midline- CDI, non tender, no erythema.  Skin: Warm and dry. No visible rashes or lesions.  Neurological: No cranial nerve deficit. Coordination normal.  Sensation intact.  Psychiatric: Alert and oriented to person, place, and time. Normal mood, calm affect.  Normal behavior and judgment.     Labs:  WBC   Date/Time Value Ref Range Status   08/28/2018 12:15 PM 5.7 4.8 - 10.8 K/uL Final   08/28/2018 12:15 PM 6.0 4.8 - 10.8 K/uL Final   10/20/2011 12:00 AM 5.7 4.0 - 10.5 x10E3/uL Final     RBC   Date/Time Value Ref Range Status   08/28/2018 12:15 PM 3.89 (L) 4.20 - 5.40 M/uL Final   08/28/2018 12:15 PM 3.92 (L) 4.20 - 5.40 M/uL Final   10/20/2011 12:00 AM 4.32 3.80 - 5.10 x10E6/uL Final     Hemoglobin   Date/Time Value Ref Range Status   08/28/2018 12:15 PM 12.1 12.0 - 16.0 g/dL Final   08/28/2018 12:15 PM 12.1 12.0 - 16.0 g/dL Final     Hematocrit "   Date/Time Value Ref Range Status   08/28/2018 12:15 PM 38.4 37.0 - 47.0 % Final   08/28/2018 12:15 PM 38.8 37.0 - 47.0 % Final     MCV   Date/Time Value Ref Range Status   08/28/2018 12:15 PM 98.7 (H) 81.4 - 97.8 fL Final   08/28/2018 12:15 PM 99.0 (H) 81.4 - 97.8 fL Final   10/20/2011 12:00 AM 99 (H) 80 - 98 fL Final     MCH   Date/Time Value Ref Range Status   08/28/2018 12:15 PM 31.1 27.0 - 33.0 pg Final   08/28/2018 12:15 PM 30.9 27.0 - 33.0 pg Final   10/20/2011 12:00 AM 34.0 27.0 - 34.0 pg Final     MCHC   Date/Time Value Ref Range Status   08/28/2018 12:15 PM 31.5 (L) 33.6 - 35.0 g/dL Final   08/28/2018 12:15 PM 31.2 (L) 33.6 - 35.0 g/dL Final     MPV   Date/Time Value Ref Range Status   08/28/2018 12:15 PM 11.3 9.0 - 12.9 fL Final   08/28/2018 12:15 PM 11.3 9.0 - 12.9 fL Final        Sodium   Date/Time Value Ref Range Status   08/28/2018 12:15  135 - 145 mmol/L Final   08/28/2018 12:15  135 - 145 mmol/L Final     Potassium   Date/Time Value Ref Range Status   08/28/2018 12:15 PM 4.0 3.6 - 5.5 mmol/L Final   08/28/2018 12:15 PM 3.9 3.6 - 5.5 mmol/L Final     Chloride   Date/Time Value Ref Range Status   08/28/2018 12:15  96 - 112 mmol/L Final   08/28/2018 12:15  96 - 112 mmol/L Final     Co2   Date/Time Value Ref Range Status   08/28/2018 12:15 PM 27 20 - 33 mmol/L Final   08/28/2018 12:15 PM 26 20 - 33 mmol/L Final     Glucose   Date/Time Value Ref Range Status   08/28/2018 12:15 PM 91 65 - 99 mg/dL Final   08/28/2018 12:15 PM 90 65 - 99 mg/dL Final     Bun   Date/Time Value Ref Range Status   08/28/2018 12:15 PM 11 8 - 22 mg/dL Final   08/28/2018 12:15 PM 11 8 - 22 mg/dL Final     Creatinine   Date/Time Value Ref Range Status   08/28/2018 12:15 PM 0.75 0.50 - 1.40 mg/dL Final   08/28/2018 12:15 PM 0.76 0.50 - 1.40 mg/dL Final   03/26/2013 12:00 AM 0.77 0.57 - 1.00 mg/dL Final     Bun-Creatinine Ratio   Date/Time Value Ref Range Status   11/23/2016 09:46 AM 30 (H) 9 - 23 Final    03/26/2013 12:00 AM 30 (H) 9 - 23 Final       Alkaline Phosphatase   Date/Time Value Ref Range Status   08/28/2018 12:15 PM 84 30 - 99 U/L Final   08/28/2018 12:15 PM 77 30 - 99 U/L Final     AST(SGOT)   Date/Time Value Ref Range Status   08/28/2018 12:15 PM 34 12 - 45 U/L Final   08/28/2018 12:15 PM 29 12 - 45 U/L Final     ALT(SGPT)   Date/Time Value Ref Range Status   08/28/2018 12:15 PM 22 2 - 50 U/L Final   08/28/2018 12:15 PM 20 2 - 50 U/L Final     Total Bilirubin   Date/Time Value Ref Range Status   08/28/2018 12:15 PM 0.5 0.1 - 1.5 mg/dL Final   08/28/2018 12:15 PM 0.4 0.1 - 1.5 mg/dL Final        Assessment and Plan:   The following treatment plan was discussed with patient at length:    1. Postoperative wound infection, subsequent encounter  ciprofloxacin (CIPRO) 500 MG Tab    -Finish IV cefepime on 8/31/18, will only have a.m. dose.  DC midline.  -Start p.o. ciprofloxacin on 8/31/18, in the evening.  Warned about risk for tendon rupture, QT prolongation, peripheral neuropathy and altered mental status.  QTc in July 2018 was 409.  Discussed dosing and side effects of medications being prescribed.  Pt instructed to call clinic with any adverse effects or to discontinue the medication and go to the ER should difficulty breathing, SOB, CP, facial swelling and/or gross rash/itching occurs.   2. Pseudomonas infection  ciprofloxacin (CIPRO) 500 MG Tab    As above.   3. Diarrhea, unspecified type      Resolved.   4. Rheumatoid arthritis involving multiple sites, unspecified rheumatoid factor presence (HCC)      Follow-up with rheumatologist as directed.  On Plaquenil.      Follow up: 2 weeks, RTC sooner if needed. FU with PCP for ongoing chronic medical conditions.     FREDDY Willis.       Please note that this dictation was created using voice recognition software. I have  worked with technical experts from [employer] to optimize the interface.  I have made every reasonable attempt to correct  obvious errors, but there may be errors of grammar and possibly content that I did not discover before finalizing the note.

## 2018-08-31 ENCOUNTER — HOME CARE VISIT (OUTPATIENT)
Dept: HOME HEALTH SERVICES | Facility: HOME HEALTHCARE | Age: 58
End: 2018-08-31
Payer: COMMERCIAL

## 2018-08-31 PROCEDURE — A6252 ABSORPT DRG >16 <=48 W/O BDR: HCPCS

## 2018-08-31 PROCEDURE — A6219 GAUZE <= 16 SQ IN W/BORDER: HCPCS

## 2018-08-31 PROCEDURE — G0299 HHS/HOSPICE OF RN EA 15 MIN: HCPCS

## 2018-08-31 NOTE — DOCUMENTATION QUERY
"DOCUMENTATION QUERY     DR. GARCIA, please review this Documentation Query and reply with an addendum. You co-signed only, which does not address the needed clarification from you to be able to code and complete this account.       To better represent the severity of illness of your patient, please review the following information and exercise your independent professional judgment in responding to this query.      Patient admitted with possible surgical wound infection. Debridement was performed on 8/16 and documented in the Operative Report as  \"...We then incised the fascia and exposed   the deep layer...We then irrigated and debrided thoroughly...\"      Based upon the clinical findings, risk factors and treatment can this procedure be further specified?     · Excisional Debridement (if so please include)  1. deepest level of debridement treated  2. instrument  3. nature of the tissue  4. appearance and size of wound  5. technique  · Non-excisional Debridement (please document deepest level)  · Other explanation of clinical findings  · Unable to determine              The medical record reflects the following:   Clinical Findings  Drainage from surgical wound   Treatment  Surgical debridement  IV antibiotics   Risk Factors  long term IV antibiotics  Rheumatoid arthritis and other autoimmune disease   Location within medical record  Progress Notes and Discharge Summary      Thank you,      Josselyn Hawkins  H[employer] Coder  [phone]          "

## 2018-09-01 ENCOUNTER — HOME CARE VISIT (OUTPATIENT)
Dept: HOME HEALTH SERVICES | Facility: HOME HEALTHCARE | Age: 58
End: 2018-09-01
Payer: COMMERCIAL

## 2018-09-02 VITALS
HEART RATE: 84 BPM | SYSTOLIC BLOOD PRESSURE: 130 MMHG | RESPIRATION RATE: 14 BRPM | DIASTOLIC BLOOD PRESSURE: 84 MMHG | TEMPERATURE: 98.4 F | OXYGEN SATURATION: 98 %

## 2018-09-02 ASSESSMENT — ENCOUNTER SYMPTOMS
VOMITING: DENIES
NAUSEA: OCCASIONALLY

## 2018-09-03 ENCOUNTER — HOME CARE VISIT (OUTPATIENT)
Dept: HOME HEALTH SERVICES | Facility: HOME HEALTHCARE | Age: 58
End: 2018-09-03
Payer: COMMERCIAL

## 2018-09-03 VITALS
SYSTOLIC BLOOD PRESSURE: 124 MMHG | OXYGEN SATURATION: 97 % | DIASTOLIC BLOOD PRESSURE: 82 MMHG | HEART RATE: 84 BPM | TEMPERATURE: 98.9 F | RESPIRATION RATE: 16 BRPM

## 2018-09-03 PROCEDURE — G0299 HHS/HOSPICE OF RN EA 15 MIN: HCPCS

## 2018-09-03 PROCEDURE — A5120 SKIN BARRIER, WIPE OR SWAB: HCPCS

## 2018-09-03 PROCEDURE — A6219 GAUZE <= 16 SQ IN W/BORDER: HCPCS

## 2018-09-03 PROCEDURE — A4216 STERILE WATER/SALINE, 10 ML: HCPCS

## 2018-09-03 SDOH — ECONOMIC STABILITY: HOUSING INSECURITY: UNSAFE APPLIANCES: 0

## 2018-09-03 SDOH — ECONOMIC STABILITY: HOUSING INSECURITY: UNSAFE COOKING RANGE AREA: 0

## 2018-09-04 ENCOUNTER — TELEPHONE (OUTPATIENT)
Dept: HEALTH INFORMATION MANAGEMENT | Facility: OTHER | Age: 58
End: 2018-09-04

## 2018-09-04 NOTE — TELEPHONE ENCOUNTER
Received referral from [facility]. Medications reviewed. No clinically significant interactions or medication issues noted.     Ivelisse Tinajero, CarmenD

## 2018-09-07 ENCOUNTER — HOME CARE VISIT (OUTPATIENT)
Dept: HOME HEALTH SERVICES | Facility: HOME HEALTHCARE | Age: 58
End: 2018-09-07
Payer: COMMERCIAL

## 2018-09-07 PROCEDURE — G0299 HHS/HOSPICE OF RN EA 15 MIN: HCPCS

## 2018-09-08 ENCOUNTER — HOME CARE VISIT (OUTPATIENT)
Dept: HOME HEALTH SERVICES | Facility: HOME HEALTHCARE | Age: 58
End: 2018-09-08
Payer: COMMERCIAL

## 2018-09-08 PROCEDURE — G0151 HHCP-SERV OF PT,EA 15 MIN: HCPCS

## 2018-09-09 VITALS
RESPIRATION RATE: 16 BRPM | DIASTOLIC BLOOD PRESSURE: 78 MMHG | SYSTOLIC BLOOD PRESSURE: 132 MMHG | HEART RATE: 88 BPM | TEMPERATURE: 99 F | OXYGEN SATURATION: 99 %

## 2018-09-09 ASSESSMENT — ENCOUNTER SYMPTOMS: DEBILITATING PAIN: 1

## 2018-09-10 ENCOUNTER — HOME CARE VISIT (OUTPATIENT)
Dept: HOME HEALTH SERVICES | Facility: HOME HEALTHCARE | Age: 58
End: 2018-09-10
Payer: COMMERCIAL

## 2018-09-10 VITALS
OXYGEN SATURATION: 98 % | DIASTOLIC BLOOD PRESSURE: 80 MMHG | RESPIRATION RATE: 16 BRPM | TEMPERATURE: 98.1 F | HEART RATE: 86 BPM | SYSTOLIC BLOOD PRESSURE: 122 MMHG

## 2018-09-10 PROCEDURE — G0495 RN CARE TRAIN/EDU IN HH: HCPCS

## 2018-09-10 SDOH — ECONOMIC STABILITY: HOUSING INSECURITY: UNSAFE COOKING RANGE AREA: 0

## 2018-09-10 SDOH — ECONOMIC STABILITY: HOUSING INSECURITY: UNSAFE APPLIANCES: 0

## 2018-09-10 ASSESSMENT — ENCOUNTER SYMPTOMS: RESPIRATORY SYMPTOMS COMMENTS: DENIES CHEST PAINS/PALPITATIONS

## 2018-09-11 VITALS
TEMPERATURE: 98.3 F | RESPIRATION RATE: 16 BRPM | HEART RATE: 82 BPM | DIASTOLIC BLOOD PRESSURE: 78 MMHG | OXYGEN SATURATION: 99 % | SYSTOLIC BLOOD PRESSURE: 124 MMHG

## 2018-09-11 ASSESSMENT — ENCOUNTER SYMPTOMS: RESPIRATORY SYMPTOMS COMMENTS: DENIES SOB, RESP EVEN AND UNLABORED

## 2018-09-13 ENCOUNTER — OFFICE VISIT (OUTPATIENT)
Dept: INFECTIOUS DISEASES | Facility: MEDICAL CENTER | Age: 58
End: 2018-09-13
Payer: COMMERCIAL

## 2018-09-13 VITALS
HEART RATE: 75 BPM | SYSTOLIC BLOOD PRESSURE: 118 MMHG | TEMPERATURE: 98 F | OXYGEN SATURATION: 97 % | WEIGHT: 169 LBS | HEIGHT: 64 IN | BODY MASS INDEX: 28.85 KG/M2 | DIASTOLIC BLOOD PRESSURE: 70 MMHG

## 2018-09-13 DIAGNOSIS — M06.9 RHEUMATOID ARTHRITIS INVOLVING MULTIPLE SITES, UNSPECIFIED RHEUMATOID FACTOR PRESENCE: ICD-10-CM

## 2018-09-13 DIAGNOSIS — A49.8 PSEUDOMONAS INFECTION: ICD-10-CM

## 2018-09-13 DIAGNOSIS — Z72.0 TOBACCO ABUSE: ICD-10-CM

## 2018-09-13 PROCEDURE — 99213 OFFICE O/P EST LOW 20 MIN: CPT | Performed by: NURSE PRACTITIONER

## 2018-09-13 NOTE — PROGRESS NOTES
Infectious Disease Clinic    Subjective:     Chief Complaint   Patient presents with   • Follow-Up     Postoperative wound infection, subsequent encounter     Interval History: 58 y.o. woman with history of rheumatoid arthritis on Plaquenil, and previously leflunomide and lumbar spondylolisthesis status post L4 through 5 laminectomy and hardware placement on 07/19/18 by Dr. Doran.  Hospitalized from 8/11-8/22/18, admitted for increasing lower back pain and drainage from her surgical site.  Had been doing fine following her surgery up until the Thursday PTA when she began to experience headache and worsening lower back pain.  Family also noticed yellowish green discharge from her prior surgical site and surrounding erythema.  On presentation, she was febrile with a T-max of 100.8 with a leukocytosis of 11.5.  CT of the spine revealed a 10.7 cm fluid collection in the posterior paraspinal soft tissues concerning for possible postsurgical seroma, pseudomeningocele or resolving hematoma.  Blood cultures were negative.  Wound cultures on 8/12 +pseudomonas aeruginosa.  Underwent I&D on 8/16 with Dr. Doran. No definitive clinical evidence of gross wound infection per OP notes.  One OR culture +Pseudomonas; however, deep lumbar wound cultures from the OR negative.  Discharged home on IV cefepime through 8/31, to be followed by p.o. ciprofloxacin through 9/16/18.     8/30/2018: Seen by SANDY Sparks.  Had a follow-up with Dr. Doran this past week, states that she is making good progress, will hold off on PT for at least another 2 weeks.  Finish IV cefepime on 8/31/18, will only have a.m. dose.  DC midline.  Start p.o. ciprofloxacin on 8/31/18, in the evening.    Today, 9/13/2018: Accompanied by her sister, Sena.  Has been tolerating the PO ciprofloxacin with minimal issue.  Has had some nausea, but no vomiting.  Denies any tendon pain, heart racing or arrhythmias, numbness or tingling to extremities or altered  "mental status. Denies feeling generally ill, fevers/chills, general malaise, headache, diarrhea, abdominal pain, chest pain or shortness of breath.  Had follow-up with Dr. Doran this past Tuesday, stated that everything looked good and patient started outpatient PT today.  Denies any more weight loss.  States that her lumbar surgical site is well-healed without any drainage or redness.  States that her pain is a 2/10 on average, improved with use of Tylenol or naproxen and ice.  To smoking 1/4 PPD, which is an improvement from 1/2 PPD.    ROS  As documented above in my HPI.    Past Medical History:   Diagnosis Date   • Anesthesia     hx of difficulty waking up   • Asthma 02/21/2018    Inhaler use during allergy season.   • Breath shortness    • Bronchitis 2016   • Cough 07/13/2018    \"from allergies\"   • Environmental and seasonal allergies    • Genital herpes in women 2/11/2014   • Heart burn    • High risk medications (not anticoagulants) long-term use 8/29/2012   • Hypothyroid 9/14/2011   • Kidney stones    • Macrocytosis without anemia med effect   • Menopause 9/15/2011   • Pain 07/13/2018    back, hands, feet, knees, ankles, 5/10   • Pneumonia 01/2015   • Post-cholecystectomy syndrome     \"dumping syndrome\"   • Rheumatoid arthritis(714.0)    • Snoring     no sleep study   • Tobacco abuse, episodic 9/14/2011   • Vitamin d deficiency 5/15/2012       Social History   Substance Use Topics   • Smoking status: Current Every Day Smoker     Packs/day: 0.50     Years: 40.00     Types: Cigarettes   • Smokeless tobacco: Never Used   • Alcohol use 0.6 oz/week     1 Glasses of wine per week      Comment: occasional, maybe 1 per month       Allergies: Morphine; Aspirin; Codeine; Tramadol; Sulfamethoxazole w-trimethoprim; and Other drug    Pt's medication and problem list reviewed.     Objective:     PE:  /70   Pulse 75   Temp 36.7 °C (98 °F)   Ht 1.626 m (5' 4\")   Wt 76.7 kg (169 lb)   LMP  (LMP Unknown)   SpO2 " 97%   BMI 29.01 kg/m²     Vital signs reviewed    Constitutional: Appears well-developed and well-nourished. No acute distress.  Speech fluent.  Overweight.  Eyes: Conjunctivae normal and EOM are normal. Pupils are equal, round, and reactive to light.   Neck: Trachea midline. Normal range of motion. Neck supple.  No JVD.  Cardiovascular: Normal rate, regular rhythm, normal heart sounds. No murmur. No edema.  Respiratory: No respiratory distress, unlabored respiratory effort.  Lungs clear to auscultation bilaterally. No wheezes or rales.   Abdomen: Soft, non tender, non-distended. BS + x 4. No masses.   Musculoskeletal: Fairly steady gait.  Normal range of motion.   Lumbar spine-remains well healed and approximated, no active drainage, no erythema, no maceration, nontender to palpitation, no bony prominences or protrusions, no induration or fluctuance.  Skin: Warm and dry. No visible rashes or lesions.  Neurological: No cranial nerve deficit. Coordination normal.    Psychiatric: Alert and oriented to person, place, and time. Normal mood, calm affect.      Assessment and Plan:   The following treatment plan was discussed with patient at length:    1. Postoperative wound infection, subsequent encounter      Finish PO ciprofloxacin on 9/16/18.  No additional antibiotics to follow.  Monitor for s/sx of worsening off abx: increased redness, pain, swelling, drainage, breakdown of surgical site, fevers, chills, general malaise, etc.  Notify ID or go to ER should these s/sx occur.   2. Pseudomonas infection      As above.   3. Tobacco abuse      Tobacco cessation encouraged as it can hinder wound healing and resolution of infection.   4. Rheumatoid arthritis involving multiple sites, unspecified rheumatoid factor presence (HCC)      On Plaquenil, follow-up with rheumatologist as directed.     Follow up: PRN, RTC sooner if needed. FU with PCP for ongoing chronic medical conditions.     FREDDY Willis.        Please note that this dictation was created using voice recognition software. I have  worked with technical experts from [facility] to optimize the interface.  I have made every reasonable attempt to correct obvious errors, but there may be errors of grammar and possibly content that I did not discover before finalizing the note.

## 2018-09-14 ENCOUNTER — HOME CARE VISIT (OUTPATIENT)
Dept: HOME HEALTH SERVICES | Facility: HOME HEALTHCARE | Age: 58
End: 2018-09-14
Payer: COMMERCIAL

## 2018-09-17 SDOH — ECONOMIC STABILITY: HOUSING INSECURITY: UNSAFE COOKING RANGE AREA: 0

## 2018-09-17 SDOH — ECONOMIC STABILITY: HOUSING INSECURITY: UNSAFE APPLIANCES: 0

## 2018-09-17 ASSESSMENT — ACTIVITIES OF DAILY LIVING (ADL)
OASIS_M1830: 01
HOME_HEALTH_OASIS: 01

## 2018-09-19 ENCOUNTER — HOSPITAL ENCOUNTER (OUTPATIENT)
Dept: RADIOLOGY | Facility: MEDICAL CENTER | Age: 58
End: 2018-09-19
Attending: SPECIALIST
Payer: COMMERCIAL

## 2018-09-19 DIAGNOSIS — H90.42 SENSORINEURAL HEARING LOSS, UNILATERAL, LEFT EAR, WITH UNRESTRICTED HEARING ON THE CONTRALATERAL SIDE: ICD-10-CM

## 2018-09-19 PROCEDURE — 700117 HCHG RX CONTRAST REV CODE 255: Performed by: SPECIALIST

## 2018-09-19 PROCEDURE — 70553 MRI BRAIN STEM W/O & W/DYE: CPT

## 2018-09-19 PROCEDURE — A9585 GADOBUTROL INJECTION: HCPCS | Performed by: SPECIALIST

## 2018-09-19 RX ORDER — GADOBUTROL 604.72 MG/ML
7.5 INJECTION INTRAVENOUS ONCE
Status: COMPLETED | OUTPATIENT
Start: 2018-09-19 | End: 2018-09-19

## 2018-09-19 RX ADMIN — GADOBUTROL 7.5 ML: 604.72 INJECTION INTRAVENOUS at 14:20

## 2018-09-20 PROCEDURE — G0180 MD CERTIFICATION HHA PATIENT: HCPCS | Performed by: INTERNAL MEDICINE

## 2018-09-24 ENCOUNTER — TELEPHONE (OUTPATIENT)
Dept: MEDICAL GROUP | Facility: MEDICAL CENTER | Age: 58
End: 2018-09-24

## 2018-09-24 DIAGNOSIS — M06.9 RHEUMATOID ARTHRITIS INVOLVING MULTIPLE SITES, UNSPECIFIED RHEUMATOID FACTOR PRESENCE: ICD-10-CM

## 2018-10-12 ENCOUNTER — HOSPITAL ENCOUNTER (OUTPATIENT)
Dept: LAB | Facility: MEDICAL CENTER | Age: 58
End: 2018-10-12
Attending: INTERNAL MEDICINE
Payer: COMMERCIAL

## 2018-10-12 ENCOUNTER — HOSPITAL ENCOUNTER (OUTPATIENT)
Dept: LAB | Facility: MEDICAL CENTER | Age: 58
End: 2018-10-12
Attending: FAMILY MEDICINE
Payer: COMMERCIAL

## 2018-10-12 DIAGNOSIS — Z72.0 TOBACCO ABUSE: ICD-10-CM

## 2018-10-12 DIAGNOSIS — Z51.81 ENCOUNTER FOR MONITORING ARAVA THERAPY: ICD-10-CM

## 2018-10-12 DIAGNOSIS — Z79.899 ENCOUNTER FOR MONITORING ARAVA THERAPY: ICD-10-CM

## 2018-10-12 DIAGNOSIS — M54.42 ACUTE MIDLINE LOW BACK PAIN WITH LEFT-SIDED SCIATICA: ICD-10-CM

## 2018-10-12 DIAGNOSIS — E03.9 ACQUIRED HYPOTHYROIDISM: ICD-10-CM

## 2018-10-12 DIAGNOSIS — E03.9 HYPOTHYROIDISM, UNSPECIFIED TYPE: ICD-10-CM

## 2018-10-12 DIAGNOSIS — M05.79 RHEUMATOID ARTHRITIS INVOLVING MULTIPLE SITES WITH POSITIVE RHEUMATOID FACTOR (HCC): ICD-10-CM

## 2018-10-12 DIAGNOSIS — M81.0 SENILE OSTEOPOROSIS: ICD-10-CM

## 2018-10-12 LAB
ALBUMIN SERPL BCP-MCNC: 4.1 G/DL (ref 3.2–4.9)
ALBUMIN/GLOB SERPL: 1.5 G/DL
ALP SERPL-CCNC: 75 U/L (ref 30–99)
ALT SERPL-CCNC: 15 U/L (ref 2–50)
ANION GAP SERPL CALC-SCNC: 6 MMOL/L (ref 0–11.9)
AST SERPL-CCNC: 25 U/L (ref 12–45)
BASOPHILS # BLD AUTO: 0.9 % (ref 0–1.8)
BASOPHILS # BLD: 0.05 K/UL (ref 0–0.12)
BILIRUB SERPL-MCNC: 0.4 MG/DL (ref 0.1–1.5)
BUN SERPL-MCNC: 19 MG/DL (ref 8–22)
CALCIUM SERPL-MCNC: 9.7 MG/DL (ref 8.5–10.5)
CHLORIDE SERPL-SCNC: 108 MMOL/L (ref 96–112)
CO2 SERPL-SCNC: 26 MMOL/L (ref 20–33)
CREAT SERPL-MCNC: 0.82 MG/DL (ref 0.5–1.4)
EOSINOPHIL # BLD AUTO: 0 K/UL (ref 0–0.51)
EOSINOPHIL NFR BLD: 0 % (ref 0–6.9)
ERYTHROCYTE [DISTWIDTH] IN BLOOD BY AUTOMATED COUNT: 51.5 FL (ref 35.9–50)
GLOBULIN SER CALC-MCNC: 2.7 G/DL (ref 1.9–3.5)
GLUCOSE SERPL-MCNC: 86 MG/DL (ref 65–99)
HCT VFR BLD AUTO: 42.8 % (ref 37–47)
HGB BLD-MCNC: 13.4 G/DL (ref 12–16)
IMM GRANULOCYTES # BLD AUTO: 0.02 K/UL (ref 0–0.11)
IMM GRANULOCYTES NFR BLD AUTO: 0.4 % (ref 0–0.9)
LYMPHOCYTES # BLD AUTO: 1.35 K/UL (ref 1–4.8)
LYMPHOCYTES NFR BLD: 25.5 % (ref 22–41)
MCH RBC QN AUTO: 30.7 PG (ref 27–33)
MCHC RBC AUTO-ENTMCNC: 31.3 G/DL (ref 33.6–35)
MCV RBC AUTO: 97.9 FL (ref 81.4–97.8)
MONOCYTES # BLD AUTO: 0.34 K/UL (ref 0–0.85)
MONOCYTES NFR BLD AUTO: 6.4 % (ref 0–13.4)
NEUTROPHILS # BLD AUTO: 3.53 K/UL (ref 2–7.15)
NEUTROPHILS NFR BLD: 66.8 % (ref 44–72)
NRBC # BLD AUTO: 0 K/UL
NRBC BLD-RTO: 0 /100 WBC
PLATELET # BLD AUTO: 192 K/UL (ref 164–446)
PMV BLD AUTO: 12.5 FL (ref 9–12.9)
POTASSIUM SERPL-SCNC: 4.5 MMOL/L (ref 3.6–5.5)
PROT SERPL-MCNC: 6.8 G/DL (ref 6–8.2)
RBC # BLD AUTO: 4.37 M/UL (ref 4.2–5.4)
SODIUM SERPL-SCNC: 140 MMOL/L (ref 135–145)
T4 FREE SERPL-MCNC: 1.21 NG/DL (ref 0.53–1.43)
TSH SERPL DL<=0.005 MIU/L-ACNC: 3.98 UIU/ML (ref 0.38–5.33)
WBC # BLD AUTO: 5.3 K/UL (ref 4.8–10.8)

## 2018-10-12 PROCEDURE — 85025 COMPLETE CBC W/AUTO DIFF WBC: CPT

## 2018-10-12 PROCEDURE — 84443 ASSAY THYROID STIM HORMONE: CPT

## 2018-10-12 PROCEDURE — 80053 COMPREHEN METABOLIC PANEL: CPT

## 2018-10-12 PROCEDURE — 84439 ASSAY OF FREE THYROXINE: CPT

## 2018-10-12 PROCEDURE — 36415 COLL VENOUS BLD VENIPUNCTURE: CPT

## 2018-10-12 PROCEDURE — 85652 RBC SED RATE AUTOMATED: CPT

## 2018-10-13 LAB — ERYTHROCYTE [SEDIMENTATION RATE] IN BLOOD BY WESTERGREN METHOD: 24 MM/HOUR (ref 0–30)

## 2018-10-16 ENCOUNTER — OFFICE VISIT (OUTPATIENT)
Dept: RHEUMATOLOGY | Facility: MEDICAL CENTER | Age: 58
End: 2018-10-16
Payer: COMMERCIAL

## 2018-10-16 VITALS
WEIGHT: 177 LBS | BODY MASS INDEX: 30.38 KG/M2 | TEMPERATURE: 97 F | DIASTOLIC BLOOD PRESSURE: 68 MMHG | OXYGEN SATURATION: 97 % | HEART RATE: 94 BPM | RESPIRATION RATE: 12 BRPM | SYSTOLIC BLOOD PRESSURE: 120 MMHG

## 2018-10-16 DIAGNOSIS — Z72.0 TOBACCO ABUSE: ICD-10-CM

## 2018-10-16 DIAGNOSIS — E03.9 ACQUIRED HYPOTHYROIDISM: ICD-10-CM

## 2018-10-16 DIAGNOSIS — Z79.899 LONG-TERM USE OF PLAQUENIL: ICD-10-CM

## 2018-10-16 DIAGNOSIS — M48.061 SPINAL STENOSIS OF LUMBAR REGION, UNSPECIFIED WHETHER NEUROGENIC CLAUDICATION PRESENT: ICD-10-CM

## 2018-10-16 DIAGNOSIS — M06.9 RHEUMATOID ARTHRITIS INVOLVING MULTIPLE SITES, UNSPECIFIED RHEUMATOID FACTOR PRESENCE: ICD-10-CM

## 2018-10-16 DIAGNOSIS — D33.3 ACOUSTIC NEUROMA (HCC): ICD-10-CM

## 2018-10-16 PROCEDURE — 99214 OFFICE O/P EST MOD 30 MIN: CPT | Performed by: INTERNAL MEDICINE

## 2018-10-16 RX ORDER — HYDROXYCHLOROQUINE SULFATE 200 MG/1
TABLET, FILM COATED ORAL
Qty: 180 TAB | Refills: 1 | Status: SHIPPED | OUTPATIENT
Start: 2018-10-16 | End: 2019-05-02 | Stop reason: SDUPTHER

## 2018-10-16 NOTE — LETTER
[facility]   [address] [place] [address], [address]  [place] [address]  Phone: [phone]  Fax: [phone]              Encounter Date: 10/16/2018    Dear Dr. Smallwood,    It was a pleasure seeing your patient, Clifford Murphy, on 10/16/2018. Diagnoses of Rheumatoid arthritis involving multiple sites, unspecified rheumatoid factor presence (HCC), Long-term use of Plaquenil, Acquired hypothyroidism, Acoustic neuroma (HCC), Spinal stenosis of lumbar region, unspecified whether neurogenic claudication present, and Tobacco abuse were pertinent to this visit.     Please find attached progress note which includes the history I obtained from Ms. Murphy, my physical examination findings, my impression and recommendations.      Once again, it was a pleasure participating in your patient's care.  Please feel free to contact me if you have any questions or if I can be of any further assistance to your patients.      Sincerely,    Elizabeth Gómez M.D.  Electronically Signed          PROGRESS NOTE:  No notes on file

## 2018-10-16 NOTE — PROGRESS NOTES
Chief Complaint- joint pain    Subjective:   Clifford Murphy is a 58 y.o. female here today for follow up of rheumatological issues    This is a follow-up visit for this patient with rheumatoid arthritis currently on Plaquenil at 200 mg p.o. twice daily only.  Patient had been on leflunomide but stopped because patient now status post a lumbar spine laminectomy for which patient's been having trouble healing from.  Patient continues to be off of leflunomide and only on Plaquenil 200 mg p.o. twice daily but seems to be doing quite well.  Patient denies any side effects from the medication, denies any unexplained weight loss, denies any fevers of unknown etiology, denies any GI upset, denies any rashes, denies any new joint swelling, denies recurrent infections.  Of note recent ESR equals 24 October 2018    Additional comorbidities include a new diagnosis of an acoustic neuroma on the left ear for which patient now is traveling to [place] today for evaluation of treatment.  Patient also with hypothyroidism.  Patient also with osteoporosis as seen by FRAX score recently status post kyphoplasty currently on Boniva once/month      S/p MTX-N/V  S/p Enbrel-sinus infections  S/p Humira-sinus infections and injection site skin breakdown  S/p Actemra-exacerbated pneumonia  S/p sulfasalazine-contradicted, pt with sulfa allergy  S/p fosamax-GI upset      CCP neg 9/2015  Quantiferon Gold neg 9/2015  Hep B neg 9/2015  Uric acid 4.6 9/2015  G6PD 11.5 adequate 10/2017  RF 18 6/2009  CHIQUITA neg 6/2009   Hand x-rays 5/2015-no pathology  Feet X-rays 5/2015-indicates DJD, no erosions    LS pine x-rays 7/2009-indicates multilevel DJD     MRI LS spine 2/2018-L1 compression fracture, multilevel multifactorial DJD, neuroforaminal narrowing at multiple sites, severe left neural foraminal narrowing  DEXA 6/5/2018 T scores -0.7, -0.9  FRAX 6/5/2018 major osteoporotic fracture risk 17.8%, hip fracture risk 3.1%  Patient on bisphosphonates since  5/2018       Current medicines (including changes today)  Current Outpatient Prescriptions   Medication Sig Dispense Refill   • hydroxychloroquine (PLAQUENIL) 200 MG Tab TAKE 1 TABLET BY MOUTH TWICE A  Tab 1   • ibandronate (BONIVA) 150 MG tablet 1 tab po in AM q 30 days on an empty stomach, take with 8 oz water and do not lie down for 30 minutes after taking medication 6 Tab 1   • Levothyroxine Sodium 137 MCG Cap Take 1 Tab by mouth every morning before breakfast. 30 Cap 3   • cetirizine (ZYRTEC) 10 MG Tab Take 10 mg by mouth 1 time daily as needed for Allergies.     • Cholecalciferol (VITAMIN D3) 5000 units Tab Take 5,000 Int'l Units by mouth every day at 6 PM.     • Folic Acid 0.8 MG Cap Take 0.8-1.6 mg by mouth every day at 6 PM. takes 1 pill daily and alternates every other day 2 pills daily     • ibuprofen (MOTRIN) 800 MG Tab Take 800 mg by mouth every 8 hours as needed for Mild Pain.     • raNITidine (ZANTAC) 150 MG Tab TAKE 1 TAB BY MOUTH 2 TIMES A  Tab 2   • ondansetron (ZOFRAN) 4 MG Tab tablet Take 1 Tab by mouth every four hours as needed for Nausea/Vomiting. 20 Tab 2   • diazePAM (VALIUM) 5 MG Tab Take 5 mg by mouth every 6 hours as needed for Anxiety.     • acetaminophen (TYLENOL) 500 MG Tab Take 500-1,000 mg by mouth 2 times a day as needed for Mild Pain.       No current facility-administered medications for this visit.      She  has a past medical history of Anesthesia; Asthma (02/21/2018); Breath shortness; Bronchitis (2016); Cough (07/13/2018); Environmental and seasonal allergies; Genital herpes in women (2/11/2014); Heart burn; High risk medications (not anticoagulants) long-term use (8/29/2012); Hypothyroid (9/14/2011); Kidney stones; Macrocytosis without anemia (med effect); Menopause (9/15/2011); Pain (07/13/2018); Pneumonia (01/2015); Post-cholecystectomy syndrome; Rheumatoid arthritis(714.0); Snoring; Tobacco abuse, episodic (9/14/2011); and Vitamin d deficiency  (5/15/2012).    ROS   Other than what is mentioned in HPI or physical exam, there is no history of headaches, double vision or blurred vision. No temporal tenderness or jaw claudication. No history of cataracts or glaucoma. No trouble swallowing difficulties or sore throats.  No chest complaints including chest pain, cough or sputum production. No GI complaints including nausea, vomiting, change in bowel habits, or past peptic ulcer disease. No history of blood in the stools. No urinary complaints including dysuria or frequency. No history of alopecia, photosensitivity, oral ulcerations, Raynaud's phenomena.       Objective:     Blood pressure 120/68, pulse 94, temperature 36.1 °C (97 °F), temperature source Temporal, resp. rate 12, weight 80.3 kg (177 lb), SpO2 97 %, not currently breastfeeding. Body mass index is 30.38 kg/m².   Physical Exam:    Constitutional: Alert and oriented X3, patient is talkative with good eye contact.Skin: Warm, dry, good turgor, no rashes in visible areas, no psoriatic lesions, no petechial lesions, no malar rashes  .Eye: Equal, round and reactive, conjunctiva clear, lids normal EOM intactENMT: Lips without lesions, good dentition, no oropharyngeal ulcers, moist buccal mucosa, pinna without deformity, perceived loss of hearing in the left ear Neck: Trachea midline, no masses, no thyromegaly.Lymph:  No cervical lymphadenopathy, no axillary lymphadenopathy, no supraclavicular lymphadenopathyRespiratory: Unlabored respiratory effort, lungs clear to auscultation, no wheezes, no ronchi.Cardiovascular: Normal S1, S2, no murmur, no edema.Abdomen: Soft, non-tender, no masses, no hepatosplenomegaly.Psych: Alert and oriented x3, normal affect and mood.Neuro: Cranial nerves 2-12 are grossly intact except for loss of hearing in the left ear, no loss of sensation LEExt:no joint laxity noted in bilateral arms, no joint laxity noted in bilateral legs, the small joints of the hands look really quite  good there is no swelling no dactylitis no ulnar deviation no sausage digits no swan-neck or boutonniere deformities, knees with minimal crepitus but no effusions toes without dactylitis, no changes of the small joints, mild bunions bilateral great toes no splay toes no crossover toes    Lab Results   Component Value Date/Time    SODIUM 140 10/12/2018 10:44 AM    POTASSIUM 4.5 10/12/2018 10:44 AM    CHLORIDE 108 10/12/2018 10:44 AM    CO2 26 10/12/2018 10:44 AM    GLUCOSE 86 10/12/2018 10:44 AM    BUN 19 10/12/2018 10:44 AM    CREATININE 0.82 10/12/2018 10:44 AM    CREATININE 0.77 03/26/2013 12:00 AM    BUNCREATRAT 30 (H) 11/23/2016 09:46 AM    BUNCREATRAT 30 (H) 03/26/2013 12:00 AM      Lab Results   Component Value Date/Time    WBC 5.3 10/12/2018 10:44 AM    WBC 5.7 10/20/2011 12:00 AM    RBC 4.37 10/12/2018 10:44 AM    RBC 4.32 10/20/2011 12:00 AM    HEMOGLOBIN 13.4 10/12/2018 10:44 AM    HEMATOCRIT 42.8 10/12/2018 10:44 AM    MCV 97.9 (H) 10/12/2018 10:44 AM    MCV 99 (H) 10/20/2011 12:00 AM    MCH 30.7 10/12/2018 10:44 AM    MCH 34.0 10/20/2011 12:00 AM    MCHC 31.3 (L) 10/12/2018 10:44 AM    MPV 12.5 10/12/2018 10:44 AM    NEUTSPOLYS 66.80 10/12/2018 10:44 AM    LYMPHOCYTES 25.50 10/12/2018 10:44 AM    MONOCYTES 6.40 10/12/2018 10:44 AM    EOSINOPHILS 0.00 10/12/2018 10:44 AM    BASOPHILS 0.90 10/12/2018 10:44 AM      Lab Results   Component Value Date/Time    CALCIUM 9.7 10/12/2018 10:44 AM    ASTSGOT 25 10/12/2018 10:44 AM    ALTSGPT 15 10/12/2018 10:44 AM    ALKPHOSPHAT 75 10/12/2018 10:44 AM    TBILIRUBIN 0.4 10/12/2018 10:44 AM    ALBUMIN 4.1 10/12/2018 10:44 AM    TOTPROTEIN 6.8 10/12/2018 10:44 AM     Lab Results   Component Value Date/Time    RHEUMFACTN 18 (H) 06/15/2009 09:27 AM    ANTINUCAB None Detected 06/15/2009 09:27 AM     Lab Results   Component Value Date/Time    SEDRATEWES 24 10/12/2018 10:44 AM     Lab Results   Component Value Date/Time    HBA1C 5.3 11/23/2016 09:46 AM     Lab Results    Component Value Date/Time    G6PD 11.5 10/03/2017 09:54 AM     Results for orders placed during the hospital encounter of 02/02/18   DX-JOINT SURVEY-HANDS SINGLE VIEW    Impression 1.  No acute fracture or bone erosion identified.    2.  Mild osteoarthritis.     Results for orders placed during the hospital encounter of 02/02/18   DX-JOINT SURVEY-FEET SINGLE VIEW    Impression 1.  No acute fracture or bone erosion.    2.  Hallux valgus deformities and mild osteoarthritis.     Results for orders placed during the hospital encounter of 06/05/18   DS-BONE DENSITY STUDY (DEXA)    Impression According to the World Health Organization classification, bone mineral density of this patient is normal.        10-year Probability of Fracture:  Major Osteoporotic     17.8%  Hip     3.1%  Population      [place] ()    Based on left femur neck BMD          INTERPRETING LOCATION:  [address] [place] [address], [id number]     Results for orders placed in visit on 09/29/14   DX-FOOT-COMPLETE 3+    Impression Subacute nondisplaced fifth proximal phalanx extra-articular fracture     Results for orders placed during the hospital encounter of 07/11/09   DX-PELVIS-1 OR 2 VIEWS    Impression IMPRESSION:     1. VERY MILD DEGENERATIVE CHANGE OF THE HIPS, SLIGHTLY WORSE ON THE RIGHT   THAN THE LEFT.  MINIMAL SCLEROSIS IS SEEN AT THE LATERAL ASPECT OF THE   RIGHT FEMORAL NECK, WHICH IS OF DOUBTFUL CLINICAL SIGNIFICANCE.      2. NO PELVIS FRACTURE.    MM/srd     Read By MARICARMEN ANSARI MD on Jul 13 2009  8:38AM  : PREMA Transcription Date: Jul 13 2009 10:42AM  THIS DOCUMENT HAS BEEN ELECTRONICALLY SIGNED BY: MARICARMNE ANSARI MD on Jul 13 2009  4:05PM        Results for orders placed during the hospital encounter of 07/11/09   DX-PELVIS-1 OR 2 VIEWS    Impression IMPRESSION:     1. VERY MILD DEGENERATIVE CHANGE OF THE HIPS, SLIGHTLY WORSE ON THE RIGHT   THAN THE LEFT.  MINIMAL SCLEROSIS IS SEEN AT THE LATERAL ASPECT OF THE    RIGHT FEMORAL NECK, WHICH IS OF DOUBTFUL CLINICAL SIGNIFICANCE.      2. NO PELVIS FRACTURE.    MM/srd     Read By MARICARMEN ANSARI MD on Jul 13 2009  8:38AM  : SRP Transcription Date: Jul 13 2009 10:42AM  THIS DOCUMENT HAS BEEN ELECTRONICALLY SIGNED BY: MARICARMEN ANSARI MD on Jul 13 2009  4:05PM        Results for orders placed during the hospital encounter of 10/10/09   DX-KNEE COMPLETE 4+    Impression IMPRESSION:     3. MODERATE THREE COMPARTMENT ARTHROSIS.     4. QUESTION SMALL EFFUSION.     MPW/vadim         Read By JASSI GREEN MD on Oct 12 2009  9:01AM  : ZPT Transcription Date: Oct 12 2009  2:56PM  THIS DOCUMENT HAS BEEN ELECTRONICALLY SIGNED BY: JASSI GREEN MD on Oct   12 2009  3:47PM        Results for orders placed in visit on 09/02/15   DX-KNEE 2- LEFT    Impression 1. No evidence of acute fracture or dislocation.    2. Increased sclerosis with punctate hyperdensities in the proximal tibial metadiaphysis anteriorly may be related to prior surgery.    3. Mild left knee osteoarthritis.     Results for orders placed during the hospital encounter of 07/10/12   DX-HAND 3+    Impression No new radiographic evidence of erosive arthropathy    Stable second DIP subchondral cyst versus erosion    Stable mild first MCP osteoarthritis, periarticular calcification and osteopenia     Results for orders placed during the hospital encounter of 02/02/18   MR-LUMBAR SPINE-W/O    Impression 1.  Acute L1 vertebral compression fracture. This would be amenable to percutaneous augmentation with vertebroplasty or kyphoplasty if clinically appropriate. Interventional radiology is available for consultation and therapy.  2.  Multilevel multifactorial degenerative changes  3.  No areas of high-grade central canal narrowing  4.  LEFT neural foraminal narrowing worse at L5-S1  5.  Other areas of central canal and neural foraminal narrowing as described above  6.  Mild intra and extrahepatic biliary  dilatation without visualized obstructing lesion. Further imaging assessment could be performed with ultrasound or MRCP if clinically appropriate. I have no relevant prior studies available for comparison.     Results for orders placed during the hospital encounter of 01/03/18   MR-KNEE-W/O LEFT    Impression 1.  No MR explanation for acute symptoms. Ligaments and menisci are intact    2.  Postoperative change related to presumed prior tibial tubercle transfer. There is mild patella timothy    3.  Severe patellofemoral cartilage thinning with areas of bone-on-bone articulation and moderate osteophytes.    4.  Moderate femorotibial osteophytes with minimal cartilage thinning     Results for orders placed during the hospital encounter of 02/21/18   DX-CERVICAL SPINE-2 OR 3 VIEWS    Impression 1.  No evidence of atlantoaxial subluxation with flexion and extension maneuvers of the cervical spine.    2.  Degenerative changes C6-7. No demonstrable range of motion at this level.     Results for orders placed during the hospital encounter of 08/11/18   CT-LSPINE W/O PLUS RECONS    Impression 1. Recent instrumentation at L2-5 levels with a 10.7 cm fluid collection in the posterior paraspinal soft tissues. Differential includes postsurgical seroma, pseudomeningocele and the resolving hematoma. Infection should be excluded clinically.  2. Prior augmentation of L1 compression deformity. No new fracture or listhesis.     Assessment and Plan:     1. Rheumatoid arthritis involving multiple sites, unspecified rheumatoid factor presence (HCC)  Currently only on Plaquenil at 200 mg p.o. twice daily and is quite stable we will continue with Plaquenil  - REFERRAL TO OPHTHALMOLOGY  - hydroxychloroquine (PLAQUENIL) 200 MG Tab; TAKE 1 TABLET BY MOUTH TWICE A DAY  Dispense: 180 Tab; Refill: 1    2. Long-term use of Plaquenil  Last ophthalmology evaluation over one year ago submitted referral to Dr. Peraza for reevaluation of Plaquenil  maculopathy  Of note G6PD levels are adequate  Patient will need CBC every 6 months, next CBC due about April 2019  - REFERRAL TO OPHTHALMOLOGY  - hydroxychloroquine (PLAQUENIL) 200 MG Tab; TAKE 1 TABLET BY MOUTH TWICE A DAY  Dispense: 180 Tab; Refill: 1    3. Acquired hypothyroidism  Can exacerbate joint pain, I recommend monitoring thyroid on a regular basis to assure it is not contributing to the patient's joint pain  - hydroxychloroquine (PLAQUENIL) 200 MG Tab; TAKE 1 TABLET BY MOUTH TWICE A DAY  Dispense: 180 Tab; Refill: 1    4. Acoustic neuroma (HCC)  Seen by MRI September 2018 patient now going to be seen at [facility] for evaluation of treatment    5. Spinal stenosis of lumbar region, unspecified whether neurogenic claudication present  Status post lumbar laminectomy currently still recovering undergoing physical therapy slowly improving  - hydroxychloroquine (PLAQUENIL) 200 MG Tab; TAKE 1 TABLET BY MOUTH TWICE A DAY  Dispense: 180 Tab; Refill: 1    6. Tobacco abuse  Tobacco abuse is known to exacerbate rheumatoid arthritis, 20 minute discussion with patient regarding the need to stop tobacco abuse, patient states understanding    7. Osteoporosis  Last DEXA June 2018, next DEXA June 2020, patient currently on Boniva once a month   continue calcium 1200 mg by mouth daily and vitamin D about 1000 units by mouth daily and magnesium 400 mg by mouth daily    Followup: Return in about 6 months (around 4/16/2019). or sooner amada Murphy was seen 30 minutes face-to-face of which more than 50% of the time was spent counseling the patient (excluding time for procedures)  regarding  rheumatological condition and care. Therapy was discussed in detail.    Please note that this dictation was created using voice recognition software. I have made every reasonable attempt to correct obvious errors, but I expect that there are errors of grammar and possibly content that I did not discover before finalizing the  note.

## 2018-11-06 ENCOUNTER — TELEPHONE (OUTPATIENT)
Dept: MEDICAL GROUP | Facility: LAB | Age: 58
End: 2018-11-06

## 2018-11-07 ENCOUNTER — OFFICE VISIT (OUTPATIENT)
Dept: MEDICAL GROUP | Facility: LAB | Age: 58
End: 2018-11-07
Payer: COMMERCIAL

## 2018-11-07 VITALS
DIASTOLIC BLOOD PRESSURE: 78 MMHG | HEART RATE: 79 BPM | WEIGHT: 181 LBS | OXYGEN SATURATION: 98 % | TEMPERATURE: 97.6 F | SYSTOLIC BLOOD PRESSURE: 134 MMHG | HEIGHT: 64 IN | RESPIRATION RATE: 16 BRPM | BODY MASS INDEX: 30.9 KG/M2

## 2018-11-07 DIAGNOSIS — B07.8 OTHER VIRAL WARTS: ICD-10-CM

## 2018-11-07 DIAGNOSIS — K21.9 GASTROESOPHAGEAL REFLUX DISEASE WITHOUT ESOPHAGITIS: ICD-10-CM

## 2018-11-07 PROCEDURE — 99213 OFFICE O/P EST LOW 20 MIN: CPT | Mod: 25 | Performed by: FAMILY MEDICINE

## 2018-11-07 PROCEDURE — 17110 DESTRUCTION B9 LES UP TO 14: CPT | Performed by: FAMILY MEDICINE

## 2018-11-07 NOTE — ASSESSMENT & PLAN NOTE
Takes ranitidine daily as directed. Denies side effects.  She started Boniva 2 months ago and her symptoms worsened after taking it this month  Denies early satiety, unintentional weight loss, choking, persistent burning pain in chest or upper abdomen.

## 2018-11-07 NOTE — TELEPHONE ENCOUNTER
ESTABLISHED PATIENT PRE-VISIT PLANNING     Note: Patient will not be contacted if there is no indication to call.     1.  Reviewed notes from the last few office visits within the medical group: Yes    2.  If any orders were placed at last visit or intended to be done for this visit (i.e. 6 mos follow-up), do we have Results/Consult Notes?        •  Labs - Labs were not ordered at last office visit.       •  Imaging - Imaging was not ordered at last office visit.       •  Referrals - No referrals were ordered at last office visit.    3. Is this appointment scheduled as a Hospital Follow-Up? No    4.  Immunizations were updated in Epic using WebIZ?: Epic matches WebIZ       •  Web Iz Recommendations: FLU, HEPATITIS A , TDAP and ZOSTAVAX (Shingles)    5.  Patient is due for the following Health Maintenance Topics:   Health Maintenance Due   Topic Date Due   • IMM DTaP/Tdap/Td Vaccine (1 - Tdap) 01/19/1977   • IMM ZOSTER VACCINES (1 of 2) 01/19/2008   • COLON CANCER SCREENING ANNUAL FIT  12/01/2015   • IMM INFLUENZA (1) 09/01/2018   • PAP SMEAR  09/23/2018       - Patient has completed PNEUMOVAX (PPSV23) Immunization(s) per WebIZ. Chart has been updated.    6.  MDX printed for Provider? NO    7.  Patient was NOT informed to arrive 15 min prior to their scheduled appointment and bring in their medication bottles.

## 2018-11-07 NOTE — PATIENT INSTRUCTIONS
Teriparatide injection  What is this medicine?  TERIPARATIDE (terr ih PAR a tyd) increases bone mass and strength. It helps make healthy bone and to slow bone loss. This medicine is used to prevent bone fractures.  This medicine may be used for other purposes; ask your health care provider or pharmacist if you have questions.  COMMON BRAND NAME(S): Param  What should I tell my health care provider before I take this medicine?  They need to know if you have any of these conditions:  -bone disease other than osteoporosis  -high levels of calcium in the blood  -history of cancer in the bone  -kidney stone  -Paget's disease  -parathyroid disease  -receiving radiation therapy  -an unusual or allergic reaction to teriparatide, other medicines, foods, dyes, or preservatives  -pregnant or trying to get pregnant  -breast-feeding  How should I use this medicine?  This medicine is for injection under the skin. You will be taught how to prepare and give this medicine. Use exactly as directed. Take your medicine at regular intervals. Do not take your medicine more often than directed.  It is important that you put your used needles and pens in a special sharps container. Do not put them in a trash can. If you do not have a sharps container, call your pharmacist or health care provider to get one.  A special MedGuide will be given to you by the pharmacist with each prescription and refill. Be sure to read this information carefully each time.  Talk to your pediatrician regarding the use of this medicine in children. Special care may be needed.  Overdosage: If you think you have taken too much of this medicine contact a poison control center or emergency room at once.  NOTE: This medicine is only for you. Do not share this medicine with others.  What if I miss a dose?  If you miss a dose, take it as soon as you can. If it is almost time for your next dose, take only that dose. Do not take double or extra doses.  What may interact  with this medicine?  -digoxin  This list may not describe all possible interactions. Give your health care provider a list of all the medicines, herbs, non-prescription drugs, or dietary supplements you use. Also tell them if you smoke, drink alcohol, or use illegal drugs. Some items may interact with your medicine.  What should I watch for while using this medicine?  Visit your doctor or health care professional for regular checks on your progress. Your doctor may order blood tests and other tests to see how you are doing.  You should make sure you get enough calcium and vitamin D while you are taking this medicine, unless your doctor tells you not to. Discuss the foods you eat and the vitamins you take with your health care professional.  You may get drowsy or dizzy. Do not drive, use machinery, or do anything that needs mental alertness until you know how this medicine affects you. Do not stand or sit up quickly, especially if you are an older patient. This reduces the risk of dizzy or fainting spells.  Talk to your doctor about your risk of cancer. You may be more at risk for certain types of cancers if you take this medicine.  What side effects may I notice from receiving this medicine?  Side effects that you should report to your doctor or health care professional as soon as possible:  -allergic reactions like skin rash, itching or hives, swelling of the face, lips, or tongue  -blood in the urine; pain in the lower back or side; pain when urinating  -signs and symptoms of low blood pressure like dizziness; feeling faint or lightheaded, falls; unusually weak or tired  -signs and symptoms of increased calcium like nausea; vomiting; constipation; low energy; or muscle weakness  Side effects that usually do not require medical attention (report these to your doctor or health care professional if they continue or are bothersome):  -headache  -joint pain  -nausea  -pain, redness, irritation or swelling at the  injection site  -stomach upset  This list may not describe all possible side effects. Call your doctor for medical advice about side effects. You may report side effects to FDA at 7-576-FDA-0758.  Where should I keep my medicine?  Keep out of the reach of children.  Store the pens in a refrigerator between 2 and 8 degrees C (36 and 46 degrees F). Do not freeze. Use the pen quickly after taking out of the refrigerator and recap and return to refrigerator right after using. Protect from light. Throw away any unused medicine 28 days after the first injection from the pen. Throw away any unused medicine after the expiration date on the label.  NOTE: This sheet is a summary. It may not cover all possible information. If you have questions about this medicine, talk to your doctor, pharmacist, or health care provider.  © 2018 [employer] Standard (2017-05-08 10:23:57)  Denosumab injection  What is this medicine?  DENOSUMAB (den oh carol mab) slows bone breakdown. Prolia is used to treat osteoporosis in women after menopause and in men. Xgeva is used to prevent bone fractures and other bone problems caused by cancer bone metastases. Xgeva is also used to treat giant cell tumor of the bone.  This medicine may be used for other purposes; ask your health care provider or pharmacist if you have questions.  COMMON BRAND NAME(S): Prolia, XGEVA  What should I tell my health care provider before I take this medicine?  They need to know if you have any of these conditions:  -dental disease  -eczema  -infection or history of infections  -kidney disease or on dialysis  -low blood calcium or vitamin D  -malabsorption syndrome  -scheduled to have surgery or tooth extraction  -taking medicine that contains denosumab  -thyroid or parathyroid disease  -an unusual reaction to denosumab, other medicines, foods, dyes, or preservatives  -pregnant or trying to get pregnant  -breast-feeding  How should I use this medicine?  This medicine is for  injection under the skin. It is given by a health care professional in a hospital or clinic setting.  If you are getting Prolia, a special MedGuide will be given to you by the pharmacist with each prescription and refill. Be sure to read this information carefully each time.  For Prolia, talk to your pediatrician regarding the use of this medicine in children. Special care may be needed. For Xgeva, talk to your pediatrician regarding the use of this medicine in children. While this drug may be prescribed for children as young as 13 years for selected conditions, precautions do apply.  Overdosage: If you think you have taken too much of this medicine contact a poison control center or emergency room at once.  NOTE: This medicine is only for you. Do not share this medicine with others.  What if I miss a dose?  It is important not to miss your dose. Call your doctor or health care professional if you are unable to keep an appointment.  What may interact with this medicine?  Do not take this medicine with any of the following medications:  -other medicines containing denosumab  This medicine may also interact with the following medications:  -medicines that suppress the immune system  -medicines that treat cancer  -steroid medicines like prednisone or cortisone  This list may not describe all possible interactions. Give your health care provider a list of all the medicines, herbs, non-prescription drugs, or dietary supplements you use. Also tell them if you smoke, drink alcohol, or use illegal drugs. Some items may interact with your medicine.  What should I watch for while using this medicine?  Visit your doctor or health care professional for regular checks on your progress. Your doctor or health care professional may order blood tests and other tests to see how you are doing.  Call your doctor or health care professional if you get a cold or other infection while receiving this medicine. Do not treat yourself. This  medicine may decrease your body's ability to fight infection.  You should make sure you get enough calcium and vitamin D while you are taking this medicine, unless your doctor tells you not to. Discuss the foods you eat and the vitamins you take with your health care professional.  See your dentist regularly. Brush and floss your teeth as directed. Before you have any dental work done, tell your dentist you are receiving this medicine.  Do not become pregnant while taking this medicine or for 5 months after stopping it. Women should inform their doctor if they wish to become pregnant or think they might be pregnant. There is a potential for serious side effects to an unborn child. Talk to your health care professional or pharmacist for more information.  What side effects may I notice from receiving this medicine?  Side effects that you should report to your doctor or health care professional as soon as possible:  -allergic reactions like skin rash, itching or hives, swelling of the face, lips, or tongue  -breathing problems  -chest pain  -fast, irregular heartbeat  -feeling faint or lightheaded, falls  -fever, chills, or any other sign of infection  -muscle spasms, tightening, or twitches  -numbness or tingling  -skin blisters or bumps, or is dry, peels, or red  -slow healing or unexplained pain in the mouth or jaw  -unusual bleeding or bruising  Side effects that usually do not require medical attention (report to your doctor or health care professional if they continue or are bothersome):  -muscle pain  -stomach upset, gas  This list may not describe all possible side effects. Call your doctor for medical advice about side effects. You may report side effects to FDA at 4-312-FDA-0164.  Where should I keep my medicine?  This medicine is only given in a clinic, doctor's office, or other health care setting and will not be stored at home.  NOTE: This sheet is a summary. It may not cover all possible information. If  you have questions about this medicine, talk to your doctor, pharmacist, or health care provider.  © 2018 [employer]/Gold Standard (2017-01-19 10:06:55)  Cryosurgery for Skin Conditions, Care After  Refer to this sheet in the next few weeks. These instructions provide you with information on caring for yourself after your procedure. Your health care provider may also give you more specific instructions. Your treatment has been planned according to current medical practices, but problems sometimes occur. Call your health care provider if you have any problems or questions after your procedure.  WHAT TO EXPECT AFTER THE PROCEDURE  After your procedure, it is typical to have the following:  · The treated area will become red and swollen shortly after the procedure.  · Within 2-3 days, a blister will form over the treated area. The blister may contain a small amount of blood.  · In about 2 weeks, the blister will break on its own, leaving a scab. The treated area will then heal. After healing, there is usually little or no scarring.  HOME CARE INSTRUCTIONS   · Keep the treated area clean, dry, and covered with a bandage until healed. The area can be cleaned as usual with soap and water.  · You may take showers. If your bandage gets wet, change it right away.  · Do not pick at your blister or try to break it open. This can cause infection and scarring.  · Do not apply any medicine, cream, or lotion to the treated area unless directed to do so by your health care provider.  SEEK MEDICAL CARE IF:   · You have increased pain, swelling, redness, fluid drainage, or bleeding in the treated area.  · Your blister becomes large and painful.     This information is not intended to replace advice given to you by your health care provider. Make sure you discuss any questions you have with your health care provider.     Document Released: 07/07/2006 Document Revised: 08/20/2014 Document Reviewed: 07/18/2014  [employer]  Patient Education ©2016 [employer]

## 2018-11-15 NOTE — PROGRESS NOTES
Subjective:     Chief Complaint   Patient presents with   • Warts     wart removal on L Middle Finger   • Gastrophageal Reflux       Clifford Murphy is a 58 y.o. female here today for evaluation and management of:    Gastroesophageal reflux disease without esophagitis  Takes ranitidine daily as directed. Denies side effects.  She started Boniva 2 months ago and her symptoms worsened after taking it this month  Denies early satiety, unintentional weight loss, choking, persistent burning pain in chest or upper abdomen.      Other viral warts  Patient has a wart on the right middle finger that she would like removed.  It seems to be enlarging.         Allergies   Allergen Reactions   • Morphine Anaphylaxis     Morphine and morphine derivatives. (demerol)   • Aspirin      Stomach pain   • Codeine      Stomach pain   • Tramadol Vomiting     SEVERE HEADACHE   • Sulfamethoxazole W-Trimethoprim Shortness of Breath     Rxn - flushing, SOB  Late 80's     • Other Drug      Muscle relaxants cause dizziness         Current medicines (including changes today)  Current Outpatient Prescriptions   Medication Sig Dispense Refill   • hydroxychloroquine (PLAQUENIL) 200 MG Tab TAKE 1 TABLET BY MOUTH TWICE A  Tab 1   • ibandronate (BONIVA) 150 MG tablet 1 tab po in AM q 30 days on an empty stomach, take with 8 oz water and do not lie down for 30 minutes after taking medication 6 Tab 1   • Levothyroxine Sodium 137 MCG Cap Take 1 Tab by mouth every morning before breakfast. 30 Cap 3   • cetirizine (ZYRTEC) 10 MG Tab Take 10 mg by mouth 1 time daily as needed for Allergies.     • Cholecalciferol (VITAMIN D3) 5000 units Tab Take 5,000 Int'l Units by mouth every day at 6 PM.     • Folic Acid 0.8 MG Cap Take 0.8-1.6 mg by mouth every day at 6 PM. takes 1 pill daily and alternates every other day 2 pills daily     • ondansetron (ZOFRAN) 4 MG Tab tablet Take 1 Tab by mouth every four hours as needed for Nausea/Vomiting. 20 Tab 2   •  ibuprofen (MOTRIN) 800 MG Tab Take 800 mg by mouth every 8 hours as needed for Mild Pain.     • diazePAM (VALIUM) 5 MG Tab Take 5 mg by mouth every 6 hours as needed for Anxiety.     • raNITidine (ZANTAC) 150 MG Tab TAKE 1 TAB BY MOUTH 2 TIMES A  Tab 2   • acetaminophen (TYLENOL) 500 MG Tab Take 500-1,000 mg by mouth 2 times a day as needed for Mild Pain.       No current facility-administered medications for this visit.        She  has a past medical history of Anesthesia; Asthma (02/21/2018); Breath shortness; Bronchitis (2016); Cough (07/13/2018); Environmental and seasonal allergies; Genital herpes in women (2/11/2014); Heart burn; High risk medications (not anticoagulants) long-term use (8/29/2012); Hypothyroid (9/14/2011); Kidney stones; Macrocytosis without anemia (med effect); Menopause (9/15/2011); Pain (07/13/2018); Pneumonia (01/2015); Post-cholecystectomy syndrome; Rheumatoid arthritis(714.0); Snoring; Tobacco abuse, episodic (9/14/2011); and Vitamin d deficiency (5/15/2012).    Patient Active Problem List    Diagnosis Date Noted   • Surgical site infection 08/11/2018     Priority: High   • Gastroesophageal reflux disease without esophagitis 11/07/2018   • Other viral warts 11/07/2018   • Allergic sinusitis 06/25/2018   • Preoperative clearance 06/25/2018   • Subclinical hypothyroidism 06/25/2018   • Long-term use of Plaquenil 02/01/2018   • Spinal stenosis of lumbar region 12/12/2017   • Internal derangement of left knee 12/12/2017   • Obesity (BMI 30-39.9) 11/17/2017   • Thrombocytopenia (HCC) 10/05/2017   • Tobacco abuse 08/05/2015   • Seasonal allergies 06/17/2015   • Hypertriglyceridemia 09/29/2014   • Genital herpes in women 02/11/2014   • Jaw pain 08/04/2013   • High risk medications (not anticoagulants) long-term use 08/29/2012   • Post-cholecystectomy syndrome 05/23/2012   • Vitamin D insufficiency 05/15/2012   • Preventative health care 09/15/2011   • Menopause 09/15/2011   •  "Macrocytosis without anemia 09/14/2011   • Acquired hypothyroidism 09/14/2011   • RA (rheumatoid arthritis) (HCC) 09/14/2011       ROS   No fever or chills.  No nausea or vomiting.  No chest pain or palpitations.  No cough or SOB.  No pain with urination or hematuria.  No black or bloody stools.       Objective:     Blood pressure 134/78, pulse 79, temperature 36.4 °C (97.6 °F), temperature source Temporal, resp. rate 16, height 1.626 m (5' 4\"), weight 82.1 kg (181 lb), SpO2 98 %, not currently breastfeeding. Body mass index is 31.07 kg/m².   Physical Exam:  Well developed, well nourished.  Alert, oriented in no acute distress.  Eye contact is good, speech goal directed, affect calm  Eyes: conjunctiva non-injected, sclera non-icteric.  Mouth: Oral mucous membranes pink and moist with no lesions.  Neck Supple.  No adenopathy or masses in the neck or supraclavicular regions. No thyromegaly  Lungs: clear to auscultation bilaterally with good excursion. No wheezes or rhonchi  CV: regular rate and rhythm. No murmur  Abdomen: soft, nontender, no masses or organomegaly.  No rebound or guarding  Skin- verrucous lesion on the dorsum of the right middle finger          Assessment and Plan:   The following treatment plan was discussed    1. Gastroesophageal reflux disease without esophagitis  Stop Boniva.  Patient will discuss her rheumatologist about other therapy options.  Continue ranitidine    2. Other viral warts  CRYOTHERAPY:  Discussed risks and benefits of cryotherapy. Patient verbally agreed. 3 applications of cryotherapy were applied to one lesion on right middle finger. Patient tolerated procedure well. Aftercare instructions given.      Any change or worsening of signs or symptoms, patient encouraged to follow-up or report to the emergency room for further evaluation. Patient understands and agrees.    Followup: Return in about 3 weeks (around 11/28/2018).           "

## 2018-11-15 NOTE — ASSESSMENT & PLAN NOTE
Patient has a wart on the right middle finger that she would like removed.  It seems to be enlarging.

## 2018-11-27 ENCOUNTER — OFFICE VISIT (OUTPATIENT)
Dept: MEDICAL GROUP | Facility: LAB | Age: 58
End: 2018-11-27
Payer: COMMERCIAL

## 2018-11-27 VITALS
SYSTOLIC BLOOD PRESSURE: 136 MMHG | WEIGHT: 182.98 LBS | DIASTOLIC BLOOD PRESSURE: 74 MMHG | TEMPERATURE: 97.9 F | HEIGHT: 64 IN | BODY MASS INDEX: 31.24 KG/M2 | RESPIRATION RATE: 16 BRPM | HEART RATE: 82 BPM | OXYGEN SATURATION: 96 %

## 2018-11-27 DIAGNOSIS — B07.8 OTHER VIRAL WARTS: ICD-10-CM

## 2018-11-27 PROCEDURE — 17110 DESTRUCTION B9 LES UP TO 14: CPT | Performed by: FAMILY MEDICINE

## 2018-11-28 NOTE — PROGRESS NOTES
Subjective:     Chief Complaint   Patient presents with   • Warts     cryotherapy       Clifford Murphy is a 58 y.o. female here today for evaluation and management of:    Other viral warts  Patient is here for follow-up of her wart on her left middle finger.  It has shrunk in size after the last treatment.  Patient tolerated well.         Allergies   Allergen Reactions   • Morphine Anaphylaxis     Morphine and morphine derivatives. (demerol)   • Aspirin      Stomach pain   • Codeine      Stomach pain   • Tramadol Vomiting     SEVERE HEADACHE   • Sulfamethoxazole W-Trimethoprim Shortness of Breath     Rxn - flushing, SOB  Late 80's     • Other Drug      Muscle relaxants cause dizziness         Current medicines (including changes today)  Current Outpatient Prescriptions   Medication Sig Dispense Refill   • hydroxychloroquine (PLAQUENIL) 200 MG Tab TAKE 1 TABLET BY MOUTH TWICE A  Tab 1   • ibandronate (BONIVA) 150 MG tablet 1 tab po in AM q 30 days on an empty stomach, take with 8 oz water and do not lie down for 30 minutes after taking medication 6 Tab 1   • Levothyroxine Sodium 137 MCG Cap Take 1 Tab by mouth every morning before breakfast. 30 Cap 3   • cetirizine (ZYRTEC) 10 MG Tab Take 10 mg by mouth 1 time daily as needed for Allergies.     • Cholecalciferol (VITAMIN D3) 5000 units Tab Take 5,000 Int'l Units by mouth every day at 6 PM.     • Folic Acid 0.8 MG Cap Take 0.8-1.6 mg by mouth every day at 6 PM. takes 1 pill daily and alternates every other day 2 pills daily     • ondansetron (ZOFRAN) 4 MG Tab tablet Take 1 Tab by mouth every four hours as needed for Nausea/Vomiting. 20 Tab 2   • ibuprofen (MOTRIN) 800 MG Tab Take 800 mg by mouth every 8 hours as needed for Mild Pain.     • diazePAM (VALIUM) 5 MG Tab Take 5 mg by mouth every 6 hours as needed for Anxiety.     • raNITidine (ZANTAC) 150 MG Tab TAKE 1 TAB BY MOUTH 2 TIMES A  Tab 2   • acetaminophen (TYLENOL) 500 MG Tab Take 500-1,000 mg by  "mouth 2 times a day as needed for Mild Pain.       No current facility-administered medications for this visit.        She  has a past medical history of Anesthesia; Asthma (02/21/2018); Breath shortness; Bronchitis (2016); Cough (07/13/2018); Environmental and seasonal allergies; Genital herpes in women (2/11/2014); Heart burn; High risk medications (not anticoagulants) long-term use (8/29/2012); Hypothyroid (9/14/2011); Kidney stones; Macrocytosis without anemia (med effect); Menopause (9/15/2011); Pain (07/13/2018); Pneumonia (01/2015); Post-cholecystectomy syndrome; Rheumatoid arthritis(714.0); Snoring; Tobacco abuse, episodic (9/14/2011); and Vitamin d deficiency (5/15/2012).    Patient Active Problem List    Diagnosis Date Noted   • Surgical site infection 08/11/2018     Priority: High   • Gastroesophageal reflux disease without esophagitis 11/07/2018   • Other viral warts 11/07/2018   • Allergic sinusitis 06/25/2018   • Preoperative clearance 06/25/2018   • Subclinical hypothyroidism 06/25/2018   • Long-term use of Plaquenil 02/01/2018   • Spinal stenosis of lumbar region 12/12/2017   • Internal derangement of left knee 12/12/2017   • Obesity (BMI 30-39.9) 11/17/2017   • Thrombocytopenia (HCC) 10/05/2017   • Tobacco abuse 08/05/2015   • Seasonal allergies 06/17/2015   • Hypertriglyceridemia 09/29/2014   • Genital herpes in women 02/11/2014   • Jaw pain 08/04/2013   • High risk medications (not anticoagulants) long-term use 08/29/2012   • Post-cholecystectomy syndrome 05/23/2012   • Vitamin D insufficiency 05/15/2012   • Preventative health care 09/15/2011   • Menopause 09/15/2011   • Macrocytosis without anemia 09/14/2011   • Acquired hypothyroidism 09/14/2011   • RA (rheumatoid arthritis) (HCC) 09/14/2011       ROS   No fever or chills.         Objective:     Blood pressure 136/74, pulse 82, temperature 36.6 °C (97.9 °F), temperature source Temporal, resp. rate 16, height 1.626 m (5' 4\"), weight 83 kg (182 " lb 15.7 oz), SpO2 96 %, not currently breastfeeding. Body mass index is 31.41 kg/m².   Physical Exam:  Well developed, well nourished.  Alert, oriented in no acute distress.  Eye contact is good, speech goal directed, affect calm  Eyes: conjunctiva non-injected, sclera non-icteric.  Left middle finger with 1 cm verrucous lesion on the dorsum at the PIP.      Assessment and Plan:   The following treatment plan was discussed    1. Other viral warts  CRYOTHERAPY:  Discussed risks and benefits of cryotherapy. Patient verbally agreed. 3 applications of cryotherapy were applied to one lesion on dorsum of left index finger. Patient tolerated procedure well. Aftercare instructions given.      Any change or worsening of signs or symptoms, patient encouraged to follow-up or report to the emergency room for further evaluation. Patient understands and agrees.    Followup: Return in about 3 weeks (around 12/20/2018).

## 2018-11-28 NOTE — ASSESSMENT & PLAN NOTE
Patient is here for follow-up of her wart on her left middle finger.  It has shrunk in size after the last treatment.  Patient tolerated well.

## 2018-12-04 ENCOUNTER — OFFICE VISIT (OUTPATIENT)
Dept: RHEUMATOLOGY | Facility: MEDICAL CENTER | Age: 58
End: 2018-12-04
Payer: COMMERCIAL

## 2018-12-04 VITALS
HEART RATE: 78 BPM | OXYGEN SATURATION: 96 % | WEIGHT: 181 LBS | SYSTOLIC BLOOD PRESSURE: 132 MMHG | DIASTOLIC BLOOD PRESSURE: 80 MMHG | BODY MASS INDEX: 31.07 KG/M2 | RESPIRATION RATE: 14 BRPM | TEMPERATURE: 97.2 F

## 2018-12-04 DIAGNOSIS — Z72.0 TOBACCO ABUSE: ICD-10-CM

## 2018-12-04 DIAGNOSIS — E03.9 ACQUIRED HYPOTHYROIDISM: ICD-10-CM

## 2018-12-04 DIAGNOSIS — M48.061 SPINAL STENOSIS OF LUMBAR REGION, UNSPECIFIED WHETHER NEUROGENIC CLAUDICATION PRESENT: ICD-10-CM

## 2018-12-04 DIAGNOSIS — M06.9 RHEUMATOID ARTHRITIS INVOLVING MULTIPLE SITES, UNSPECIFIED RHEUMATOID FACTOR PRESENCE: ICD-10-CM

## 2018-12-04 DIAGNOSIS — Z79.899 LONG-TERM USE OF PLAQUENIL: ICD-10-CM

## 2018-12-04 DIAGNOSIS — M81.8 OTHER OSTEOPOROSIS WITHOUT CURRENT PATHOLOGICAL FRACTURE: ICD-10-CM

## 2018-12-04 PROCEDURE — 99214 OFFICE O/P EST MOD 30 MIN: CPT | Performed by: INTERNAL MEDICINE

## 2018-12-04 NOTE — PROGRESS NOTES
Chief Complaint- joint pain    Subjective:   Clifford Murphy is a 58 y.o. female here today for follow up of rheumatological issues    This is a follow-up visit for this patient who we see in this clinic for rheumatoid arthritis currently on Plaquenil at 200 mg p.o. twice daily only.  Patient had been on leflunomide but stopped because of recovery from lumbar spine surgery, patient continues to be off of leflunomide and doing quite well on Plaquenil 20 mg p.o. twice daily only.  Patient denies any side effects from the medication, denies any unexplained weight loss, denies any fevers of unknown etiology, denies any GI upset, denies any rashes, denies any new joint swelling, denies recurrent infections.  Recent sedimentation rate equals 24 October 2018.    Additional comorbidities include lumbar spine DDD/DJD/spinal stenosis status post lumbar spine laminectomy with benefit, patient currently healing the patient also with an acoustic neuroma left ear for which patient is lost about 60% of her hearing is currently being followed by [facility] and currently just monitoring at this time.     Patient is here today to talk about possibly changing her Boniva to IV infusion bisphosphonate because of significant GI upset from the Boniva.  Patient is status post a trial of Fosamax that caused GI upset as well.     Additional comorbidities include  Hypothyroidism and osteoporosis seen by FRAX score recently status post a kyphoplasty for compression fracture in L1.    S/p MTX-N/V  S/p Enbrel-sinus infections  S/p Humira-sinus infections and injection site skin breakdown  S/p Actemra-exacerbated pneumonia  S/p sulfasalazine-contradicted, pt with sulfa allergy  S/p fosamax-GI upset  S/p boniva-GI upset     CCP neg 9/2015  Quantiferon Gold neg 9/2015  Hep B neg 9/2015  Uric acid 4.6 9/2015  G6PD 11.5 adequate 10/2017  RF 18 6/2009  CHIQUITA neg 6/2009   Hand x-rays 5/2015-no pathology  Feet X-rays 5/2015-indicates DJD, no  erosions    LS pine x-rays 7/2009-indicates multilevel DJD     MRI LS spine 2/2018-L1 compression fracture, multilevel multifactorial DJD, neuroforaminal narrowing at multiple sites, severe left neural foraminal narrowing  DEXA 6/5/2018 T scores -0.7, -0.9  FRAX 6/5/2018 major osteoporotic fracture risk 17.8%, hip fracture risk 3.1%  Patient on bisphosphonates since 5/2018           Current medicines (including changes today)  Current Outpatient Prescriptions   Medication Sig Dispense Refill   • hydroxychloroquine (PLAQUENIL) 200 MG Tab TAKE 1 TABLET BY MOUTH TWICE A  Tab 1   • ibandronate (BONIVA) 150 MG tablet 1 tab po in AM q 30 days on an empty stomach, take with 8 oz water and do not lie down for 30 minutes after taking medication 6 Tab 1   • Levothyroxine Sodium 137 MCG Cap Take 1 Tab by mouth every morning before breakfast. 30 Cap 3   • cetirizine (ZYRTEC) 10 MG Tab Take 10 mg by mouth 1 time daily as needed for Allergies.     • Cholecalciferol (VITAMIN D3) 5000 units Tab Take 5,000 Int'l Units by mouth every day at 6 PM.     • Folic Acid 0.8 MG Cap Take 0.8-1.6 mg by mouth every day at 6 PM. takes 1 pill daily and alternates every other day 2 pills daily     • ondansetron (ZOFRAN) 4 MG Tab tablet Take 1 Tab by mouth every four hours as needed for Nausea/Vomiting. 20 Tab 2   • ibuprofen (MOTRIN) 800 MG Tab Take 800 mg by mouth every 8 hours as needed for Mild Pain.     • diazePAM (VALIUM) 5 MG Tab Take 5 mg by mouth every 6 hours as needed for Anxiety.     • raNITidine (ZANTAC) 150 MG Tab TAKE 1 TAB BY MOUTH 2 TIMES A  Tab 2   • acetaminophen (TYLENOL) 500 MG Tab Take 500-1,000 mg by mouth 2 times a day as needed for Mild Pain.       No current facility-administered medications for this visit.      She  has a past medical history of Anesthesia; Asthma (02/21/2018); Breath shortness; Bronchitis (2016); Cough (07/13/2018); Environmental and seasonal allergies; Genital herpes in women (2/11/2014);  Heart burn; High risk medications (not anticoagulants) long-term use (8/29/2012); Hypothyroid (9/14/2011); Kidney stones; Macrocytosis without anemia (med effect); Menopause (9/15/2011); Pain (07/13/2018); Pneumonia (01/2015); Post-cholecystectomy syndrome; Rheumatoid arthritis(714.0); Snoring; Tobacco abuse, episodic (9/14/2011); and Vitamin d deficiency (5/15/2012).    ROS   Other than what is mentioned in HPI or physical exam, there is no history of headaches, double vision or blurred vision. No temporal tenderness or jaw claudication. No history of cataracts or glaucoma. No trouble swallowing difficulties or sore throats.  No chest complaints including chest pain, cough or sputum production. No GI complaints including nausea, vomiting, change in bowel habits, or past peptic ulcer disease. No history of blood in the stools. No urinary complaints including dysuria or frequency. No history of alopecia, photosensitivity, oral ulcerations, Raynaud's phenomena.       Objective:     Blood pressure 132/80, pulse 78, temperature 36.2 °C (97.2 °F), temperature source Temporal, resp. rate 14, weight 82.1 kg (181 lb), SpO2 96 %, not currently breastfeeding. Body mass index is 31.07 kg/m².   Physical Exam:    Constitutional: Alert and oriented X3, patient is talkative with good eye contact.Skin: Warm, dry, good turgor, no rashes in visible areas, no psoriatic lesions, no petechial lesions, no malar rashes  .Eye: Equal, round and reactive, conjunctiva clear, lids normal EOM intactENMT: Lips without lesions, good dentition, no oropharyngeal ulcers, moist buccal mucosa, pinna without deformityNeck: Trachea midline, no masses, no thyromegaly.Lymph:  No cervical lymphadenopathy, no axillary lymphadenopathy, no supraclavicular lymphadenopathyRespiratory: Unlabored respiratory effort, lungs clear to auscultation, no wheezes, no ronchi.Cardiovascular: Normal S1, S2, no murmur, no edema.Abdomen: Soft, non-tender, no masses, no  hepatosplenomegaly.Psych: Alert and oriented x3, normal affect and mood.Neuro: Cranial nerves 2-12 are grossly intact, no loss of sensation LEExt:no joint laxity noted in bilateral arms, no joint laxity noted in bilateral legs, small joints of the hands look really quite good, no swelling no dactylitis no ulnar deviation no sausage digits no swan-neck or boutonniere deformities, knees with minimal crepitus but no effusions, toes without splay toes and without crossover toes    Lab Results   Component Value Date/Time    SODIUM 140 10/12/2018 10:44 AM    POTASSIUM 4.5 10/12/2018 10:44 AM    CHLORIDE 108 10/12/2018 10:44 AM    CO2 26 10/12/2018 10:44 AM    GLUCOSE 86 10/12/2018 10:44 AM    BUN 19 10/12/2018 10:44 AM    CREATININE 0.82 10/12/2018 10:44 AM    CREATININE 0.77 03/26/2013 12:00 AM    BUNCREATRAT 30 (H) 11/23/2016 09:46 AM    BUNCREATRAT 30 (H) 03/26/2013 12:00 AM      Lab Results   Component Value Date/Time    WBC 5.3 10/12/2018 10:44 AM    WBC 5.7 10/20/2011 12:00 AM    RBC 4.37 10/12/2018 10:44 AM    RBC 4.32 10/20/2011 12:00 AM    HEMOGLOBIN 13.4 10/12/2018 10:44 AM    HEMATOCRIT 42.8 10/12/2018 10:44 AM    MCV 97.9 (H) 10/12/2018 10:44 AM    MCV 99 (H) 10/20/2011 12:00 AM    MCH 30.7 10/12/2018 10:44 AM    MCH 34.0 10/20/2011 12:00 AM    MCHC 31.3 (L) 10/12/2018 10:44 AM    MPV 12.5 10/12/2018 10:44 AM    NEUTSPOLYS 66.80 10/12/2018 10:44 AM    LYMPHOCYTES 25.50 10/12/2018 10:44 AM    MONOCYTES 6.40 10/12/2018 10:44 AM    EOSINOPHILS 0.00 10/12/2018 10:44 AM    BASOPHILS 0.90 10/12/2018 10:44 AM      Lab Results   Component Value Date/Time    CALCIUM 9.7 10/12/2018 10:44 AM    ASTSGOT 25 10/12/2018 10:44 AM    ALTSGPT 15 10/12/2018 10:44 AM    ALKPHOSPHAT 75 10/12/2018 10:44 AM    TBILIRUBIN 0.4 10/12/2018 10:44 AM    ALBUMIN 4.1 10/12/2018 10:44 AM    TOTPROTEIN 6.8 10/12/2018 10:44 AM     Lab Results   Component Value Date/Time    RHEUMFACTN 18 (H) 06/15/2009 09:27 AM    ANTINUCAB None Detected  06/15/2009 09:27 AM     Lab Results   Component Value Date/Time    SEDRATEWES 24 10/12/2018 10:44 AM     Lab Results   Component Value Date/Time    HBA1C 5.3 11/23/2016 09:46 AM     Lab Results   Component Value Date/Time    G6PD 11.5 10/03/2017 09:54 AM     Results for orders placed during the hospital encounter of 02/02/18   DX-JOINT SURVEY-HANDS SINGLE VIEW    Impression 1.  No acute fracture or bone erosion identified.    2.  Mild osteoarthritis.     Results for orders placed during the hospital encounter of 02/02/18   DX-JOINT SURVEY-FEET SINGLE VIEW    Impression 1.  No acute fracture or bone erosion.    2.  Hallux valgus deformities and mild osteoarthritis.     Results for orders placed during the hospital encounter of 06/05/18   DS-BONE DENSITY STUDY (DEXA)    Impression According to the World Health Organization classification, bone mineral density of this patient is normal.        10-year Probability of Fracture:  Major Osteoporotic     17.8%  Hip     3.1%  Population      [place] ()    Based on left femur neck BMD          INTERPRETING LOCATION:  [address] [place] [address], [id number]     Results for orders placed in visit on 09/29/14   DX-FOOT-COMPLETE 3+    Impression Subacute nondisplaced fifth proximal phalanx extra-articular fracture     Results for orders placed during the hospital encounter of 07/11/09   DX-PELVIS-1 OR 2 VIEWS    Impression IMPRESSION:     1. VERY MILD DEGENERATIVE CHANGE OF THE HIPS, SLIGHTLY WORSE ON THE RIGHT   THAN THE LEFT.  MINIMAL SCLEROSIS IS SEEN AT THE LATERAL ASPECT OF THE   RIGHT FEMORAL NECK, WHICH IS OF DOUBTFUL CLINICAL SIGNIFICANCE.      2. NO PELVIS FRACTURE.    MM/srd     Read By MARICARMEN ANSARI MD on Jul 13 2009  8:38AM  : PREMA Transcription Date: Jul 13 2009 10:42AM  THIS DOCUMENT HAS BEEN ELECTRONICALLY SIGNED BY: MARICARMEN ANSARI MD on Jul 13 2009  4:05PM        Results for orders placed during the hospital encounter of 07/11/09    DX-PELVIS-1 OR 2 VIEWS    Impression IMPRESSION:     1. VERY MILD DEGENERATIVE CHANGE OF THE HIPS, SLIGHTLY WORSE ON THE RIGHT   THAN THE LEFT.  MINIMAL SCLEROSIS IS SEEN AT THE LATERAL ASPECT OF THE   RIGHT FEMORAL NECK, WHICH IS OF DOUBTFUL CLINICAL SIGNIFICANCE.      2. NO PELVIS FRACTURE.    MM/srd     Read By MARICARMEN ANSARI MD on Jul 13 2009  8:38AM  : PREMA Transcription Date: Jul 13 2009 10:42AM  THIS DOCUMENT HAS BEEN ELECTRONICALLY SIGNED BY: MARICARMEN ANSARI MD on Jul 13 2009  4:05PM        Results for orders placed during the hospital encounter of 10/10/09   DX-KNEE COMPLETE 4+    Impression IMPRESSION:     3. MODERATE THREE COMPARTMENT ARTHROSIS.     4. QUESTION SMALL EFFUSION.     MPW/vadim         Read By JASSI GREEN MD on Oct 12 2009  9:01AM  : DEBRA Transcription Date: Oct 12 2009  2:56PM  THIS DOCUMENT HAS BEEN ELECTRONICALLY SIGNED BY: JASSI GREEN MD on Oct   12 2009  3:47PM        Results for orders placed in visit on 09/02/15   DX-KNEE 2- LEFT    Impression 1. No evidence of acute fracture or dislocation.    2. Increased sclerosis with punctate hyperdensities in the proximal tibial metadiaphysis anteriorly may be related to prior surgery.    3. Mild left knee osteoarthritis.     Results for orders placed during the hospital encounter of 07/10/12   DX-HAND 3+    Impression No new radiographic evidence of erosive arthropathy    Stable second DIP subchondral cyst versus erosion    Stable mild first MCP osteoarthritis, periarticular calcification and osteopenia     Results for orders placed during the hospital encounter of 02/02/18   MR-LUMBAR SPINE-W/O    Impression 1.  Acute L1 vertebral compression fracture. This would be amenable to percutaneous augmentation with vertebroplasty or kyphoplasty if clinically appropriate. Interventional radiology is available for consultation and therapy.  2.  Multilevel multifactorial degenerative changes  3.  No areas of high-grade  central canal narrowing  4.  LEFT neural foraminal narrowing worse at L5-S1  5.  Other areas of central canal and neural foraminal narrowing as described above  6.  Mild intra and extrahepatic biliary dilatation without visualized obstructing lesion. Further imaging assessment could be performed with ultrasound or MRCP if clinically appropriate. I have no relevant prior studies available for comparison.     Results for orders placed during the hospital encounter of 01/03/18   MR-KNEE-W/O LEFT    Impression 1.  No MR explanation for acute symptoms. Ligaments and menisci are intact    2.  Postoperative change related to presumed prior tibial tubercle transfer. There is mild patella timothy    3.  Severe patellofemoral cartilage thinning with areas of bone-on-bone articulation and moderate osteophytes.    4.  Moderate femorotibial osteophytes with minimal cartilage thinning     Results for orders placed during the hospital encounter of 02/21/18   DX-CERVICAL SPINE-2 OR 3 VIEWS    Impression 1.  No evidence of atlantoaxial subluxation with flexion and extension maneuvers of the cervical spine.    2.  Degenerative changes C6-7. No demonstrable range of motion at this level.     Results for orders placed during the hospital encounter of 08/11/18   CT-LSPINE W/O PLUS RECONS    Impression 1. Recent instrumentation at L2-5 levels with a 10.7 cm fluid collection in the posterior paraspinal soft tissues. Differential includes postsurgical seroma, pseudomeningocele and the resolving hematoma. Infection should be excluded clinically.  2. Prior augmentation of L1 compression deformity. No new fracture or listhesis.     Assessment and Plan:     1. Rheumatoid arthritis involving multiple sites, unspecified rheumatoid factor presence (HCC)  Doing quite well on Plaquenil 20 mg p.o. twice daily only we will continue to monitor hold off any additional DMARD's of Biologics at this time    2. Long-term use of Plaquenil  Of note G6PD levels  are adequate, patient will need CBC every 6 months next CBC will be due April 2019, of note last ophthalmology evaluation November 2018    3. Acquired hypothyroidism  Can exacerbate joint pain, I recommend monitoring thyroid on a regular basis to assure it is not contributing to the patient's joint pain    4. Spinal stenosis of lumbar region, unspecified whether neurogenic claudication present  Status post lumbar laminectomy with benefit currently still healing and undergoing rehabilitation    5. Osteoporosis  Unable to tolerate oral bisphosphonates will do a trial of Reclast 5 mg IV every year.    6. Tobacco abuse  Tobacco abuse is known to exacerbate rheumatoid arthritis, 20 minute discussion with patient regarding the need to stop tobacco abuse, patient states understanding    Followup: Return in about 3 months (around 3/4/2019). or sooner amada Murphy was seen 30 minutes face-to-face of which more than 50% of the time was spent counseling the patient (excluding time for procedures)  regarding  rheumatological condition and care. Therapy was discussed in detail.    Please note that this dictation was created using voice recognition software. I have made every reasonable attempt to correct obvious errors, but I expect that there are errors of grammar and possibly content that I did not discover before finalizing the note.

## 2018-12-04 NOTE — LETTER
[facility]   [address] [place] [address], [address]  [place] [address]  Phone: [phone]  Fax: [phone]              Encounter Date: 12/4/2018    Dear Dr. Whitney ref. provider found,    It was a pleasure seeing your patient, Clifford Murphy, on 12/4/2018. Diagnoses of Rheumatoid arthritis involving multiple sites, unspecified rheumatoid factor presence (HCC), Long-term use of Plaquenil, Acquired hypothyroidism, Spinal stenosis of lumbar region, unspecified whether neurogenic claudication present, and Tobacco abuse were pertinent to this visit.     Please find attached progress note which includes the history I obtained from Ms. Murphy, my physical examination findings, my impression and recommendations.      Once again, it was a pleasure participating in your patient's care.  Please feel free to contact me if you have any questions or if I can be of any further assistance to your patients.      Sincerely,    Elizabeth Gómez M.D.  Electronically Signed          PROGRESS NOTE:  No notes on file

## 2018-12-20 ENCOUNTER — OFFICE VISIT (OUTPATIENT)
Dept: MEDICAL GROUP | Facility: LAB | Age: 58
End: 2018-12-20
Payer: COMMERCIAL

## 2018-12-20 VITALS
HEART RATE: 74 BPM | TEMPERATURE: 97.2 F | BODY MASS INDEX: 30.73 KG/M2 | DIASTOLIC BLOOD PRESSURE: 86 MMHG | HEIGHT: 64 IN | SYSTOLIC BLOOD PRESSURE: 128 MMHG | RESPIRATION RATE: 20 BRPM | WEIGHT: 180 LBS | OXYGEN SATURATION: 100 %

## 2018-12-20 DIAGNOSIS — K91.5 POST-CHOLECYSTECTOMY SYNDROME: ICD-10-CM

## 2018-12-20 DIAGNOSIS — H00.011 HORDEOLUM EXTERNUM OF RIGHT UPPER EYELID: ICD-10-CM

## 2018-12-20 DIAGNOSIS — B35.1 FUNGAL INFECTION OF TOENAIL: ICD-10-CM

## 2018-12-20 DIAGNOSIS — B07.8 OTHER VIRAL WARTS: ICD-10-CM

## 2018-12-20 PROCEDURE — 99214 OFFICE O/P EST MOD 30 MIN: CPT | Performed by: FAMILY MEDICINE

## 2018-12-20 RX ORDER — CIPROFLOXACIN HYDROCHLORIDE 3.5 MG/ML
1 SOLUTION/ DROPS TOPICAL
Qty: 1 BOTTLE | Refills: 0 | Status: SHIPPED | OUTPATIENT
Start: 2018-12-20 | End: 2018-12-27

## 2018-12-20 RX ORDER — CICLOPIROX 80 MG/ML
SOLUTION TOPICAL
Qty: 6.6 ML | Refills: 6 | Status: SHIPPED | OUTPATIENT
Start: 2018-12-20 | End: 2019-07-17

## 2018-12-20 RX ORDER — CHOLESTYRAMINE 4 G/9G
1 POWDER, FOR SUSPENSION ORAL DAILY
Qty: 60 EACH | Refills: 2 | Status: SHIPPED | OUTPATIENT
Start: 2018-12-20

## 2018-12-20 RX ORDER — LEVOTHYROXINE SODIUM 137 UG/1
TABLET ORAL
Qty: 90 TAB | Refills: 3 | Status: SHIPPED | OUTPATIENT
Start: 2018-12-20 | End: 2019-05-01 | Stop reason: SDUPTHER

## 2018-12-27 NOTE — ASSESSMENT & PLAN NOTE
Patient reports that she has had a bump on her right upper eyelid for the last 4 days.  She had some drainage on day #1 but none since then.

## 2018-12-27 NOTE — ASSESSMENT & PLAN NOTE
Patient complains of yellow thickened toenails on her 2 great toes.  She would like to seek treatment for this.

## 2018-12-27 NOTE — PROGRESS NOTES
Subjective:     Chief Complaint   Patient presents with   • Warts       Clifford Murphy is a 58 y.o. female here today for evaluation and management of:    Hordeolum externum of right upper eyelid  Patient reports that she has had a bump on her right upper eyelid for the last 4 days.  She had some drainage on day #1 but none since then.      Other viral warts  Patient is her to have her wart frozen.  It has decreased in size.    Fungal infection of toenail  Patient complains of yellow thickened toenails on her 2 great toes.  She would like to seek treatment for this.       Allergies   Allergen Reactions   • Morphine Anaphylaxis     Morphine and morphine derivatives. (demerol)   • Aspirin      Stomach pain   • Codeine      Stomach pain   • Tramadol Vomiting     SEVERE HEADACHE   • Sulfamethoxazole W-Trimethoprim Shortness of Breath     Rxn - flushing, SOB  Late 80's     • Other Drug      Muscle relaxants cause dizziness         Current medicines (including changes today)  Current Outpatient Prescriptions   Medication Sig Dispense Refill   • cholestyramine (QUESTRAN) 4 g packet Take 4 g by mouth every day. 60 Each 2   • ciclopirox (PENLAC) 8 % solution Apply to affected toenails qhs 6.6 mL 6   • ciprofloxacin (CILOXIN) 0.3 % Solution Place 1 Drop in right eye every 2 hours for 7 days. WA 1 Bottle 0   • hydroxychloroquine (PLAQUENIL) 200 MG Tab TAKE 1 TABLET BY MOUTH TWICE A  Tab 1   • ibandronate (BONIVA) 150 MG tablet 1 tab po in AM q 30 days on an empty stomach, take with 8 oz water and do not lie down for 30 minutes after taking medication 6 Tab 1   • cetirizine (ZYRTEC) 10 MG Tab Take 10 mg by mouth 1 time daily as needed for Allergies.     • Cholecalciferol (VITAMIN D3) 5000 units Tab Take 5,000 Int'l Units by mouth every day at 6 PM.     • Folic Acid 0.8 MG Cap Take 0.8-1.6 mg by mouth every day at 6 PM. takes 1 pill daily and alternates every other day 2 pills daily     • ibuprofen (MOTRIN) 800 MG Tab  Take 800 mg by mouth every 8 hours as needed for Mild Pain.     • diazePAM (VALIUM) 5 MG Tab Take 5 mg by mouth every 6 hours as needed for Anxiety.     • raNITidine (ZANTAC) 150 MG Tab TAKE 1 TAB BY MOUTH 2 TIMES A  Tab 2   • acetaminophen (TYLENOL) 500 MG Tab Take 500-1,000 mg by mouth 2 times a day as needed for Mild Pain.     • levothyroxine (SYNTHROID) 137 MCG Tab TAKE 1 TABLET BY MOUTH EVERY MORNING BEFORE BREAKFAST 90 Tab 3   • ondansetron (ZOFRAN) 4 MG Tab tablet Take 1 Tab by mouth every four hours as needed for Nausea/Vomiting. 20 Tab 2     No current facility-administered medications for this visit.        She  has a past medical history of Anesthesia; Asthma (02/21/2018); Breath shortness; Bronchitis (2016); Cough (07/13/2018); Environmental and seasonal allergies; Genital herpes in women (2/11/2014); Heart burn; High risk medications (not anticoagulants) long-term use (8/29/2012); Hypothyroid (9/14/2011); Kidney stones; Macrocytosis without anemia (med effect); Menopause (9/15/2011); Pain (07/13/2018); Pneumonia (01/2015); Post-cholecystectomy syndrome; Rheumatoid arthritis(714.0); Snoring; Tobacco abuse, episodic (9/14/2011); and Vitamin d deficiency (5/15/2012).    Patient Active Problem List    Diagnosis Date Noted   • Surgical site infection 08/11/2018     Priority: High   • Fungal infection of toenail 12/20/2018   • Hordeolum externum of right upper eyelid 12/20/2018   • Gastroesophageal reflux disease without esophagitis 11/07/2018   • Other viral warts 11/07/2018   • Allergic sinusitis 06/25/2018   • Preoperative clearance 06/25/2018   • Subclinical hypothyroidism 06/25/2018   • Long-term use of Plaquenil 02/01/2018   • Spinal stenosis of lumbar region 12/12/2017   • Internal derangement of left knee 12/12/2017   • Obesity (BMI 30-39.9) 11/17/2017   • Thrombocytopenia (HCC) 10/05/2017   • Tobacco abuse 08/05/2015   • Seasonal allergies 06/17/2015   • Hypertriglyceridemia 09/29/2014   •  "Genital herpes in women 02/11/2014   • Jaw pain 08/04/2013   • High risk medications (not anticoagulants) long-term use 08/29/2012   • Post-cholecystectomy syndrome 05/23/2012   • Vitamin D insufficiency 05/15/2012   • Preventative health care 09/15/2011   • Menopause 09/15/2011   • Macrocytosis without anemia 09/14/2011   • Acquired hypothyroidism 09/14/2011   • RA (rheumatoid arthritis) (HCC) 09/14/2011       ROS   No fever or chills.  No nausea or vomiting.  No chest pain or palpitations.  No cough or SOB.  No pain with urination or hematuria.  No black or bloody stools.       Objective:     Blood pressure 128/86, pulse 74, temperature 36.2 °C (97.2 °F), temperature source Temporal, resp. rate 20, height 1.626 m (5' 4\"), weight 81.6 kg (180 lb), SpO2 100 %, not currently breastfeeding. Body mass index is 30.9 kg/m².   Physical Exam:  Well developed, well nourished.  Alert, oriented in no acute distress.  Eye contact is good, speech goal directed, affect calm  Eyes: conjunctiva non-injected, sclera non-icteric.  Pustule present on right upper eyelid in the temporal area.  No active draining  Neck Supple.  No adenopathy or masses in the neck or supraclavicular regions. No thyromegaly  Lungs: clear to auscultation bilaterally with good excursion. No wheezes or rhonchi  CV: regular rate and rhythm. No murmur  Abdomen: soft, nontender, no masses or organomegaly.  No rebound or guarding  Toenails are yellow and thickened bilaterally at the great toes.  Dorsum of left finger with vertical lesion approximately 7 mm in diameter          Assessment and Plan:   The following treatment plan was discussed    1. Other viral warts  CRYOTHERAPY:  Discussed risks and benefits of cryotherapy. Patient verbally agreed. 3 applications of cryotherapy were applied to one lesion on dorsum of left index finger. Patient tolerated procedure well. Aftercare instructions given.      2. Fungal infection of toenail  Penlac as directed to " affected nail.  Wipe off with rubbing alcohol once a week and reapply  - ciclopirox (PENLAC) 8 % solution; Apply to affected toenails qhs  Dispense: 6.6 mL; Refill: 6    3. Post-cholecystectomy syndrome  Refill Questran for prn use  - cholestyramine (QUESTRAN) 4 g packet; Take 4 g by mouth every day.  Dispense: 60 Each; Refill: 2    4. Hordeolum externum of right upper eyelid  Warm packs 3-4 times a day.  Ciloxin as directed  - ciprofloxacin (CILOXIN) 0.3 % Solution; Place 1 Drop in right eye every 2 hours for 7 days. WA  Dispense: 1 Bottle; Refill: 0    Any change or worsening of signs or symptoms, patient encouraged to follow-up or report to the emergency room for further evaluation. Patient understands and agrees.    Followup: Return in about 3 weeks (around 1/10/2019).

## 2019-01-10 ENCOUNTER — OUTPATIENT INFUSION SERVICES (OUTPATIENT)
Dept: ONCOLOGY | Facility: MEDICAL CENTER | Age: 59
End: 2019-01-10
Attending: INTERNAL MEDICINE
Payer: COMMERCIAL

## 2019-01-10 ENCOUNTER — TELEPHONE (OUTPATIENT)
Dept: MEDICAL GROUP | Facility: LAB | Age: 59
End: 2019-01-10

## 2019-01-10 ENCOUNTER — APPOINTMENT (OUTPATIENT)
Dept: MEDICAL GROUP | Facility: LAB | Age: 59
End: 2019-01-10
Payer: COMMERCIAL

## 2019-01-10 VITALS
HEIGHT: 63 IN | TEMPERATURE: 97.8 F | OXYGEN SATURATION: 98 % | SYSTOLIC BLOOD PRESSURE: 145 MMHG | WEIGHT: 177.69 LBS | DIASTOLIC BLOOD PRESSURE: 81 MMHG | HEART RATE: 80 BPM | RESPIRATION RATE: 18 BRPM | BODY MASS INDEX: 31.48 KG/M2

## 2019-01-10 LAB
CA-I BLD ISE-SCNC: 1.17 MMOL/L (ref 1.1–1.3)
CREAT BLD-MCNC: 0.9 MG/DL (ref 0.5–1.4)

## 2019-01-10 PROCEDURE — 36415 COLL VENOUS BLD VENIPUNCTURE: CPT

## 2019-01-10 PROCEDURE — 700111 HCHG RX REV CODE 636 W/ 250 OVERRIDE (IP): Performed by: INTERNAL MEDICINE

## 2019-01-10 PROCEDURE — 82565 ASSAY OF CREATININE: CPT

## 2019-01-10 PROCEDURE — 96365 THER/PROPH/DIAG IV INF INIT: CPT

## 2019-01-10 PROCEDURE — 82330 ASSAY OF CALCIUM: CPT

## 2019-01-10 RX ORDER — ZOLEDRONIC ACID 5 MG/100ML
5 INJECTION, SOLUTION INTRAVENOUS ONCE
Status: COMPLETED | OUTPATIENT
Start: 2019-01-10 | End: 2019-01-10

## 2019-01-10 RX ADMIN — ZOLEDRONIC ACID 5 MG: 5 INJECTION, SOLUTION INTRAVENOUS at 14:55

## 2019-01-10 ASSESSMENT — PAIN SCALES - GENERAL: PAINLEVEL_OUTOF10: 5

## 2019-01-10 NOTE — TELEPHONE ENCOUNTER
Phone Number Called: [phone] (home)     Message: I left a voicemail and sent a [employer] message that her appointment needed to be reschedule.  Please call Dr. Smallwood's medical assistant at [phone] to set up a new appointment. Thank you!      Left Message for patient to call back: yes

## 2019-01-10 NOTE — PROGRESS NOTES
Pharmacy note  Cr = 0.9, CrCl ~ 85 ml/min  Ionized Ca = 1.17  OK for zoledronic acid (Reclast) 5 mg IV today.    Last given = none previously given      Yessenia Ritter, PharmD

## 2019-01-11 NOTE — PROGRESS NOTES
Pt arrived to [facility], ambulatory, for Reclast. Pt educated about reclast infusion and potential side effects. Pt denies any recent or upcoming dental surgeries. 24g PIV established in the R-AC, positive blood return noted. Labs drawn and reviewed, pt within parameters to treat today. Reclast infused with no s/sx of adverse reaction. PIV flushed and removed. Pt left [facility] with no s/sx of distress. Follow up appointment to be scheduled after pt sees MD.

## 2019-01-29 ENCOUNTER — OFFICE VISIT (OUTPATIENT)
Dept: MEDICAL GROUP | Facility: LAB | Age: 59
End: 2019-01-29
Payer: COMMERCIAL

## 2019-01-29 VITALS
TEMPERATURE: 97.6 F | HEART RATE: 80 BPM | WEIGHT: 175 LBS | BODY MASS INDEX: 31.01 KG/M2 | RESPIRATION RATE: 20 BRPM | OXYGEN SATURATION: 97 % | DIASTOLIC BLOOD PRESSURE: 74 MMHG | SYSTOLIC BLOOD PRESSURE: 134 MMHG | HEIGHT: 63 IN

## 2019-01-29 DIAGNOSIS — B07.8 OTHER VIRAL WARTS: ICD-10-CM

## 2019-01-29 DIAGNOSIS — H90.72 MIXED CONDUCTIVE AND SENSORINEURAL HEARING LOSS OF LEFT EAR WITH UNRESTRICTED HEARING OF RIGHT EAR: ICD-10-CM

## 2019-01-29 DIAGNOSIS — D33.3 ACOUSTIC NEUROMA (HCC): ICD-10-CM

## 2019-01-29 PROCEDURE — 17110 DESTRUCTION B9 LES UP TO 14: CPT | Performed by: FAMILY MEDICINE

## 2019-01-29 PROCEDURE — 99213 OFFICE O/P EST LOW 20 MIN: CPT | Mod: 25 | Performed by: FAMILY MEDICINE

## 2019-01-29 ASSESSMENT — PATIENT HEALTH QUESTIONNAIRE - PHQ9: CLINICAL INTERPRETATION OF PHQ2 SCORE: 0

## 2019-01-29 NOTE — PROGRESS NOTES
Subjective:     Chief Complaint   Patient presents with   • Warts     removal       Clifford Murphy is a 59 y.o. female here today for evaluation and management of:    Other viral warts  Patient is here today for cryotherapy on her wart.  She reports it has gotten smaller.    Acoustic neuroma (HCC)  Patient has an acoustic neuroma on the left-hand side that is being followed by [facility].  She is due for a repeat MRI in April and would like an order for that.  She does have a loud noise in that ear when she lays down at nighttime.  She also has some pain in the canal but review of her past MRI shows extension to the external auditory canal.  She is feeling as if her hearing has worsened on that side.       Allergies   Allergen Reactions   • Morphine Anaphylaxis     Morphine and morphine derivatives. (demerol)   • Aspirin      Stomach pain   • Codeine      Stomach pain   • Tramadol Vomiting     SEVERE HEADACHE   • Sulfamethoxazole W-Trimethoprim Shortness of Breath     Rxn - flushing, SOB  Late 80's     • Other Drug      Muscle relaxants cause dizziness         Current medicines (including changes today)  Current Outpatient Prescriptions   Medication Sig Dispense Refill   • levothyroxine (SYNTHROID) 137 MCG Tab TAKE 1 TABLET BY MOUTH EVERY MORNING BEFORE BREAKFAST 90 Tab 3   • cholestyramine (QUESTRAN) 4 g packet Take 4 g by mouth every day. 60 Each 2   • ciclopirox (PENLAC) 8 % solution Apply to affected toenails qhs 6.6 mL 6   • hydroxychloroquine (PLAQUENIL) 200 MG Tab TAKE 1 TABLET BY MOUTH TWICE A  Tab 1   • Cholecalciferol (VITAMIN D3) 5000 units Tab Take 5,000 Int'l Units by mouth every day at 6 PM.     • Folic Acid 0.8 MG Cap Take 0.8-1.6 mg by mouth every day at 6 PM. takes 1 pill daily and alternates every other day 2 pills daily     • ibuprofen (MOTRIN) 800 MG Tab Take 800 mg by mouth every 8 hours as needed for Mild Pain.     • raNITidine (ZANTAC) 150 MG Tab TAKE 1 TAB BY MOUTH 2 TIMES A   Tab 2   • acetaminophen (TYLENOL) 500 MG Tab Take 500-1,000 mg by mouth 2 times a day as needed for Mild Pain.     • cetirizine (ZYRTEC) 10 MG Tab Take 10 mg by mouth 1 time daily as needed for Allergies.       No current facility-administered medications for this visit.        She  has a past medical history of Anesthesia; Asthma (02/21/2018); Breath shortness; Bronchitis (2016); Cough (07/13/2018); Environmental and seasonal allergies; Genital herpes in women (2/11/2014); Heart burn; High risk medications (not anticoagulants) long-term use (8/29/2012); Hypothyroid (9/14/2011); Kidney stones; Macrocytosis without anemia (med effect); Menopause (9/15/2011); Pain (07/13/2018); Pneumonia (01/2015); Post-cholecystectomy syndrome; Rheumatoid arthritis(714.0); Snoring; Tobacco abuse, episodic (9/14/2011); and Vitamin d deficiency (5/15/2012).    Patient Active Problem List    Diagnosis Date Noted   • Surgical site infection 08/11/2018     Priority: High   • Fungal infection of toenail 12/20/2018   • Hordeolum externum of right upper eyelid 12/20/2018   • Gastroesophageal reflux disease without esophagitis 11/07/2018   • Other viral warts 11/07/2018   • Acoustic neuroma (HCC) 10/17/2018   • Allergic sinusitis 06/25/2018   • Preoperative clearance 06/25/2018   • Subclinical hypothyroidism 06/25/2018   • Long-term use of Plaquenil 02/01/2018   • Spinal stenosis of lumbar region 12/12/2017   • Internal derangement of left knee 12/12/2017   • Obesity (BMI 30-39.9) 11/17/2017   • Thrombocytopenia (HCC) 10/05/2017   • Tobacco abuse 08/05/2015   • Seasonal allergies 06/17/2015   • Hypertriglyceridemia 09/29/2014   • Genital herpes in women 02/11/2014   • Jaw pain 08/04/2013   • High risk medications (not anticoagulants) long-term use 08/29/2012   • Post-cholecystectomy syndrome 05/23/2012   • Vitamin D insufficiency 05/15/2012   • Preventative health care 09/15/2011   • Menopause 09/15/2011   • Macrocytosis without anemia  "09/14/2011   • Acquired hypothyroidism 09/14/2011   • RA (rheumatoid arthritis) (HCC) 09/14/2011       ROS   No fever or chills.  No nausea or vomiting.  No chest pain or palpitations.  No cough or SOB.  No pain with urination or hematuria.  No black or bloody stools.       Objective:     Blood pressure 134/74, pulse 80, temperature 36.4 °C (97.6 °F), temperature source Temporal, resp. rate 20, height 1.61 m (5' 3.4\"), weight 79.4 kg (175 lb), SpO2 97 %, not currently breastfeeding. Body mass index is 30.61 kg/m².   Physical Exam:  Well developed, well nourished.  Alert, oriented in no acute distress.  Eye contact is good, speech goal directed, affect calm  Eyes: conjunctiva non-injected, sclera non-icteric.  Ears: Pinna normal. TM pearly gray.  Canals are clear  Lungs: clear to auscultation bilaterally with good excursion. No wheezes or rhonchi  CV: regular rate and rhythm. No murmur  Left middle finger with 1.5 cm verrucous lesion on the dorsum approximately      Assessment and Plan:   The following treatment plan was discussed    1. Acoustic neuroma (HCC)  Repeat MRI of the brain and IACs with and without contrast.  [facility]    2. Other viral warts  CRYOTHERAPY:  Discussed risks and benefits of cryotherapy. Patient verbally agreed. 3 applications of cryotherapy were applied to one lesion on left third finger. Patient tolerated procedure well. Aftercare instructions given.      3. Mixed conductive and sensorineural hearing loss of left ear with unrestricted hearing of right ear    - MRRepeat MRI of the brain and IACs with and without contrast.  Follow-up with [facility]; Future    Any change or worsening of signs or symptoms, patient encouraged to follow-up or report to the emergency room for further evaluation. Patient understands and agrees.    Followup: Return in about 4 weeks (around 2/26/2019).           "

## 2019-01-29 NOTE — ASSESSMENT & PLAN NOTE
Patient has an acoustic neuroma on the left-hand side that is being followed by [facility].  She is due for a repeat MRI in April and would like an order for that.  She does have a loud noise in that ear when she lays down at nighttime.  She also has some pain in the canal but review of her past MRI shows extension to the external auditory canal.  She is feeling as if her hearing has worsened on that side.

## 2019-02-27 ENCOUNTER — OFFICE VISIT (OUTPATIENT)
Dept: MEDICAL GROUP | Facility: LAB | Age: 59
End: 2019-02-27
Payer: COMMERCIAL

## 2019-02-27 VITALS
RESPIRATION RATE: 20 BRPM | SYSTOLIC BLOOD PRESSURE: 136 MMHG | DIASTOLIC BLOOD PRESSURE: 88 MMHG | OXYGEN SATURATION: 97 % | HEART RATE: 79 BPM | TEMPERATURE: 97.9 F | BODY MASS INDEX: 30.47 KG/M2 | HEIGHT: 63 IN | WEIGHT: 171.96 LBS

## 2019-02-27 DIAGNOSIS — J40 BRONCHITIS: ICD-10-CM

## 2019-02-27 DIAGNOSIS — L03.012 CELLULITIS OF FINGER OF LEFT HAND: ICD-10-CM

## 2019-02-27 PROCEDURE — 99214 OFFICE O/P EST MOD 30 MIN: CPT | Performed by: FAMILY MEDICINE

## 2019-02-27 RX ORDER — AMOXICILLIN AND CLAVULANATE POTASSIUM 875; 125 MG/1; MG/1
1 TABLET, FILM COATED ORAL 2 TIMES DAILY
Qty: 14 TAB | Refills: 0 | Status: SHIPPED | OUTPATIENT
Start: 2019-02-27 | End: 2019-05-02

## 2019-02-27 ASSESSMENT — PATIENT HEALTH QUESTIONNAIRE - PHQ9: CLINICAL INTERPRETATION OF PHQ2 SCORE: 0

## 2019-02-28 NOTE — PATIENT INSTRUCTIONS
Bronchitis  Bronchitis is the body's way of reacting to injury and/or infection (inflammation) of the bronchi. Bronchi are the air tubes that extend from the windpipe into the lungs. If the inflammation becomes severe, it may cause shortness of breath.  CAUSES   Inflammation may be caused by:  · A virus.  · Germs (bacteria).  · Dust.  · Allergens.  · Pollutants and many other irritants.  The cells lining the bronchial tree are covered with tiny hairs (cilia). These constantly beat upward, away from the lungs, toward the mouth. This keeps the lungs free of pollutants. When these cells become too irritated and are unable to do their job, mucus begins to develop. This causes the characteristic cough of bronchitis. The cough clears the lungs when the cilia are unable to do their job. Without either of these protective mechanisms, the mucus would settle in the lungs. Then you would develop pneumonia.  Smoking is a common cause of bronchitis and can contribute to pneumonia. Stopping this habit is the single most important thing you can do to help yourself.  TREATMENT   · Your caregiver may prescribe an antibiotic if the cough is caused by bacteria. Also, medicines that open up your airways make it easier to breathe. Your caregiver may also recommend or prescribe an expectorant. It will loosen the mucus to be coughed up. Only take over-the-counter or prescription medicines for pain, discomfort, or fever as directed by your caregiver.  · Removing whatever causes the problem (smoking, for example) is critical to preventing the problem from getting worse.  · Cough suppressants may be prescribed for relief of cough symptoms.  · Inhaled medicines may be prescribed to help with symptoms now and to help prevent problems from returning.  · For those with recurrent (chronic) bronchitis, there may be a need for steroid medicines.  SEEK IMMEDIATE MEDICAL CARE IF:   · During treatment, you develop more pus-like mucus (purulent  sputum).  · You have a fever.  · Your baby is older than 3 months with a rectal temperature of 102° F (38.9° C) or higher.  · Your baby is 3 months old or younger with a rectal temperature of 100.4° F (38° C) or higher.  · You become progressively more ill.  · You have increased difficulty breathing, wheezing, or shortness of breath.  It is necessary to seek immediate medical care if you are elderly or sick from any other disease.  MAKE SURE YOU:   · Understand these instructions.  · Will watch your condition.  · Will get help right away if you are not doing well or get worse.  Document Released: 12/18/2006 Document Revised: 03/11/2013 Document Reviewed: 10/27/2009  [employer]® Patient Information ©2014 [employer].  Cellulitis, Adult  Cellulitis is a skin infection. The infected area is usually red and tender. This condition occurs most often in the arms and lower legs. The infection can travel to the muscles, blood, and underlying tissue and become serious. It is very important to get treated for this condition.  What are the causes?  Cellulitis is caused by bacteria. The bacteria enter through a break in the skin, such as a cut, burn, insect bite, open sore, or crack.  What increases the risk?  This condition is more likely to occur in people who:  · Have a weak defense system (immune system).  · Have open wounds on the skin such as cuts, burns, bites, and scrapes. Bacteria can enter the body through these open wounds.  · Are older.  · Have diabetes.  · Have a type of long-lasting (chronic) liver disease (cirrhosis) or kidney disease.  · Use IV drugs.  What are the signs or symptoms?  Symptoms of this condition include:  · Redness, streaking, or spotting on the skin.  · Swollen area of the skin.  · Tenderness or pain when an area of the skin is touched.  · Warm skin.  · Fever.  · Chills.  · Blisters.  How is this diagnosed?  This condition is diagnosed based on a medical history and physical exam. You may also  have tests, including:  · Blood tests.  · Lab tests.  · Imaging tests.  How is this treated?  Treatment for this condition may include:  · Medicines, such as antibiotic medicines or antihistamines.  · Supportive care, such as rest and application of cold or warm cloths (cold or warm compresses) to the skin.  · Hospital care, if the condition is severe.  The infection usually gets better within 1-2 days of treatment.  Follow these instructions at home:  · Take over-the-counter and prescription medicines only as told by your health care provider.  · If you were prescribed an antibiotic medicine, take it as told by your health care provider. Do not stop taking the antibiotic even if you start to feel better.  · Drink enough fluid to keep your urine clear or pale yellow.  · Do not touch or rub the infected area.  · Raise (elevate) the infected area above the level of your heart while you are sitting or lying down.  · Apply warm or cold compresses to the affected area as told by your health care provider.  · Keep all follow-up visits as told by your health care provider. This is important. These visits let your health care provider make sure a more serious infection is not developing.  Contact a health care provider if:  · You have a fever.  · Your symptoms do not improve within 1-2 days of starting treatment.  · Your bone or joint underneath the infected area becomes painful after the skin has healed.  · Your infection returns in the same area or another area.  · You notice a swollen bump in the infected area.  · You develop new symptoms.  · You have a general ill feeling (malaise) with muscle aches and pains.  Get help right away if:  · Your symptoms get worse.  · You feel very sleepy.  · You develop vomiting or diarrhea that persists.  · You notice red streaks coming from the infected area.  · Your red area gets larger or turns dark in color.  This information is not intended to replace advice given to you by your  health care provider. Make sure you discuss any questions you have with your health care provider.  Document Released: 09/27/2006 Document Revised: 04/27/2017 Document Reviewed: 10/26/2016  [employer]vier Interactive Patient Education © 2017 [employer]

## 2019-02-28 NOTE — PROGRESS NOTES
Subjective:     Chief Complaint   Patient presents with   • Wound Infection     Possible infection at site of last cryotherapy    • URI     Cough, sinus congestion/pressure       Clifford Murphy is a 59 y.o. female here today for evaluation and management of:    1. Bronchitis  Illness: 5 days of illness including: nasal congestion, green/purulent rhinorrhea, fever greater than 101, cough ,  Sinus pain and pressure: bilateral frontal  Symptoms negative for hemoptysis,   Treatments tried: OTC meds   Since onset, symptoms are worse   Similarly ill exposures: yes  Medical history negative for asthma  She  reports that she has been smoking Cigarettes.  She has a 20.00 pack-year smoking history. She has never used smokeless tobacco.      2. Cellulitis of finger of left hand  Patient has noticed redness at the site of her wart that we have been treating with cryotherapy.  It has worsened over the last 5 days and now become swollen and puffy and tender to touch.  There is no victorina purulent discharge.        Allergies   Allergen Reactions   • Morphine Anaphylaxis     Morphine and morphine derivatives. (demerol)   • Aspirin      Stomach pain   • Codeine      Stomach pain   • Tramadol Vomiting     SEVERE HEADACHE   • Sulfamethoxazole W-Trimethoprim Shortness of Breath     Rxn - flushing, SOB  Late 80's     • Other Drug      Muscle relaxants cause dizziness         Current medicines (including changes today)  Current Outpatient Prescriptions   Medication Sig Dispense Refill   • amoxicillin-clavulanate (AUGMENTIN) 875-125 MG Tab Take 1 Tab by mouth 2 times a day. 14 Tab 0   • levothyroxine (SYNTHROID) 137 MCG Tab TAKE 1 TABLET BY MOUTH EVERY MORNING BEFORE BREAKFAST 90 Tab 3   • cholestyramine (QUESTRAN) 4 g packet Take 4 g by mouth every day. 60 Each 2   • ciclopirox (PENLAC) 8 % solution Apply to affected toenails qhs 6.6 mL 6   • hydroxychloroquine (PLAQUENIL) 200 MG Tab TAKE 1 TABLET BY MOUTH TWICE A  Tab 1   •  cetirizine (ZYRTEC) 10 MG Tab Take 10 mg by mouth 1 time daily as needed for Allergies.     • Cholecalciferol (VITAMIN D3) 5000 units Tab Take 5,000 Int'l Units by mouth every day at 6 PM.     • Folic Acid 0.8 MG Cap Take 0.8-1.6 mg by mouth every day at 6 PM. takes 1 pill daily and alternates every other day 2 pills daily     • ibuprofen (MOTRIN) 800 MG Tab Take 800 mg by mouth every 8 hours as needed for Mild Pain.     • raNITidine (ZANTAC) 150 MG Tab TAKE 1 TAB BY MOUTH 2 TIMES A  Tab 2   • acetaminophen (TYLENOL) 500 MG Tab Take 500-1,000 mg by mouth 2 times a day as needed for Mild Pain.       No current facility-administered medications for this visit.        She  has a past medical history of Anesthesia; Asthma (02/21/2018); Breath shortness; Bronchitis (2016); Cough (07/13/2018); Environmental and seasonal allergies; Genital herpes in women (2/11/2014); Heart burn; High risk medications (not anticoagulants) long-term use (8/29/2012); Hypothyroid (9/14/2011); Kidney stones; Macrocytosis without anemia (med effect); Menopause (9/15/2011); Pain (07/13/2018); Pneumonia (01/2015); Post-cholecystectomy syndrome; Rheumatoid arthritis(714.0); Snoring; Tobacco abuse, episodic (9/14/2011); and Vitamin d deficiency (5/15/2012).    Patient Active Problem List    Diagnosis Date Noted   • Surgical site infection 08/11/2018     Priority: High   • Fungal infection of toenail 12/20/2018   • Hordeolum externum of right upper eyelid 12/20/2018   • Gastroesophageal reflux disease without esophagitis 11/07/2018   • Other viral warts 11/07/2018   • Acoustic neuroma (HCC) 10/17/2018   • Allergic sinusitis 06/25/2018   • Preoperative clearance 06/25/2018   • Subclinical hypothyroidism 06/25/2018   • Long-term use of Plaquenil 02/01/2018   • Spinal stenosis of lumbar region 12/12/2017   • Internal derangement of left knee 12/12/2017   • Obesity (BMI 30-39.9) 11/17/2017   • Thrombocytopenia (HCC) 10/05/2017   • Tobacco abuse  "08/05/2015   • Seasonal allergies 06/17/2015   • Hypertriglyceridemia 09/29/2014   • Genital herpes in women 02/11/2014   • Jaw pain 08/04/2013   • High risk medications (not anticoagulants) long-term use 08/29/2012   • Post-cholecystectomy syndrome 05/23/2012   • Vitamin D insufficiency 05/15/2012   • Preventative health care 09/15/2011   • Menopause 09/15/2011   • Macrocytosis without anemia 09/14/2011   • Acquired hypothyroidism 09/14/2011   • RA (rheumatoid arthritis) (HCC) 09/14/2011       ROS     No nausea or vomiting.  No chest pain or palpitations.  No SOB.  No pain with urination or hematuria.  No black or bloody stools.       Objective:     Blood pressure 136/88, pulse 79, temperature 36.6 °C (97.9 °F), temperature source Temporal, resp. rate 20, height 1.61 m (5' 3.4\"), weight 78 kg (171 lb 15.3 oz), SpO2 97 %, not currently breastfeeding. Body mass index is 30.08 kg/m².   Physical Exam:  Well developed, well nourished.  Alert, oriented in no acute distress.  Eye contact is good, speech goal directed, affect calm  Eyes: conjunctiva non-injected, sclera non-icteric.  Ears: Pinna normal. TM pearly gray.   Nose: Nares are patent.  Erythematous, swollen mucosa with yellow discharge  Mouth: Oral mucous membranes pink and moist with no lesions.  Moderate diffuse erythema the posterior pharynx without exudate  Neck Supple.  No adenopathy or masses in the neck or supraclavicular regions. No thyromegaly  Lungs: clear to auscultation bilaterally with good excursion. No wheezes or rhonchi  CV: regular rate and rhythm. No murmur  Left ring finger with surrounding erythema to healing cryo therapy wound.  Induration present with mild lymphatic streaking up above the MP joint.  No purulent materials.          Assessment and Plan:   The following treatment plan was discussed  1. Bronchitis  Augmentin twice daily for 7 days.  Increase fluids and rest.  Call if not improving  - amoxicillin-clavulanate (AUGMENTIN) 875-125 " MG Tab; Take 1 Tab by mouth 2 times a day.  Dispense: 14 Tab; Refill: 0    2. Cellulitis of finger of left hand  Augmentin twice a day for 7 days.  Warm pack area.  Call for extension of the redness.  Patient education materials given.  - amoxicillin-clavulanate (AUGMENTIN) 875-125 MG Tab; Take 1 Tab by mouth 2 times a day.  Dispense: 14 Tab; Refill: 0        Any change or worsening of signs or symptoms, patient encouraged to follow-up or report to the emergency room for further evaluation. Patient understands and agrees.    Followup: Return if symptoms worsen or fail to improve.

## 2019-04-09 ENCOUNTER — PATIENT MESSAGE (OUTPATIENT)
Dept: MEDICAL GROUP | Facility: LAB | Age: 59
End: 2019-04-09

## 2019-04-09 RX ORDER — FLUCONAZOLE 150 MG/1
150 TABLET ORAL DAILY
Qty: 1 TAB | Refills: 0 | Status: SHIPPED | OUTPATIENT
Start: 2019-04-09 | End: 2019-07-17

## 2019-04-16 ENCOUNTER — APPOINTMENT (OUTPATIENT)
Dept: RHEUMATOLOGY | Facility: MEDICAL CENTER | Age: 59
End: 2019-04-16
Payer: COMMERCIAL

## 2019-04-25 RX ORDER — RANITIDINE 150 MG/1
TABLET ORAL
Qty: 180 TAB | Refills: 2 | Status: SHIPPED | OUTPATIENT
Start: 2019-04-25 | End: 2020-02-07

## 2019-04-25 NOTE — TELEPHONE ENCOUNTER
Was the patient seen in the last year in this department? Yes lov 2/27/19    Does patient have an active prescription for medications requested? No     Received Request Via: Pharmacy

## 2019-04-30 ENCOUNTER — HOSPITAL ENCOUNTER (OUTPATIENT)
Dept: LAB | Facility: MEDICAL CENTER | Age: 59
End: 2019-04-30
Attending: INTERNAL MEDICINE
Payer: COMMERCIAL

## 2019-04-30 ENCOUNTER — HOSPITAL ENCOUNTER (OUTPATIENT)
Dept: LAB | Facility: MEDICAL CENTER | Age: 59
End: 2019-04-30
Attending: FAMILY MEDICINE
Payer: COMMERCIAL

## 2019-04-30 DIAGNOSIS — Z79.899 ENCOUNTER FOR MONITORING ARAVA THERAPY: ICD-10-CM

## 2019-04-30 DIAGNOSIS — E03.8 SUBCLINICAL HYPOTHYROIDISM: ICD-10-CM

## 2019-04-30 DIAGNOSIS — E03.9 ACQUIRED HYPOTHYROIDISM: ICD-10-CM

## 2019-04-30 DIAGNOSIS — S32.010S CLOSED COMPRESSION FRACTURE OF FIRST LUMBAR VERTEBRA, SEQUELA: ICD-10-CM

## 2019-04-30 DIAGNOSIS — Z79.899 LONG-TERM USE OF PLAQUENIL: ICD-10-CM

## 2019-04-30 DIAGNOSIS — Z72.0 TOBACCO ABUSE: ICD-10-CM

## 2019-04-30 DIAGNOSIS — M06.9 RHEUMATOID ARTHRITIS INVOLVING MULTIPLE SITES, UNSPECIFIED RHEUMATOID FACTOR PRESENCE: ICD-10-CM

## 2019-04-30 DIAGNOSIS — Z51.81 ENCOUNTER FOR MONITORING ARAVA THERAPY: ICD-10-CM

## 2019-04-30 LAB
ALBUMIN SERPL BCP-MCNC: 4 G/DL (ref 3.2–4.9)
ALBUMIN/GLOB SERPL: 1.7 G/DL
ALP SERPL-CCNC: 52 U/L (ref 30–99)
ALT SERPL-CCNC: 17 U/L (ref 2–50)
ANION GAP SERPL CALC-SCNC: 6 MMOL/L (ref 0–11.9)
AST SERPL-CCNC: 24 U/L (ref 12–45)
BASOPHILS # BLD AUTO: 1.1 % (ref 0–1.8)
BASOPHILS # BLD: 0.06 K/UL (ref 0–0.12)
BILIRUB SERPL-MCNC: 0.5 MG/DL (ref 0.1–1.5)
BUN SERPL-MCNC: 24 MG/DL (ref 8–22)
CALCIUM SERPL-MCNC: 9.7 MG/DL (ref 8.5–10.5)
CHLORIDE SERPL-SCNC: 108 MMOL/L (ref 96–112)
CO2 SERPL-SCNC: 27 MMOL/L (ref 20–33)
CREAT SERPL-MCNC: 0.88 MG/DL (ref 0.5–1.4)
EOSINOPHIL # BLD AUTO: 0 K/UL (ref 0–0.51)
EOSINOPHIL NFR BLD: 0 % (ref 0–6.9)
ERYTHROCYTE [DISTWIDTH] IN BLOOD BY AUTOMATED COUNT: 50.7 FL (ref 35.9–50)
ERYTHROCYTE [SEDIMENTATION RATE] IN BLOOD BY WESTERGREN METHOD: 10 MM/HOUR (ref 0–30)
GLOBULIN SER CALC-MCNC: 2.4 G/DL (ref 1.9–3.5)
GLUCOSE SERPL-MCNC: 77 MG/DL (ref 65–99)
HCT VFR BLD AUTO: 48 % (ref 37–47)
HGB BLD-MCNC: 15.2 G/DL (ref 12–16)
IMM GRANULOCYTES # BLD AUTO: 0.01 K/UL (ref 0–0.11)
IMM GRANULOCYTES NFR BLD AUTO: 0.2 % (ref 0–0.9)
LYMPHOCYTES # BLD AUTO: 1.71 K/UL (ref 1–4.8)
LYMPHOCYTES NFR BLD: 30.1 % (ref 22–41)
MCH RBC QN AUTO: 31.9 PG (ref 27–33)
MCHC RBC AUTO-ENTMCNC: 31.7 G/DL (ref 33.6–35)
MCV RBC AUTO: 100.8 FL (ref 81.4–97.8)
MONOCYTES # BLD AUTO: 0.43 K/UL (ref 0–0.85)
MONOCYTES NFR BLD AUTO: 7.6 % (ref 0–13.4)
NEUTROPHILS # BLD AUTO: 3.47 K/UL (ref 2–7.15)
NEUTROPHILS NFR BLD: 61 % (ref 44–72)
NRBC # BLD AUTO: 0 K/UL
NRBC BLD-RTO: 0 /100 WBC
PLATELET # BLD AUTO: 132 K/UL (ref 164–446)
PMV BLD AUTO: 12.4 FL (ref 9–12.9)
POTASSIUM SERPL-SCNC: 4.6 MMOL/L (ref 3.6–5.5)
PROT SERPL-MCNC: 6.4 G/DL (ref 6–8.2)
RBC # BLD AUTO: 4.76 M/UL (ref 4.2–5.4)
SODIUM SERPL-SCNC: 141 MMOL/L (ref 135–145)
TSH SERPL DL<=0.005 MIU/L-ACNC: 1.93 UIU/ML (ref 0.38–5.33)
WBC # BLD AUTO: 5.7 K/UL (ref 4.8–10.8)

## 2019-04-30 PROCEDURE — 85025 COMPLETE CBC W/AUTO DIFF WBC: CPT

## 2019-04-30 PROCEDURE — 36415 COLL VENOUS BLD VENIPUNCTURE: CPT

## 2019-04-30 PROCEDURE — 85652 RBC SED RATE AUTOMATED: CPT

## 2019-04-30 PROCEDURE — 80053 COMPREHEN METABOLIC PANEL: CPT

## 2019-04-30 PROCEDURE — 84443 ASSAY THYROID STIM HORMONE: CPT

## 2019-05-01 DIAGNOSIS — E03.9 ACQUIRED HYPOTHYROIDISM: ICD-10-CM

## 2019-05-01 RX ORDER — LEVOTHYROXINE SODIUM 137 UG/1
137 TABLET ORAL
Qty: 90 TAB | Refills: 3 | Status: SHIPPED | OUTPATIENT
Start: 2019-05-01 | End: 2020-05-27

## 2019-05-02 ENCOUNTER — OFFICE VISIT (OUTPATIENT)
Dept: RHEUMATOLOGY | Facility: MEDICAL CENTER | Age: 59
End: 2019-05-02
Payer: COMMERCIAL

## 2019-05-02 VITALS
DIASTOLIC BLOOD PRESSURE: 72 MMHG | SYSTOLIC BLOOD PRESSURE: 130 MMHG | RESPIRATION RATE: 14 BRPM | WEIGHT: 167.55 LBS | HEART RATE: 76 BPM | BODY MASS INDEX: 29.31 KG/M2 | OXYGEN SATURATION: 98 % | TEMPERATURE: 97.6 F

## 2019-05-02 DIAGNOSIS — M81.0 OSTEOPOROSIS WITHOUT CURRENT PATHOLOGICAL FRACTURE, UNSPECIFIED OSTEOPOROSIS TYPE: ICD-10-CM

## 2019-05-02 DIAGNOSIS — M48.061 SPINAL STENOSIS OF LUMBAR REGION, UNSPECIFIED WHETHER NEUROGENIC CLAUDICATION PRESENT: ICD-10-CM

## 2019-05-02 DIAGNOSIS — E03.9 HYPOTHYROIDISM, UNSPECIFIED TYPE: ICD-10-CM

## 2019-05-02 DIAGNOSIS — Z72.0 TOBACCO ABUSE: ICD-10-CM

## 2019-05-02 DIAGNOSIS — M06.9 RHEUMATOID ARTHRITIS INVOLVING MULTIPLE SITES, UNSPECIFIED RHEUMATOID FACTOR PRESENCE: ICD-10-CM

## 2019-05-02 DIAGNOSIS — Z79.1 NSAID LONG-TERM USE: ICD-10-CM

## 2019-05-02 DIAGNOSIS — E03.9 ACQUIRED HYPOTHYROIDISM: ICD-10-CM

## 2019-05-02 DIAGNOSIS — Z79.899 LONG-TERM USE OF PLAQUENIL: ICD-10-CM

## 2019-05-02 PROCEDURE — 99214 OFFICE O/P EST MOD 30 MIN: CPT | Performed by: INTERNAL MEDICINE

## 2019-05-02 RX ORDER — HYDROXYCHLOROQUINE SULFATE 200 MG/1
TABLET, FILM COATED ORAL
Qty: 180 TAB | Refills: 1 | Status: SHIPPED | OUTPATIENT
Start: 2019-05-02 | End: 2020-02-18

## 2019-05-02 NOTE — PROGRESS NOTES
Chief Complaint- joint pain    Subjective:   Clifford Murphy is a 59 y.o. female here today for follow up of rheumatological issues    This is a follow-up visit for this patient who we see in his clinic for rheumatoid arthritis currently on Plaquenil at 200 mg p.o. twice daily only.  Of note patient sedimentation rate equals 10 April 2019.  Also of note patient is scheduled for an ophthalmology evaluation September 2019. Patient denies any side effects from the medication, denies any unexplained weight loss, denies any fevers of unknown etiology, denies any GI upset, denies any rashes, denies any new joint swelling, denies recurrent infections.     Additional comorbidities include lumbar spine DDD/DJD/spinal stenosis status post lumbar spine laminectomy with benefit, patient currently healing the patient also with an acoustic neuroma left ear for which patient is lost about 60% of her hearing is currently being followed by [facility] and currently just monitoring at this time.      Patient also with osteoporosis currently on Reclast infusions first Reclast infusion was January 2019. Patient denies any side effects from the medication, denies any unexplained weight loss, denies any fevers of unknown etiology, denies any GI upset, denies any rashes, denies any new joint swelling, denies recurrent infections.     Additional comorbidities include  Hypothyroidism and osteoporosis seen by FRAX score recently status post a kyphoplasty for compression fracture in L1.     S/p MTX-N/V  S/p Enbrel-sinus infections  S/p Humira-sinus infections and injection site skin breakdown  S/p Actemra-exacerbated pneumonia  S/p sulfasalazine-contradicted, pt with sulfa allergy  S/p fosamax-GI upset  S/p boniva-GI upset  S/p leflunamide-pt no longer needs     CCP neg 9/2015  Quantiferon Gold neg 9/2015  Hep B neg 9/2015  Uric acid 4.6 9/2015  G6PD 11.5 adequate 10/2017  RF 18 6/2009  CHIQUITA neg 6/2009   Hand x-rays 5/2015-no  pathology  Feet X-rays 5/2015-indicates DJD, no erosions    LS pine x-rays 7/2009-indicates multilevel DJD     MRI LS spine 2/2018-L1 compression fracture, multilevel multifactorial DJD, neuroforaminal narrowing at multiple sites, severe left neural foraminal narrowing  DEXA 6/5/2018 T scores -0.7, -0.9  FRAX 6/5/2018 major osteoporotic fracture risk 17.8%, hip fracture risk 3.1%  Patient on Reclast since 1/2019       Current medicines (including changes today)  Current Outpatient Prescriptions   Medication Sig Dispense Refill   • hydroxychloroquine (PLAQUENIL) 200 MG Tab TAKE 1 TABLET BY MOUTH TWICE A  Tab 1   • levothyroxine (SYNTHROID) 137 MCG Tab Take 1 Tab by mouth every morning before breakfast. 90 Tab 3   • raNITidine (ZANTAC) 150 MG Tab TAKE 1 TABLET BY MOUTH TWICE A  Tab 2   • cetirizine (ZYRTEC) 10 MG Tab Take 10 mg by mouth 1 time daily as needed for Allergies.     • Cholecalciferol (VITAMIN D3) 5000 units Tab Take 5,000 Int'l Units by mouth every day at 6 PM.     • Folic Acid 0.8 MG Cap Take 0.8-1.6 mg by mouth every day at 6 PM. takes 1 pill daily and alternates every other day 2 pills daily     • ibuprofen (MOTRIN) 800 MG Tab Take 800 mg by mouth every 8 hours as needed for Mild Pain.     • fluconazole (DIFLUCAN) 150 MG tablet Take 1 Tab by mouth every day. (Patient not taking: Reported on 5/2/2019) 1 Tab 0   • cholestyramine (QUESTRAN) 4 g packet Take 4 g by mouth every day. 60 Each 2   • ciclopirox (PENLAC) 8 % solution Apply to affected toenails qhs (Patient not taking: Reported on 5/2/2019) 6.6 mL 6   • acetaminophen (TYLENOL) 500 MG Tab Take 500-1,000 mg by mouth 2 times a day as needed for Mild Pain.       No current facility-administered medications for this visit.      She  has a past medical history of Anesthesia; Asthma (02/21/2018); Breath shortness; Bronchitis (2016); Cough (07/13/2018); Environmental and seasonal allergies; Genital herpes in women (2/11/2014); Heart burn;  High risk medications (not anticoagulants) long-term use (8/29/2012); Hypothyroid (9/14/2011); Kidney stones; Macrocytosis without anemia (med effect); Menopause (9/15/2011); Pain (07/13/2018); Pneumonia (01/2015); Post-cholecystectomy syndrome; Rheumatoid arthritis(714.0); Snoring; Tobacco abuse, episodic (9/14/2011); and Vitamin d deficiency (5/15/2012).    ROS   Other than what is mentioned in HPI or physical exam, there is no history of headaches, double vision or blurred vision. No temporal tenderness or jaw claudication. No history of cataracts or glaucoma. No trouble swallowing difficulties or sore throats.  No chest complaints including chest pain, cough or sputum production. No GI complaints including nausea, vomiting, change in bowel habits, or past peptic ulcer disease. No history of blood in the stools. No urinary complaints including dysuria or frequency. No history of alopecia, photosensitivity, oral ulcerations, Raynaud's phenomena.       Objective:     /72   Pulse 76   Temp 36.4 °C (97.6 °F) (Temporal)   Resp 14   Wt 76 kg (167 lb 8.8 oz)   SpO2 98%  Body mass index is 29.31 kg/m².   Physical Exam:    Constitutional: Alert and oriented X3, patient is talkative with good eye contact.Skin: Warm, dry, good turgor, no rashes in visible areas.Eye: Equal, round and reactive, conjunctiva clear, lids normal EOM intactENMT: Lips without lesions, good dentition, no oropharyngeal ulcers, moist buccal mucosa, pinna without deformityNeck: Trachea midline, no masses, no thyromegaly.Lymph:  No cervical lymphadenopathy, no axillary lymphadenopathy, no supraclavicular lymphadenopathyRespiratory: Unlabored respiratory effort, lungs clear to auscultation, no wheezes, no ronchi.Cardiovascular: Normal S1, S2, no murmur, no edema.Abdomen: Soft, non-tender, no masses, no hepatosplenomegaly.Psych: Alert and oriented x3, normal affect and mood.Neuro: Cranial nerves 2-12 are grossly intact, no loss of sensation  LEExt:no joint laxity noted in bilateral arms, no joint laxity noted in bilateral legs, joints actually look really quite good, no swan-neck or boutonniere deformities no sausage digits no dactylitis no crossover toes no splay toes, gait without antalgia and without foot drop slight unsteadiness secondary to low back surgery but no falling,    Lab Results   Component Value Date/Time    SODIUM 141 04/30/2019 10:14 AM    POTASSIUM 4.6 04/30/2019 10:14 AM    CHLORIDE 108 04/30/2019 10:14 AM    CO2 27 04/30/2019 10:14 AM    GLUCOSE 77 04/30/2019 10:14 AM    BUN 24 (H) 04/30/2019 10:14 AM    CREATININE 0.88 04/30/2019 10:14 AM    CREATININE 0.77 03/26/2013 12:00 AM    BUNCREATRAT 30 (H) 11/23/2016 09:46 AM    BUNCREATRAT 30 (H) 03/26/2013 12:00 AM      Lab Results   Component Value Date/Time    WBC 5.7 04/30/2019 10:14 AM    WBC 5.7 10/20/2011 12:00 AM    RBC 4.76 04/30/2019 10:14 AM    RBC 4.32 10/20/2011 12:00 AM    HEMOGLOBIN 15.2 04/30/2019 10:14 AM    HEMATOCRIT 48.0 (H) 04/30/2019 10:14 AM    .8 (H) 04/30/2019 10:14 AM    MCV 99 (H) 10/20/2011 12:00 AM    MCH 31.9 04/30/2019 10:14 AM    MCH 34.0 10/20/2011 12:00 AM    MCHC 31.7 (L) 04/30/2019 10:14 AM    MPV 12.4 04/30/2019 10:14 AM    NEUTSPOLYS 61.00 04/30/2019 10:14 AM    LYMPHOCYTES 30.10 04/30/2019 10:14 AM    MONOCYTES 7.60 04/30/2019 10:14 AM    EOSINOPHILS 0.00 04/30/2019 10:14 AM    BASOPHILS 1.10 04/30/2019 10:14 AM      Lab Results   Component Value Date/Time    CALCIUM 9.7 04/30/2019 10:14 AM    ASTSGOT 24 04/30/2019 10:14 AM    ALTSGPT 17 04/30/2019 10:14 AM    ALKPHOSPHAT 52 04/30/2019 10:14 AM    TBILIRUBIN 0.5 04/30/2019 10:14 AM    ALBUMIN 4.0 04/30/2019 10:14 AM    TOTPROTEIN 6.4 04/30/2019 10:14 AM     Lab Results   Component Value Date/Time    RHEUMFACTN 18 (H) 06/15/2009 09:27 AM    ANTINUCAB None Detected 06/15/2009 09:27 AM     Lab Results   Component Value Date/Time    SEDRATEWES 10 04/30/2019 10:14 AM     Lab Results   Component  Value Date/Time    HBA1C 5.3 11/23/2016 09:46 AM     Lab Results   Component Value Date/Time    G6PD 11.5 10/03/2017 09:54 AM     Results for orders placed during the hospital encounter of 02/02/18   DX-JOINT SURVEY-HANDS SINGLE VIEW    Impression 1.  No acute fracture or bone erosion identified.    2.  Mild osteoarthritis.     Results for orders placed during the hospital encounter of 02/02/18   DX-JOINT SURVEY-FEET SINGLE VIEW    Impression 1.  No acute fracture or bone erosion.    2.  Hallux valgus deformities and mild osteoarthritis.     Results for orders placed during the hospital encounter of 06/05/18   DS-BONE DENSITY STUDY (DEXA)    Impression According to the World Health Organization classification, bone mineral density of this patient is normal.        10-year Probability of Fracture:  Major Osteoporotic     17.8%  Hip     3.1%  Population      [place] ()    Based on left femur neck BMD          INTERPRETING LOCATION:  [address] [place] [address], [address]     Results for orders placed in visit on 09/29/14   DX-FOOT-COMPLETE 3+    Impression Subacute nondisplaced fifth proximal phalanx extra-articular fracture     Results for orders placed during the hospital encounter of 07/11/09   DX-PELVIS-1 OR 2 VIEWS    Impression IMPRESSION:     1. VERY MILD DEGENERATIVE CHANGE OF THE HIPS, SLIGHTLY WORSE ON THE RIGHT   THAN THE LEFT.  MINIMAL SCLEROSIS IS SEEN AT THE LATERAL ASPECT OF THE   RIGHT FEMORAL NECK, WHICH IS OF DOUBTFUL CLINICAL SIGNIFICANCE.      2. NO PELVIS FRACTURE.    MM/srd     Read By MARICARMEN ANSARI MD on Jul 13 2009  8:38AM  : PREMA Transcription Date: Jul 13 2009 10:42AM  THIS DOCUMENT HAS BEEN ELECTRONICALLY SIGNED BY: MARICARMEN ANSARI MD on Jul 13 2009  4:05PM        Results for orders placed during the hospital encounter of 07/11/09   DX-PELVIS-1 OR 2 VIEWS    Impression IMPRESSION:     1. VERY MILD DEGENERATIVE CHANGE OF THE HIPS, SLIGHTLY WORSE ON THE RIGHT   THAN THE  LEFT.  MINIMAL SCLEROSIS IS SEEN AT THE LATERAL ASPECT OF THE   RIGHT FEMORAL NECK, WHICH IS OF DOUBTFUL CLINICAL SIGNIFICANCE.      2. NO PELVIS FRACTURE.    MM/srd     Read By MARICARMEN ANSARI MD on Jul 13 2009  8:38AM  : SRP Transcription Date: Jul 13 2009 10:42AM  THIS DOCUMENT HAS BEEN ELECTRONICALLY SIGNED BY: MARICARMEN ANSARI MD on Jul 13 2009  4:05PM        Results for orders placed during the hospital encounter of 10/10/09   DX-KNEE COMPLETE 4+    Impression IMPRESSION:     3. MODERATE THREE COMPARTMENT ARTHROSIS.     4. QUESTION SMALL EFFUSION.     MPW/vadim         Read By JASSI GREEN MD on Oct 12 2009  9:01AM  : ZFREDERIC Transcription Date: Oct 12 2009  2:56PM  THIS DOCUMENT HAS BEEN ELECTRONICALLY SIGNED BY: JASSI GREEN MD on Oct   12 2009  3:47PM        Results for orders placed in visit on 09/02/15   DX-KNEE 2- LEFT    Impression 1. No evidence of acute fracture or dislocation.    2. Increased sclerosis with punctate hyperdensities in the proximal tibial metadiaphysis anteriorly may be related to prior surgery.    3. Mild left knee osteoarthritis.     Results for orders placed during the hospital encounter of 07/10/12   DX-HAND 3+    Impression No new radiographic evidence of erosive arthropathy    Stable second DIP subchondral cyst versus erosion    Stable mild first MCP osteoarthritis, periarticular calcification and osteopenia     Results for orders placed during the hospital encounter of 02/02/18   MR-LUMBAR SPINE-W/O    Impression 1.  Acute L1 vertebral compression fracture. This would be amenable to percutaneous augmentation with vertebroplasty or kyphoplasty if clinically appropriate. Interventional radiology is available for consultation and therapy.  2.  Multilevel multifactorial degenerative changes  3.  No areas of high-grade central canal narrowing  4.  LEFT neural foraminal narrowing worse at L5-S1  5.  Other areas of central canal and neural foraminal narrowing  as described above  6.  Mild intra and extrahepatic biliary dilatation without visualized obstructing lesion. Further imaging assessment could be performed with ultrasound or MRCP if clinically appropriate. I have no relevant prior studies available for comparison.     Results for orders placed during the hospital encounter of 01/03/18   MR-KNEE-W/O LEFT    Impression 1.  No MR explanation for acute symptoms. Ligaments and menisci are intact    2.  Postoperative change related to presumed prior tibial tubercle transfer. There is mild patella timothy    3.  Severe patellofemoral cartilage thinning with areas of bone-on-bone articulation and moderate osteophytes.    4.  Moderate femorotibial osteophytes with minimal cartilage thinning     Results for orders placed during the hospital encounter of 02/21/18   DX-CERVICAL SPINE-2 OR 3 VIEWS    Impression 1.  No evidence of atlantoaxial subluxation with flexion and extension maneuvers of the cervical spine.    2.  Degenerative changes C6-7. No demonstrable range of motion at this level.     Results for orders placed during the hospital encounter of 08/11/18   CT-LSPINE W/O PLUS RECONS    Impression 1. Recent instrumentation at L2-5 levels with a 10.7 cm fluid collection in the posterior paraspinal soft tissues. Differential includes postsurgical seroma, pseudomeningocele and the resolving hematoma. Infection should be excluded clinically.  2. Prior augmentation of L1 compression deformity. No new fracture or listhesis.     Assessment and Plan:     1. Rheumatoid arthritis involving multiple sites, unspecified rheumatoid factor presence (HCC)  Doing really well on Plaquenil 200 mg p.o. twice daily only will continue with such  - hydroxychloroquine (PLAQUENIL) 200 MG Tab; TAKE 1 TABLET BY MOUTH TWICE A DAY  Dispense: 180 Tab; Refill: 1  - Comp Metabolic Panel; Future  - CBC WITH DIFFERENTIAL; Future  - WESTERGREN SED RATE; Future    2. Long-term use of Plaquenil  Currently on  Plaquenil 200 mg p.o. twice daily, patient up-to-date on ophthalmology evaluation coming up September 2019, also of note G6PD levels are adequate, patient will need CBC every 6 months next CBC will be due October 2019, labs ordered for patient  - hydroxychloroquine (PLAQUENIL) 200 MG Tab; TAKE 1 TABLET BY MOUTH TWICE A DAY  Dispense: 180 Tab; Refill: 1  - Comp Metabolic Panel; Future  - CBC WITH DIFFERENTIAL; Future  - WESTERGREN SED RATE; Future    3. NSAID long-term use  Takes Naprosyn as needed, will need monitoring labs every 6 months next monitoring labs will be due October 2019, labs ordered for patient  - hydroxychloroquine (PLAQUENIL) 200 MG Tab; TAKE 1 TABLET BY MOUTH TWICE A DAY  Dispense: 180 Tab; Refill: 1  - Comp Metabolic Panel; Future  - CBC WITH DIFFERENTIAL; Future  - WESTERGREN SED RATE; Future    4. Osteoporosis without current pathological fracture, unspecified osteoporosis type  Currently on Reclast infusions first Reclast infusion is generally 2019 Next Reclast infusion January 2020  Last DEXA June 2018 Next DEXA June 2020   continue calcium citrate 1200 mg by mouth daily and vitamin D about 2000 units by mouth daily and magnesium 200 mg by mouth daily  - hydroxychloroquine (PLAQUENIL) 200 MG Tab; TAKE 1 TABLET BY MOUTH TWICE A DAY  Dispense: 180 Tab; Refill: 1  - Comp Metabolic Panel; Future  - CBC WITH DIFFERENTIAL; Future  - WESTERGREN SED RATE; Future    5. Spinal stenosis of lumbar region, unspecified whether neurogenic claudication present  Status post lower back laminectomy with benefit  - hydroxychloroquine (PLAQUENIL) 200 MG Tab; TAKE 1 TABLET BY MOUTH TWICE A DAY  Dispense: 180 Tab; Refill: 1  - Comp Metabolic Panel; Future  - CBC WITH DIFFERENTIAL; Future  - WESTERGREN SED RATE; Future    6. Hypothyroidism, unspecified type  Can exacerbate joint pain, I recommend monitoring thyroid on a regular basis to assure it is not contributing to the patient's joint pain    7. Tobacco  abuse  Tobacco abuse is known to exacerbate rheumatoid arthritis, 20 minute discussion with patient regarding the need to stop tobacco abuse, patient states understanding      Followup: Return in about 6 months (around 11/2/2019). or sooner amada Murphy  was seen 30 minutes face-to-face of which more than 50% of the time was spent counseling the patient (excluding time for procedures)  regarding  rheumatological condition and care. Therapy was discussed in detail.      Please note that this dictation was created using voice recognition software. I have made every reasonable attempt to correct obvious errors, but I expect that there are errors of grammar and possibly content that I did not discover before finalizing the note.

## 2019-05-02 NOTE — LETTER
[facility]   [address] [place] [address], [address]  [place] [address]  Phone: [phone]  Fax: [phone]              Encounter Date: 5/2/2019    Dear Dr. Whitney ref. provider found,    It was a pleasure seeing your patient, Clifford Murphy, on 5/2/2019. Diagnoses of Rheumatoid arthritis involving multiple sites, unspecified rheumatoid factor presence (HCC), Long-term use of Plaquenil, NSAID long-term use, Osteoporosis without current pathological fracture, unspecified osteoporosis type, Spinal stenosis of lumbar region, unspecified whether neurogenic claudication present, Hypothyroidism, unspecified type, Tobacco abuse, and Acquired hypothyroidism were pertinent to this visit.     Please find attached progress note which includes the history I obtained from Ms. Murphy, my physical examination findings, my impression and recommendations.      Once again, it was a pleasure participating in your patient's care.  Please feel free to contact me if you have any questions or if I can be of any further assistance to your patients.      Sincerely,    Elizabeth Gómez M.D.  Electronically Signed          PROGRESS NOTE:  No notes on file

## 2019-07-10 DIAGNOSIS — M06.9 RHEUMATOID ARTHRITIS INVOLVING MULTIPLE SITES, UNSPECIFIED RHEUMATOID FACTOR PRESENCE: ICD-10-CM

## 2019-07-12 ENCOUNTER — HOSPITAL ENCOUNTER (OUTPATIENT)
Dept: LAB | Facility: MEDICAL CENTER | Age: 59
End: 2019-07-12
Attending: FAMILY MEDICINE
Payer: COMMERCIAL

## 2019-07-12 DIAGNOSIS — M06.9 RHEUMATOID ARTHRITIS INVOLVING MULTIPLE SITES, UNSPECIFIED RHEUMATOID FACTOR PRESENCE: ICD-10-CM

## 2019-07-12 LAB
ALBUMIN SERPL BCP-MCNC: 4.1 G/DL (ref 3.2–4.9)
BUN SERPL-MCNC: 21 MG/DL (ref 8–22)
CALCIUM SERPL-MCNC: 10.1 MG/DL (ref 8.5–10.5)
CHLORIDE SERPL-SCNC: 108 MMOL/L (ref 96–112)
CO2 SERPL-SCNC: 25 MMOL/L (ref 20–33)
CREAT SERPL-MCNC: 0.91 MG/DL (ref 0.5–1.4)
GLUCOSE SERPL-MCNC: 96 MG/DL (ref 65–99)
PHOSPHATE SERPL-MCNC: 4.3 MG/DL (ref 2.5–4.5)
POTASSIUM SERPL-SCNC: 4.4 MMOL/L (ref 3.6–5.5)
SODIUM SERPL-SCNC: 139 MMOL/L (ref 135–145)

## 2019-07-12 PROCEDURE — 36415 COLL VENOUS BLD VENIPUNCTURE: CPT

## 2019-07-12 PROCEDURE — 80069 RENAL FUNCTION PANEL: CPT

## 2019-07-15 ENCOUNTER — HOSPITAL ENCOUNTER (OUTPATIENT)
Dept: RADIOLOGY | Facility: MEDICAL CENTER | Age: 59
End: 2019-07-15
Attending: FAMILY MEDICINE
Payer: COMMERCIAL

## 2019-07-15 DIAGNOSIS — H90.72 MIXED CONDUCTIVE AND SENSORINEURAL HEARING LOSS OF LEFT EAR WITH UNRESTRICTED HEARING OF RIGHT EAR: ICD-10-CM

## 2019-07-15 DIAGNOSIS — D33.3 ACOUSTIC NEUROMA (HCC): ICD-10-CM

## 2019-07-15 PROCEDURE — 70553 MRI BRAIN STEM W/O & W/DYE: CPT

## 2019-07-15 PROCEDURE — 700117 HCHG RX CONTRAST REV CODE 255: Performed by: FAMILY MEDICINE

## 2019-07-15 PROCEDURE — A9585 GADOBUTROL INJECTION: HCPCS | Performed by: FAMILY MEDICINE

## 2019-07-15 RX ORDER — GADOBUTROL 604.72 MG/ML
7.5 INJECTION INTRAVENOUS ONCE
Status: COMPLETED | OUTPATIENT
Start: 2019-07-15 | End: 2019-07-15

## 2019-07-15 RX ADMIN — GADOBUTROL 7.5 ML: 604.72 INJECTION INTRAVENOUS at 10:10

## 2019-07-17 ENCOUNTER — HOSPITAL ENCOUNTER (EMERGENCY)
Facility: MEDICAL CENTER | Age: 59
End: 2019-07-17
Attending: EMERGENCY MEDICINE
Payer: COMMERCIAL

## 2019-07-17 ENCOUNTER — APPOINTMENT (OUTPATIENT)
Dept: RADIOLOGY | Facility: MEDICAL CENTER | Age: 59
End: 2019-07-17
Attending: EMERGENCY MEDICINE
Payer: COMMERCIAL

## 2019-07-17 ENCOUNTER — OFFICE VISIT (OUTPATIENT)
Dept: URGENT CARE | Facility: CLINIC | Age: 59
End: 2019-07-17
Payer: COMMERCIAL

## 2019-07-17 VITALS
HEART RATE: 88 BPM | WEIGHT: 160 LBS | OXYGEN SATURATION: 97 % | DIASTOLIC BLOOD PRESSURE: 78 MMHG | BODY MASS INDEX: 28.35 KG/M2 | SYSTOLIC BLOOD PRESSURE: 138 MMHG | TEMPERATURE: 99 F | RESPIRATION RATE: 16 BRPM | HEIGHT: 63 IN

## 2019-07-17 VITALS
OXYGEN SATURATION: 96 % | TEMPERATURE: 98.2 F | WEIGHT: 169.31 LBS | HEART RATE: 88 BPM | RESPIRATION RATE: 16 BRPM | HEIGHT: 63 IN | BODY MASS INDEX: 30 KG/M2 | SYSTOLIC BLOOD PRESSURE: 163 MMHG | DIASTOLIC BLOOD PRESSURE: 85 MMHG

## 2019-07-17 DIAGNOSIS — M54.50 ACUTE BILATERAL LOW BACK PAIN WITHOUT SCIATICA: ICD-10-CM

## 2019-07-17 DIAGNOSIS — M25.531 RIGHT WRIST PAIN: ICD-10-CM

## 2019-07-17 DIAGNOSIS — S89.91XA INJURY OF RIGHT KNEE, INITIAL ENCOUNTER: ICD-10-CM
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
## 2019-07-17 DIAGNOSIS — S80.01XA CONTUSION OF RIGHT KNEE, INITIAL ENCOUNTER: ICD-10-CM

## 2019-07-17 DIAGNOSIS — M54.5 ACUTE LOW BACK PAIN, UNSPECIFIED BACK PAIN LATERALITY, WITH SCIATICA PRESENCE UNSPECIFIED: ICD-10-CM

## 2019-07-17 PROCEDURE — 99214 OFFICE O/P EST MOD 30 MIN: CPT | Performed by: FAMILY MEDICINE

## 2019-07-17 PROCEDURE — 99283 EMERGENCY DEPT VISIT LOW MDM: CPT

## 2019-07-17 PROCEDURE — 73562 X-RAY EXAM OF KNEE 3: CPT | Mod: RT

## 2019-07-17 PROCEDURE — 72100 X-RAY EXAM L-S SPINE 2/3 VWS: CPT

## 2019-07-17 ASSESSMENT — PAIN SCALES - GENERAL: PAINLEVEL: 10=SEVERE PAIN

## 2019-07-18 NOTE — PROGRESS NOTES
"Subjective:      Clifford Murphy is a 59 y.o. 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  She fell forward hit the left knee very hard on the floor, was not able to put any weight on it since.  She is been using crutches that she had from previous injury in the right knee.  She is also having low back pain.  Past history significant for spinal fusion she has hardware in the lower back.  She denies any paresthesias or radiculopathy or muscle weakness, loss of bladder or bowel control.  She also has some pain in the right wrist.  She also has noticed small soreness in the left side of the chest, not sure how she injured the chest wall.  Denies any headache, head injury or loss of consciousness.  No other injuries.  No other symptoms.        Review of Systems   All other systems reviewed and are negative.         Objective:     /78   Pulse 88   Temp 37.2 °C (99 °F) (Temporal)   Resp 16   Ht 1.6 m (5' 3\")   Wt 72.6 kg (160 lb)   LMP  (LMP Unknown)   SpO2 97%   BMI 28.34 kg/m²       Physical Exam   Constitutional: She is oriented to person, place, and time. She appears well-developed and well-nourished.  Non-toxic appearance. No distress.   HENT:   Head: Normocephalic and atraumatic.   Nose: Nose normal.   Eyes: Conjunctivae are normal.   Cardiovascular: Normal rate.    Pulmonary/Chest: Effort normal. No respiratory distress.   Musculoskeletal:        Right knee: She exhibits decreased range of motion (Patient is unable to fully extend the right knee due to pain in the kneecap where she also has small bruising.) and swelling. She exhibits no laceration.        Back:    No bruising noted on the back and no midline tenderness but she also has paraspinal tenderness close to the center as in the " outline area.   Neurological: She is alert and oriented to person, place, and time.   Skin: Skin is warm. Bruising (Bruising noted on the right kneecap.) noted. She is not diaphoretic. No pallor.        Psychiatric: She has a normal mood and affect.               Assessment/Plan:     1. Injury of right knee, initial encounter    2. Acute low back pain, unspecified back pain laterality, with sciatica presence unspecified    3. Right wrist pain    Patient fell on the right knee but somehow also complaining of back pain that she is more concerned about that.  She has had hardware in the lower back from previous spinal fusion.  We discussed that in her case based on her pain and recent injury she would most likely be needing more advanced imaging and recommended to go to the emergency department for further evaluation.  We also discussed that she would be needing x-ray of the right knee and right wrist but this can be done either here or in the emergency department she decided to get it at all done in the ED.  She ambulated with crutches with no weightbearing and is going to go to local emergency department with her .

## 2019-07-18 NOTE — ED PROVIDER NOTES
ED Provider Note    CHIEF COMPLAINT  Chief Complaint   Patient presents with   • Knee Injury     pt reports that she tripped at fell landing on right knee.    • Back Pain     Hx back surgery, back painful after fall       HPI  Clifford Murphy is a 59 y.o. female who presents with right knee pain after a trip and fall while walking.  She states that all of her weight landed on her right knee.  Denies any numbness or weakness distally.  No hip pain.  Does have bilateral lower back pain.  Has a prior history of lumbar fusion and kyphoplasty in the past.  Reports that the pain is worse on the sides than in the middle and the back.  Her primary complaint however is right knee pain.    REVIEW OF SYSTEMS  See HPI for further details. All other systems are negative.     PAST MEDICAL HISTORY   has a past medical history of Anesthesia; Asthma (02/21/2018); Breath shortness; Bronchitis (2016); Cough (07/13/2018); Environmental and seasonal allergies; Genital herpes in women (2/11/2014); Heart burn; High risk medications (not anticoagulants) long-term use (8/29/2012); Hypothyroid (9/14/2011); Kidney stones; Macrocytosis without anemia (med effect); Menopause (9/15/2011); Pain (07/13/2018); Pneumonia (01/2015); Post-cholecystectomy syndrome; Rheumatoid arthritis(714.0); Snoring; Tobacco abuse, episodic (9/14/2011); and Vitamin d deficiency (5/15/2012).    SOCIAL HISTORY  Social History     Social History Main Topics   • Smoking status: Current Every Day Smoker     Packs/day: 0.50     Years: 40.00     Types: Cigarettes   • Smokeless tobacco: Never Used   • Alcohol use 0.6 oz/week     1 Glasses of wine per week      Comment: occasional, maybe 1 per month   • Drug use: No   • Sexual activity: Yes     Partners: Male       SURGICAL HISTORY   has a past surgical history that includes other orthopedic surgery (Left, 1974/1981); appendectomy (2002); tonsillectomy; tmj reconstruction bilateral; cholecystectomy (2002); kyphoplasty  "(2/22/2018); lumbar fusion posterior (7/19/2018); lumbar decompression (7/19/2018); and irrigation & debridement neuro (8/16/2018).    CURRENT MEDICATIONS  Home Medications    **Home medications have not yet been reviewed for this encounter**         ALLERGIES  Allergies   Allergen Reactions   • Morphine Anaphylaxis     Morphine and morphine derivatives. (demerol)   • Aspirin      Stomach pain   • Codeine      Stomach pain   • Tramadol Vomiting     SEVERE HEADACHE   • Sulfamethoxazole W-Trimethoprim Shortness of Breath     Rxn - flushing, SOB  Late 80's     • Other Drug      Muscle relaxants cause dizziness         PHYSICAL EXAM  VITAL SIGNS: BP (!) 176/91   Pulse 83   Temp 36.9 °C (98.5 °F) (Temporal)   Resp 16   Ht 1.6 m (5' 3\")   Wt 76.8 kg (169 lb 5 oz)   LMP  (LMP Unknown)   SpO2 97%   BMI 29.99 kg/m²   Pulse ox interpretation: I interpret this pulse ox as normal.  Constitutional: Alert in no apparent distress.  HENT: No signs of trauma, Bilateral external ears normal, Nose normal.   Eyes: Pupils are equal and reactive, Conjunctiva normal, Non-icteric.   Neck: Normal range of motion, No tenderness, Supple, No stridor.   Cardiovascular: Regular rate and rhythm.   Thorax & Lungs: Normal breath sounds, No respiratory distress  Skin: Warm, Dry, No erythema, No rash.   Back: Minimal tenderness to bilateral flanks.  No significant midline point tenderness  Extremities: Intact distal pulses, No edema, right knee tenderness, No cyanosis  Musculoskeletal: Pain with range of motion of the right knee. No major deformities noted.  No knee effusion identified.  No open wounds or ecchymosis.  Neurologic: Alert, Normal motor function, Normal sensory function, No focal deficits noted.       DIAGNOSTIC STUDIES / PROCEDURES    LABS  Labs Reviewed - No data to display      RADIOLOGY  DX-KNEE 3 VIEWS RIGHT   Final Result         1.  No acute traumatic bony injury.   2.  Tricompartmental degenerative changes of the knee.    " "  DX-LUMBAR SPINE-2 OR 3 VIEWS   Final Result         1.  Retrolisthesis L1 on L2 and L2 on L3 appears likely degenerative. Otherwise no acute traumatic bony injury identified.   2.  Large quantity of stool in the colon compatible changes of constipation.            COURSE & MEDICAL DECISION MAKING    Medications - No data to display    Pertinent Labs & Imaging studies reviewed. (See chart for details)  59 y.o. female presenting with right knee pain and lower back pain after a mechanical ground-level fall.  No syncope or head trauma.  No neck pain.  Has knee tenderness which is her primary concern.  Also with lower back pain mostly in the bilateral flanks rather than the midline.  No focal neurologic deficits or incontinence.    X-rays were performed that were largely unremarkable for acute injury.  No obvious knee effusion or knee deformity to suggest an acute fracture or dislocation.  Neurovascularly intact distally.  No open wounds.  She is recommended to use crutches to help get around and to be weightbearing as tolerated.  With regards to the back pain, likely a strain injury.  No evidence of an acute injury on x-ray.  Patient was instructed to follow-up with a primary care physician for further management.  She does have an orthopedic doctor with him to follow-up with should the pain persist.    The patient was instructed to follow-up with primary care physician for further management.  To return immediately for any worsening symptoms or development of any other concerning signs or symptoms. The patient verbalizes understanding in their own words.    BP (!) 163/85   Pulse 88   Temp 36.8 °C (98.2 °F) (Temporal)   Resp 16   Ht 1.6 m (5' 3\")   Wt 76.8 kg (169 lb 5 oz)   LMP  (LMP Unknown)   SpO2 96%   BMI 29.99 kg/m²     The patient was referred to primary care where they will receive further BP management.      Tish Smallwood M.D.  [address] " [id number]  [phone]    Schedule an appointment as soon as possible for a visit       [facility], Emergency Dept  [address]  [place] [id number] [phone]    As needed, If symptoms worsen      FINAL IMPRESSION  1. Fall, initial encounter    2. Contusion of right knee, initial encounter    3. Acute bilateral low back pain without sciatica            Electronically signed by: Jacob Bonilla, 7/17/2019 8:24 PM

## 2019-07-18 NOTE — ED TRIAGE NOTES
"Chief Complaint   Patient presents with   • Knee Injury     pt reports that she tripped at fell landing on right knee.    • Back Pain     Hx back surgery, back painful after fall     No neuro deficits noted. Pt in obvious discomfort. Sent from  for further eval.  BP (!) 176/91   Pulse 83   Temp 36.9 °C (98.5 °F) (Temporal)   Resp 16   Ht 1.6 m (5' 3\")   Wt 76.8 kg (169 lb 5 oz)   SpO2 97%   Pt informed of wait times. Educated on triage process.  Asked to return to triage RN for any new or worsening of symptoms. Thanked for patience.        "

## 2019-07-26 ENCOUNTER — TELEPHONE (OUTPATIENT)
Dept: MEDICAL GROUP | Facility: LAB | Age: 59
End: 2019-07-26

## 2019-07-26 NOTE — TELEPHONE ENCOUNTER
"· orders/clearance  paperwork received from [place] requiring provider signature.     · All appropriate fields completed by Medical Assistant: No    · Paperwork placed in \"MA to Provider\" folder/basket. Awaiting provider completion/signature.   · Pt has appt on 7/31/19  "

## 2019-10-15 ENCOUNTER — HOSPITAL ENCOUNTER (OUTPATIENT)
Facility: MEDICAL CENTER | Age: 59
End: 2019-10-15
Attending: FAMILY MEDICINE
Payer: COMMERCIAL

## 2019-10-15 ENCOUNTER — OFFICE VISIT (OUTPATIENT)
Dept: MEDICAL GROUP | Facility: LAB | Age: 59
End: 2019-10-15
Payer: COMMERCIAL

## 2019-10-15 VITALS
TEMPERATURE: 98.1 F | HEART RATE: 82 BPM | RESPIRATION RATE: 16 BRPM | SYSTOLIC BLOOD PRESSURE: 130 MMHG | WEIGHT: 159 LBS | BODY MASS INDEX: 28.17 KG/M2 | HEIGHT: 63 IN | OXYGEN SATURATION: 97 % | DIASTOLIC BLOOD PRESSURE: 84 MMHG

## 2019-10-15 DIAGNOSIS — H92.02 LEFT EAR PAIN: ICD-10-CM

## 2019-10-15 DIAGNOSIS — N76.0 ACUTE VAGINITIS: ICD-10-CM

## 2019-10-15 PROCEDURE — 99214 OFFICE O/P EST MOD 30 MIN: CPT | Performed by: FAMILY MEDICINE

## 2019-10-15 PROCEDURE — 87510 GARDNER VAG DNA DIR PROBE: CPT

## 2019-10-15 PROCEDURE — 87660 TRICHOMONAS VAGIN DIR PROBE: CPT

## 2019-10-15 PROCEDURE — 87480 CANDIDA DNA DIR PROBE: CPT

## 2019-10-15 RX ORDER — FLUCONAZOLE 150 MG/1
150 TABLET ORAL DAILY
Qty: 1 TAB | Refills: 0 | Status: SHIPPED | OUTPATIENT
Start: 2019-10-15 | End: 2019-10-22 | Stop reason: SDUPTHER

## 2019-10-15 NOTE — ASSESSMENT & PLAN NOTE
Since Thursday, she has had vaginal pain and itching.  Monogamous relationship.  No recent antibiotics, no discharge, no douching.  Changed probiotics.  No dysuria or frequency.  No back pain.  No fever or chills.

## 2019-10-15 NOTE — ASSESSMENT & PLAN NOTE
Pain for the last 3 days on the cartilage when she sleeps on it. She has also had some nasal congestion and PND.  No cough or sore throat.

## 2019-10-15 NOTE — PROGRESS NOTES
Subjective:     Chief Complaint   Patient presents with   • Vaginitis   • Otalgia     left       Clifford Murphy is a 59 y.o. female here today for evaluation and management of:    Acute vaginitis  Since Thursday, she has had vaginal pain and itching.  Monogamous relationship.  No recent antibiotics, no discharge, no douching.  Changed probiotics.  No dysuria or frequency.  No back pain.  No fever or chills.    Left ear pain  Pain for the last 3 days on the cartilage when she sleeps on it. She has also had some nasal congestion and PND.  No cough or sore throat.       Allergies   Allergen Reactions   • Morphine Anaphylaxis     Morphine and morphine derivatives. (demerol)   • Aspirin      Stomach pain   • Codeine      Stomach pain   • Tramadol Vomiting     SEVERE HEADACHE   • Sulfamethoxazole W-Trimethoprim Shortness of Breath     Rxn - flushing, SOB  Late 80's     • Other Drug      Muscle relaxants cause dizziness         Current medicines (including changes today)  Current Outpatient Medications   Medication Sig Dispense Refill   • fluconazole (DIFLUCAN) 150 MG tablet Take 1 Tab by mouth every day. 1 Tab 0   • hydroxychloroquine (PLAQUENIL) 200 MG Tab TAKE 1 TABLET BY MOUTH TWICE A  Tab 1   • levothyroxine (SYNTHROID) 137 MCG Tab Take 1 Tab by mouth every morning before breakfast. 90 Tab 3   • raNITidine (ZANTAC) 150 MG Tab TAKE 1 TABLET BY MOUTH TWICE A  Tab 2   • cholestyramine (QUESTRAN) 4 g packet Take 4 g by mouth every day. 60 Each 2   • cetirizine (ZYRTEC) 10 MG Tab Take 10 mg by mouth 1 time daily as needed for Allergies.     • Cholecalciferol (VITAMIN D3) 5000 units Tab Take 5,000 Int'l Units by mouth every day at 6 PM.     • Folic Acid 0.8 MG Cap Take 0.8-1.6 mg by mouth every day at 6 PM. takes 1 pill daily and alternates every other day 2 pills daily     • ibuprofen (MOTRIN) 800 MG Tab Take 800 mg by mouth every 8 hours as needed for Mild Pain.     • acetaminophen (TYLENOL) 500 MG Tab  Take 500-1,000 mg by mouth 2 times a day as needed for Mild Pain.       No current facility-administered medications for this visit.        She  has a past medical history of Anesthesia, Asthma (02/21/2018), Breath shortness, Bronchitis (2016), Cough (07/13/2018), Environmental and seasonal allergies, Genital herpes in women (2/11/2014), Heart burn, High risk medications (not anticoagulants) long-term use (8/29/2012), Hypothyroid (9/14/2011), Kidney stones, Macrocytosis without anemia (med effect), Menopause (9/15/2011), Pain (07/13/2018), Pneumonia (01/2015), Post-cholecystectomy syndrome, Rheumatoid arthritis(714.0), Snoring, Tobacco abuse, episodic (9/14/2011), and Vitamin d deficiency (5/15/2012).    Patient Active Problem List    Diagnosis Date Noted   • Surgical site infection 08/11/2018     Priority: High   • Acute vaginitis 10/15/2019   • Left ear pain 10/15/2019   • Fungal infection of toenail 12/20/2018   • Hordeolum externum of right upper eyelid 12/20/2018   • Gastroesophageal reflux disease without esophagitis 11/07/2018   • Other viral warts 11/07/2018   • Acoustic neuroma (HCC) 10/17/2018   • Allergic sinusitis 06/25/2018   • Preoperative clearance 06/25/2018   • Subclinical hypothyroidism 06/25/2018   • Long-term use of Plaquenil 02/01/2018   • Spinal stenosis of lumbar region 12/12/2017   • Internal derangement of left knee 12/12/2017   • Obesity (BMI 30-39.9) 11/17/2017   • Thrombocytopenia (HCC) 10/05/2017   • Tobacco abuse 08/05/2015   • Seasonal allergies 06/17/2015   • Hypertriglyceridemia 09/29/2014   • Genital herpes in women 02/11/2014   • Jaw pain 08/04/2013   • High risk medications (not anticoagulants) long-term use 08/29/2012   • Post-cholecystectomy syndrome 05/23/2012   • Vitamin D insufficiency 05/15/2012   • Preventative health care 09/15/2011   • Menopause 09/15/2011   • Macrocytosis without anemia 09/14/2011   • Acquired hypothyroidism 09/14/2011   • RA (rheumatoid arthritis) (HCC)  "09/14/2011       ROS   No fever or chills.  No nausea or vomiting.  No chest pain or palpitations.  No cough or SOB.  No pain with urination or hematuria.  No black or bloody stools.       Objective:     /84 (BP Location: Right arm, Patient Position: Sitting, BP Cuff Size: Adult)   Pulse 82   Temp 36.7 °C (98.1 °F) (Temporal)   Resp 16   Ht 1.6 m (5' 3\")   Wt 72.1 kg (159 lb)   SpO2 97%  Body mass index is 28.17 kg/m².   Physical Exam:  Well developed, well nourished.  Alert, oriented in no acute distress.  Eye contact is good, speech goal directed, affect calm  Eyes: conjunctiva non-injected, sclera non-icteric.  Ears: Pinna normal without lesions. TM pearly gray.   Nose: Nares are patent.  Erythematous, swollen mucosa with clear discharge  Mouth: Oral mucous membranes pink and moist with no lesions.  Neck Supple.  No adenopathy or masses in the neck or supraclavicular regions. No thyromegaly  Lungs: clear to auscultation bilaterally with good excursion. No wheezes or rhonchi  CV: regular rate and rhythm. No murmur  Abdomen: soft, nontender, no masses or organomegaly.  No rebound or guarding  ABD: Abdomen soft, ND, NT. No CVAT. No suprapubic tenderness.  PELVIC:Perineum and external genitalia normal without rash. Vagina with white discharge. Cervix without visible lesions or discharge. Bimanual exam without adnexal masses or cervical motion tenderness.              Assessment and Plan:   The following treatment plan was discussed    1. Acute vaginitis  Affirm pending.  Given her symptoms we will treat for yeast with Diflucan 150 mg x 1.  Await results  - VAGINAL PATHOGENS DNA PANEL; Future  - fluconazole (DIFLUCAN) 150 MG tablet; Take 1 Tab by mouth every day.  Dispense: 1 Tab; Refill: 0  - VAGINAL PATHOGENS DNA PANEL    2. Left ear pain  Try using a travel pillow or donut-like pillow to relieve pressure on the ear.  This is the site of her acoustic neuroma.  Discussed this sounds like a neuralgia.  " Discussed symptoms may be related to allergies.  Call if symptoms worsen.    Any change or worsening of signs or symptoms, patient encouraged to follow-up or report to the emergency room for further evaluation. Patient understands and agrees.    Followup: Return if symptoms worsen or fail to improve.

## 2019-10-16 ENCOUNTER — IMMUNIZATION (OUTPATIENT)
Dept: SOCIAL WORK | Facility: CLINIC | Age: 59
End: 2019-10-16
Payer: COMMERCIAL

## 2019-10-16 DIAGNOSIS — Z23 NEED FOR VACCINATION: ICD-10-CM

## 2019-10-16 LAB
CANDIDA DNA VAG QL PROBE+SIG AMP: NEGATIVE
G VAGINALIS DNA VAG QL PROBE+SIG AMP: NEGATIVE
T VAGINALIS DNA VAG QL PROBE+SIG AMP: NEGATIVE

## 2019-10-16 PROCEDURE — 90686 IIV4 VACC NO PRSV 0.5 ML IM: CPT | Performed by: REGISTERED NURSE

## 2019-10-16 PROCEDURE — 90471 IMMUNIZATION ADMIN: CPT | Performed by: REGISTERED NURSE

## 2019-10-17 LAB
AMBIGUOUS DTTM AMBI4: NORMAL
SIGNIFICANT IND 70042: NORMAL
SITE SITE: NORMAL
SOURCE SOURCE: NORMAL

## 2019-10-18 ENCOUNTER — PATIENT MESSAGE (OUTPATIENT)
Dept: MEDICAL GROUP | Facility: LAB | Age: 59
End: 2019-10-18

## 2019-10-18 ENCOUNTER — NON-PROVIDER VISIT (OUTPATIENT)
Dept: URGENT CARE | Facility: MEDICAL CENTER | Age: 59
End: 2019-10-18

## 2019-10-18 DIAGNOSIS — N76.0 ACUTE VAGINITIS: ICD-10-CM

## 2019-10-18 DIAGNOSIS — Z02.1 PRE-EMPLOYMENT DRUG SCREENING: ICD-10-CM

## 2019-10-18 LAB
AMP AMPHETAMINE: NORMAL
COC COCAINE: NORMAL
INT CON NEG: NORMAL
INT CON POS: NORMAL
MET METHAMPHETAMINES: NORMAL
OPI OPIATES: NORMAL
PCP PHENCYCLIDINE: NORMAL
POC DRUG COMMENT 753798-OCCUPATIONAL HEALTH: NEGATIVE
THC: NORMAL

## 2019-10-18 PROCEDURE — 80305 DRUG TEST PRSMV DIR OPT OBS: CPT | Performed by: NURSE PRACTITIONER

## 2019-10-22 RX ORDER — FLUCONAZOLE 150 MG/1
150 TABLET ORAL DAILY
Qty: 1 TAB | Refills: 1 | Status: SHIPPED | OUTPATIENT
Start: 2019-10-22 | End: 2020-06-29 | Stop reason: SDUPTHER

## 2019-11-07 ENCOUNTER — APPOINTMENT (OUTPATIENT)
Dept: RHEUMATOLOGY | Facility: MEDICAL CENTER | Age: 59
End: 2019-11-07
Payer: COMMERCIAL

## 2020-02-11 RX ORDER — RANITIDINE 150 MG/1
TABLET ORAL
Qty: 180 TAB | Refills: 2 | Status: SHIPPED | OUTPATIENT
Start: 2020-02-11 | End: 2020-07-21 | Stop reason: SDUPTHER

## 2020-04-08 ENCOUNTER — PATIENT MESSAGE (OUTPATIENT)
Dept: MEDICAL GROUP | Facility: LAB | Age: 60
End: 2020-04-08

## 2020-04-09 ENCOUNTER — TELEMEDICINE (OUTPATIENT)
Dept: MEDICAL GROUP | Facility: LAB | Age: 60
End: 2020-04-09

## 2020-04-09 VITALS — BODY MASS INDEX: 28 KG/M2 | HEIGHT: 63 IN | WEIGHT: 158 LBS

## 2020-04-09 DIAGNOSIS — E55.9 VITAMIN D INSUFFICIENCY: ICD-10-CM

## 2020-04-09 DIAGNOSIS — D75.89 MACROCYTOSIS WITHOUT ANEMIA: ICD-10-CM

## 2020-04-09 DIAGNOSIS — Z13.220 SCREENING FOR HYPERLIPIDEMIA: ICD-10-CM

## 2020-04-09 DIAGNOSIS — D69.6 THROMBOCYTOPENIA (HCC): ICD-10-CM

## 2020-04-09 DIAGNOSIS — D84.9 IMMUNOCOMPROMISED (HCC): ICD-10-CM

## 2020-04-09 DIAGNOSIS — E03.9 ACQUIRED HYPOTHYROIDISM: ICD-10-CM

## 2020-04-09 DIAGNOSIS — M06.9 RHEUMATOID ARTHRITIS INVOLVING MULTIPLE SITES, UNSPECIFIED RHEUMATOID FACTOR PRESENCE: ICD-10-CM

## 2020-04-09 DIAGNOSIS — Z79.899 LONG-TERM USE OF PLAQUENIL: ICD-10-CM

## 2020-04-09 PROBLEM — Z01.818 PREOPERATIVE CLEARANCE: Status: RESOLVED | Noted: 2018-06-25 | Resolved: 2020-04-09

## 2020-04-09 PROCEDURE — 99214 OFFICE O/P EST MOD 30 MIN: CPT | Mod: 95,CR | Performed by: FAMILY MEDICINE

## 2020-04-09 ASSESSMENT — FIBROSIS 4 INDEX: FIB4 SCORE: 2.73

## 2020-04-09 NOTE — ASSESSMENT & PLAN NOTE
Patient was diagnosed with rheumatoid arthritis in 2009.  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  She is concerned about working given her immunosuppressive status and current COVID crisis.  She needs paper work filled out for the unemployment division.  Additionally patient has not had any recent labs drawn and is currently on Plaquenil.

## 2020-04-09 NOTE — ASSESSMENT & PLAN NOTE
Stable. Currently taking levothyroxine 137 mcg daily as prescribed.  Denies palpitations, skin changes, temperature intolerance, or changes in bowel habits  She is due for labs.

## 2020-04-09 NOTE — PROGRESS NOTES
Telemedicine Visit: Established Patient     This encounter was conducted via Zoom .   Verbal consent was obtained. Patient's identity was verified.    Subjective:   CC: Unemployment paperwork  Clifford Murphy is a 60 y.o. female presenting for evaluation and management of:    RA (rheumatoid arthritis)  Patient was diagnosed with rheumatoid arthritis in 2009.  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  She is concerned about working given her immunosuppressive status and current COVID crisis.  She needs paper work filled out for the unemployment division.  Additionally patient has not had any recent labs drawn and is currently on Plaquenil.    Acquired hypothyroidism  Stable. Currently taking levothyroxine 137 mcg daily as prescribed.  Denies palpitations, skin changes, temperature intolerance, or changes in bowel habits  She is due for labs.          ROS   Denies any recent fevers or chills. No nausea or vomiting. No chest pains or shortness of breath.     Allergies   Allergen Reactions   • Morphine Anaphylaxis     Morphine and morphine derivatives. (demerol)   • Aspirin      Stomach pain   • Codeine      Stomach pain   • Tramadol Vomiting     SEVERE HEADACHE   • Sulfamethoxazole W-Trimethoprim Shortness of Breath     Rxn - flushing, SOB  Late 80's     • Other Drug      Muscle relaxants cause dizziness         Current medicines (including changes today)  Current Outpatient Medications   Medication Sig Dispense Refill   • hydroxychloroquine (PLAQUENIL) 200 MG Tab TAKE 1 TABLET BY MOUTH TWICE A DAY 60 Tab 1   • raNITidine (ZANTAC) 150 MG Tab TAKE 1 TABLET BY MOUTH TWICE A  Tab 2   • fluconazole (DIFLUCAN) 150 MG tablet Take 1 Tab by mouth every day. 1 Tab 1   • levothyroxine (SYNTHROID) 137 MCG Tab Take 1 Tab by mouth every morning before breakfast. 90 Tab 3   • cholestyramine (QUESTRAN) 4 g packet Take 4 g by mouth every day. 60 Each  2   • cetirizine (ZYRTEC) 10 MG Tab Take 10 mg by mouth 1 time daily as needed for Allergies.     • Cholecalciferol (VITAMIN D3) 5000 units Tab Take 5,000 Int'l Units by mouth every day at 6 PM.     • Folic Acid 0.8 MG Cap Take 0.8-1.6 mg by mouth every day at 6 PM. takes 1 pill daily and alternates every other day 2 pills daily     • ibuprofen (MOTRIN) 800 MG Tab Take 800 mg by mouth every 8 hours as needed for Mild Pain.     • acetaminophen (TYLENOL) 500 MG Tab Take 500-1,000 mg by mouth 2 times a day as needed for Mild Pain.       No current facility-administered medications for this visit.        Patient Active Problem List    Diagnosis Date Noted   • Surgical site infection 08/11/2018     Priority: High   • Immunocompromised ([facility]) 04/09/2020   • Acute vaginitis 10/15/2019   • Left ear pain 10/15/2019   • Fungal infection of toenail 12/20/2018   • Hordeolum externum of right upper eyelid 12/20/2018   • Gastroesophageal reflux disease without esophagitis 11/07/2018   • Other viral warts 11/07/2018   • Acoustic neuroma ([facility]) 10/17/2018   • Allergic sinusitis 06/25/2018   • Subclinical hypothyroidism 06/25/2018   • Long-term use of Plaquenil 02/01/2018   • Spinal stenosis of lumbar region 12/12/2017   • Internal derangement of left knee 12/12/2017   • Obesity (BMI 30-39.9) 11/17/2017   • Thrombocytopenia ([facility]) 10/05/2017   • Tobacco abuse 08/05/2015   • Seasonal allergies 06/17/2015   • Hypertriglyceridemia 09/29/2014   • Genital herpes in women 02/11/2014   • Jaw pain 08/04/2013   • High risk medications (not anticoagulants) long-term use 08/29/2012   • Post-cholecystectomy syndrome 05/23/2012   • Vitamin D insufficiency 05/15/2012   • Menopause 09/15/2011   • Macrocytosis without anemia 09/14/2011   • Acquired hypothyroidism 09/14/2011   • RA (rheumatoid arthritis) ([facility]) 09/14/2011       Family History   Problem Relation Age of Onset   • Lung Disease Mother         COPD   • Hyperlipidemia Mother    • Hypertension  Mother    • Cancer Father         Prostate   • Diabetes Father    • Heart Disease Father    • Genetic Disorder Sister        She  has a past medical history of Anesthesia, Asthma (02/21/2018), Breath shortness, Bronchitis (2016), Cough (07/13/2018), Environmental and seasonal allergies, Genital herpes in women (2/11/2014), Heart burn, High risk medications (not anticoagulants) long-term use (8/29/2012), Hypothyroid (9/14/2011), Kidney stones, Macrocytosis without anemia (med effect), Menopause (9/15/2011), Pain (07/13/2018), Pneumonia (01/2015), Post-cholecystectomy syndrome, Rheumatoid arthritis(714.0), Snoring, Tobacco abuse, episodic (9/14/2011), and Vitamin d deficiency (5/15/2012).  She  has a past surgical history that includes other orthopedic surgery (Left, 1974/1981); appendectomy (2002); tonsillectomy; tmj reconstruction bilateral; cholecystectomy (2002); kyphoplasty (2/22/2018); lumbar fusion posterior (7/19/2018); lumbar decompression (7/19/2018); and irrigation & debridement neuro (8/16/2018).       Objective:   Vitals obtained by patient:  Respirations through observation: normal       Physical Exam:  Constitutional: Alert, no distress, well-groomed.  Skin: No rashes in visible areas.  Eye: Round. Conjunctiva clear, lids normal. No icterus.   ENMT: Lips pink without lesions, good dentition, moist mucous membranes. Phonation normal.  Neck: No masses, no thyromegaly. Moves freely without pain.  CV: Pulse as reported by patient  Respiratory: Unlabored respiratory effort, no cough or audible wheeze  Psych: Alert and oriented x3, normal affect and mood.       Assessment and Plan:   The following treatment plan was discussed:     1. Rheumatoid arthritis involving multiple sites, unspecified rheumatoid factor presence (HCC)  Paperwork filled out for unemployment.  Check labs.  Await results  - CBC WITH DIFFERENTIAL; Future  - Comp Metabolic Panel; Future    2. Long-term use of Plaquenil  Check labs.  Await  results  - CBC WITH DIFFERENTIAL; Future  - Comp Metabolic Panel; Future    3. Immunocompromised (HCC)  Paperwork filled out for unemployment.  Check labs.  Await results    4. Acquired hypothyroidism  Check labs.  Await results.  Adjust levothyroxine dose as needed  - TSH; Future  - FREE THYROXINE; Future    5. Macrocytosis without anemia  Check labs.  Await results  - CBC WITH DIFFERENTIAL; Future  - VITAMIN B12; Future    6. Thrombocytopenia (HCC)  Check labs.  Await results  - CBC WITH DIFFERENTIAL; Future    7. Vitamin D insufficiency  Check vitamin D level and adjust supplementation as needed  - VITAMIN D,25 HYDROXY; Future    8. Screening for hyperlipidemia  Screening labs ordered.  Await results for counseling.    - Lipid Profile; Future      Follow-up: Return in about 6 months (around 10/9/2020).

## 2020-05-26 ENCOUNTER — HOSPITAL ENCOUNTER (OUTPATIENT)
Dept: LAB | Facility: MEDICAL CENTER | Age: 60
End: 2020-05-26
Attending: FAMILY MEDICINE

## 2020-05-26 DIAGNOSIS — E03.9 ACQUIRED HYPOTHYROIDISM: ICD-10-CM

## 2020-05-26 DIAGNOSIS — D69.6 THROMBOCYTOPENIA (HCC): ICD-10-CM

## 2020-05-26 DIAGNOSIS — Z79.899 LONG-TERM USE OF PLAQUENIL: ICD-10-CM

## 2020-05-26 DIAGNOSIS — M06.9 RHEUMATOID ARTHRITIS INVOLVING MULTIPLE SITES, UNSPECIFIED RHEUMATOID FACTOR PRESENCE: ICD-10-CM

## 2020-05-26 DIAGNOSIS — Z13.220 SCREENING FOR HYPERLIPIDEMIA: ICD-10-CM

## 2020-05-26 DIAGNOSIS — E55.9 VITAMIN D INSUFFICIENCY: ICD-10-CM

## 2020-05-26 DIAGNOSIS — D75.89 MACROCYTOSIS WITHOUT ANEMIA: ICD-10-CM

## 2020-05-26 LAB
25(OH)D3 SERPL-MCNC: 46 NG/ML (ref 30–100)
ALBUMIN SERPL BCP-MCNC: 3.8 G/DL (ref 3.2–4.9)
ALBUMIN/GLOB SERPL: 1.5 G/DL
ALP SERPL-CCNC: 62 U/L (ref 30–99)
ALT SERPL-CCNC: 13 U/L (ref 2–50)
ANION GAP SERPL CALC-SCNC: 9 MMOL/L (ref 7–16)
AST SERPL-CCNC: 20 U/L (ref 12–45)
BASOPHILS # BLD AUTO: 0 % (ref 0–1.8)
BASOPHILS # BLD: 0 K/UL (ref 0–0.12)
BILIRUB SERPL-MCNC: 0.3 MG/DL (ref 0.1–1.5)
BUN SERPL-MCNC: 24 MG/DL (ref 8–22)
CALCIUM SERPL-MCNC: 8.9 MG/DL (ref 8.5–10.5)
CHLORIDE SERPL-SCNC: 106 MMOL/L (ref 96–112)
CHOLEST SERPL-MCNC: 142 MG/DL (ref 100–199)
CO2 SERPL-SCNC: 24 MMOL/L (ref 20–33)
CREAT SERPL-MCNC: 0.79 MG/DL (ref 0.5–1.4)
EOSINOPHIL # BLD AUTO: 0.22 K/UL (ref 0–0.51)
EOSINOPHIL NFR BLD: 3.6 % (ref 0–6.9)
ERYTHROCYTE [DISTWIDTH] IN BLOOD BY AUTOMATED COUNT: 47.6 FL (ref 35.9–50)
FASTING STATUS PATIENT QL REPORTED: NORMAL
GLOBULIN SER CALC-MCNC: 2.6 G/DL (ref 1.9–3.5)
GLUCOSE SERPL-MCNC: 85 MG/DL (ref 65–99)
HCT VFR BLD AUTO: 45.1 % (ref 37–47)
HDLC SERPL-MCNC: 54 MG/DL
HGB BLD-MCNC: 14.6 G/DL (ref 12–16)
LDLC SERPL CALC-MCNC: 79 MG/DL
LYMPHOCYTES # BLD AUTO: 1.94 K/UL (ref 1–4.8)
LYMPHOCYTES NFR BLD: 31.3 % (ref 22–41)
MANUAL DIFF BLD: NORMAL
MCH RBC QN AUTO: 32.4 PG (ref 27–33)
MCHC RBC AUTO-ENTMCNC: 32.4 G/DL (ref 33.6–35)
MCV RBC AUTO: 100 FL (ref 81.4–97.8)
METAMYELOCYTES NFR BLD MANUAL: 0.9 %
MONOCYTES # BLD AUTO: 0.72 K/UL (ref 0–0.85)
MONOCYTES NFR BLD AUTO: 11.6 % (ref 0–13.4)
MORPHOLOGY BLD-IMP: NORMAL
MYELOCYTES NFR BLD MANUAL: 0.9 %
NEUTROPHILS # BLD AUTO: 3.21 K/UL (ref 2–7.15)
NEUTROPHILS NFR BLD: 50.9 % (ref 44–72)
NEUTS BAND NFR BLD MANUAL: 0.9 % (ref 0–10)
NRBC # BLD AUTO: 0 K/UL
NRBC BLD-RTO: 0 /100 WBC
PLATELET # BLD AUTO: 163 K/UL (ref 164–446)
PLATELET BLD QL SMEAR: NORMAL
PMV BLD AUTO: 12.4 FL (ref 9–12.9)
POTASSIUM SERPL-SCNC: 4.1 MMOL/L (ref 3.6–5.5)
PROT SERPL-MCNC: 6.4 G/DL (ref 6–8.2)
RBC # BLD AUTO: 4.51 M/UL (ref 4.2–5.4)
SODIUM SERPL-SCNC: 139 MMOL/L (ref 135–145)
T4 FREE SERPL-MCNC: 1.8 NG/DL (ref 0.93–1.7)
TRIGL SERPL-MCNC: 47 MG/DL (ref 0–149)
TSH SERPL DL<=0.005 MIU/L-ACNC: 2.78 UIU/ML (ref 0.38–5.33)
VIT B12 SERPL-MCNC: 262 PG/ML (ref 211–911)
WBC # BLD AUTO: 6.2 K/UL (ref 4.8–10.8)

## 2020-05-26 PROCEDURE — 80053 COMPREHEN METABOLIC PANEL: CPT

## 2020-05-26 PROCEDURE — 82306 VITAMIN D 25 HYDROXY: CPT

## 2020-05-26 PROCEDURE — 85007 BL SMEAR W/DIFF WBC COUNT: CPT

## 2020-05-26 PROCEDURE — 36415 COLL VENOUS BLD VENIPUNCTURE: CPT

## 2020-05-26 PROCEDURE — 84443 ASSAY THYROID STIM HORMONE: CPT

## 2020-05-26 PROCEDURE — 85027 COMPLETE CBC AUTOMATED: CPT

## 2020-05-26 PROCEDURE — 84439 ASSAY OF FREE THYROXINE: CPT

## 2020-05-26 PROCEDURE — 80061 LIPID PANEL: CPT

## 2020-05-26 PROCEDURE — 82607 VITAMIN B-12: CPT

## 2020-05-27 DIAGNOSIS — E03.9 ACQUIRED HYPOTHYROIDISM: ICD-10-CM

## 2020-05-27 RX ORDER — LEVOTHYROXINE SODIUM 0.12 MG/1
125 TABLET ORAL
Qty: 90 TAB | Refills: 1 | Status: SHIPPED | OUTPATIENT
Start: 2020-05-27 | End: 2020-11-16

## 2020-06-02 DIAGNOSIS — M81.0 OSTEOPOROSIS WITHOUT CURRENT PATHOLOGICAL FRACTURE, UNSPECIFIED OSTEOPOROSIS TYPE: ICD-10-CM

## 2020-06-02 DIAGNOSIS — M06.9 RHEUMATOID ARTHRITIS INVOLVING MULTIPLE SITES, UNSPECIFIED RHEUMATOID FACTOR PRESENCE: ICD-10-CM

## 2020-06-02 DIAGNOSIS — Z79.899 LONG-TERM USE OF PLAQUENIL: ICD-10-CM

## 2020-06-02 DIAGNOSIS — M48.061 SPINAL STENOSIS OF LUMBAR REGION, UNSPECIFIED WHETHER NEUROGENIC CLAUDICATION PRESENT: ICD-10-CM

## 2020-06-02 DIAGNOSIS — E03.9 ACQUIRED HYPOTHYROIDISM: ICD-10-CM

## 2020-06-02 DIAGNOSIS — Z79.1 NSAID LONG-TERM USE: ICD-10-CM

## 2020-06-02 RX ORDER — HYDROXYCHLOROQUINE SULFATE 200 MG/1
TABLET, FILM COATED ORAL
Qty: 60 TAB | Refills: 0 | Status: SHIPPED | OUTPATIENT
Start: 2020-06-02 | End: 2020-06-16 | Stop reason: SDUPTHER

## 2020-06-16 ENCOUNTER — OFFICE VISIT (OUTPATIENT)
Dept: RHEUMATOLOGY | Facility: MEDICAL CENTER | Age: 60
End: 2020-06-16

## 2020-06-16 VITALS
DIASTOLIC BLOOD PRESSURE: 88 MMHG | RESPIRATION RATE: 14 BRPM | HEART RATE: 68 BPM | OXYGEN SATURATION: 94 % | BODY MASS INDEX: 29.23 KG/M2 | TEMPERATURE: 97.6 F | WEIGHT: 165 LBS | SYSTOLIC BLOOD PRESSURE: 120 MMHG

## 2020-06-16 DIAGNOSIS — M06.9 RHEUMATOID ARTHRITIS INVOLVING MULTIPLE SITES, UNSPECIFIED RHEUMATOID FACTOR PRESENCE: ICD-10-CM

## 2020-06-16 DIAGNOSIS — Z72.0 TOBACCO ABUSE: ICD-10-CM

## 2020-06-16 DIAGNOSIS — M48.061 SPINAL STENOSIS OF LUMBAR REGION, UNSPECIFIED WHETHER NEUROGENIC CLAUDICATION PRESENT: ICD-10-CM

## 2020-06-16 DIAGNOSIS — E03.9 HYPOTHYROIDISM, UNSPECIFIED TYPE: ICD-10-CM

## 2020-06-16 DIAGNOSIS — M81.0 OSTEOPOROSIS WITHOUT CURRENT PATHOLOGICAL FRACTURE, UNSPECIFIED OSTEOPOROSIS TYPE: ICD-10-CM

## 2020-06-16 DIAGNOSIS — Z51.81 ENCOUNTER FOR MONITORING ARAVA THERAPY: ICD-10-CM

## 2020-06-16 DIAGNOSIS — Z79.899 LONG-TERM USE OF PLAQUENIL: ICD-10-CM

## 2020-06-16 DIAGNOSIS — Z79.899 ENCOUNTER FOR MONITORING ARAVA THERAPY: ICD-10-CM

## 2020-06-16 DIAGNOSIS — Z79.1 NSAID LONG-TERM USE: ICD-10-CM

## 2020-06-16 PROCEDURE — 99214 OFFICE O/P EST MOD 30 MIN: CPT | Performed by: INTERNAL MEDICINE

## 2020-06-16 RX ORDER — HYDROXYCHLOROQUINE SULFATE 200 MG/1
TABLET, FILM COATED ORAL
Qty: 180 TAB | Refills: 1 | Status: SHIPPED | OUTPATIENT
Start: 2020-06-16 | End: 2021-01-12 | Stop reason: SDUPTHER

## 2020-06-16 RX ORDER — LEFLUNOMIDE 20 MG/1
20 TABLET ORAL DAILY
Qty: 90 TAB | Refills: 0 | Status: SHIPPED | OUTPATIENT
Start: 2020-06-16 | End: 2021-03-22 | Stop reason: SDUPTHER

## 2020-06-16 ASSESSMENT — FIBROSIS 4 INDEX: FIB4 SCORE: 2.11

## 2020-06-16 ASSESSMENT — JOINT PAIN
TOTAL NUMBER OF SWOLLEN JOINTS: 0
TOTAL NUMBER OF TENDER JOINTS: 10
TOTAL NUMBER OF TENDER JOINTS: 12

## 2020-06-16 NOTE — PROGRESS NOTES
Chief Complaint- joint pain     Subjective:   Clifford Murphy is a 60 y.o. female here today for follow up of rheumatological issues    This is a follow-up visit for this patient who we see in this clinic for rheumatoid arthritis.  Patient is currently on Plaquenil at 200 mg p.o. twice daily monotherapy.  Patient states that she has been having problems with increased achiness in her hands along the MCP joints, and shoulders as well.  Patient wonders about additional treatment.  Patient denies any side effects from the medication, denies any unexplained weight loss, denies any fevers of unknown etiology, denies any GI upset, denies any rashes, denies any new joint swelling, denies recurrent infections.  Of note last optometry evaluation August 2019 at [facility].      Additional comorbidities include lumbar spine DDD/DJD/spinal stenosis status post lumbar spine laminectomy with benefit, patient currently healing the patient also with an acoustic neuroma left ear for which patient is lost about 60% of her hearing is currently being followed by [facility] and currently just monitoring at this time.      Patient also with osteoporosis currently on Reclast infusions last Reclast infusion was January 2019.   Patient was not able to get her Reclast infusion for January 2020 because of the coronavirus pandemic, patient has yet to schedule her Reclast infusion for 2020.  Patient denies any side effects from the medication, denies any unexplained weight loss, denies any fevers of unknown etiology, denies any GI upset, denies any rashes, denies any new joint swelling, denies recurrent infections.      Additional comorbidities include  Hypothyroidism and osteoporosis seen by FRAX score recently status post a kyphoplasty for compression fracture in L1.     S/p MTX-N/V  S/p Enbrel-sinus infections  S/p Humira-sinus infections and injection site skin breakdown  S/p Actemra-exacerbated pneumonia  S/p  sulfasalazine-contradicted, pt with sulfa allergy  S/p fosamax-GI upset  S/p boniva-GI upset     CCP neg 9/2015  Quantiferon Gold neg 9/2015  Hep B neg 9/2015  Uric acid 4.6 9/2015  G6PD 11.5 adequate 10/2017  RF 18 6/2009  CHIQUITA neg 6/2009   Hand x-rays 5/2015-no pathology  Feet X-rays 5/2015-indicates DJD, no erosions    LS pine x-rays 7/2009-indicates multilevel DJD     MRI LS spine 2/2018-L1 compression fracture, multilevel multifactorial DJD, neuroforaminal narrowing at multiple sites, severe left neural foraminal narrowing  DEXA 6/5/2018 T scores -0.7, -0.9  FRAX 6/5/2018 major osteoporotic fracture risk 17.8%, hip fracture risk 3.1%  Patient on Reclast since 1/2019       Current medicines (including changes today)  Current Outpatient Medications   Medication Sig Dispense Refill   • leflunomide (ARAVA) 20 MG Tab Take 1 Tab by mouth every day. 90 Tab 0   • hydroxychloroquine (PLAQUENIL) 200 MG Tab TAKE 1 TABLET BY MOUTH TWICE A  Tab 1   • levothyroxine (SYNTHROID) 125 MCG Tab Take 1 Tab by mouth Every morning on an empty stomach. 90 Tab 1   • raNITidine (ZANTAC) 150 MG Tab TAKE 1 TABLET BY MOUTH TWICE A  Tab 2   • fluconazole (DIFLUCAN) 150 MG tablet Take 1 Tab by mouth every day. 1 Tab 1   • cholestyramine (QUESTRAN) 4 g packet Take 4 g by mouth every day. 60 Each 2   • Cholecalciferol (VITAMIN D3) 5000 units Tab Take 5,000 Int'l Units by mouth every day at 6 PM.     • Folic Acid 0.8 MG Cap Take 0.8-1.6 mg by mouth every day at 6 PM. takes 1 pill daily and alternates every other day 2 pills daily     • ibuprofen (MOTRIN) 800 MG Tab Take 800 mg by mouth every 8 hours as needed for Mild Pain.     • acetaminophen (TYLENOL) 500 MG Tab Take 500-1,000 mg by mouth 2 times a day as needed for Mild Pain.     • cetirizine (ZYRTEC) 10 MG Tab Take 10 mg by mouth 1 time daily as needed for Allergies.       No current facility-administered medications for this visit.      She  has a past medical history of  Anesthesia, Asthma (02/21/2018), Breath shortness, Bronchitis (2016), Cough (07/13/2018), Environmental and seasonal allergies, Genital herpes in women (2/11/2014), Heart burn, High risk medications (not anticoagulants) long-term use (8/29/2012), Hypothyroid (9/14/2011), Kidney stones, Macrocytosis without anemia (med effect), Menopause (9/15/2011), Pain (07/13/2018), Pneumonia (01/2015), Post-cholecystectomy syndrome, Rheumatoid arthritis(714.0), Snoring, Tobacco abuse, episodic (9/14/2011), and Vitamin d deficiency (5/15/2012).    ROS   Other than what is mentioned in HPI or physical exam, there is no history of headaches, double vision or blurred vision. No temporal tenderness or jaw claudication. No trouble swallowing difficulties or sore throats.  No chest complaints including chest pain, cough or sputum production. No GI complaints including nausea, vomiting, change in bowel habits, or past peptic ulcer disease. No history of blood in the stools. No urinary complaints including dysuria or frequency. No history of alopecia, photosensitivity, oral ulcerations, Raynaud's phenomena.       Objective:     /88   Pulse 68   Temp 36.4 °C (97.6 °F) (Temporal)   Resp 14   Wt 74.8 kg (165 lb)   SpO2 94%  Body mass index is 29.23 kg/m².   Physical Exam:    Constitutional: Alert and oriented X3, patient is talkative with good eye contact.Skin: Warm, dry, good turgor, no rashes in visible areas.Eye: Equal, round and reactive, conjunctiva clear, lids normal EOM intactENMT: Lips without lesions, good dentition, no oropharyngeal ulcers, moist buccal mucosa, pinna without deformityNeck: Trachea midline, no masses, no thyromegaly.Lymph:  No cervical lymphadenopathy, no axillary lymphadenopathy, no supraclavicular lymphadenopathyRespiratory: Unlabored respiratory effort, lungs clear to auscultation, no wheezes, no ronchi.Cardiovascular: Normal S1, S2, no murmur, no edema.Abdomen: Soft, non-tender, no masses, no  hepatosplenomegaly.Psych: Alert and oriented x3, normal affect and mood.Neuro: Cranial nerves 2-12 are grossly intact, no loss of sensation LEExt:no joint laxity noted in bilateral arms, no joint laxity noted in bilateral legs          Lab Results   Component Value Date/Time    SODIUM 139 05/26/2020 08:21 AM    POTASSIUM 4.1 05/26/2020 08:21 AM    CHLORIDE 106 05/26/2020 08:21 AM    CO2 24 05/26/2020 08:21 AM    GLUCOSE 85 05/26/2020 08:21 AM    BUN 24 (H) 05/26/2020 08:21 AM    CREATININE 0.79 05/26/2020 08:21 AM    CREATININE 0.77 03/26/2013 12:00 AM    BUNCREATRAT 30 (H) 11/23/2016 09:46 AM    BUNCREATRAT 30 (H) 03/26/2013 12:00 AM      Lab Results   Component Value Date/Time    WBC 6.2 05/26/2020 08:21 AM    WBC 5.7 10/20/2011 12:00 AM    RBC 4.51 05/26/2020 08:21 AM    RBC 4.32 10/20/2011 12:00 AM    HEMOGLOBIN 14.6 05/26/2020 08:21 AM    HEMATOCRIT 45.1 05/26/2020 08:21 AM    .0 (H) 05/26/2020 08:21 AM    MCV 99 (H) 10/20/2011 12:00 AM    MCH 32.4 05/26/2020 08:21 AM    MCH 34.0 10/20/2011 12:00 AM    MCHC 32.4 (L) 05/26/2020 08:21 AM    MPV 12.4 05/26/2020 08:21 AM    NEUTSPOLYS 50.90 05/26/2020 08:21 AM    LYMPHOCYTES 31.30 05/26/2020 08:21 AM    MONOCYTES 11.60 05/26/2020 08:21 AM    EOSINOPHILS 3.60 05/26/2020 08:21 AM    BASOPHILS 0.00 05/26/2020 08:21 AM      Lab Results   Component Value Date/Time    CALCIUM 8.9 05/26/2020 08:21 AM    ASTSGOT 20 05/26/2020 08:21 AM    ALTSGPT 13 05/26/2020 08:21 AM    ALKPHOSPHAT 62 05/26/2020 08:21 AM    TBILIRUBIN 0.3 05/26/2020 08:21 AM    ALBUMIN 3.8 05/26/2020 08:21 AM    TOTPROTEIN 6.4 05/26/2020 08:21 AM     Lab Results   Component Value Date/Time    RHEUMFACTN 18 (H) 06/15/2009 09:27 AM    ANTINUCAB None Detected 06/15/2009 09:27 AM     Lab Results   Component Value Date/Time    SEDRATEWES 10 04/30/2019 10:14 AM     Lab Results   Component Value Date/Time    HBA1C 5.3 11/23/2016 09:46 AM     Lab Results   Component Value Date/Time    G6PD 11.5 10/03/2017  09:54 AM     Results for orders placed during the hospital encounter of 02/02/18   DX-JOINT SURVEY-HANDS SINGLE VIEW    Impression 1.  No acute fracture or bone erosion identified.    2.  Mild osteoarthritis.     Results for orders placed during the hospital encounter of 02/02/18   DX-JOINT SURVEY-FEET SINGLE VIEW    Impression 1.  No acute fracture or bone erosion.    2.  Hallux valgus deformities and mild osteoarthritis.     Results for orders placed during the hospital encounter of 06/05/18   DS-BONE DENSITY STUDY (DEXA)    Impression According to the World Health Organization classification, bone mineral density of this patient is normal.        10-year Probability of Fracture:  Major Osteoporotic     17.8%  Hip     3.1%  Population      [place] ()    Based on left femur neck BMD          INTERPRETING LOCATION:  [address] [place] [address], [facility], [id number]     Results for orders placed in visit on 09/29/14   DX-FOOT-COMPLETE 3+    Impression Subacute nondisplaced fifth proximal phalanx extra-articular fracture     Results for orders placed during the hospital encounter of 07/11/09   DX-PELVIS-1 OR 2 VIEWS    Impression IMPRESSION:     1. VERY MILD DEGENERATIVE CHANGE OF THE HIPS, SLIGHTLY WORSE ON THE RIGHT   THAN THE LEFT.  MINIMAL SCLEROSIS IS SEEN AT THE LATERAL ASPECT OF THE   RIGHT FEMORAL NECK, WHICH IS OF DOUBTFUL CLINICAL SIGNIFICANCE.      2. NO PELVIS FRACTURE.    MM/srd     Read By MARICARMEN ANSARI MD on Jul 13 2009  8:38AM  : PREMA Transcription Date: Jul 13 2009 10:42AM  THIS DOCUMENT HAS BEEN ELECTRONICALLY SIGNED BY: MARICARMEN ANSARI MD on Jul 13 2009  4:05PM        Results for orders placed during the hospital encounter of 07/11/09   DX-PELVIS-1 OR 2 VIEWS    Impression IMPRESSION:     1. VERY MILD DEGENERATIVE CHANGE OF THE HIPS, SLIGHTLY WORSE ON THE RIGHT   THAN THE LEFT.  MINIMAL SCLEROSIS IS SEEN AT THE LATERAL ASPECT OF THE   RIGHT FEMORAL NECK, WHICH IS OF DOUBTFUL CLINICAL  SIGNIFICANCE.      2. NO PELVIS FRACTURE.    MM/srd     Read By MARICARMEN ANSARI MD on Jul 13 2009  8:38AM  : SRP Transcription Date: Jul 13 2009 10:42AM  THIS DOCUMENT HAS BEEN ELECTRONICALLY SIGNED BY: MARICARMEN ANSARI MD on Jul 13 2009  4:05PM        Results for orders placed during the hospital encounter of 10/10/09   DX-KNEE COMPLETE 4+    Impression IMPRESSION:     3. MODERATE THREE COMPARTMENT ARTHROSIS.     4. QUESTION SMALL EFFUSION.     MPW/vadim         Read By JASSI GREEN MD on Oct 12 2009  9:01AM  : ZFREDERIC Transcription Date: Oct 12 2009  2:56PM  THIS DOCUMENT HAS BEEN ELECTRONICALLY SIGNED BY: JASSI GREEN MD on Oct   12 2009  3:47PM        Results for orders placed in visit on 09/02/15   DX-KNEE 2- LEFT    Impression 1. No evidence of acute fracture or dislocation.    2. Increased sclerosis with punctate hyperdensities in the proximal tibial metadiaphysis anteriorly may be related to prior surgery.    3. Mild left knee osteoarthritis.     Results for orders placed during the hospital encounter of 07/10/12   DX-HAND 3+    Impression No new radiographic evidence of erosive arthropathy    Stable second DIP subchondral cyst versus erosion    Stable mild first MCP osteoarthritis, periarticular calcification and osteopenia     Results for orders placed during the hospital encounter of 02/02/18   MR-LUMBAR SPINE-W/O    Impression 1.  Acute L1 vertebral compression fracture. This would be amenable to percutaneous augmentation with vertebroplasty or kyphoplasty if clinically appropriate. Interventional radiology is available for consultation and therapy.  2.  Multilevel multifactorial degenerative changes  3.  No areas of high-grade central canal narrowing  4.  LEFT neural foraminal narrowing worse at L5-S1  5.  Other areas of central canal and neural foraminal narrowing as described above  6.  Mild intra and extrahepatic biliary dilatation without visualized obstructing lesion.  Further imaging assessment could be performed with ultrasound or MRCP if clinically appropriate. I have no relevant prior studies available for comparison.     Results for orders placed during the hospital encounter of 01/03/18   MR-KNEE-W/O LEFT    Impression 1.  No MR explanation for acute symptoms. Ligaments and menisci are intact    2.  Postoperative change related to presumed prior tibial tubercle transfer. There is mild patella timothy    3.  Severe patellofemoral cartilage thinning with areas of bone-on-bone articulation and moderate osteophytes.    4.  Moderate femorotibial osteophytes with minimal cartilage thinning     Results for orders placed during the hospital encounter of 02/21/18   DX-CERVICAL SPINE-2 OR 3 VIEWS    Impression 1.  No evidence of atlantoaxial subluxation with flexion and extension maneuvers of the cervical spine.    2.  Degenerative changes C6-7. No demonstrable range of motion at this level.        Results for orders placed during the hospital encounter of 08/11/18   CT-LSPINE W/O PLUS RECONS    Impression 1. Recent instrumentation at L2-5 levels with a 10.7 cm fluid collection in the posterior paraspinal soft tissues. Differential includes postsurgical seroma, pseudomeningocele and the resolving hematoma. Infection should be excluded clinically.  2. Prior augmentation of L1 compression deformity. No new fracture or listhesis.     Assessment and Plan:     1. Rheumatoid arthritis involving multiple sites, unspecified rheumatoid factor presence (HCC)  Long discussion with patient about treatment options, patient currently on Plaquenil at 200 mg p.o. twice daily monotherapy only, we opted to add leflunomide 20 mg p.o. daily with folic acid 1 mg p.o. daily in combination with the Plaquenil.  We also opted to redo hand and feet x-rays compared to x-rays done May 2015 for comparison and evaluation of progression of erosive arthritis.  We also discussed the possible use of Biologics such as  Rinvoq, information from up-to-date printed out given to patient today  - leflunomide (ARAVA) 20 MG Tab; Take 1 Tab by mouth every day.  Dispense: 90 Tab; Refill: 0  - DX-JOINT SURVEY-HANDS SINGLE VIEW; Future  - DX-JOINT SURVEY-FEET SINGLE VIEW; Future  - DS-BONE DENSITY STUDY (DEXA); Future  - hydroxychloroquine (PLAQUENIL) 200 MG Tab; TAKE 1 TABLET BY MOUTH TWICE A DAY  Dispense: 180 Tab; Refill: 1    2. Long-term use of Plaquenil  On Plaquenil 200 mg p.o. twice daily of note G6PD levels are adequate  Patient needs monitoring labs every 6 months, next labs due November 2020.  Patient needs a ophthalmology/optometry evaluation every year, last optometry evaluation August 2019 at [facility].  - leflunomide (ARAVA) 20 MG Tab; Take 1 Tab by mouth every day.  Dispense: 90 Tab; Refill: 0  - DX-JOINT SURVEY-HANDS SINGLE VIEW; Future  - DX-JOINT SURVEY-FEET SINGLE VIEW; Future  - DS-BONE DENSITY STUDY (DEXA); Future  - hydroxychloroquine (PLAQUENIL) 200 MG Tab; TAKE 1 TABLET BY MOUTH TWICE A DAY  Dispense: 180 Tab; Refill: 1    3. Encounter for monitoring Arava therapy  Start leflunomide 20 mg p.o. daily in combination with folic acid 1 mg p.o. daily  Patient will need monitoring labs in 3 months, standing orders written for patient  - leflunomide (ARAVA) 20 MG Tab; Take 1 Tab by mouth every day.  Dispense: 90 Tab; Refill: 0    4. NSAID long-term use  Patient takes ibuprofen as needed,  Patient will need monitoring labs about every 6 months, next labs due about November 2020  - hydroxychloroquine (PLAQUENIL) 200 MG Tab; TAKE 1 TABLET BY MOUTH TWICE A DAY  Dispense: 180 Tab; Refill: 1    5. Osteoporosis without current pathological fracture, unspecified osteoporosis type  Last DEXA June 2018 patient due for DEXA June 2020, DEXA ordered for patient  Patient currently on Reclast infusions, last Reclast infusion was January 2019 patient has not been able to get her January 2020 Reclast infusion because of the coronavirus  pandemic.  Patient states she will schedule.  - DX-JOINT SURVEY-HANDS SINGLE VIEW; Future  - DX-JOINT SURVEY-FEET SINGLE VIEW; Future  - DS-BONE DENSITY STUDY (DEXA); Future  - hydroxychloroquine (PLAQUENIL) 200 MG Tab; TAKE 1 TABLET BY MOUTH TWICE A DAY  Dispense: 180 Tab; Refill: 1    6. Spinal stenosis of lumbar region, unspecified whether neurogenic claudication present  Stable per patient report  - hydroxychloroquine (PLAQUENIL) 200 MG Tab; TAKE 1 TABLET BY MOUTH TWICE A DAY  Dispense: 180 Tab; Refill: 1    7. Hypothyroidism, unspecified type  Can exacerbate joint pain, I recommend monitoring thyroid on a regular basis to assure it is not contributing to the patient's joint pain    8. Tobacco abuse  Tobacco abuse is known to exacerbate rheumatoid arthritis, 20 minute discussion with patient regarding the need to stop tobacco abuse, patient states understanding    Followup: Return in about 6 months (around 12/16/2020). or sooner amada Murphy  was seen 30 minutes face-to-face of which more than 50% of the time was spent counseling the patient (excluding time for procedures)  regarding  rheumatological condition and care. Therapy was discussed in detail.      Please note that this dictation was created using voice recognition software. I have made every reasonable attempt to correct obvious errors, but I expect that there are errors of grammar and possibly content that I did not discover before finalizing the note.

## 2020-06-29 DIAGNOSIS — N76.0 ACUTE VAGINITIS: ICD-10-CM

## 2020-06-29 NOTE — TELEPHONE ENCOUNTER
Received request via: Pharmacy    Was the patient seen in the last year in this department? Yes  10/15/19  Does the patient have an active prescription (recently filled or refills available) for medication(s) requested? No

## 2020-06-30 RX ORDER — FLUCONAZOLE 150 MG/1
150 TABLET ORAL DAILY
Qty: 1 TAB | Refills: 1 | Status: SHIPPED | OUTPATIENT
Start: 2020-06-30 | End: 2021-05-04

## 2020-07-20 ENCOUNTER — PATIENT MESSAGE (OUTPATIENT)
Dept: MEDICAL GROUP | Facility: LAB | Age: 60
End: 2020-07-20

## 2020-07-21 RX ORDER — RANITIDINE 150 MG/1
TABLET ORAL
Qty: 180 TAB | Refills: 2 | Status: SHIPPED | OUTPATIENT
Start: 2020-07-21 | End: 2020-08-06 | Stop reason: SDUPTHER

## 2020-07-21 NOTE — TELEPHONE ENCOUNTER
Received request via: Patient    Was the patient seen in the last year in this department? Yes  6/16/2020  Does the patient have an active prescription (recently filled or refills available) for medication(s) requested? No

## 2020-08-06 RX ORDER — RANITIDINE 150 MG/1
TABLET ORAL
Qty: 180 TAB | Refills: 2 | Status: SHIPPED | OUTPATIENT
Start: 2020-08-06 | End: 2020-08-10

## 2020-08-06 NOTE — TELEPHONE ENCOUNTER
----- Message from Clifford Murphy sent at 8/5/2020  5:15 PM PDT -----  Regarding: RE: Prescription Question  Contact: [phone]  Michael Thurston,    I checked with [facility] yesterday and they said they never received it.  Would you send the request again for me please.  [address] in the [place].      Thank you,    Clifford Murphy

## 2020-08-06 NOTE — TELEPHONE ENCOUNTER
Received request via: Patient    Was the patient seen in the last year in this department? Yes  4/9/2020  Does the patient have an active prescription (recently filled or refills available) for medication(s) requested? No

## 2020-08-10 RX ORDER — CIMETIDINE 400 MG/1
400 TABLET, FILM COATED ORAL 2 TIMES DAILY
Qty: 180 TAB | Refills: 1 | Status: SHIPPED | OUTPATIENT
Start: 2020-08-10 | End: 2021-01-28

## 2020-11-16 DIAGNOSIS — E03.9 ACQUIRED HYPOTHYROIDISM: ICD-10-CM

## 2020-11-16 RX ORDER — LEVOTHYROXINE SODIUM 0.12 MG/1
TABLET ORAL
Qty: 90 TAB | Refills: 1 | Status: SHIPPED | OUTPATIENT
Start: 2020-11-16 | End: 2021-05-20

## 2020-12-14 ENCOUNTER — OFFICE VISIT (OUTPATIENT)
Dept: URGENT CARE | Facility: PHYSICIAN GROUP | Age: 60
End: 2020-12-14

## 2020-12-14 VITALS
BODY MASS INDEX: 27.82 KG/M2 | DIASTOLIC BLOOD PRESSURE: 80 MMHG | RESPIRATION RATE: 16 BRPM | OXYGEN SATURATION: 97 % | HEIGHT: 63 IN | TEMPERATURE: 97.9 F | WEIGHT: 157 LBS | SYSTOLIC BLOOD PRESSURE: 138 MMHG | HEART RATE: 70 BPM

## 2020-12-14 DIAGNOSIS — L08.9 TOE INFECTION: ICD-10-CM

## 2020-12-14 PROCEDURE — 99214 OFFICE O/P EST MOD 30 MIN: CPT | Performed by: NURSE PRACTITIONER

## 2020-12-14 RX ORDER — DOXYCYCLINE HYCLATE 100 MG/1
100 CAPSULE ORAL 2 TIMES DAILY
Qty: 14 CAP | Refills: 0 | Status: SHIPPED | OUTPATIENT
Start: 2020-12-14 | End: 2020-12-21

## 2020-12-14 ASSESSMENT — FIBROSIS 4 INDEX: FIB4 SCORE: 2.11

## 2020-12-17 ENCOUNTER — APPOINTMENT (OUTPATIENT)
Dept: RHEUMATOLOGY | Facility: MEDICAL CENTER | Age: 60
End: 2020-12-17

## 2020-12-19 ASSESSMENT — ENCOUNTER SYMPTOMS
NAUSEA: 0
TINGLING: 0
FEVER: 0
MYALGIAS: 0
CHILLS: 0

## 2020-12-19 NOTE — PROGRESS NOTES
Subjective:      Clifford Murphy is a 60 y.o. female who presents with Toe Pain (L big toe swelling, [id number])            HPI New. Patient is 60 year old female presenting with possible infection to left great toe. She has redness and swelling at bottom of affected toe. She denies fever, chills, myalgia, headache or nausea. States area exquisitely tender to touch. Previous trauma to this area per her report. She has not taken any medication for this. Previous infection similar to this-responded well to antibiotics.  Morphine, Aspirin, Codeine, Tramadol, Sulfamethoxazole w-trimethoprim, and Other drug  Current Outpatient Medications on File Prior to Visit   Medication Sig Dispense Refill   • levothyroxine (SYNTHROID) 125 MCG Tab TAKE 1 TABLET BY MOUTH EVERY MORNING ON AN EMPTY STOMACH. 90 Tab 1   • cimetidine (TAGAMET) 400 MG Tab Take 1 Tab by mouth 2 times a day. 180 Tab 1   • hydroxychloroquine (PLAQUENIL) 200 MG Tab TAKE 1 TABLET BY MOUTH TWICE A  Tab 1   • cholestyramine (QUESTRAN) 4 g packet Take 4 g by mouth every day. 60 Each 2   • cetirizine (ZYRTEC) 10 MG Tab Take 10 mg by mouth 1 time daily as needed for Allergies.     • Cholecalciferol (VITAMIN D3) 5000 units Tab Take 5,000 Int'l Units by mouth every day at 6 PM.     • Folic Acid 0.8 MG Cap Take 0.8-1.6 mg by mouth every day at 6 PM. takes 1 pill daily and alternates every other day 2 pills daily     • ibuprofen (MOTRIN) 800 MG Tab Take 800 mg by mouth every 8 hours as needed for Mild Pain.     • acetaminophen (TYLENOL) 500 MG Tab Take 500-1,000 mg by mouth 2 times a day as needed for Mild Pain.     • fluconazole (DIFLUCAN) 150 MG tablet Take 1 Tab by mouth every day. (Patient not taking: Reported on 12/14/2020) 1 Tab 1   • leflunomide (ARAVA) 20 MG Tab Take 1 Tab by mouth every day. (Patient not taking: Reported on 12/14/2020) 90 Tab 0     No current facility-administered medications on file prior to visit.      Social History      Socioeconomic History   • Marital status:      Spouse name: Not on file   • Number of children: Not on file   • Years of education: Not on file   • Highest education level: Not on file   Occupational History   • Not on file   Social Needs   • Financial resource strain: Not on file   • Food insecurity     Worry: Not on file     Inability: Not on file   • Transportation needs     Medical: Not on file     Non-medical: Not on file   Tobacco Use   • Smoking status: Current Every Day Smoker     Packs/day: 0.50     Years: 40.00     Pack years: 20.00     Types: Cigarettes   • Smokeless tobacco: Never Used   Substance and Sexual Activity   • Alcohol use: Yes     Alcohol/week: 0.6 oz     Types: 1 Glasses of wine per week     Comment: occasional, maybe 1 per month   • Drug use: No   • Sexual activity: Yes     Partners: Male   Lifestyle   • Physical activity     Days per week: Not on file     Minutes per session: Not on file   • Stress: Not on file   Relationships   • Social connections     Talks on phone: Not on file     Gets together: Not on file     Attends [employer] service: Not on file     Active member of club or organization: Not on file     Attends meetings of clubs or organizations: Not on file     Relationship status: Not on file   • Intimate partner violence     Fear of current or ex partner: Not on file     Emotionally abused: Not on file     Physically abused: Not on file     Forced sexual activity: Not on file   Other Topics Concern   • Not on file   Social History Narrative        Travels a lot    . 1 child     Breast Cancer-related family history is not on file.      Review of Systems   Constitutional: Negative for chills and fever.   Gastrointestinal: Negative for nausea.   Musculoskeletal: Negative for myalgias.   Skin:        Redness and swelling of left great toe    Neurological: Negative for tingling.          Objective:     /80   Pulse 70   Temp 36.6 °C (97.9 °F) (Temporal)    "Resp 16   Ht 1.6 m (5' 3\")   Wt 71.2 kg (157 lb)   LMP  (LMP Unknown)   SpO2 97%   BMI 27.81 kg/m²      Physical Exam  Vitals signs and nursing note reviewed.   Constitutional:       Appearance: Normal appearance. She is not ill-appearing.   Cardiovascular:      Rate and Rhythm: Normal rate and regular rhythm.      Heart sounds: No murmur.   Pulmonary:      Effort: Pulmonary effort is normal.      Breath sounds: Normal breath sounds.   Musculoskeletal: Normal range of motion.   Skin:     General: Skin is warm and dry.      Findings: Erythema present.      Comments: Erythema and swelling as well as mild induration noted on plantar aspect of left great toe.   Neurological:      General: No focal deficit present.      Mental Status: She is alert and oriented to person, place, and time.   Psychiatric:         Mood and Affect: Mood normal.         Behavior: Behavior normal.                 Assessment/Plan:        1. Toe infection  doxycycline (VIBRAMYCIN) 100 MG Cap     Differential diagnosis, natural history, supportive care, and indications for immediate follow-up discussed at length.     "

## 2021-01-05 ENCOUNTER — HOSPITAL ENCOUNTER (OUTPATIENT)
Dept: LAB | Facility: MEDICAL CENTER | Age: 61
End: 2021-01-05
Attending: INTERNAL MEDICINE
Payer: COMMERCIAL

## 2021-01-05 DIAGNOSIS — M06.9 RHEUMATOID ARTHRITIS INVOLVING MULTIPLE SITES (HCC): ICD-10-CM

## 2021-01-05 DIAGNOSIS — Z51.81 ENCOUNTER FOR MONITORING ARAVA THERAPY: ICD-10-CM

## 2021-01-05 DIAGNOSIS — Z79.899 ENCOUNTER FOR MONITORING ARAVA THERAPY: ICD-10-CM

## 2021-01-05 LAB
ALBUMIN SERPL BCP-MCNC: 4 G/DL (ref 3.2–4.9)
ALBUMIN/GLOB SERPL: 1.5 G/DL
ALP SERPL-CCNC: 64 U/L (ref 30–99)
ALT SERPL-CCNC: 15 U/L (ref 2–50)
ANION GAP SERPL CALC-SCNC: 8 MMOL/L (ref 7–16)
AST SERPL-CCNC: 18 U/L (ref 12–45)
BASOPHILS # BLD AUTO: 1 % (ref 0–1.8)
BASOPHILS # BLD: 0.06 K/UL (ref 0–0.12)
BILIRUB SERPL-MCNC: 0.5 MG/DL (ref 0.1–1.5)
BUN SERPL-MCNC: 20 MG/DL (ref 8–22)
CALCIUM SERPL-MCNC: 9.5 MG/DL (ref 8.5–10.5)
CHLORIDE SERPL-SCNC: 103 MMOL/L (ref 96–112)
CO2 SERPL-SCNC: 27 MMOL/L (ref 20–33)
CREAT SERPL-MCNC: 0.94 MG/DL (ref 0.5–1.4)
EOSINOPHIL # BLD AUTO: 0.18 K/UL (ref 0–0.51)
EOSINOPHIL NFR BLD: 2.9 % (ref 0–6.9)
ERYTHROCYTE [DISTWIDTH] IN BLOOD BY AUTOMATED COUNT: 48.6 FL (ref 35.9–50)
ERYTHROCYTE [SEDIMENTATION RATE] IN BLOOD BY WESTERGREN METHOD: 7 MM/HOUR (ref 0–30)
GLOBULIN SER CALC-MCNC: 2.7 G/DL (ref 1.9–3.5)
GLUCOSE SERPL-MCNC: 88 MG/DL (ref 65–99)
HCT VFR BLD AUTO: 48.1 % (ref 37–47)
HGB BLD-MCNC: 15.4 G/DL (ref 12–16)
IMM GRANULOCYTES # BLD AUTO: 0.02 K/UL (ref 0–0.11)
IMM GRANULOCYTES NFR BLD AUTO: 0.3 % (ref 0–0.9)
LYMPHOCYTES # BLD AUTO: 1.57 K/UL (ref 1–4.8)
LYMPHOCYTES NFR BLD: 25 % (ref 22–41)
MCH RBC QN AUTO: 32.5 PG (ref 27–33)
MCHC RBC AUTO-ENTMCNC: 32 G/DL (ref 33.6–35)
MCV RBC AUTO: 101.5 FL (ref 81.4–97.8)
MONOCYTES # BLD AUTO: 0.47 K/UL (ref 0–0.85)
MONOCYTES NFR BLD AUTO: 7.5 % (ref 0–13.4)
NEUTROPHILS # BLD AUTO: 3.99 K/UL (ref 2–7.15)
NEUTROPHILS NFR BLD: 63.3 % (ref 44–72)
NRBC # BLD AUTO: 0 K/UL
NRBC BLD-RTO: 0 /100 WBC
PLATELET # BLD AUTO: 183 K/UL (ref 164–446)
PMV BLD AUTO: 12.3 FL (ref 9–12.9)
POTASSIUM SERPL-SCNC: 4.4 MMOL/L (ref 3.6–5.5)
PROT SERPL-MCNC: 6.7 G/DL (ref 6–8.2)
RBC # BLD AUTO: 4.74 M/UL (ref 4.2–5.4)
SODIUM SERPL-SCNC: 138 MMOL/L (ref 135–145)
WBC # BLD AUTO: 6.3 K/UL (ref 4.8–10.8)

## 2021-01-05 PROCEDURE — 80053 COMPREHEN METABOLIC PANEL: CPT

## 2021-01-05 PROCEDURE — 36415 COLL VENOUS BLD VENIPUNCTURE: CPT

## 2021-01-05 PROCEDURE — 85025 COMPLETE CBC W/AUTO DIFF WBC: CPT

## 2021-01-05 PROCEDURE — 85652 RBC SED RATE AUTOMATED: CPT

## 2021-01-12 ENCOUNTER — OFFICE VISIT (OUTPATIENT)
Dept: RHEUMATOLOGY | Facility: MEDICAL CENTER | Age: 61
End: 2021-01-12
Payer: COMMERCIAL

## 2021-01-12 VITALS
RESPIRATION RATE: 14 BRPM | TEMPERATURE: 97.1 F | BODY MASS INDEX: 30.82 KG/M2 | HEART RATE: 95 BPM | WEIGHT: 174 LBS | OXYGEN SATURATION: 98 % | SYSTOLIC BLOOD PRESSURE: 140 MMHG | DIASTOLIC BLOOD PRESSURE: 80 MMHG

## 2021-01-12 DIAGNOSIS — R71.8 ELEVATED MCV: ICD-10-CM

## 2021-01-12 DIAGNOSIS — Z79.1 NSAID LONG-TERM USE: ICD-10-CM

## 2021-01-12 DIAGNOSIS — M48.061 SPINAL STENOSIS OF LUMBAR REGION, UNSPECIFIED WHETHER NEUROGENIC CLAUDICATION PRESENT: ICD-10-CM

## 2021-01-12 DIAGNOSIS — M06.9 RHEUMATOID ARTHRITIS, INVOLVING UNSPECIFIED SITE, UNSPECIFIED WHETHER RHEUMATOID FACTOR PRESENT (HCC): ICD-10-CM

## 2021-01-12 DIAGNOSIS — G89.29 CHRONIC RIGHT SHOULDER PAIN: ICD-10-CM

## 2021-01-12 DIAGNOSIS — M81.0 OSTEOPOROSIS WITHOUT CURRENT PATHOLOGICAL FRACTURE, UNSPECIFIED OSTEOPOROSIS TYPE: ICD-10-CM

## 2021-01-12 DIAGNOSIS — M25.511 CHRONIC RIGHT SHOULDER PAIN: ICD-10-CM

## 2021-01-12 DIAGNOSIS — Z72.0 TOBACCO ABUSE: ICD-10-CM

## 2021-01-12 DIAGNOSIS — Z51.81 ENCOUNTER FOR MONITORING ARAVA THERAPY: ICD-10-CM

## 2021-01-12 DIAGNOSIS — Z79.899 ENCOUNTER FOR MONITORING ARAVA THERAPY: ICD-10-CM

## 2021-01-12 DIAGNOSIS — E03.9 HYPOTHYROIDISM, UNSPECIFIED TYPE: ICD-10-CM

## 2021-01-12 DIAGNOSIS — Z79.899 LONG-TERM USE OF PLAQUENIL: ICD-10-CM

## 2021-01-12 PROCEDURE — 99214 OFFICE O/P EST MOD 30 MIN: CPT | Performed by: INTERNAL MEDICINE

## 2021-01-12 RX ORDER — HYDROXYCHLOROQUINE SULFATE 200 MG/1
TABLET, FILM COATED ORAL
Qty: 180 TAB | Refills: 1 | Status: SHIPPED | OUTPATIENT
Start: 2021-01-12 | End: 2021-07-12

## 2021-01-12 RX ORDER — LEUCOVORIN CALCIUM 10 MG/1
TABLET ORAL
Qty: 12 TAB | Refills: 3 | Status: SHIPPED | OUTPATIENT
Start: 2021-01-12 | End: 2021-08-10

## 2021-01-12 ASSESSMENT — FIBROSIS 4 INDEX: FIB4 SCORE: 1.57

## 2021-01-12 ASSESSMENT — JOINT PAIN
TOTAL NUMBER OF SWOLLEN JOINTS: 0
TOTAL NUMBER OF TENDER JOINTS: 1

## 2021-01-12 NOTE — PROGRESS NOTES
Chief Complaint- joint pain     Subjective:   Clifford Murphy is a 60 y.o. female here today for follow up of rheumatological issues    This is a follow-up visit for this patient who is seen in this clinic for rheumatoid arthritis.  Patient is currently on Plaquenil 200 mg p.o. twice daily with leflunomide 20 mg p.o. daily and folic acid 1 mg p.o. daily.  Patient is not sure how her arthritis doing, patient is under quite a bit of stress with her sister having a stroke and patient having to take care of her sister.   Patient denies any side effects from the medication, denies any unexplained weight loss, denies any fevers of unknown etiology, denies any GI upset, denies any rashes, denies any new joint swelling, denies recurrent infections.  Of note recent sedimentation rate equals 7 January 2021.  Of note last optometry evaluation July 2020 at [facility]    Patient also complaining of exacerbation of right shoulder bursitis wonders what she can do about that    Additional comorbidities include lumbar spine DDD/DJD/spinal stenosis status post lumbar spine laminectomy with benefit, patient currently healing the patient also with an acoustic neuroma left ear for which patient is lost about 60% of her hearing is currently being followed by [facility] and currently just monitoring at this time.      Patient also with osteoporosis currently on Reclast infusions last Reclast infusion was January 2019.   Patient was not able to get her Reclast infusion for January 2020 because of the coronavirus pandemic.     Additional comorbidities include  Hypothyroidism and osteoporosis seen by FRAX score recently status post a kyphoplasty for compression fracture in L1.     S/p MTX-N/V  S/p Enbrel-sinus infections  S/p Humira-sinus infections and injection site skin breakdown  S/p Actemra-exacerbated pneumonia  S/p sulfasalazine-contradicted, pt with sulfa allergy  S/p fosamax-GI upset  S/p boniva-GI upset     CCP neg  9/2015  Quantiferon Gold neg 9/2015  Hep B neg 9/2015  Uric acid 4.6 9/2015  G6PD 11.5 adequate 10/2017  RF 18 6/2009  CHIQUITA neg 6/2009   Hand x-rays 5/2015-no pathology  Feet X-rays 5/2015-indicates DJD, no erosions    LS pine x-rays 7/2009-indicates multilevel DJD     MRI LS spine 2/2018-L1 compression fracture, multilevel multifactorial DJD, neuroforaminal narrowing at multiple sites, severe left neural foraminal narrowing  DEXA 6/5/2018 T scores -0.7, -0.9  FRAX 6/5/2018 major osteoporotic fracture risk 17.8%, hip fracture risk 3.1%  Patient on Reclast since 1/2019          Current medicines (including changes today)  Current Outpatient Medications   Medication Sig Dispense Refill   • Leucovorin Calcium 10 MG Tab 1 tab po qweek 12 Tab 3   • hydroxychloroquine (PLAQUENIL) 200 MG Tab TAKE 1 TABLET BY MOUTH TWICE A  Tab 1   • levothyroxine (SYNTHROID) 125 MCG Tab TAKE 1 TABLET BY MOUTH EVERY MORNING ON AN EMPTY STOMACH. 90 Tab 1   • cimetidine (TAGAMET) 400 MG Tab Take 1 Tab by mouth 2 times a day. 180 Tab 1   • leflunomide (ARAVA) 20 MG Tab Take 1 Tab by mouth every day. 90 Tab 0   • cholestyramine (QUESTRAN) 4 g packet Take 4 g by mouth every day. 60 Each 2   • cetirizine (ZYRTEC) 10 MG Tab Take 10 mg by mouth 1 time daily as needed for Allergies.     • Cholecalciferol (VITAMIN D3) 5000 units Tab Take 5,000 Int'l Units by mouth every day at 6 PM.     • Folic Acid 0.8 MG Cap Take 0.8-1.6 mg by mouth every day at 6 PM. takes 1 pill daily and alternates every other day 2 pills daily     • ibuprofen (MOTRIN) 800 MG Tab Take 800 mg by mouth every 8 hours as needed for Mild Pain.     • acetaminophen (TYLENOL) 500 MG Tab Take 500-1,000 mg by mouth 2 times a day as needed for Mild Pain.     • fluconazole (DIFLUCAN) 150 MG tablet Take 1 Tab by mouth every day. (Patient not taking: Reported on 12/14/2020) 1 Tab 1     No current facility-administered medications for this visit.      She  has a past medical history of  Anesthesia, Asthma (02/21/2018), Breath shortness, Bronchitis (2016), Cough (07/13/2018), Environmental and seasonal allergies, Genital herpes in women (2/11/2014), Heart burn, High risk medications (not anticoagulants) long-term use (8/29/2012), Hypothyroid (9/14/2011), Kidney stones, Macrocytosis without anemia (med effect), Menopause (9/15/2011), Pain (07/13/2018), Pneumonia (01/2015), Post-cholecystectomy syndrome, Rheumatoid arthritis(714.0), Snoring, Tobacco abuse, episodic (9/14/2011), and Vitamin d deficiency (5/15/2012).    ROS   Other than what is mentioned in HPI or physical exam, there is no history of headaches, double vision or blurred vision. No temporal tenderness or jaw claudication. No trouble swallowing difficulties or sore throats.  No chest complaints including chest pain, cough or sputum production. No GI complaints including nausea, vomiting, change in bowel habits, or past peptic ulcer disease. No history of blood in the stools. No urinary complaints including dysuria or frequency. No history of alopecia, photosensitivity, oral ulcerations, Raynaud's phenomena.       Objective:     /80   Pulse 95   Temp 36.2 °C (97.1 °F) (Temporal)   Resp 14   Wt 78.9 kg (174 lb)   SpO2 98%  Body mass index is 30.82 kg/m².   Physical Exam:    Constitutional: Alert and oriented X3, patient is talkative with good eye contact.Skin: Warm, dry, good turgor, no rashes in visible areas.Eye: Equal, round and reactive, conjunctiva clear, lids normal EOM intactENMT: Lips without lesions, good dentition, no oropharyngeal ulcers, moist buccal mucosa, pinna without deformityNeck: Trachea midline, no masses, no thyromegaly.Lymph:  No cervical lymphadenopathy, no axillary lymphadenopathy, no supraclavicular lymphadenopathyRespiratory: Unlabored respiratory effort, lungs clear to auscultation, no wheezes, no ronchi.Cardiovascular: Normal S1, S2, no murmur, no edema.Abdomen: Soft, non-tender, no masses, no  hepatosplenomegaly.Psych: Alert and oriented x3, normal affect and mood.Neuro: Cranial nerves 2-12 are grossly intact, no loss of sensation LEExt:no joint laxity noted in bilateral arms, no joint laxity noted in bilateral legs, valgus deformity bilateral great toes, hands without any victorina swan-neck or boutonniere deformities no dactylitis no sausage digits            Lab Results   Component Value Date/Time    SODIUM 138 01/05/2021 09:13 AM    POTASSIUM 4.4 01/05/2021 09:13 AM    CHLORIDE 103 01/05/2021 09:13 AM    CO2 27 01/05/2021 09:13 AM    GLUCOSE 88 01/05/2021 09:13 AM    BUN 20 01/05/2021 09:13 AM    CREATININE 0.94 01/05/2021 09:13 AM    CREATININE 0.77 03/26/2013 12:00 AM    BUNCREATRAT 30 (H) 11/23/2016 09:46 AM    BUNCREATRAT 30 (H) 03/26/2013 12:00 AM      Lab Results   Component Value Date/Time    WBC 6.3 01/05/2021 09:13 AM    WBC 5.7 10/20/2011 12:00 AM    RBC 4.74 01/05/2021 09:13 AM    RBC 4.32 10/20/2011 12:00 AM    HEMOGLOBIN 15.4 01/05/2021 09:13 AM    HEMATOCRIT 48.1 (H) 01/05/2021 09:13 AM    .5 (H) 01/05/2021 09:13 AM    MCV 99 (H) 10/20/2011 12:00 AM    MCH 32.5 01/05/2021 09:13 AM    MCH 34.0 10/20/2011 12:00 AM    MCHC 32.0 (L) 01/05/2021 09:13 AM    MPV 12.3 01/05/2021 09:13 AM    NEUTSPOLYS 63.30 01/05/2021 09:13 AM    LYMPHOCYTES 25.00 01/05/2021 09:13 AM    MONOCYTES 7.50 01/05/2021 09:13 AM    EOSINOPHILS 2.90 01/05/2021 09:13 AM    BASOPHILS 1.00 01/05/2021 09:13 AM      Lab Results   Component Value Date/Time    CALCIUM 9.5 01/05/2021 09:13 AM    ASTSGOT 18 01/05/2021 09:13 AM    ALTSGPT 15 01/05/2021 09:13 AM    ALKPHOSPHAT 64 01/05/2021 09:13 AM    TBILIRUBIN 0.5 01/05/2021 09:13 AM    ALBUMIN 4.0 01/05/2021 09:13 AM    TOTPROTEIN 6.7 01/05/2021 09:13 AM     Lab Results   Component Value Date/Time    RHEUMFACTN 18 (H) 06/15/2009 09:27 AM    ANTINUCAB None Detected 06/15/2009 09:27 AM     Lab Results   Component Value Date/Time    SEDRATEWES 7 01/05/2021 09:13 AM     Lab  Results   Component Value Date/Time    HBA1C 5.3 11/23/2016 09:46 AM     Lab Results   Component Value Date/Time    G6PD 11.5 10/03/2017 09:54 AM     Results for orders placed during the hospital encounter of 02/02/18   DX-JOINT SURVEY-HANDS SINGLE VIEW    Impression 1.  No acute fracture or bone erosion identified.    2.  Mild osteoarthritis.     Results for orders placed during the hospital encounter of 02/02/18   DX-JOINT SURVEY-FEET SINGLE VIEW    Impression 1.  No acute fracture or bone erosion.    2.  Hallux valgus deformities and mild osteoarthritis.     Results for orders placed during the hospital encounter of 06/05/18   DS-BONE DENSITY STUDY (DEXA)    Impression According to the World Health Organization classification, bone mineral density of this patient is normal.        10-year Probability of Fracture:  Major Osteoporotic     17.8%  Hip     3.1%  Population      [place] ()    Based on left femur neck BMD          INTERPRETING LOCATION:  [address] [place] [address], [id number]     Results for orders placed in visit on 09/29/14   DX-FOOT-COMPLETE 3+    Impression Subacute nondisplaced fifth proximal phalanx extra-articular fracture     Results for orders placed during the hospital encounter of 07/11/09   DX-PELVIS-1 OR 2 VIEWS    Impression IMPRESSION:     1. VERY MILD DEGENERATIVE CHANGE OF THE HIPS, SLIGHTLY WORSE ON THE RIGHT   THAN THE LEFT.  MINIMAL SCLEROSIS IS SEEN AT THE LATERAL ASPECT OF THE   RIGHT FEMORAL NECK, WHICH IS OF DOUBTFUL CLINICAL SIGNIFICANCE.      2. NO PELVIS FRACTURE.    MM/srd     Read By MARICARMEN ANSARI MD on Jul 13 2009  8:38AM  : PREMA Transcription Date: Jul 13 2009 10:42AM  THIS DOCUMENT HAS BEEN ELECTRONICALLY SIGNED BY: MARICARMEN ANSARI MD on Jul 13 2009  4:05PM        Results for orders placed during the hospital encounter of 07/11/09   DX-PELVIS-1 OR 2 VIEWS    Impression IMPRESSION:     1. VERY MILD DEGENERATIVE CHANGE OF THE HIPS, SLIGHTLY WORSE ON  THE RIGHT   THAN THE LEFT.  MINIMAL SCLEROSIS IS SEEN AT THE LATERAL ASPECT OF THE   RIGHT FEMORAL NECK, WHICH IS OF DOUBTFUL CLINICAL SIGNIFICANCE.      2. NO PELVIS FRACTURE.    MM/srd     Read By MARICARMEN ANSARI MD on Jul 13 2009  8:38AM  : SRP Transcription Date: Jul 13 2009 10:42AM  THIS DOCUMENT HAS BEEN ELECTRONICALLY SIGNED BY: MARICARMEN ANSARI MD on Jul 13 2009  4:05PM        Results for orders placed during the hospital encounter of 10/10/09   DX-KNEE COMPLETE 4+    Impression IMPRESSION:     3. MODERATE THREE COMPARTMENT ARTHROSIS.     4. QUESTION SMALL EFFUSION.     MPW/vadim         Read By JASSI GREEN MD on Oct 12 2009  9:01AM  : ZPT Transcription Date: Oct 12 2009  2:56PM  THIS DOCUMENT HAS BEEN ELECTRONICALLY SIGNED BY: JASSI GREEN MD on Oct   12 2009  3:47PM        Results for orders placed in visit on 09/02/15   DX-KNEE 2- LEFT    Impression 1. No evidence of acute fracture or dislocation.    2. Increased sclerosis with punctate hyperdensities in the proximal tibial metadiaphysis anteriorly may be related to prior surgery.    3. Mild left knee osteoarthritis.     Results for orders placed during the hospital encounter of 07/10/12   DX-HAND 3+    Impression No new radiographic evidence of erosive arthropathy    Stable second DIP subchondral cyst versus erosion    Stable mild first MCP osteoarthritis, periarticular calcification and osteopenia     Results for orders placed during the hospital encounter of 02/02/18   MR-LUMBAR SPINE-W/O    Impression 1.  Acute L1 vertebral compression fracture. This would be amenable to percutaneous augmentation with vertebroplasty or kyphoplasty if clinically appropriate. Interventional radiology is available for consultation and therapy.  2.  Multilevel multifactorial degenerative changes  3.  No areas of high-grade central canal narrowing  4.  LEFT neural foraminal narrowing worse at L5-S1  5.  Other areas of central canal and  neural foraminal narrowing as described above  6.  Mild intra and extrahepatic biliary dilatation without visualized obstructing lesion. Further imaging assessment could be performed with ultrasound or MRCP if clinically appropriate. I have no relevant prior studies available for comparison.     Results for orders placed during the hospital encounter of 01/03/18   MR-KNEE-W/O LEFT    Impression 1.  No MR explanation for acute symptoms. Ligaments and menisci are intact    2.  Postoperative change related to presumed prior tibial tubercle transfer. There is mild patella timothy    3.  Severe patellofemoral cartilage thinning with areas of bone-on-bone articulation and moderate osteophytes.    4.  Moderate femorotibial osteophytes with minimal cartilage thinning     Results for orders placed during the hospital encounter of 02/21/18   DX-CERVICAL SPINE-2 OR 3 VIEWS    Impression 1.  No evidence of atlantoaxial subluxation with flexion and extension maneuvers of the cervical spine.    2.  Degenerative changes C6-7. No demonstrable range of motion at this level.     Results for orders placed during the hospital encounter of 08/11/18   CT-LSPINE W/O PLUS RECONS    Impression 1. Recent instrumentation at L2-5 levels with a 10.7 cm fluid collection in the posterior paraspinal soft tissues. Differential includes postsurgical seroma, pseudomeningocele and the resolving hematoma. Infection should be excluded clinically.  2. Prior augmentation of L1 compression deformity. No new fracture or listhesis.       Assessment and Plan:     1. Rheumatoid arthritis, involving unspecified site, unspecified whether rheumatoid factor present (HCC)  Clinically stable on Plaquenil 200 mg p.o. twice daily with leflunomide 20 mg p.o. daily and folic acid 1 mg p.o. daily, patient's MCV slightly elevated at 101, will add leucovorin 10 mg p.o. weekly.  Patient has yet to do hand and feet x-rays are ordered at last visit June 2020, patient states she  will do  - Leucovorin Calcium 10 MG Tab; 1 tab po qweek  Dispense: 12 Tab; Refill: 3  - hydroxychloroquine (PLAQUENIL) 200 MG Tab; TAKE 1 TABLET BY MOUTH TWICE A DAY  Dispense: 180 Tab; Refill: 1  - VITAMIN B12; Future  - VITAMIN B1; Future  - FOLATE; Future    2. Long-term use of Plaquenil  On Plaquenil 200 mg p.o. twice daily, last ophthalmology evaluation July 2020 per patient report, of note G6PD levels are adequate, continue Plaquenil 200 mg p.o. twice daily  Patient needs monitoring labs every 6 months, next labs due about June 2021 patient is standing orders available to her  - Leucovorin Calcium 10 MG Tab; 1 tab po qweek  Dispense: 12 Tab; Refill: 3  - hydroxychloroquine (PLAQUENIL) 200 MG Tab; TAKE 1 TABLET BY MOUTH TWICE A DAY  Dispense: 180 Tab; Refill: 1    3. Encounter for monitoring Arava therapy  On leflunomide 20 mg p.o. daily with folic acid 1 mg p.o. daily, will also add leucovorin 10 mg p.o. weekly with elevated MCV to 101  Patient needs monitoring labs every 3 months, next labs due April 2021 patient standing orders available to her  - Leucovorin Calcium 10 MG Tab; 1 tab po qweek  Dispense: 12 Tab; Refill: 3    4. NSAID long-term use  Patient takes NSAIDs every day, patient needs monitoring labs to check renal function and liver functions every 6 months, next labs due about June 2021, patient standing orders available to her  - Leucovorin Calcium 10 MG Tab; 1 tab po qweek  Dispense: 12 Tab; Refill: 3  - hydroxychloroquine (PLAQUENIL) 200 MG Tab; TAKE 1 TABLET BY MOUTH TWICE A DAY  Dispense: 180 Tab; Refill: 1    5. Elevated MCV  MCV up to 101, today check vitamin B1 vitamin B12 and folic acid  Start leucovorin 10 mg p.o. week  - Leucovorin Calcium 10 MG Tab; 1 tab po qweek  Dispense: 12 Tab; Refill: 3  - VITAMIN B12; Future  - VITAMIN B1; Future  - FOLATE; Future    6. Chronic right shoulder pain  History of chronic right shoulder bursitis, offered corticosteroid injection today patient opted out  patient will try topical ointments    7. Osteoporosis without current pathological fracture, unspecified osteoporosis type  Last DEXA June 2018 patient was due for DEXA June 2020, DEXA has been ordered for patient patient has not followed through as of yet  Patient also gets Reclast infusions every year last Reclast infusion January 2019, patient has not been able to get her 2020 Reclast infusion because of the COVID-19 virus, patient now to get her bone density scan to see if we still need to do the Reclast  Of note patient unable to take oral bisphosphonates secondary to GI upset  Alternatives may be Prolia   continue calcium citrate 1200 mg by mouth daily and vitamin D about 2000 units by mouth daily and magnesium 200 mg by mouth daily  - hydroxychloroquine (PLAQUENIL) 200 MG Tab; TAKE 1 TABLET BY MOUTH TWICE A DAY  Dispense: 180 Tab; Refill: 1    8. Hypothyroidism, unspecified type  Can exacerbate joint pain, I recommend monitoring thyroid on a regular basis to assure it is not contributing to the patient's joint pain    9. Spinal stenosis of lumbar region, unspecified whether neurogenic claudication present  - hydroxychloroquine (PLAQUENIL) 200 MG Tab; TAKE 1 TABLET BY MOUTH TWICE A DAY  Dispense: 180 Tab; Refill: 1    10. Tobacco abuse  Tobacco abuse is known to exacerbate rheumatoid arthritis, 20 minute discussion with patient regarding the need to stop tobacco abuse, patient states understanding  Of particular concern is that patient sister recently had a stroke, advised patient that tobacco abuse significantly increases risk of stroke, patient states understanding    Followup: Return in about 6 months (around 7/12/2021). or sooner amada Murphy  was seen 30 minutes face-to-face of which more than 50% of the time was spent counseling the patient (excluding time for procedures)  regarding  rheumatological condition and care. Therapy was discussed in detail.      Please note that this dictation was  created using voice recognition software. I have made every reasonable attempt to correct obvious errors, but I expect that there are errors of grammar and possibly content that I did not discover before finalizing the note.

## 2021-02-10 ENCOUNTER — APPOINTMENT (OUTPATIENT)
Dept: RADIOLOGY | Facility: IMAGING CENTER | Age: 61
End: 2021-02-10
Attending: NURSE PRACTITIONER
Payer: COMMERCIAL

## 2021-02-10 ENCOUNTER — OFFICE VISIT (OUTPATIENT)
Dept: URGENT CARE | Facility: CLINIC | Age: 61
End: 2021-02-10
Payer: COMMERCIAL

## 2021-02-10 VITALS
HEIGHT: 64 IN | DIASTOLIC BLOOD PRESSURE: 90 MMHG | HEART RATE: 87 BPM | BODY MASS INDEX: 27.83 KG/M2 | OXYGEN SATURATION: 97 % | WEIGHT: 163 LBS | SYSTOLIC BLOOD PRESSURE: 130 MMHG | TEMPERATURE: 98 F

## 2021-02-10 DIAGNOSIS — M79.672 ACUTE PAIN OF LEFT FOOT: ICD-10-CM

## 2021-02-10 DIAGNOSIS — M79.672 LEFT FOOT PAIN: ICD-10-CM

## 2021-02-10 PROCEDURE — 73630 X-RAY EXAM OF FOOT: CPT | Mod: TC,LT | Performed by: NURSE PRACTITIONER

## 2021-02-10 PROCEDURE — 99213 OFFICE O/P EST LOW 20 MIN: CPT | Performed by: NURSE PRACTITIONER

## 2021-02-10 ASSESSMENT — FIBROSIS 4 INDEX: FIB4 SCORE: 1.6

## 2021-02-11 NOTE — PROGRESS NOTES
Chief Complaint   Patient presents with   • Foot Pain     Left foot. Pt. states she was walking and all of a sudden she felt very painful pain in her foot near pinky toe.        HISTORY OF PRESENT ILLNESS: Patient is a 61 y.o. female who presents to urgent care today with complaints of left foot pain.  The patient was ambulating earlier tonight when she suddenly felt pain to the distal aspect of her left fifth metatarsal with pain radiating up to the toe.  She denies any significant injury but notes she is walking faster than normal.  She has had pain to the area since.  Denies any numbness, tingling, fever, chills, malaise.  She has not tried any medication for symptom relief.    Patient Active Problem List    Diagnosis Date Noted   • Surgical site infection 08/11/2018   • Immunocompromised ([facility]) 04/09/2020   • Acute vaginitis 10/15/2019   • Left ear pain 10/15/2019   • Fungal infection of toenail 12/20/2018   • Hordeolum externum of right upper eyelid 12/20/2018   • Gastroesophageal reflux disease without esophagitis 11/07/2018   • Other viral warts 11/07/2018   • Acoustic neuroma ([facility]) 10/17/2018   • Allergic sinusitis 06/25/2018   • Subclinical hypothyroidism 06/25/2018   • Long-term use of Plaquenil 02/01/2018   • Spinal stenosis of lumbar region 12/12/2017   • Internal derangement of left knee 12/12/2017   • Obesity (BMI 30-39.9) 11/17/2017   • Thrombocytopenia ([facility]) 10/05/2017   • Tobacco abuse 08/05/2015   • Seasonal allergies 06/17/2015   • Hypertriglyceridemia 09/29/2014   • Genital herpes in women 02/11/2014   • Jaw pain 08/04/2013   • High risk medications (not anticoagulants) long-term use 08/29/2012   • Post-cholecystectomy syndrome 05/23/2012   • Vitamin D insufficiency 05/15/2012   • Menopause 09/15/2011   • Macrocytosis without anemia 09/14/2011   • Acquired hypothyroidism 09/14/2011   • RA (rheumatoid arthritis) ([facility]) 09/14/2011       Allergies:Morphine, Aspirin, Codeine, Tramadol, Sulfamethoxazole  "w-trimethoprim, and Other drug    Current Outpatient Medications Ordered in Epic   Medication Sig Dispense Refill   • cimetidine (TAGAMET) 400 MG Tab TAKE 1 TABLET BY MOUTH TWICE A DAY 60 Tab 2   • Leucovorin Calcium 10 MG Tab 1 tab po qweek 12 Tab 3   • hydroxychloroquine (PLAQUENIL) 200 MG Tab TAKE 1 TABLET BY MOUTH TWICE A  Tab 1   • levothyroxine (SYNTHROID) 125 MCG Tab TAKE 1 TABLET BY MOUTH EVERY MORNING ON AN EMPTY STOMACH. 90 Tab 1   • fluconazole (DIFLUCAN) 150 MG tablet Take 1 Tab by mouth every day. (Patient not taking: Reported on 12/14/2020) 1 Tab 1   • leflunomide (ARAVA) 20 MG Tab Take 1 Tab by mouth every day. 90 Tab 0   • cholestyramine (QUESTRAN) 4 g packet Take 4 g by mouth every day. 60 Each 2   • cetirizine (ZYRTEC) 10 MG Tab Take 10 mg by mouth 1 time daily as needed for Allergies.     • Cholecalciferol (VITAMIN D3) 5000 units Tab Take 5,000 Int'l Units by mouth every day at 6 PM.     • Folic Acid 0.8 MG Cap Take 0.8-1.6 mg by mouth every day at 6 PM. takes 1 pill daily and alternates every other day 2 pills daily     • ibuprofen (MOTRIN) 800 MG Tab Take 800 mg by mouth every 8 hours as needed for Mild Pain.     • acetaminophen (TYLENOL) 500 MG Tab Take 500-1,000 mg by mouth 2 times a day as needed for Mild Pain.       No current Epic-ordered facility-administered medications on file.       Past Medical History:   Diagnosis Date   • Anesthesia     hx of difficulty waking up   • Asthma 02/21/2018    Inhaler use during allergy season.   • Breath shortness    • Bronchitis 2016   • Cough 07/13/2018    \"from allergies\"   • Environmental and seasonal allergies    • Genital herpes in women 2/11/2014   • Heart burn    • High risk medications (not anticoagulants) long-term use 8/29/2012   • Hypothyroid 9/14/2011   • Kidney stones    • Macrocytosis without anemia med effect   • Menopause 9/15/2011   • Pain 07/13/2018    back, hands, feet, knees, ankles, 5/10   • Pneumonia 01/2015   • " "Post-cholecystectomy syndrome     \"dumping syndrome\"   • Rheumatoid arthritis(714.0)    • Snoring     no sleep study   • Tobacco abuse, episodic 2011   • Vitamin d deficiency 5/15/2012       Social History     Tobacco Use   • Smoking status: Current Every Day Smoker     Packs/day: 0.50     Years: 40.00     Pack years: 20.00     Types: Cigarettes   • Smokeless tobacco: Never Used   Substance Use Topics   • Alcohol use: Yes     Alcohol/week: 0.6 oz     Types: 1 Glasses of wine per week     Comment: occasional, maybe 1 per month   • Drug use: No       Family Status   Relation Name Status   • Mo     • Fa     • Sis  Alive   • Sis  Alive     Family History   Problem Relation Age of Onset   • Lung Disease Mother         COPD   • Hyperlipidemia Mother    • Hypertension Mother    • Cancer Father         Prostate   • Diabetes Father    • Heart Disease Father    • Genetic Disorder Sister        ROS:  Review of Systems   Constitutional: Negative for fever, chills, weight loss, malaise, and fatigue.   HENT: Negative for ear pain, nosebleeds, congestion, sore throat and neck pain.    Eyes: Negative for vision changes.   Neuro: Negative for headache, sensory changes, weakness, seizure, LOC.   Cardiovascular: Negative for chest pain, palpitations, orthopnea and leg swelling.   Respiratory: Negative for cough, sputum production, shortness of breath and wheezing.   Gastrointestinal: Negative for abdominal pain, nausea, vomiting or diarrhea.   Genitourinary: Negative for dysuria, urgency and frequency.  Musculoskeletal: Positive for left foot pain.  Negative for falls, neck pain, back pain, joint pain, myalgias.   Skin: Negative for rash, diaphoresis.     Exam:  /90   Pulse 87   Temp 36.7 °C (98 °F) (Temporal)   Ht 1.626 m (5' 4\")   Wt 73.9 kg (163 lb)   SpO2 97%   General: well-nourished, well-developed female in NAD  Head: normocephalic, atraumatic  Eyes: PERRLA, no conjunctival injection, acuity " "grossly intact, lids normal.  Ears: normal shape and symmetry, no tenderness, no discharge. External canals are without any significant edema or erythema. Tympanic membranes are without any inflammation, no effusion. Gross auditory acuity is intact.  Nose: symmetrical without tenderness, no discharge.  Mouth/Throat: reasonable hygiene, no erythema, exudates or tonsillar enlargement.  Neck: no masses, range of motion within normal limits, no tracheal deviation. No obvious thyroid enlargement.   Lymph: no cervical adenopathy. No supraclavicular adenopathy.   Neuro: alert and oriented. Cranial nerves 1-12 grossly intact. No sensory deficit.   Cardiovascular: regular rate and rhythm. No edema.  Pulmonary: no distress. Chest is symmetrical with respiration, no wheezes, crackles, or rhonchi.   Musculoskeletal: no clubbing, appropriate muscle tone, gait is antalgic.  Left foot: There is tenderness noted to distal aspect of fifth metatarsal and base of fifth toe.  Distal neurovascular is intact, cap refill is brisk.  Skin: warm, dry, intact, no clubbing, no cyanosis, no rashes.   Psych: appropriate mood, affect, judgement.       DX left foot radiology reading \"1.  No acute traumatic bony injury. 2.  Hallux valgus\"      Assessment/Plan:  1. Acute pain of left foot  DX-FOOT-COMPLETE 3+ LEFT    REFERRAL TO ORTHOPEDICS         Patient presents with acute onset of left foot pain.  X-ray today is negative for any acute process.  Patient placed in a short walking boot, RICE.  OTC Tylenol.  Referral to orthopedics is placed for follow-up.  Supportive care, differential diagnoses, and indications for immediate follow-up discussed with patient.   Pathogenesis of diagnosis discussed including typical length and natural progression.   Instructed to return to clinic or nearest emergency department for any change in condition, further concerns, or worsening of symptoms.  Patient states understanding of the plan of care and discharge " instructions.          Please note that this dictation was created using voice recognition software. I have made every reasonable attempt to correct obvious errors, but I expect that there are errors of grammar and possibly content that I did not discover before finalizing the note.      FREDDY Alvarez.

## 2021-03-12 ENCOUNTER — PATIENT MESSAGE (OUTPATIENT)
Dept: MEDICAL GROUP | Facility: LAB | Age: 61
End: 2021-03-12

## 2021-03-12 DIAGNOSIS — E03.9 ACQUIRED HYPOTHYROIDISM: ICD-10-CM

## 2021-03-12 NOTE — TELEPHONE ENCOUNTER
From: Clifford Murphy  To: Physician Tish Smallwood  Sent: 3/12/2021 11:18 AM PST  Subject: Non-Urgent Medical Question    Hi Dr. Smallwood,    Would you put a lab request in for checking my thyroid. I have a lab req in already for my RA panel and want to do both at the same time.     Thank you,    Clifford Murphy

## 2021-03-12 NOTE — PATIENT COMMUNICATION
Patient inquiring about lab work to check thyroid.     TSH and Free thyroxine orders pended.     Please sign/ammend if appropriate.

## 2021-03-15 DIAGNOSIS — Z23 NEED FOR VACCINATION: ICD-10-CM

## 2021-03-18 ENCOUNTER — HOSPITAL ENCOUNTER (OUTPATIENT)
Dept: LAB | Facility: MEDICAL CENTER | Age: 61
End: 2021-03-18
Attending: FAMILY MEDICINE
Payer: COMMERCIAL

## 2021-03-18 ENCOUNTER — HOSPITAL ENCOUNTER (OUTPATIENT)
Dept: LAB | Facility: MEDICAL CENTER | Age: 61
End: 2021-03-18
Attending: INTERNAL MEDICINE
Payer: COMMERCIAL

## 2021-03-18 DIAGNOSIS — R71.8 ELEVATED MCV: ICD-10-CM

## 2021-03-18 DIAGNOSIS — Z51.81 ENCOUNTER FOR MONITORING ARAVA THERAPY: ICD-10-CM

## 2021-03-18 DIAGNOSIS — M06.9 RHEUMATOID ARTHRITIS INVOLVING MULTIPLE SITES (HCC): ICD-10-CM

## 2021-03-18 DIAGNOSIS — E03.9 ACQUIRED HYPOTHYROIDISM: ICD-10-CM

## 2021-03-18 DIAGNOSIS — Z79.899 ENCOUNTER FOR MONITORING ARAVA THERAPY: ICD-10-CM

## 2021-03-18 DIAGNOSIS — M06.9 RHEUMATOID ARTHRITIS, INVOLVING UNSPECIFIED SITE, UNSPECIFIED WHETHER RHEUMATOID FACTOR PRESENT (HCC): ICD-10-CM

## 2021-03-18 LAB
ALBUMIN SERPL BCP-MCNC: 4.1 G/DL (ref 3.2–4.9)
ALBUMIN/GLOB SERPL: 1.4 G/DL
ALP SERPL-CCNC: 71 U/L (ref 30–99)
ALT SERPL-CCNC: 12 U/L (ref 2–50)
ANION GAP SERPL CALC-SCNC: 8 MMOL/L (ref 7–16)
AST SERPL-CCNC: 17 U/L (ref 12–45)
BASOPHILS # BLD AUTO: 1.1 % (ref 0–1.8)
BASOPHILS # BLD: 0.06 K/UL (ref 0–0.12)
BILIRUB SERPL-MCNC: 0.5 MG/DL (ref 0.1–1.5)
BUN SERPL-MCNC: 19 MG/DL (ref 8–22)
CALCIUM SERPL-MCNC: 9.7 MG/DL (ref 8.5–10.5)
CHLORIDE SERPL-SCNC: 104 MMOL/L (ref 96–112)
CO2 SERPL-SCNC: 27 MMOL/L (ref 20–33)
CREAT SERPL-MCNC: 0.82 MG/DL (ref 0.5–1.4)
EOSINOPHIL # BLD AUTO: 0 K/UL (ref 0–0.51)
EOSINOPHIL NFR BLD: 0 % (ref 0–6.9)
ERYTHROCYTE [DISTWIDTH] IN BLOOD BY AUTOMATED COUNT: 48.7 FL (ref 35.9–50)
ERYTHROCYTE [SEDIMENTATION RATE] IN BLOOD BY WESTERGREN METHOD: 7 MM/HOUR (ref 0–30)
FOLATE SERPL-MCNC: 33.2 NG/ML
GLOBULIN SER CALC-MCNC: 2.9 G/DL (ref 1.9–3.5)
GLUCOSE SERPL-MCNC: 84 MG/DL (ref 65–99)
HCT VFR BLD AUTO: 47.2 % (ref 37–47)
HGB BLD-MCNC: 15 G/DL (ref 12–16)
IMM GRANULOCYTES # BLD AUTO: 0.01 K/UL (ref 0–0.11)
IMM GRANULOCYTES NFR BLD AUTO: 0.2 % (ref 0–0.9)
LYMPHOCYTES # BLD AUTO: 1.27 K/UL (ref 1–4.8)
LYMPHOCYTES NFR BLD: 22.8 % (ref 22–41)
MCH RBC QN AUTO: 31.8 PG (ref 27–33)
MCHC RBC AUTO-ENTMCNC: 31.8 G/DL (ref 33.6–35)
MCV RBC AUTO: 100.2 FL (ref 81.4–97.8)
MONOCYTES # BLD AUTO: 0.49 K/UL (ref 0–0.85)
MONOCYTES NFR BLD AUTO: 8.8 % (ref 0–13.4)
NEUTROPHILS # BLD AUTO: 3.74 K/UL (ref 2–7.15)
NEUTROPHILS NFR BLD: 67.1 % (ref 44–72)
NRBC # BLD AUTO: 0 K/UL
NRBC BLD-RTO: 0 /100 WBC
PLATELET # BLD AUTO: 164 K/UL (ref 164–446)
PMV BLD AUTO: 12.2 FL (ref 9–12.9)
POTASSIUM SERPL-SCNC: 4.1 MMOL/L (ref 3.6–5.5)
PROT SERPL-MCNC: 7 G/DL (ref 6–8.2)
RBC # BLD AUTO: 4.71 M/UL (ref 4.2–5.4)
SODIUM SERPL-SCNC: 139 MMOL/L (ref 135–145)
T4 FREE SERPL-MCNC: 1.71 NG/DL (ref 0.93–1.7)
TSH SERPL DL<=0.005 MIU/L-ACNC: 5.26 UIU/ML (ref 0.38–5.33)
VIT B12 SERPL-MCNC: 334 PG/ML (ref 211–911)
WBC # BLD AUTO: 5.6 K/UL (ref 4.8–10.8)

## 2021-03-18 PROCEDURE — 85652 RBC SED RATE AUTOMATED: CPT

## 2021-03-18 PROCEDURE — 84425 ASSAY OF VITAMIN B-1: CPT

## 2021-03-18 PROCEDURE — 36415 COLL VENOUS BLD VENIPUNCTURE: CPT

## 2021-03-18 PROCEDURE — 82607 VITAMIN B-12: CPT

## 2021-03-18 PROCEDURE — 80053 COMPREHEN METABOLIC PANEL: CPT

## 2021-03-18 PROCEDURE — 84439 ASSAY OF FREE THYROXINE: CPT

## 2021-03-18 PROCEDURE — 85025 COMPLETE CBC W/AUTO DIFF WBC: CPT

## 2021-03-18 PROCEDURE — 84443 ASSAY THYROID STIM HORMONE: CPT

## 2021-03-18 PROCEDURE — 82746 ASSAY OF FOLIC ACID SERUM: CPT

## 2021-03-22 LAB — VIT B1 BLD-MCNC: 125 NMOL/L (ref 70–180)

## 2021-04-29 NOTE — CARE PLAN
Problem: Pain Management  Goal: Pain level will decrease to patient's comfort goal  Outcome: PROGRESSING AS EXPECTED         none

## 2021-05-04 ENCOUNTER — OFFICE VISIT (OUTPATIENT)
Dept: MEDICAL GROUP | Facility: LAB | Age: 61
End: 2021-05-04
Payer: COMMERCIAL

## 2021-05-04 ENCOUNTER — HOSPITAL ENCOUNTER (OUTPATIENT)
Dept: LAB | Facility: MEDICAL CENTER | Age: 61
End: 2021-05-04
Attending: FAMILY MEDICINE
Payer: COMMERCIAL

## 2021-05-04 VITALS
HEIGHT: 64 IN | OXYGEN SATURATION: 94 % | DIASTOLIC BLOOD PRESSURE: 80 MMHG | RESPIRATION RATE: 16 BRPM | TEMPERATURE: 97.6 F | HEART RATE: 77 BPM | WEIGHT: 164 LBS | BODY MASS INDEX: 28 KG/M2 | SYSTOLIC BLOOD PRESSURE: 130 MMHG

## 2021-05-04 DIAGNOSIS — D33.3 ACOUSTIC NEUROMA (HCC): ICD-10-CM

## 2021-05-04 DIAGNOSIS — E03.9 ACQUIRED HYPOTHYROIDISM: ICD-10-CM

## 2021-05-04 DIAGNOSIS — H81.4 VERTIGO OF CENTRAL ORIGIN: ICD-10-CM

## 2021-05-04 DIAGNOSIS — Z12.31 ENCOUNTER FOR SCREENING MAMMOGRAM FOR BREAST CANCER: ICD-10-CM

## 2021-05-04 DIAGNOSIS — H91.92 HEARING LOSS OF LEFT EAR, UNSPECIFIED HEARING LOSS TYPE: ICD-10-CM

## 2021-05-04 DIAGNOSIS — K21.9 GASTROESOPHAGEAL REFLUX DISEASE WITHOUT ESOPHAGITIS: ICD-10-CM

## 2021-05-04 DIAGNOSIS — Z78.0 POSTMENOPAUSAL: ICD-10-CM

## 2021-05-04 DIAGNOSIS — M81.8 OTHER OSTEOPOROSIS WITHOUT CURRENT PATHOLOGICAL FRACTURE: ICD-10-CM

## 2021-05-04 PROBLEM — R42 VERTIGO: Status: ACTIVE | Noted: 2021-05-04

## 2021-05-04 PROBLEM — R42 VERTIGO: Status: RESOLVED | Noted: 2021-05-04 | Resolved: 2021-05-04

## 2021-05-04 LAB
T3 SERPL-MCNC: 86.6 NG/DL (ref 60–181)
T4 FREE SERPL-MCNC: 1.63 NG/DL (ref 0.93–1.7)
TSH SERPL DL<=0.005 MIU/L-ACNC: 3.94 UIU/ML (ref 0.38–5.33)

## 2021-05-04 PROCEDURE — 86376 MICROSOMAL ANTIBODY EACH: CPT

## 2021-05-04 PROCEDURE — 99214 OFFICE O/P EST MOD 30 MIN: CPT | Performed by: FAMILY MEDICINE

## 2021-05-04 PROCEDURE — 36415 COLL VENOUS BLD VENIPUNCTURE: CPT

## 2021-05-04 PROCEDURE — 84480 ASSAY TRIIODOTHYRONINE (T3): CPT

## 2021-05-04 PROCEDURE — 84443 ASSAY THYROID STIM HORMONE: CPT

## 2021-05-04 PROCEDURE — 84439 ASSAY OF FREE THYROXINE: CPT

## 2021-05-04 PROCEDURE — 86800 THYROGLOBULIN ANTIBODY: CPT

## 2021-05-04 RX ORDER — CIMETIDINE 400 MG/1
TABLET, FILM COATED ORAL
Qty: 180 TABLET | Refills: 3 | Status: SHIPPED | OUTPATIENT
Start: 2021-05-04 | End: 2022-05-12

## 2021-05-04 ASSESSMENT — PATIENT HEALTH QUESTIONNAIRE - PHQ9: CLINICAL INTERPRETATION OF PHQ2 SCORE: 0

## 2021-05-04 ASSESSMENT — FIBROSIS 4 INDEX: FIB4 SCORE: 1.89

## 2021-05-06 LAB
THYROGLOB AB SERPL-ACNC: 321.6 IU/ML (ref 0–4)
THYROPEROXIDASE AB SERPL-ACNC: 3240 IU/ML (ref 0–9)

## 2021-05-10 DIAGNOSIS — E03.9 ACQUIRED HYPOTHYROIDISM: ICD-10-CM

## 2021-05-10 DIAGNOSIS — R76.8 THYROID ANTIBODY POSITIVE: ICD-10-CM

## 2021-05-10 NOTE — ASSESSMENT & PLAN NOTE
Stable.Takes Tagamet 400 mg daily as directed. Denies side effects.  Denies early satiety, unintentional weight loss, choking, persistent burning pain in chest or upper abdomen.

## 2021-05-10 NOTE — PROGRESS NOTES
Subjective:     Chief Complaint   Patient presents with   • Medication Refill     Tagamet        Clifford Murphy is a 61 y.o. female here today for evaluation and management of:    Acoustic neuroma (HCC)  Patient has an acoustic neuroma on the left-hand side that is being followed by [place].  She is overdue for a repeat MRI.   She is feeling as if her hearing has worsened on that side and her tinnitus is improved.  Additionally she is having more vertigo.  She has been good about hydration so she does not feel like it is related to that.    Acquired hypothyroidism  Stable. Currently taking levothyroxine 125 mcg daily as prescribed.  Denies palpitations, skin changes, temperature intolerance, or changes in bowel habits        Gastroesophageal reflux disease without esophagitis  Stable.Takes Tagamet 400 mg daily as directed. Denies side effects.  Denies early satiety, unintentional weight loss, choking, persistent burning pain in chest or upper abdomen.         Allergies   Allergen Reactions   • Morphine Anaphylaxis     Morphine and morphine derivatives. (demerol)   • Aspirin      Stomach pain   • Codeine      Stomach pain   • Tramadol Vomiting     SEVERE HEADACHE   • Sulfamethoxazole W-Trimethoprim Shortness of Breath     Rxn - flushing, SOB  Late 80's     • Other Drug      Muscle relaxants cause dizziness         Current medicines (including changes today)  Current Outpatient Medications   Medication Sig Dispense Refill   • cimetidine (TAGAMET) 400 MG Tab TAKE 1 TABLET BY MOUTH TWICE A  tablet 3   • leflunomide (ARAVA) 20 MG Tab Take 1 tablet by mouth every day. 90 tablet 0   • Leucovorin Calcium 10 MG Tab 1 tab po qweek 12 Tab 3   • hydroxychloroquine (PLAQUENIL) 200 MG Tab TAKE 1 TABLET BY MOUTH TWICE A  Tab 1   • levothyroxine (SYNTHROID) 125 MCG Tab TAKE 1 TABLET BY MOUTH EVERY MORNING ON AN EMPTY STOMACH. 90 Tab 1   • cholestyramine (QUESTRAN) 4 g packet Take 4 g by mouth every day. 60 Each 2    • cetirizine (ZYRTEC) 10 MG Tab Take 10 mg by mouth 1 time daily as needed for Allergies.     • Cholecalciferol (VITAMIN D3) 5000 units Tab Take 5,000 Int'l Units by mouth every day at 6 PM.     • Folic Acid 0.8 MG Cap Take 0.8-1.6 mg by mouth every day at 6 PM. takes 1 pill daily and alternates every other day 2 pills daily     • ibuprofen (MOTRIN) 800 MG Tab Take 800 mg by mouth every 8 hours as needed for Mild Pain.     • acetaminophen (TYLENOL) 500 MG Tab Take 500-1,000 mg by mouth 2 times a day as needed for Mild Pain.       No current facility-administered medications for this visit.       She  has a past medical history of Anesthesia, Asthma (02/21/2018), Breath shortness, Bronchitis (2016), Cough (07/13/2018), Environmental and seasonal allergies, Genital herpes in women (2/11/2014), Heart burn, High risk medications (not anticoagulants) long-term use (8/29/2012), Hypothyroid (9/14/2011), Kidney stones, Macrocytosis without anemia (med effect), Menopause (9/15/2011), Pain (07/13/2018), Pneumonia (01/2015), Post-cholecystectomy syndrome, Rheumatoid arthritis(714.0), Snoring, Tobacco abuse, episodic (9/14/2011), and Vitamin d deficiency (5/15/2012).    Patient Active Problem List    Diagnosis Date Noted   • Surgical site infection 08/11/2018     Priority: High   • Hearing loss of left ear 05/04/2021   • Vertigo of central origin 05/04/2021   • Immunocompromised (HCC) 04/09/2020   • Acute vaginitis 10/15/2019   • Left ear pain 10/15/2019   • Fungal infection of toenail 12/20/2018   • Hordeolum externum of right upper eyelid 12/20/2018   • Gastroesophageal reflux disease without esophagitis 11/07/2018   • Other viral warts 11/07/2018   • Acoustic neuroma (HCC) 10/17/2018   • Allergic sinusitis 06/25/2018   • Subclinical hypothyroidism 06/25/2018   • Long-term use of Plaquenil 02/01/2018   • Spinal stenosis of lumbar region 12/12/2017   • Internal derangement of left knee 12/12/2017   • Obesity (BMI 30-39.9)  "11/17/2017   • Thrombocytopenia (HCC) 10/05/2017   • Tobacco abuse 08/05/2015   • Seasonal allergies 06/17/2015   • Hypertriglyceridemia 09/29/2014   • Encounter for long-term (current) use of insulin (HCC) 09/15/2014   • Genital herpes in women 02/11/2014   • Jaw pain 08/04/2013   • High risk medications (not anticoagulants) long-term use 08/29/2012   • Post-cholecystectomy syndrome 05/23/2012   • Vitamin D insufficiency 05/15/2012   • Menopause 09/15/2011   • Macrocytosis without anemia 09/14/2011   • Acquired hypothyroidism 09/14/2011   • RA (rheumatoid arthritis) ([facility]) 09/14/2011       ROS   No fever or chills.  No nausea or vomiting.  No chest pain or palpitations.  No cough or SOB.  No pain with urination or hematuria.  No black or bloody stools.       Objective:     /80 (BP Location: Right arm, Patient Position: Sitting, BP Cuff Size: Adult)   Pulse 77   Temp 36.4 °C (97.6 °F) (Temporal)   Resp 16   Ht 1.626 m (5' 4\")   Wt 74.4 kg (164 lb)   SpO2 94%  Body mass index is 28.15 kg/m².   Physical Exam:  Well developed, well nourished.  Alert, oriented in no acute distress.  Eye contact is good, speech goal directed, affect calm  Eyes: conjunctiva non-injected, sclera non-icteric.  PERRL.  EOMs intact  Ears: Pinna normal. TM pearly gray.   Neck Supple.  No adenopathy or masses in the neck or supraclavicular regions. No thyromegaly  Lungs: clear to auscultation bilaterally with good excursion. No wheezes or rhonchi  CV: regular rate and rhythm. No murmur  Abdomen: soft, nontender, no masses or organomegaly.  No rebound or guarding            Assessment and Plan:   The following treatment plan was discussed    1. Acoustic neuroma (HCC)  Patient is overdue for an MRI.  MRI ordered.  Recommend follow-up with [place] as she is become more symptomatic.  - MR-BRAIN IAC'S W/WO; Future    2. Hearing loss of left ear, unspecified hearing loss type  Patient is overdue for an MRI.  MRI ordered.  Recommend " follow-up with [facility] as she is become more symptomatic.  - MR-BRAIN IAC'S W/WO; Future    3. Vertigo of central origin  Patient is overdue for an MRI.  MRI ordered.  Recommend follow-up with [facility] as she is become more symptomatic.  - MR-BRAIN IAC'S W/WO; Future    4. Acquired hypothyroidism  Patient has had some increased fatigue.  Check labs and adjust dose as needed  - TSH; Future  - FREE THYROXINE; Future  - THYROID PEROXIDASE  (TPO) AB; Future  - ANTITHYROGLOBULIN AB; Future  - TRIIDOTHYRONINE; Future    5. Other osteoporosis without current pathological fracture  Schedule bone density  - DS-BONE DENSITY STUDY (DEXA); Future    6. Postmenopausal  Schedule bone density.  Continue weightbearing exercise, calcium and vitamin D supplementation  - DS-BONE DENSITY STUDY (DEXA); Future    7. Gastroesophageal reflux disease without esophagitis  Discussed dietary modifications.  Continue cimetidine twice a day  - cimetidine (TAGAMET) 400 MG Tab; TAKE 1 TABLET BY MOUTH TWICE A DAY  Dispense: 180 tablet; Refill: 3    8. Encounter for screening mammogram for breast cancer    - MA-SCREENING MAMMO BILAT W/CAD; Future    Any change or worsening of signs or symptoms, patient encouraged to follow-up or report to the emergency room for further evaluation. Patient understands and agrees.    Followup: Return in about 6 months (around 11/4/2021).

## 2021-05-10 NOTE — ASSESSMENT & PLAN NOTE
Patient has an acoustic neuroma on the left-hand side that is being followed by [facility].  She is overdue for a repeat MRI.   She is feeling as if her hearing has worsened on that side and her tinnitus is improved.  Additionally she is having more vertigo.  She has been good about hydration so she does not feel like it is related to that.

## 2021-05-20 DIAGNOSIS — E03.9 ACQUIRED HYPOTHYROIDISM: ICD-10-CM

## 2021-05-20 RX ORDER — LEVOTHYROXINE SODIUM 0.12 MG/1
TABLET ORAL
Qty: 90 TABLET | Refills: 3 | Status: SHIPPED | OUTPATIENT
Start: 2021-05-20 | End: 2021-10-25 | Stop reason: SDUPTHER

## 2021-05-20 NOTE — TELEPHONE ENCOUNTER
Received request via: Pharmacy    Was the patient seen in the last year in this department? Yes 5/4/2021    Does the patient have an active prescription (recently filled or refills available) for medication(s) requested? No

## 2021-06-07 ENCOUNTER — APPOINTMENT (OUTPATIENT)
Dept: RADIOLOGY | Facility: MEDICAL CENTER | Age: 61
End: 2021-06-07
Attending: EMERGENCY MEDICINE
Payer: COMMERCIAL

## 2021-06-07 ENCOUNTER — HOSPITAL ENCOUNTER (EMERGENCY)
Facility: MEDICAL CENTER | Age: 61
End: 2021-06-07
Attending: EMERGENCY MEDICINE
Payer: COMMERCIAL

## 2021-06-07 VITALS
DIASTOLIC BLOOD PRESSURE: 93 MMHG | HEIGHT: 64 IN | SYSTOLIC BLOOD PRESSURE: 174 MMHG | RESPIRATION RATE: 18 BRPM | OXYGEN SATURATION: 94 % | TEMPERATURE: 98.2 F | BODY MASS INDEX: 27.33 KG/M2 | WEIGHT: 160.05 LBS | HEART RATE: 68 BPM

## 2021-06-07 DIAGNOSIS — R10.31 RIGHT LOWER QUADRANT ABDOMINAL PAIN: ICD-10-CM

## 2021-06-07 DIAGNOSIS — I10 ESSENTIAL HYPERTENSION: ICD-10-CM

## 2021-06-07 LAB
ALBUMIN SERPL BCP-MCNC: 4.3 G/DL (ref 3.2–4.9)
ALBUMIN/GLOB SERPL: 1.5 G/DL
ALP SERPL-CCNC: 65 U/L (ref 30–99)
ALT SERPL-CCNC: 21 U/L (ref 2–50)
ANION GAP SERPL CALC-SCNC: 10 MMOL/L (ref 7–16)
APPEARANCE UR: CLEAR
AST SERPL-CCNC: 32 U/L (ref 12–45)
BASOPHILS # BLD AUTO: 0.8 % (ref 0–1.8)
BASOPHILS # BLD: 0.04 K/UL (ref 0–0.12)
BILIRUB SERPL-MCNC: 0.5 MG/DL (ref 0.1–1.5)
BILIRUB UR QL STRIP.AUTO: NEGATIVE
BUN SERPL-MCNC: 21 MG/DL (ref 8–22)
CALCIUM SERPL-MCNC: 9.3 MG/DL (ref 8.4–10.2)
CHLORIDE SERPL-SCNC: 107 MMOL/L (ref 96–112)
CO2 SERPL-SCNC: 25 MMOL/L (ref 20–33)
COLOR UR: YELLOW
CREAT SERPL-MCNC: 0.73 MG/DL (ref 0.5–1.4)
EOSINOPHIL # BLD AUTO: 0.02 K/UL (ref 0–0.51)
EOSINOPHIL NFR BLD: 0.4 % (ref 0–6.9)
ERYTHROCYTE [DISTWIDTH] IN BLOOD BY AUTOMATED COUNT: 47.3 FL (ref 35.9–50)
GLOBULIN SER CALC-MCNC: 2.8 G/DL (ref 1.9–3.5)
GLUCOSE SERPL-MCNC: 86 MG/DL (ref 65–99)
GLUCOSE UR STRIP.AUTO-MCNC: NEGATIVE MG/DL
HCT VFR BLD AUTO: 44.7 % (ref 37–47)
HGB BLD-MCNC: 14.5 G/DL (ref 12–16)
IMM GRANULOCYTES # BLD AUTO: 0.02 K/UL (ref 0–0.11)
IMM GRANULOCYTES NFR BLD AUTO: 0.4 % (ref 0–0.9)
KETONES UR STRIP.AUTO-MCNC: NEGATIVE MG/DL
LEUKOCYTE ESTERASE UR QL STRIP.AUTO: NEGATIVE
LIPASE SERPL-CCNC: 28 U/L (ref 7–58)
LYMPHOCYTES # BLD AUTO: 1.75 K/UL (ref 1–4.8)
LYMPHOCYTES NFR BLD: 36.2 % (ref 22–41)
MCH RBC QN AUTO: 33 PG (ref 27–33)
MCHC RBC AUTO-ENTMCNC: 32.4 G/DL (ref 33.6–35)
MCV RBC AUTO: 101.6 FL (ref 81.4–97.8)
MICRO URNS: NORMAL
MONOCYTES # BLD AUTO: 0.48 K/UL (ref 0–0.85)
MONOCYTES NFR BLD AUTO: 9.9 % (ref 0–13.4)
NEUTROPHILS # BLD AUTO: 2.52 K/UL (ref 2–7.15)
NEUTROPHILS NFR BLD: 52.3 % (ref 44–72)
NITRITE UR QL STRIP.AUTO: NEGATIVE
NRBC # BLD AUTO: 0 K/UL
NRBC BLD-RTO: 0 /100 WBC
PH UR STRIP.AUTO: 6 [PH] (ref 5–8)
PLATELET # BLD AUTO: 146 K/UL (ref 164–446)
PMV BLD AUTO: 11.8 FL (ref 9–12.9)
POTASSIUM SERPL-SCNC: 3.8 MMOL/L (ref 3.6–5.5)
PROT SERPL-MCNC: 7.1 G/DL (ref 6–8.2)
PROT UR QL STRIP: NEGATIVE MG/DL
RBC # BLD AUTO: 4.4 M/UL (ref 4.2–5.4)
RBC UR QL AUTO: NEGATIVE
SODIUM SERPL-SCNC: 142 MMOL/L (ref 135–145)
SP GR UR STRIP.AUTO: <=1.005
WBC # BLD AUTO: 4.8 K/UL (ref 4.8–10.8)

## 2021-06-07 PROCEDURE — 80053 COMPREHEN METABOLIC PANEL: CPT

## 2021-06-07 PROCEDURE — 83690 ASSAY OF LIPASE: CPT

## 2021-06-07 PROCEDURE — 74177 CT ABD & PELVIS W/CONTRAST: CPT

## 2021-06-07 PROCEDURE — 76856 US EXAM PELVIC COMPLETE: CPT

## 2021-06-07 PROCEDURE — 36415 COLL VENOUS BLD VENIPUNCTURE: CPT

## 2021-06-07 PROCEDURE — 85025 COMPLETE CBC W/AUTO DIFF WBC: CPT

## 2021-06-07 PROCEDURE — 81003 URINALYSIS AUTO W/O SCOPE: CPT

## 2021-06-07 PROCEDURE — 99284 EMERGENCY DEPT VISIT MOD MDM: CPT

## 2021-06-07 PROCEDURE — 700117 HCHG RX CONTRAST REV CODE 255: Performed by: EMERGENCY MEDICINE

## 2021-06-07 RX ADMIN — IOHEXOL 100 ML: 350 INJECTION, SOLUTION INTRAVENOUS at 18:57

## 2021-06-07 ASSESSMENT — FIBROSIS 4 INDEX: FIB4 SCORE: 1.89

## 2021-06-08 NOTE — ED PROVIDER NOTES
"ED Provider Note    CHIEF COMPLAINT  Chief Complaint   Patient presents with   • RLQ Pain     started 3 days ago       HPI  Clifford Murphy is a 61 y.o. female who presents to the emergency department complaint of right lower quadrant abdominal pain.  The patient states she had right lower quadrant pain since last Wednesday.  Has been acutely worsening over the last 3 days.  The pain is always been in the right lower quadrant.  It does not radiate.  Pain is worsened by movement, and palpation.  She had mild nausea but no vomiting.  She states that sometimes she feels like she is having hard time moving her bowels and other times she has diarrhea.  She denies any dysuria, hematuria, increased urinary frequency.  She has no fevers but she has felt hot and cold and has had some night sweats.  She has had a previous cholecystectomy and appendectomy.  She denies any known history of diverticulitis or diverticulosis.  No colonoscopy.  She still has her ovaries.  She denies any unusual weight loss.  She denies any other acute concerns or complaints.    REVIEW OF SYSTEMS  See HPI for further details. All other systems are negative.    PAST MEDICAL HISTORY  Past Medical History:   Diagnosis Date   • Anesthesia     hx of difficulty waking up   • Asthma 02/21/2018    Inhaler use during allergy season.   • Breath shortness    • Bronchitis 2016   • Cough 07/13/2018    \"from allergies\"   • Environmental and seasonal allergies    • Genital herpes in women 2/11/2014   • Heart burn    • High risk medications (not anticoagulants) long-term use 8/29/2012   • Hypothyroid 9/14/2011   • Kidney stones    • Macrocytosis without anemia med effect   • Menopause 9/15/2011   • Pain 07/13/2018    back, hands, feet, knees, ankles, 5/10   • Pneumonia 01/2015   • Post-cholecystectomy syndrome     \"dumping syndrome\"   • Rheumatoid arthritis(714.0)    • Snoring     no sleep study   • Tobacco abuse, episodic 9/14/2011   • Vitamin d deficiency " 5/15/2012       FAMILY HISTORY  Family History   Problem Relation Age of Onset   • Lung Disease Mother         COPD   • Hyperlipidemia Mother    • Hypertension Mother    • Cancer Father         Prostate   • Diabetes Father    • Heart Disease Father    • Genetic Disorder Sister        SOCIAL HISTORY  Social History     Socioeconomic History   • Marital status:      Spouse name: Not on file   • Number of children: Not on file   • Years of education: Not on file   • Highest education level: Not on file   Occupational History   • Not on file   Tobacco Use   • Smoking status: Current Every Day Smoker     Packs/day: 0.50     Years: 40.00     Pack years: 20.00     Types: Cigarettes   • Smokeless tobacco: Never Used   Substance and Sexual Activity   • Alcohol use: Yes     Alcohol/week: 0.6 oz     Types: 1 Glasses of wine per week   • Drug use: No   • Sexual activity: Yes     Partners: Male   Other Topics Concern   • Not on file   Social History Narrative        Travels a lot    . 1 child     Social Determinants of Health     Financial Resource Strain:    • Difficulty of Paying Living Expenses:    Food Insecurity:    • Worried About Running Out of Food in the Last Year:    • Ran Out of Food in the Last Year:    Transportation Needs:    • Lack of Transportation (Medical):    • Lack of Transportation (Non-Medical):    Physical Activity:    • Days of Exercise per Week:    • Minutes of Exercise per Session:    Stress:    • Feeling of Stress :    Social Connections:    • Frequency of Communication with Friends and Family:    • Frequency of Social Gatherings with Friends and Family:    • Attends [employer] Services:    • Active Member of Clubs or Organizations:    • Attends Club or Organization Meetings:    • Marital Status:    Intimate Partner Violence:    • Fear of Current or Ex-Partner:    • Emotionally Abused:    • Physically Abused:    • Sexually Abused:        SURGICAL HISTORY  Past Surgical History:   Procedure  "Laterality Date   • IRRIGATION & DEBRIDEMENT NEURO  8/16/2018    Procedure: IRRIGATION & DEBRIDEMENT NEURO - LUMBAR WOUND POST-FUSION;  Surgeon: Antonio Doran M.D.;  Location: SURGERY [facility];  Service: Orthopedics   • LUMBAR FUSION POSTERIOR  7/19/2018    Procedure: LUMBAR FUSION POSTERIOR- L4-5 TLIF;  Surgeon: Antonio Doran M.D.;  Location: SURGERY [facility];  Service: Orthopedics   • LUMBAR DECOMPRESSION  7/19/2018    Procedure: LUMBAR DECOMPRESSION- L2-S1;  Surgeon: Antonio Doran M.D.;  Location: SURGERY [facility];  Service: Orthopedics   • KYPHOPLASTY  2/22/2018    Procedure: KYPHOPLASTY - L1 and Biopsy;  Surgeon: Antonio Doran M.D.;  Location: SURGERY [facility];  Service: Orthopedics   • APPENDECTOMY  2002   • CHOLECYSTECTOMY  2002   • OTHER ORTHOPEDIC SURGERY Left 1974/1981    left knee surgery x 2   • TMJ RECONSTRUCTION BILATERAL     • TONSILLECTOMY         CURRENT MEDICATIONS  Home Medications    **Home medications have not yet been reviewed for this encounter**         ALLERGIES  Allergies   Allergen Reactions   • Morphine Anaphylaxis     Morphine and morphine derivatives. (demerol)   • Aspirin      Stomach pain   • Codeine      Stomach pain   • Tramadol Vomiting     SEVERE HEADACHE   • Sulfamethoxazole W-Trimethoprim Shortness of Breath     Rxn - flushing, SOB  Late 80's     • Other Drug      Muscle relaxants cause dizziness         PHYSICAL EXAM  VITAL SIGNS: BP (!) 178/98   Pulse 71   Temp 36.7 °C (98.1 °F) (Temporal)   Resp 16   Ht 1.626 m (5' 4\")   Wt 72.6 kg (160 lb 0.9 oz)   LMP  (LMP Unknown)   SpO2 96%   BMI 27.47 kg/m²    Constitutional: Awake alert nontoxic no acute distress  HENT: Normocephalic, Atraumatic, Bilateral external ears normal  Eyes: PERRL, EOMI, Conjunctiva normal, No discharge.   Neck: Normal range of motion,  Cardiovascular: Normal heart rate, Normal rhythm, No murmurs, No rubs, No gallops.   Thorax & Lungs: Normal breath sounds, No " respiratory distress, No wheezing,  Abdomen: Bowel sounds normal, Soft, tender in the right lower quadrant, no right upper quadrant tenderness no peritonitis.  Skin: Warm, Dry, No erythema, No rash.     Musculoskeletal: No asymmetric edema good pulses.  Neurologic: Alert No focal deficits noted.   Psychiatric: Affect normal      Results for orders placed or performed during the hospital encounter of 06/07/21   CBC WITH DIFFERENTIAL   Result Value Ref Range    WBC 4.8 4.8 - 10.8 K/uL    RBC 4.40 4.20 - 5.40 M/uL    Hemoglobin 14.5 12.0 - 16.0 g/dL    Hematocrit 44.7 37.0 - 47.0 %    .6 (H) 81.4 - 97.8 fL    MCH 33.0 27.0 - 33.0 pg    MCHC 32.4 (L) 33.6 - 35.0 g/dL    RDW 47.3 35.9 - 50.0 fL    Platelet Count 146 (L) 164 - 446 K/uL    MPV 11.8 9.0 - 12.9 fL    Neutrophils-Polys 52.30 44.00 - 72.00 %    Lymphocytes 36.20 22.00 - 41.00 %    Monocytes 9.90 0.00 - 13.40 %    Eosinophils 0.40 0.00 - 6.90 %    Basophils 0.80 0.00 - 1.80 %    Immature Granulocytes 0.40 0.00 - 0.90 %    Nucleated RBC 0.00 /100 WBC    Neutrophils (Absolute) 2.52 2.00 - 7.15 K/uL    Lymphs (Absolute) 1.75 1.00 - 4.80 K/uL    Monos (Absolute) 0.48 0.00 - 0.85 K/uL    Eos (Absolute) 0.02 0.00 - 0.51 K/uL    Baso (Absolute) 0.04 0.00 - 0.12 K/uL    Immature Granulocytes (abs) 0.02 0.00 - 0.11 K/uL    NRBC (Absolute) 0.00 K/uL   COMP METABOLIC PANEL   Result Value Ref Range    Sodium 142 135 - 145 mmol/L    Potassium 3.8 3.6 - 5.5 mmol/L    Chloride 107 96 - 112 mmol/L    Co2 25 20 - 33 mmol/L    Anion Gap 10.0 7.0 - 16.0    Glucose 86 65 - 99 mg/dL    Bun 21 8 - 22 mg/dL    Creatinine 0.73 0.50 - 1.40 mg/dL    Calcium 9.3 8.4 - 10.2 mg/dL    AST(SGOT) 32 12 - 45 U/L    ALT(SGPT) 21 2 - 50 U/L    Alkaline Phosphatase 65 30 - 99 U/L    Total Bilirubin 0.5 0.1 - 1.5 mg/dL    Albumin 4.3 3.2 - 4.9 g/dL    Total Protein 7.1 6.0 - 8.2 g/dL    Globulin 2.8 1.9 - 3.5 g/dL    A-G Ratio 1.5 g/dL   LIPASE   Result Value Ref Range    Lipase 28 7 - 58  U/L   URINALYSIS CULTURE, IF INDICATED    Specimen: Urine   Result Value Ref Range    Color Yellow     Character Clear     Specific Gravity <=1.005 <1.035    Ph 6.0 5.0 - 8.0    Glucose Negative Negative mg/dL    Ketones Negative Negative mg/dL    Protein Negative Negative mg/dL    Bilirubin Negative Negative    Nitrite Negative Negative    Leukocyte Esterase Negative Negative    Occult Blood Negative Negative    Micro Urine Req see below    ESTIMATED GFR   Result Value Ref Range    GFR If African American >60 >60 mL/min/1.73 m 2    GFR If Non African American >60 >60 mL/min/1.73 m 2        RADIOLOGY/PROCEDURES  US-PELVIC COMPLETE (TRANSABDOMINAL/TRANSVAGINAL) (COMBO)   Final Result      Normal transvaginal appearance of the pelvis.      CT-ABDOMEN-PELVIS WITH   Final Result      1.  No acute intra-abdominal findings.      2.  Biliary dilatation is similar to the prior exam and likely related to prior cholecystectomy.      3.  Retrolisthesis at L1-2 and L2-3, new from the prior CT in 2018.            COURSE & MEDICAL DECISION MAKING  Pertinent Labs & Imaging studies reviewed. (See chart for details)    Patient presents emerged part with several days of right lower quadrant pain which is gradually worsening.  A broad differential diagnosis was considered including but not only to colitis, diverticulitis, appendicitis and she has had appendectomy, renal colic, pyelonephritis, gynecologic pathology, malignancy, ischemic bowel.    The patient is tender in the right lower quadrant and lower in the pelvis without peritonitis.  She is worked up for the above differential diagnosis with labs and imaging.    CBC CMP and lipase are unremarkable.  She is not acidotic.  Urinalysis is negative.  She is menopausal and obviously not pregnant.  Abdominal CT was obtained which shows acute abnormal no intra abdominal findings.    Patient has been seen and examined on several occasions.  After unremarkable CT she still has some  tenderness but no peritonitis I did ultrasound there is no pelvic pathology identified.    At this point with several days of pain, a unremarkable CT, a ultrasound reassuring normal labs and no think she requires any further work-up.  Repeat exam does not show any evidence of surgical process.  This is at 10 PM.  She is in tenderness but no peritonitis.  Tenderness is in the right lower quadrant and right periumbilical area.  I discussed with the patient she may have early diverticulitis or colitis and she does have a very low threshold for returning.  She is given close return precautions.  Her questions are answered she is agreeable to plan.        He can be discharged home with return precautions follow-up with her primary care doctor.  She is counseled to follow-up for screening colonoscopy.  She is counseled follow-up for her blood pressure.  She is counseled to follow-up for her marked degeneration in her lumbar spine for follow-up for her degenerative changes.      The patient was noted to have elevated blood pressure while in the ER and was counseled to see their doctor within one wee to have this rechecked.    Tish Smallwood M.D.  [address] [address]  [phone]              FINAL IMPRESSION  1. Right lower quadrant abdominal pain     2. Essential hypertension         2.   3.         Electronically signed by: Lazarus Aguila M.D., 6/7/2021 6:01 PM

## 2021-06-08 NOTE — DISCHARGE INSTRUCTIONS
[facility]    If you have any questions regarding to your visit please contact your care team:       Team Purple:   Clinic Hours Telephone Number   Dr. Ketty Ortega   7am-7pm  Monday - Thursday   7am-5pm  Fridays  [phone]  (Appointment scheduling available 24/7)    Questions about your Visit?   Team Line:  [phone]   Urgent Care - [place] [facility] - 11am-9pm Monday-Friday Saturday-Sunday- 9am-5pm   [place] - 5pm-9pm Monday-Friday Saturday-Sunday- 9am-5pm  [phone] - [facility]  [phone] - [place]       What options do I have for visits at the clinic other than the traditional office visit?  To expand how we care for you, many of our providers are utilizing electronic visits (e-visits) and telephone visits, when medically appropriate, for interactions with their patients rather than a visit in the clinic.   We also offer nurse visits for many medical concerns. Just like any other service, we will bill your insurance company for this type of visit based on time spent on the phone with your provider. Not all insurance companies cover these visits. Please check with your medical insurance if this type of visit is covered. You will be responsible for any charges that are not paid by your insurance.      E-visits via [employer]:  generally incur a $35.00 fee.  Telephone visits:  Time spent on the phone: *charged based on time that is spent on the phone in increments of 10 minutes. Estimated cost:   5-10 mins $30.00   11-20 mins. $59.00   21-30 mins. $85.00     Use [employer] (secure email communication and access to your chart) to send your primary care provider a message or make an appointment. Ask someone on your Team how to sign up for [employer].  For a Price Quote for your services, please call our Consumer Price Line at [phone].  As always, Thank you for trusting us with your health care  Your work-up did not reveal a cause for your abdominal pain today.  Return to the emergency department 48 hours for recheck and if you are completely better.  Return sooner for more pain, fever, vomiting, or any other complaints.  Plenty of fluids.  Follow-up with your doctor.  Your blood blood pressure is very high have this rechecked within a week by your doctor.  Have your doctor refer you for a screening colonoscopy.  Your CT scan of your abdomen showed significant degeneration of your spine I recommend you follow-up with a spine surgeon.   needs!    Discharge by SHAD FERNANDES

## 2021-06-08 NOTE — ED NOTES
Patient discharged home in stable condition with   AVS provided with recommended follow up and home care instructions and education information  No prescriptions provided at this time  Patient and  verbalized understanding  Ambulatory at time of discharge

## 2021-06-08 NOTE — ED TRIAGE NOTES
"Chief Complaint   Patient presents with   • RLQ Pain     started 3 days ago     /98   Pulse 79   Temp 36.7 °C (98.1 °F) (Temporal)   Resp 16   Ht 1.626 m (5' 4\")   Wt 72.6 kg (160 lb 0.9 oz)   LMP  (LMP Unknown)   SpO2 96%   BMI 27.47 kg/m²     Pt ambulated to ED by self for c/o RLQ pain x 3 days, increasing in intensity today.  Pt denies N/V, denies dysuria.    Covid Screen Negative.      "

## 2021-06-09 ENCOUNTER — OFFICE VISIT (OUTPATIENT)
Dept: MEDICAL GROUP | Facility: LAB | Age: 61
End: 2021-06-09
Payer: COMMERCIAL

## 2021-06-09 VITALS
TEMPERATURE: 97.5 F | OXYGEN SATURATION: 96 % | BODY MASS INDEX: 26.63 KG/M2 | SYSTOLIC BLOOD PRESSURE: 122 MMHG | WEIGHT: 156 LBS | DIASTOLIC BLOOD PRESSURE: 76 MMHG | HEIGHT: 64 IN | HEART RATE: 75 BPM | RESPIRATION RATE: 13 BRPM

## 2021-06-09 DIAGNOSIS — M51.36 DDD (DEGENERATIVE DISC DISEASE), LUMBAR: ICD-10-CM

## 2021-06-09 DIAGNOSIS — Z12.11 SCREENING FOR COLON CANCER: ICD-10-CM

## 2021-06-09 DIAGNOSIS — M43.10 RETROLISTHESIS OF VERTEBRAE: ICD-10-CM

## 2021-06-09 DIAGNOSIS — M54.50 CHRONIC BILATERAL LOW BACK PAIN WITHOUT SCIATICA: ICD-10-CM

## 2021-06-09 DIAGNOSIS — R10.31 RLQ ABDOMINAL PAIN: ICD-10-CM

## 2021-06-09 DIAGNOSIS — G89.29 CHRONIC BILATERAL LOW BACK PAIN WITHOUT SCIATICA: ICD-10-CM

## 2021-06-09 DIAGNOSIS — Z98.1 S/P LUMBAR SPINAL FUSION: ICD-10-CM

## 2021-06-09 PROCEDURE — 99214 OFFICE O/P EST MOD 30 MIN: CPT | Performed by: FAMILY MEDICINE

## 2021-06-09 ASSESSMENT — FIBROSIS 4 INDEX: FIB4 SCORE: 3.01

## 2021-06-09 NOTE — PATIENT INSTRUCTIONS
Adhesions    Adhesions are stringy (fibrous) bands of tissue that stick together. They form between two surfaces of the body. Adhesions are similar to scars, but they form inside your body instead of on your skin.  Adhesions can be painful and may cause problems if they pull tissues or organs out of their normal positions. They can also cause problems if they block the normal passage of substances in the body. For example, an adhesion that forms in the intestines can block the passage of food.  What are the causes?  This condition is caused by inflammation. Adhesions most often develop after surgery, but they can also develop after an infection, radiation treatment, or another event that causes inflammation in the body.  What increases the risk?  This condition is more likely to develop in:  · People who have had open surgery in the abdomen.  · Women who have had more than one  section.  What are the signs or symptoms?  Symptoms of this condition vary depending on where the adhesions form. They may take weeks, months, or years to develop. Symptoms include:  · Pain in the abdomen or pelvis. This is the most common symptom.  · Bloating.  · Constipation.  · Diarrhea.  · Vomiting.  · Pain when having sex.  · Difficulty getting pregnant.  Often, there are no symptoms.  How is this diagnosed?  This condition is diagnosed based on:  · Your symptoms.  · Your medical history.  · A physical exam.  · Tests, such as an X-ray or CT scan.  · A surgical procedure that uses a scope to check for adhesions inside your body.  How is this treated?  Treatment for this condition depends on where the adhesions are located and the symptoms they are causing. If there are no symptoms, treatment may not be needed. If there are symptoms, treatment may include:  · Taking medicines to help with symptoms.  · Having surgery to remove the adhesions.  Follow these instructions at home:  · Take over-the-counter and prescription medicines only  as told by your health care provider.  · If you were prescribed an antibiotic medicine, take it as told by your health care provider. Donot stop using the antibiotic even if you start to feel better.  · Follow any diet instructions your health care provider gives you. Your health care provider may recommend a liquid or low-fiber diet in specific cases.  · Keep all follow-up visits as told by your health care provider. This is important.  Contact a health care provider if you have:  · A fever.  · Pain in the abdomen or pelvis.  · Vomiting or bloating.  · Swelling in the abdomen.  · Loud bowel sounds.  · Difficulty passing stool (constipation).  Get help right away if you:  · Have severe pain in your abdomen or pelvis, and the pain is getting worse.  · Have vomiting that will not go away.  · Cannot pass gas.  · Cannot have a bowel movement.  Summary  · Adhesions are stringy (fibrous) bands of tissue that stick together.  · Adhesions can cause problems if they pull tissues or organs out of their normal positions.  · Symptoms include pain, bloating, vomiting, constipation, diarrhea, pain during sex, and difficulty getting pregnant.  · Treatment for this condition includes medicines and surgery.  · Follow instructions from your health care provider about what medicines to take, what to eat and drink, when to schedule a follow-up visit, and when to call for help.  This information is not intended to replace advice given to you by your health care provider. Make sure you discuss any questions you have with your health care provider.  Document Released: 03/09/2005 Document Revised: 05/08/2019 Document Reviewed: 05/08/2019  [employer] Patient Education © 2020 [employer]

## 2021-06-14 ENCOUNTER — HOSPITAL ENCOUNTER (OUTPATIENT)
Dept: RADIOLOGY | Facility: MEDICAL CENTER | Age: 61
End: 2021-06-14
Attending: FAMILY MEDICINE
Payer: COMMERCIAL

## 2021-06-14 DIAGNOSIS — D33.3 ACOUSTIC NEUROMA (HCC): ICD-10-CM

## 2021-06-14 DIAGNOSIS — H81.4 VERTIGO OF CENTRAL ORIGIN: ICD-10-CM

## 2021-06-14 DIAGNOSIS — H91.92 HEARING LOSS OF LEFT EAR, UNSPECIFIED HEARING LOSS TYPE: ICD-10-CM

## 2021-06-14 DIAGNOSIS — R76.8 THYROID ANTIBODY POSITIVE: ICD-10-CM

## 2021-06-14 DIAGNOSIS — E03.9 ACQUIRED HYPOTHYROIDISM: ICD-10-CM

## 2021-06-14 PROCEDURE — 700117 HCHG RX CONTRAST REV CODE 255: Performed by: FAMILY MEDICINE

## 2021-06-14 PROCEDURE — 76536 US EXAM OF HEAD AND NECK: CPT

## 2021-06-14 PROCEDURE — 70553 MRI BRAIN STEM W/O & W/DYE: CPT

## 2021-06-14 PROCEDURE — A9576 INJ PROHANCE MULTIPACK: HCPCS | Performed by: FAMILY MEDICINE

## 2021-06-14 RX ADMIN — GADOTERIDOL 15 ML: 279.3 INJECTION, SOLUTION INTRAVENOUS at 14:13

## 2021-06-14 NOTE — ASSESSMENT & PLAN NOTE
Medication reconciliation completed with information provided by - verbal list 
from patient. Any prior medication reconciliation on file was reviewed and 
corrected. Patient status post spinal fusion at L4-5.  She has persistent low back pain.  She finds that this is more negatively affecting her life and she would like to talk to somebody about options of treatment.  This is complicated by the fact that she has rheumatoid arthritis.  The CT done for abdominal pain showed:  Prior vertebral augmentation is again noted in the L1 vertebral body. There is new retrolisthesis of L1 on L2 and L2-L3. Postoperative changes in the lower lumbar spine.

## 2021-06-14 NOTE — PROGRESS NOTES
Subjective:     Chief Complaint   Patient presents with   • Hospital Follow-up       Clifford Murphy is a 61 y.o. female here today for evaluation and management of:  Patient is here to follow-up on her right lower quadrant abdominal pain.  She reports that the pain is improved.  She was seen in the emergency room and a CT scan showed no cause for her the abdominal pain.  She reports that she is having bowel movements regularly.  She denies any fever and chills.  No nausea or vomiting.    S/P lumbar spinal fusion  Patient status post spinal fusion at L4-5.  She has persistent low back pain.  She finds that this is more negatively affecting her life and she would like to talk to somebody about options of treatment.  This is complicated by the fact that she has rheumatoid arthritis.  The CT done for abdominal pain showed:  Prior vertebral augmentation is again noted in the L1 vertebral body. There is new retrolisthesis of L1 on L2 and L2-L3. Postoperative changes in the lower lumbar spine.          Allergies   Allergen Reactions   • Morphine Anaphylaxis     Morphine and morphine derivatives. (demerol)   • Aspirin      Stomach pain   • Codeine      Stomach pain   • Tramadol Vomiting     SEVERE HEADACHE   • Sulfamethoxazole W-Trimethoprim Shortness of Breath     Rxn - flushing, SOB  Late 80's     • Other Drug      Muscle relaxants cause dizziness         Current medicines (including changes today)  Current Outpatient Medications   Medication Sig Dispense Refill   • leflunomide (ARAVA) 20 MG Tab TAKE 1 TABLET BY MOUTH EVERY DAY 90 tablet 0   • levothyroxine (SYNTHROID) 125 MCG Tab TAKE 1 TABLET BY MOUTH EVERY MORNING ON AN EMPTY STOMACH. 90 tablet 3   • cimetidine (TAGAMET) 400 MG Tab TAKE 1 TABLET BY MOUTH TWICE A  tablet 3   • Leucovorin Calcium 10 MG Tab 1 tab po qweek 12 Tab 3   • hydroxychloroquine (PLAQUENIL) 200 MG Tab TAKE 1 TABLET BY MOUTH TWICE A  Tab 1   • cholestyramine (QUESTRAN) 4 g packet  Take 4 g by mouth every day. 60 Each 2   • cetirizine (ZYRTEC) 10 MG Tab Take 10 mg by mouth 1 time daily as needed for Allergies.     • Cholecalciferol (VITAMIN D3) 5000 units Tab Take 5,000 Int'l Units by mouth every day at 6 PM.     • Folic Acid 0.8 MG Cap Take 0.8-1.6 mg by mouth every day at 6 PM. takes 1 pill daily and alternates every other day 2 pills daily     • ibuprofen (MOTRIN) 800 MG Tab Take 800 mg by mouth every 8 hours as needed for Mild Pain.     • acetaminophen (TYLENOL) 500 MG Tab Take 500-1,000 mg by mouth 2 times a day as needed for Mild Pain.       No current facility-administered medications for this visit.       She  has a past medical history of Anesthesia, Asthma (02/21/2018), Breath shortness, Bronchitis (2016), Cough (07/13/2018), Environmental and seasonal allergies, Genital herpes in women (2/11/2014), Heart burn, High risk medications (not anticoagulants) long-term use (8/29/2012), Hypothyroid (9/14/2011), Kidney stones, Macrocytosis without anemia (med effect), Menopause (9/15/2011), Pain (07/13/2018), Pneumonia (01/2015), Post-cholecystectomy syndrome, Rheumatoid arthritis(714.0), Snoring, Tobacco abuse, episodic (9/14/2011), and Vitamin d deficiency (5/15/2012).    Patient Active Problem List    Diagnosis Date Noted   • S/P lumbar spinal fusion 06/09/2021   • Hearing loss of left ear 05/04/2021   • Vertigo of central origin 05/04/2021   • Immunocompromised (HCC) 04/09/2020   • Acute vaginitis 10/15/2019   • Left ear pain 10/15/2019   • Fungal infection of toenail 12/20/2018   • Hordeolum externum of right upper eyelid 12/20/2018   • Gastroesophageal reflux disease without esophagitis 11/07/2018   • Other viral warts 11/07/2018   • Acoustic neuroma (HCC) 10/17/2018   • Surgical site infection 08/11/2018   • Allergic sinusitis 06/25/2018   • Subclinical hypothyroidism 06/25/2018   • Long-term use of Plaquenil 02/01/2018   • Spinal stenosis of lumbar region 12/12/2017   • Internal  "derangement of left knee 12/12/2017   • Obesity (BMI 30-39.9) 11/17/2017   • Thrombocytopenia ([facility]) 10/05/2017   • Tobacco abuse 08/05/2015   • Seasonal allergies 06/17/2015   • Hypertriglyceridemia 09/29/2014   • Encounter for long-term (current) use of insulin ([facility]) 09/15/2014   • Genital herpes in women 02/11/2014   • Jaw pain 08/04/2013   • High risk medications (not anticoagulants) long-term use 08/29/2012   • Post-cholecystectomy syndrome 05/23/2012   • Vitamin D insufficiency 05/15/2012   • Menopause 09/15/2011   • Macrocytosis without anemia 09/14/2011   • Acquired hypothyroidism 09/14/2011   • RA (rheumatoid arthritis) ([facility]) 09/14/2011       ROS   No fever or chills.  No nausea or vomiting.  No chest pain or palpitations.  No cough or SOB.  No pain with urination or hematuria.  No black or bloody stools.       Objective:     /76   Pulse 75   Temp 36.4 °C (97.5 °F) (Temporal)   Resp 13   Ht 1.626 m (5' 4.02\")   Wt 70.8 kg (156 lb)   SpO2 96%  Body mass index is 26.76 kg/m².   Physical Exam:  Well developed, well nourished.  Alert, oriented in no acute distress.  Eye contact is good, speech goal directed, affect calm  Eyes: conjunctiva non-injected, sclera non-icteric.  Neck Supple.  No adenopathy or masses in the neck or supraclavicular regions. No thyromegaly  Lungs: clear to auscultation bilaterally with good excursion. No wheezes or rhonchi  CV: regular rate and rhythm. No murmur  Abdomen: soft, slight right lower quadrant tenderness without rebound or guarding, no masses or organomegaly.    SPINE: No significant spinal curvature on forward bend. Mild tenderness in paraspinous muscles lumbar spine without current spasm. SLT . Strength 5/5 proximal and distal LE.     Assessment and Plan:   The following treatment plan was discussed    1. RLQ abdominal pain  Discussed possible constipation.  MiraLAX daily.  Increase fluids.  We will also refer to gastroenterology as she is due for colon cancer " screening.  - REFERRAL TO GASTROENTEROLOGY    2. Screening for colon cancer  Referral placed  - REFERRAL TO GASTROENTEROLOGY    3. Retrolisthesis of vertebrae  Refer to PMR for further evaluation and treatment as her condition is negatively affecting her activity.  - REFERRAL TO OUTPATIENT INTERVENTIONAL PAIN CLINIC    4. DDD (degenerative disc disease), lumbar  Refer to PMR for further evaluation and treatment as her condition is negatively affecting her activity.  - REFERRAL TO OUTPATIENT INTERVENTIONAL PAIN CLINIC    5. Chronic bilateral low back pain without sciatica  Refer to PMR for further evaluation and treatment as her condition is negatively affecting her activity.  - REFERRAL TO OUTPATIENT INTERVENTIONAL PAIN CLINIC    6. S/P lumbar spinal fusion  Refer to PMR for further evaluation and treatment as her condition is negatively affecting her activity.  - REFERRAL TO OUTPATIENT INTERVENTIONAL PAIN CLINIC    Any change or worsening of signs or symptoms, patient encouraged to follow-up or report to the emergency room for further evaluation. Patient understands and agrees.    Followup: Return if symptoms worsen or fail to improve.

## 2021-06-15 DIAGNOSIS — H91.92 HEARING LOSS OF LEFT EAR, UNSPECIFIED HEARING LOSS TYPE: ICD-10-CM

## 2021-06-15 DIAGNOSIS — D33.3 ACOUSTIC NEUROMA (HCC): ICD-10-CM

## 2021-06-29 ENCOUNTER — HOSPITAL ENCOUNTER (OUTPATIENT)
Dept: RADIOLOGY | Facility: MEDICAL CENTER | Age: 61
End: 2021-06-29
Attending: FAMILY MEDICINE
Payer: COMMERCIAL

## 2021-06-29 DIAGNOSIS — Z12.31 ENCOUNTER FOR SCREENING MAMMOGRAM FOR BREAST CANCER: ICD-10-CM

## 2021-06-29 DIAGNOSIS — M81.8 OTHER OSTEOPOROSIS WITHOUT CURRENT PATHOLOGICAL FRACTURE: ICD-10-CM

## 2021-06-29 DIAGNOSIS — Z78.0 POSTMENOPAUSAL: ICD-10-CM

## 2021-06-29 PROCEDURE — 77080 DXA BONE DENSITY AXIAL: CPT

## 2021-06-29 PROCEDURE — 77063 BREAST TOMOSYNTHESIS BI: CPT

## 2021-06-30 ENCOUNTER — OFFICE VISIT (OUTPATIENT)
Dept: MEDICAL GROUP | Facility: LAB | Age: 61
End: 2021-06-30
Payer: COMMERCIAL

## 2021-06-30 ENCOUNTER — HOSPITAL ENCOUNTER (OUTPATIENT)
Facility: MEDICAL CENTER | Age: 61
End: 2021-06-30
Attending: FAMILY MEDICINE
Payer: COMMERCIAL

## 2021-06-30 VITALS
BODY MASS INDEX: 26.98 KG/M2 | WEIGHT: 158 LBS | DIASTOLIC BLOOD PRESSURE: 80 MMHG | HEART RATE: 87 BPM | TEMPERATURE: 98.5 F | HEIGHT: 64 IN | SYSTOLIC BLOOD PRESSURE: 124 MMHG | OXYGEN SATURATION: 95 % | RESPIRATION RATE: 16 BRPM

## 2021-06-30 DIAGNOSIS — M85.80 OSTEOPENIA AFTER MENOPAUSE: ICD-10-CM

## 2021-06-30 DIAGNOSIS — D33.3 ACOUSTIC NEUROMA (HCC): ICD-10-CM

## 2021-06-30 DIAGNOSIS — N76.0 ACUTE VAGINITIS: ICD-10-CM

## 2021-06-30 DIAGNOSIS — Z91.89 AT HIGH RISK FOR FRACTURE: ICD-10-CM

## 2021-06-30 DIAGNOSIS — Z78.0 OSTEOPENIA AFTER MENOPAUSE: ICD-10-CM

## 2021-06-30 PROCEDURE — 87480 CANDIDA DNA DIR PROBE: CPT

## 2021-06-30 PROCEDURE — 87510 GARDNER VAG DNA DIR PROBE: CPT

## 2021-06-30 PROCEDURE — 87660 TRICHOMONAS VAGIN DIR PROBE: CPT

## 2021-06-30 PROCEDURE — 99214 OFFICE O/P EST MOD 30 MIN: CPT | Performed by: FAMILY MEDICINE

## 2021-06-30 RX ORDER — FLUCONAZOLE 150 MG/1
150 TABLET ORAL
Qty: 2 TABLET | Refills: 2 | Status: SHIPPED | OUTPATIENT
Start: 2021-06-30 | End: 2021-10-21

## 2021-06-30 ASSESSMENT — FIBROSIS 4 INDEX: FIB4 SCORE: 3.01

## 2021-06-30 NOTE — PATIENT INSTRUCTIONS
Zoledronic Acid injection (Paget's Disease, Osteoporosis)  What is this medicine?  ZOLEDRONIC ACID (TREVOR le denisen ik AS id) lowers the amount of calcium loss from bone. It is used to treat Paget's disease and osteoporosis in women.  This medicine may be used for other purposes; ask your health care provider or pharmacist if you have questions.  COMMON BRAND NAME(S): Reclast, Zometa  What should I tell my health care provider before I take this medicine?  They need to know if you have any of these conditions:  · aspirin-sensitive asthma  · cancer, especially if you are receiving medicines used to treat cancer  · dental disease or wear dentures  · infection  · kidney disease  · low levels of calcium in the blood  · past surgery on the parathyroid gland or intestines  · receiving corticosteroids like dexamethasone or prednisone  · an unusual or allergic reaction to zoledronic acid, other medicines, foods, dyes, or preservatives  · pregnant or trying to get pregnant  · breast-feeding  How should I use this medicine?  This medicine is for infusion into a vein. It is given by a health care professional in a hospital or clinic setting.  Talk to your pediatrician regarding the use of this medicine in children. This medicine is not approved for use in children.  Overdosage: If you think you have taken too much of this medicine contact a poison control center or emergency room at once.  NOTE: This medicine is only for you. Do not share this medicine with others.  What if I miss a dose?  It is important not to miss your dose. Call your doctor or health care professional if you are unable to keep an appointment.  What may interact with this medicine?  · certain antibiotics given by injection  · NSAIDs, medicines for pain and inflammation, like ibuprofen or naproxen  · some diuretics like bumetanide, furosemide  · teriparatide  This list may not describe all possible interactions. Give your health care provider a list of all the  medicines, herbs, non-prescription drugs, or dietary supplements you use. Also tell them if you smoke, drink alcohol, or use illegal drugs. Some items may interact with your medicine.  What should I watch for while using this medicine?  Visit your doctor or health care professional for regular checkups. It may be some time before you see the benefit from this medicine. Do not stop taking your medicine unless your doctor tells you to. Your doctor may order blood tests or other tests to see how you are doing.  Women should inform their doctor if they wish to become pregnant or think they might be pregnant. There is a potential for serious side effects to an unborn child. Talk to your health care professional or pharmacist for more information.  You should make sure that you get enough calcium and vitamin D while you are taking this medicine. Discuss the foods you eat and the vitamins you take with your health care professional.  Some people who take this medicine have severe bone, joint, and/or muscle pain. This medicine may also increase your risk for jaw problems or a broken thigh bone. Tell your doctor right away if you have severe pain in your jaw, bones, joints, or muscles. Tell your doctor if you have any pain that does not go away or that gets worse.  Tell your dentist and dental surgeon that you are taking this medicine. You should not have major dental surgery while on this medicine. See your dentist to have a dental exam and fix any dental problems before starting this medicine. Take good care of your teeth while on this medicine. Make sure you see your dentist for regular follow-up appointments.  What side effects may I notice from receiving this medicine?  Side effects that you should report to your doctor or health care professional as soon as possible:  · allergic reactions like skin rash, itching or hives, swelling of the face, lips, or tongue  · anxiety, confusion, or depression  · breathing  problems  · changes in vision  · eye pain  · feeling faint or lightheaded, falls  · jaw pain, especially after dental work  · mouth sores  · muscle cramps, stiffness, or weakness  · redness, blistering, peeling or loosening of the skin, including inside the mouth  · trouble passing urine or change in the amount of urine  Side effects that usually do not require medical attention (report to your doctor or health care professional if they continue or are bothersome):  · bone, joint, or muscle pain  · constipation  · diarrhea  · fever  · hair loss  · irritation at site where injected  · loss of appetite  · nausea, vomiting  · stomach upset  · trouble sleeping  · trouble swallowing  · weak or tired  This list may not describe all possible side effects. Call your doctor for medical advice about side effects. You may report side effects to FDA at 6-860-FDA-9874.  Where should I keep my medicine?  This drug is given in a hospital or clinic and will not be stored at home.  NOTE: This sheet is a summary. It may not cover all possible information. If you have questions about this medicine, talk to your doctor, pharmacist, or health care provider.  © 2020 [employer]/Gold Standard (2015-05-16 14:19:57)

## 2021-07-01 NOTE — ASSESSMENT & PLAN NOTE
Patient complains of significant vaginal and vulvar itching.  She has a history of yeast vaginitis.  She is getting ready to go camping and wants to get this checked out.  She is in a long-term monogamous relationship.

## 2021-07-01 NOTE — PROGRESS NOTES
Subjective:     Chief Complaint   Patient presents with   • Results     Bone Density   • Vaginal Itching     vaginal burning  since last week        Clifford Murphy is a 61 y.o. female here today for evaluation and management of:    At high risk for fracture  She took Boniva once      Osteopenia after menopause  The distal left forearm has a mean bone mineral density of 0.729 g/cm2, with a T score of -1.7 and a Z score of -0.5.     The proximal left femur has a mean bone mineral density of 0.907 g/cm2, with a T score of -0.8 and a Z score of 0.2.     When compared with the most recent study dated 6/5/2018, there has been a 2.1% increase in the bone mineral density of the left femur.     IMPRESSION:     1.  According to the World Health Organization classification, bone mineral density of this patient is osteopenic in the left forearm and normal in the left proximal femur.     2.  No significant change in bone density     10-year Probability of Fracture:  Major Osteoporotic     18.9%  Hip     3.6%  Population      [facility]   Patient was given 1 dose of Reclast 2 years ago and then she never followed up on it.  She had a little bit of nausea for couple days but other wise tolerated it well.  She cannot eat calcium because of a history of kidney stones.  She is willing to retry the Reclast.    Acute vaginitis  Patient complains of significant vaginal and vulvar itching.  She has a history of yeast vaginitis.  She is getting ready to go camping and wants to get this checked out.  She is in a long-term monogamous relationship.    Acoustic neuroma (HCC)  On her recent MRI her acoustic neuroma has grown quite a bit.  She is scheduled to see her surgeon at [place] later this month.  She has had increasing hearing loss.       Allergies   Allergen Reactions   • Morphine Anaphylaxis     Morphine and morphine derivatives. (demerol)   • Aspirin      Stomach pain   • Codeine      Stomach pain   • Tramadol Vomiting     SEVERE  HEADACHE   • Sulfamethoxazole W-Trimethoprim Shortness of Breath     Rxn - flushing, SOB  Late 80's     • Other Drug      Muscle relaxants cause dizziness         Current medicines (including changes today)  Current Outpatient Medications   Medication Sig Dispense Refill   • fluconazole (DIFLUCAN) 150 MG tablet Take 1 tablet by mouth every 7 days. 2 tablet 2   • leflunomide (ARAVA) 20 MG Tab TAKE 1 TABLET BY MOUTH EVERY DAY 90 tablet 0   • levothyroxine (SYNTHROID) 125 MCG Tab TAKE 1 TABLET BY MOUTH EVERY MORNING ON AN EMPTY STOMACH. 90 tablet 3   • cimetidine (TAGAMET) 400 MG Tab TAKE 1 TABLET BY MOUTH TWICE A  tablet 3   • Leucovorin Calcium 10 MG Tab 1 tab po qweek 12 Tab 3   • hydroxychloroquine (PLAQUENIL) 200 MG Tab TAKE 1 TABLET BY MOUTH TWICE A  Tab 1   • cholestyramine (QUESTRAN) 4 g packet Take 4 g by mouth every day. 60 Each 2   • cetirizine (ZYRTEC) 10 MG Tab Take 10 mg by mouth 1 time daily as needed for Allergies.     • Cholecalciferol (VITAMIN D3) 5000 units Tab Take 5,000 Int'l Units by mouth every day at 6 PM.     • Folic Acid 0.8 MG Cap Take 0.8-1.6 mg by mouth every day at 6 PM. takes 1 pill daily and alternates every other day 2 pills daily     • ibuprofen (MOTRIN) 800 MG Tab Take 800 mg by mouth every 8 hours as needed for Mild Pain.     • acetaminophen (TYLENOL) 500 MG Tab Take 500-1,000 mg by mouth 2 times a day as needed for Mild Pain.       No current facility-administered medications for this visit.       She  has a past medical history of Anesthesia, Asthma (02/21/2018), Breath shortness, Bronchitis (2016), Cough (07/13/2018), Environmental and seasonal allergies, Genital herpes in women (2/11/2014), Heart burn, High risk medications (not anticoagulants) long-term use (8/29/2012), Hypothyroid (9/14/2011), Kidney stones, Macrocytosis without anemia (med effect), Menopause (9/15/2011), Pain (07/13/2018), Pneumonia (01/2015), Post-cholecystectomy syndrome, Rheumatoid  "arthritis(714.0), Snoring, Tobacco abuse, episodic (9/14/2011), and Vitamin d deficiency (5/15/2012).    Patient Active Problem List    Diagnosis Date Noted   • At high risk for fracture 06/30/2021   • Osteopenia after menopause 06/30/2021   • S/P lumbar spinal fusion 06/09/2021   • Hearing loss of left ear 05/04/2021   • Vertigo of central origin 05/04/2021   • Immunocompromised ([facility]) 04/09/2020   • Acute vaginitis 10/15/2019   • Left ear pain 10/15/2019   • Fungal infection of toenail 12/20/2018   • Hordeolum externum of right upper eyelid 12/20/2018   • Gastroesophageal reflux disease without esophagitis 11/07/2018   • Other viral warts 11/07/2018   • Acoustic neuroma ([facility]) 10/17/2018   • Surgical site infection 08/11/2018   • Allergic sinusitis 06/25/2018   • Subclinical hypothyroidism 06/25/2018   • Long-term use of Plaquenil 02/01/2018   • Spinal stenosis of lumbar region 12/12/2017   • Internal derangement of left knee 12/12/2017   • Obesity (BMI 30-39.9) 11/17/2017   • Thrombocytopenia ([facility]) 10/05/2017   • Tobacco abuse 08/05/2015   • Seasonal allergies 06/17/2015   • Hypertriglyceridemia 09/29/2014   • Encounter for long-term (current) use of insulin ([facility]) 09/15/2014   • Genital herpes in women 02/11/2014   • Jaw pain 08/04/2013   • High risk medications (not anticoagulants) long-term use 08/29/2012   • Post-cholecystectomy syndrome 05/23/2012   • Vitamin D insufficiency 05/15/2012   • Menopause 09/15/2011   • Macrocytosis without anemia 09/14/2011   • Acquired hypothyroidism 09/14/2011   • RA (rheumatoid arthritis) ([facility]) 09/14/2011       ROS   No fever or chills.  No nausea or vomiting.  No chest pain or palpitations.  No cough or SOB.  No pain with urination or hematuria.  No black or bloody stools.       Objective:     /80 (BP Location: Right arm, Patient Position: Sitting, BP Cuff Size: Adult)   Pulse 87   Temp 36.9 °C (98.5 °F) (Temporal)   Resp 16   Ht 1.626 m (5' 4.02\")   Wt 71.7 kg (158 " lb)   SpO2 95%  Body mass index is 27.11 kg/m².   Physical Exam:  Well developed, well nourished.  Alert, oriented in no acute distress.  Eye contact is good, speech goal directed, affect calm  Eyes: conjunctiva non-injected, sclera non-icteric..  Neck Supple.  No adenopathy or masses in the neck or supraclavicular regions. No thyromegaly  Lungs: clear to auscultation bilaterally with good excursion. No wheezes or rhonchi  CV: regular rate and rhythm. No murmur  Abdomen: soft, nontender, no masses or organomegaly.  No rebound or guarding  PELVIC:Perineum and external genitalia normal without rash. Vagina with scant and white discharge. Cervix without visible lesions or discharge. Bimanual exam without adnexal masses or cervical motion tenderness.              Assessment and Plan:   The following treatment plan was discussed    1. At high risk for fracture  We will restart Reclast every 12 months.  Recheck bone density 1 month after starting treatment.  Continue weightbearing exercise and vitamin D supplementation.    2. Osteopenia after menopause  We will restart Reclast every 12 months.  Recheck bone density 1 month after starting treatment.  Continue weightbearing exercise and vitamin D supplementation.    3. Acute vaginitis  Form ordered.  Because patients can be going camping will give her a prescription of fluconazole to have on hand.  She may repeat after 1 week if still symptomatic  - fluconazole (DIFLUCAN) 150 MG tablet; Take 1 tablet by mouth every 7 days.  Dispense: 2 tablet; Refill: 2  - VAGINAL PATHOGENS DNA PANEL; Future    4. Acoustic neuroma (HCC)  Follow-up with [place] surgeon as scheduled    Any change or worsening of signs or symptoms, patient encouraged to follow-up or report to the emergency room for further evaluation. Patient understands and agrees.    Followup: Return in about 6 months (around 12/30/2021).

## 2021-07-01 NOTE — ASSESSMENT & PLAN NOTE
On her recent MRI her acoustic neuroma has grown quite a bit.  She is scheduled to see her surgeon at [place] later this month.  She has had increasing hearing loss.

## 2021-07-01 NOTE — ASSESSMENT & PLAN NOTE
The distal left forearm has a mean bone mineral density of 0.729 g/cm2, with a T score of -1.7 and a Z score of -0.5.     The proximal left femur has a mean bone mineral density of 0.907 g/cm2, with a T score of -0.8 and a Z score of 0.2.     When compared with the most recent study dated 6/5/2018, there has been a 2.1% increase in the bone mineral density of the left femur.     IMPRESSION:     1.  According to the World Health Organization classification, bone mineral density of this patient is osteopenic in the left forearm and normal in the left proximal femur.     2.  No significant change in bone density     10-year Probability of Fracture:  Major Osteoporotic     18.9%  Hip     3.6%  Population      [facility]   Patient was given 1 dose of Reclast 2 years ago and then she never followed up on it.  She had a little bit of nausea for couple days but other wise tolerated it well.  She cannot eat calcium because of a history of kidney stones.  She is willing to retry the Reclast.

## 2021-07-06 ENCOUNTER — TELEPHONE (OUTPATIENT)
Dept: MEDICAL GROUP | Facility: LAB | Age: 61
End: 2021-07-06

## 2021-07-06 RX ORDER — ZOLEDRONIC ACID 5 MG/100ML
5 INJECTION, SOLUTION INTRAVENOUS ONCE
Status: CANCELLED | OUTPATIENT
Start: 2021-07-07 | End: 2021-07-07

## 2021-07-13 ENCOUNTER — OFFICE VISIT (OUTPATIENT)
Dept: RHEUMATOLOGY | Facility: MEDICAL CENTER | Age: 61
End: 2021-07-13
Payer: COMMERCIAL

## 2021-07-13 VITALS
DIASTOLIC BLOOD PRESSURE: 70 MMHG | BODY MASS INDEX: 27.11 KG/M2 | WEIGHT: 158 LBS | HEART RATE: 78 BPM | OXYGEN SATURATION: 100 % | TEMPERATURE: 96.9 F | RESPIRATION RATE: 12 BRPM | SYSTOLIC BLOOD PRESSURE: 130 MMHG

## 2021-07-13 DIAGNOSIS — Z72.0 TOBACCO ABUSE: ICD-10-CM

## 2021-07-13 DIAGNOSIS — Z51.81 ENCOUNTER FOR MONITORING ARAVA THERAPY: ICD-10-CM

## 2021-07-13 DIAGNOSIS — Z79.899 LONG-TERM USE OF PLAQUENIL: ICD-10-CM

## 2021-07-13 DIAGNOSIS — M48.061 SPINAL STENOSIS OF LUMBAR REGION, UNSPECIFIED WHETHER NEUROGENIC CLAUDICATION PRESENT: ICD-10-CM

## 2021-07-13 DIAGNOSIS — M06.9 RHEUMATOID ARTHRITIS, INVOLVING UNSPECIFIED SITE, UNSPECIFIED WHETHER RHEUMATOID FACTOR PRESENT (HCC): ICD-10-CM

## 2021-07-13 DIAGNOSIS — M81.0 OSTEOPOROSIS WITHOUT CURRENT PATHOLOGICAL FRACTURE, UNSPECIFIED OSTEOPOROSIS TYPE: ICD-10-CM

## 2021-07-13 DIAGNOSIS — Z79.899 ENCOUNTER FOR MONITORING ARAVA THERAPY: ICD-10-CM

## 2021-07-13 DIAGNOSIS — E03.9 HYPOTHYROIDISM, UNSPECIFIED TYPE: ICD-10-CM

## 2021-07-13 DIAGNOSIS — D36.10 SCHWANNOMA: ICD-10-CM

## 2021-07-13 DIAGNOSIS — Z79.1 NSAID LONG-TERM USE: ICD-10-CM

## 2021-07-13 PROCEDURE — 99214 OFFICE O/P EST MOD 30 MIN: CPT | Performed by: INTERNAL MEDICINE

## 2021-07-13 ASSESSMENT — JOINT PAIN
TOTAL NUMBER OF TENDER JOINTS: 8
TOTAL NUMBER OF SWOLLEN JOINTS: 0

## 2021-07-13 ASSESSMENT — FIBROSIS 4 INDEX: FIB4 SCORE: 3.01
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
## 2021-07-14 ENCOUNTER — OUTPATIENT INFUSION SERVICES (OUTPATIENT)
Dept: ONCOLOGY | Facility: MEDICAL CENTER | Age: 61
End: 2021-07-14
Attending: FAMILY MEDICINE
Payer: COMMERCIAL

## 2021-07-14 VITALS
BODY MASS INDEX: 27.1 KG/M2 | HEIGHT: 64 IN | TEMPERATURE: 98.7 F | OXYGEN SATURATION: 94 % | SYSTOLIC BLOOD PRESSURE: 145 MMHG | HEART RATE: 78 BPM | DIASTOLIC BLOOD PRESSURE: 69 MMHG | WEIGHT: 158.73 LBS | RESPIRATION RATE: 18 BRPM

## 2021-07-14 DIAGNOSIS — Z78.0 OSTEOPENIA AFTER MENOPAUSE: ICD-10-CM

## 2021-07-14 DIAGNOSIS — M85.80 OSTEOPENIA AFTER MENOPAUSE: ICD-10-CM

## 2021-07-14 LAB
CA-I BLD ISE-SCNC: 0.96 MMOL/L (ref 1.1–1.3)
CREAT BLD-MCNC: 1 MG/DL (ref 0.5–1.4)

## 2021-07-14 PROCEDURE — 700111 HCHG RX REV CODE 636 W/ 250 OVERRIDE (IP): Performed by: FAMILY MEDICINE

## 2021-07-14 PROCEDURE — 82330 ASSAY OF CALCIUM: CPT

## 2021-07-14 PROCEDURE — 36415 COLL VENOUS BLD VENIPUNCTURE: CPT

## 2021-07-14 PROCEDURE — 96365 THER/PROPH/DIAG IV INF INIT: CPT

## 2021-07-14 PROCEDURE — 82565 ASSAY OF CREATININE: CPT

## 2021-07-14 RX ORDER — ZOLEDRONIC ACID 5 MG/100ML
INJECTION, SOLUTION INTRAVENOUS
Status: DISCONTINUED
Start: 2021-07-14 | End: 2021-07-14 | Stop reason: HOSPADM

## 2021-07-14 RX ORDER — ZOLEDRONIC ACID 5 MG/100ML
5 INJECTION, SOLUTION INTRAVENOUS ONCE
Status: CANCELLED | OUTPATIENT
Start: 2021-07-14 | End: 2021-07-14

## 2021-07-14 RX ORDER — ZOLEDRONIC ACID 5 MG/100ML
5 INJECTION, SOLUTION INTRAVENOUS ONCE
Status: COMPLETED | OUTPATIENT
Start: 2021-07-14 | End: 2021-07-14

## 2021-07-14 RX ADMIN — ZOLEDRONIC ACID 5 MG: 5 INJECTION, SOLUTION INTRAVENOUS at 16:47

## 2021-07-14 ASSESSMENT — FIBROSIS 4 INDEX: FIB4 SCORE: 3.01

## 2021-07-14 NOTE — LETTER
Infusion Services   [address] [place] [id number]  Phone: [phone]  Fax: [phone]              Dear Dr. Smallwood,    Your patient, Clifford Murphy (: 1960), was scheduled at [facility].  Clifford's encounter diagnosis is:  1. Osteopenia after menopause  POC Creatinine Docked Device    POC IONIZED CA Docked Device    Nursing Communication    Nursing Communication    Nursing Communication    Calcium monitoring per protocol    POC Creatinine Docked Device    POC IONIZED CA Docked Device    Nursing Communication    Nursing Communication    Nursing Communication    Calcium monitoring per protocol    zoledronic Acid (RECLAST) IVPB premix 5 mg     She arrived for her appointment, and  the scheduled treatment was   given. These medications were administered to the patient: We administered zoledronic Acid..  Clifford Murphy  tolerated treatment. In addition, the following labs were drawn    Recent Results (from the past 24 hour(s))   POCT creatinine device results    Collection Time: 21  4:24 PM   Result Value Ref Range    Istat Creatinine 1.0 0.5 - 1.4 mg/dL   POCT ionized CA device results    Collection Time: 21  4:24 PM   Result Value Ref Range    Istat Ionized Calcium 0.96 (L) 1.10 - 1.30 mmol/L            Her next appointment is did not reschedule; because she must see her provider first.    For more information, you may review the nurse's progress notes in chart review under the notes section.       Sincerely,  Adelaide Mayorga R.N.

## 2021-07-15 NOTE — PROGRESS NOTES
Clifford arrives to infusion services for reclast infusion. PIV established; labs drawn as ordered. Clifford denies any oral surgery in the past 4 weeks or plans to have oral surgery in the next 4 weeks. Educated Clifford regarding signs and symptoms of hypocalcemica including paresthesias, muscle twitching, spasms. Zoledronic Acid (Reclast) given per MAR. PIV flushed with normal saline and removed; gauze/coban applied to site. Clifford to follow up with MD for future plan of care. Discharged to self care; no apparent distress noted.

## 2021-08-06 ENCOUNTER — PATIENT MESSAGE (OUTPATIENT)
Dept: MEDICAL GROUP | Facility: LAB | Age: 61
End: 2021-08-06

## 2021-08-06 DIAGNOSIS — Z11.59 ENCOUNTER FOR SCREENING FOR OTHER VIRAL DISEASES: ICD-10-CM

## 2021-08-06 NOTE — TELEPHONE ENCOUNTER
From: Clifford Murphy  To: Physician Tish Smallwood  Sent: 8/6/2021 11:36 AM PDT  Subject: Procedure Question    Hi Cl Alexandra,    I am scheduled for cyber knife procedure with [facility] the week of August 23rd. Prior to that I need to have a Covid test. Would you put the order in so I can get it done on the 18th of August at a [facility].    Thank you,    Clifford Murphy

## 2021-08-06 NOTE — PATIENT COMMUNICATION
Patient requesting Covid test for screening prior to surgery.     Order pended.     Please sign/amend if appropriate.

## 2021-08-10 ENCOUNTER — HOSPITAL ENCOUNTER (OUTPATIENT)
Facility: MEDICAL CENTER | Age: 61
End: 2021-08-10
Attending: PHYSICIAN ASSISTANT
Payer: COMMERCIAL

## 2021-08-10 ENCOUNTER — HOSPITAL ENCOUNTER (OUTPATIENT)
Dept: RADIOLOGY | Facility: MEDICAL CENTER | Age: 61
End: 2021-08-10
Attending: PHYSICIAN ASSISTANT
Payer: COMMERCIAL

## 2021-08-10 ENCOUNTER — OFFICE VISIT (OUTPATIENT)
Dept: MEDICAL GROUP | Facility: LAB | Age: 61
End: 2021-08-10
Payer: COMMERCIAL

## 2021-08-10 VITALS
RESPIRATION RATE: 18 BRPM | DIASTOLIC BLOOD PRESSURE: 92 MMHG | HEIGHT: 64 IN | TEMPERATURE: 98.8 F | SYSTOLIC BLOOD PRESSURE: 148 MMHG | OXYGEN SATURATION: 92 % | HEART RATE: 77 BPM | WEIGHT: 158.1 LBS | BODY MASS INDEX: 26.99 KG/M2

## 2021-08-10 DIAGNOSIS — R05.8 PRODUCTIVE COUGH: ICD-10-CM

## 2021-08-10 PROCEDURE — 99214 OFFICE O/P EST MOD 30 MIN: CPT | Performed by: PHYSICIAN ASSISTANT

## 2021-08-10 PROCEDURE — U0003 INFECTIOUS AGENT DETECTION BY NUCLEIC ACID (DNA OR RNA); SEVERE ACUTE RESPIRATORY SYNDROME CORONAVIRUS 2 (SARS-COV-2) (CORONAVIRUS DISEASE [COVID-19]), AMPLIFIED PROBE TECHNIQUE, MAKING USE OF HIGH THROUGHPUT TECHNOLOGIES AS DESCRIBED BY CMS-2020-01-R: HCPCS

## 2021-08-10 PROCEDURE — U0005 INFEC AGEN DETEC AMPLI PROBE: HCPCS

## 2021-08-10 PROCEDURE — 71046 X-RAY EXAM CHEST 2 VIEWS: CPT

## 2021-08-10 RX ORDER — ALBUTEROL SULFATE 90 UG/1
2 AEROSOL, METERED RESPIRATORY (INHALATION) EVERY 4 HOURS PRN
Qty: 1 EACH | Refills: 1 | Status: SHIPPED | OUTPATIENT
Start: 2021-08-10

## 2021-08-10 ASSESSMENT — FIBROSIS 4 INDEX: FIB4 SCORE: 3.01

## 2021-08-10 NOTE — PROGRESS NOTES
Subjective:   Clifford Murphy is a 61 y.o. female here today for productive cough x1 day, cold-like symptoms x1 week    Productive cough  New to me. Symptoms worsening since yesterday. Initally started with cold-like symptoms August 4th, 2021.   Now is having a productive cough since yesterday.  Denies any fevers chills.  Denies any body aches.  No headaches  Some wheezing in the chest/throat.   Denies sore throat.   No nausea/vomiting  No changes to smell or taste. Has never been tested for COVID-19. Recently received her 1st COVID-19 vaccination on 07/16/2021.   She denies sick contacts.     She is immunocompromised and currently using leflunomide and Plaquenil for RA.  She has recently stopped the leflunomide as she is preparing for a CyberKnife surgery to be completed at [place] next week.         Current medicines (including changes today)  Current Outpatient Medications   Medication Sig Dispense Refill   • albuterol 108 (90 Base) MCG/ACT Aero Soln inhalation aerosol Inhale 2 Puffs every four hours as needed for Shortness of Breath. 1 Each 1   • hydroxychloroquine (PLAQUENIL) 200 MG Tab TAKE 1 TABLET BY MOUTH TWICE A  tablet 1   • fluconazole (DIFLUCAN) 150 MG tablet Take 1 tablet by mouth every 7 days. 2 tablet 2   • leflunomide (ARAVA) 20 MG Tab TAKE 1 TABLET BY MOUTH EVERY DAY 90 tablet 0   • levothyroxine (SYNTHROID) 125 MCG Tab TAKE 1 TABLET BY MOUTH EVERY MORNING ON AN EMPTY STOMACH. 90 tablet 3   • cimetidine (TAGAMET) 400 MG Tab TAKE 1 TABLET BY MOUTH TWICE A  tablet 3   • cholestyramine (QUESTRAN) 4 g packet Take 4 g by mouth every day. 60 Each 2   • cetirizine (ZYRTEC) 10 MG Tab Take 10 mg by mouth 1 time daily as needed for Allergies.     • Cholecalciferol (VITAMIN D3) 5000 units Tab Take 5,000 Int'l Units by mouth every day at 6 PM.     • Folic Acid 0.8 MG Cap Take 0.8-1.6 mg by mouth every day at 6 PM. takes 1 pill daily and alternates every other day 2 pills daily     • ibuprofen  "(MOTRIN) 800 MG Tab Take 800 mg by mouth every 8 hours as needed for Mild Pain.     • acetaminophen (TYLENOL) 500 MG Tab Take 500-1,000 mg by mouth 2 times a day as needed for Mild Pain.       No current facility-administered medications for this visit.     She  has a past medical history of Anesthesia, Asthma (02/21/2018), Breath shortness, Bronchitis (2016), Cough (07/13/2018), Environmental and seasonal allergies, Genital herpes in women (2/11/2014), Heart burn, High risk medications (not anticoagulants) long-term use (8/29/2012), Hypothyroid (9/14/2011), Kidney stones, Macrocytosis without anemia (med effect), Menopause (9/15/2011), Pain (07/13/2018), Pneumonia (01/2015), Post-cholecystectomy syndrome, Rheumatoid arthritis(714.0), Snoring, Tobacco abuse, episodic (9/14/2011), and Vitamin d deficiency (5/15/2012).    ROS   As per HPI  No chest pain, no shortness of breath, no abdominal pain       Objective:     /92 (BP Location: Left arm, Patient Position: Sitting, BP Cuff Size: Adult)   Pulse 77   Temp 37.1 °C (98.8 °F) (Temporal)   Resp 18   Ht 1.626 m (5' 4\")   Wt 71.7 kg (158 lb 1.6 oz)   SpO2 92%  Body mass index is 27.14 kg/m².   Physical Exam:  Constitutional: Alert, no distress.  Skin: Warm, dry, good turgor, no rashes in visible areas.  Eye: Equal, round and reactive, conjunctiva clear, lids normal.  ENMT: Lips without lesions, good dentition, oropharynx clear. TMs pearly gray bilaterally.  Neck: Trachea midline, no masses, no thyromegaly. No cervical or supraclavicular lymphadenopathy  Respiratory: Significant productive cough throughout the visit.  Unlabored respiratory effort, rhonchi noted in right upper and lower lobes.  Rhonchi noted in left upper lobe.  No wheeze noted.  Cardiovascular: Normal S1, S2, no murmur, no edema.  Psych: Alert and oriented x3, normal affect and mood.    Assessment and Plan:   The following treatment plan was discussed    1. Productive cough  New to me, concern " for pneumonia versus Covid given productive cough and cold-like symptoms that have been persistent for about a week now.  Will obtain chest x-ray, Covid 19 swab was completed today in clinic.  Discussed that pending chest x-ray will send in antibiotics.  She declined steroids at this time.  Urgent care or ER precautions if symptoms acutely worsen  She agrees with the plan  - DX-CHEST-2 VIEWS; Future  - albuterol 108 (90 Base) MCG/ACT Aero Soln inhalation aerosol; Inhale 2 Puffs every four hours as needed for Shortness of Breath.  Dispense: 1 Each; Refill: 1  - SARS-CoV-2 PCR (24 hour In-House): Collect NP swab in VTM; Future    New to me; has been seen by [facility] w/in the past 3 years.   Followup: Return if symptoms worsen or fail to improve.       Ivelisse Olmos PLangA.-SUZY.  Supervising MD: Dr. Pilo Taylor MD  08/10/21

## 2021-08-10 NOTE — ASSESSMENT & PLAN NOTE
New to me. Symptoms worsening since yesterday. Initally started with cold-like symptoms August 4th, 2021.   Now is having a productive cough since yesterday.  Denies any fevers chills.  Denies any body aches.  No headaches  Some wheezing in the chest/throat.   Denies sore throat.   No nausea/vomiting  No changes to smell or taste. Has never been tested for COVID-19. Recently received her 1st COVID-19 vaccination on 07/16/2021.   She denies sick contacts.     She is immunocompromised and currently using leflunomide and Plaquenil for RA.  She has recently stopped the leflunomide as she is preparing for a CyberKnife surgery to be completed at [place] next week.

## 2021-08-11 LAB — COVID ORDER STATUS COVID19: NORMAL

## 2021-08-12 LAB
SARS-COV-2 RNA RESP QL NAA+PROBE: NOTDETECTED
SPECIMEN SOURCE: NORMAL

## 2021-08-17 ENCOUNTER — OFFICE VISIT (OUTPATIENT)
Dept: MEDICAL GROUP | Facility: LAB | Age: 61
End: 2021-08-17
Payer: COMMERCIAL

## 2021-08-17 VITALS
RESPIRATION RATE: 16 BRPM | HEART RATE: 76 BPM | HEIGHT: 64 IN | OXYGEN SATURATION: 90 % | BODY MASS INDEX: 27.06 KG/M2 | WEIGHT: 158.51 LBS | TEMPERATURE: 98.5 F | DIASTOLIC BLOOD PRESSURE: 80 MMHG | SYSTOLIC BLOOD PRESSURE: 132 MMHG

## 2021-08-17 DIAGNOSIS — J40 BRONCHITIS: ICD-10-CM

## 2021-08-17 PROCEDURE — 99214 OFFICE O/P EST MOD 30 MIN: CPT | Performed by: FAMILY MEDICINE

## 2021-08-17 RX ORDER — AZITHROMYCIN 250 MG/1
TABLET, FILM COATED ORAL
Qty: 6 TABLET | Refills: 0 | Status: SHIPPED | OUTPATIENT
Start: 2021-08-17 | End: 2021-08-22

## 2021-08-17 RX ORDER — METHYLPREDNISOLONE 4 MG/1
TABLET ORAL
Qty: 21 TABLET | Refills: 0 | Status: SHIPPED | OUTPATIENT
Start: 2021-08-17 | End: 2021-10-06

## 2021-08-17 ASSESSMENT — FIBROSIS 4 INDEX: FIB4 SCORE: 3.01

## 2021-08-17 NOTE — PATIENT INSTRUCTIONS

## 2021-08-18 ENCOUNTER — PATIENT MESSAGE (OUTPATIENT)
Dept: MEDICAL GROUP | Facility: LAB | Age: 61
End: 2021-08-18

## 2021-08-19 RX ORDER — DOXYCYCLINE HYCLATE 100 MG
100 TABLET ORAL 2 TIMES DAILY
Qty: 14 TABLET | Refills: 0 | Status: SHIPPED | OUTPATIENT
Start: 2021-08-19 | End: 2021-10-21

## 2021-08-24 ENCOUNTER — HOSPITAL ENCOUNTER (OUTPATIENT)
Dept: LAB | Facility: MEDICAL CENTER | Age: 61
End: 2021-08-24
Attending: FAMILY MEDICINE
Payer: COMMERCIAL

## 2021-08-24 DIAGNOSIS — Z11.59 ENCOUNTER FOR SCREENING FOR OTHER VIRAL DISEASES: ICD-10-CM

## 2021-08-24 LAB — COVID ORDER STATUS COVID19: NORMAL

## 2021-08-24 PROCEDURE — U0005 INFEC AGEN DETEC AMPLI PROBE: HCPCS

## 2021-08-24 PROCEDURE — C9803 HOPD COVID-19 SPEC COLLECT: HCPCS

## 2021-08-24 PROCEDURE — U0003 INFECTIOUS AGENT DETECTION BY NUCLEIC ACID (DNA OR RNA); SEVERE ACUTE RESPIRATORY SYNDROME CORONAVIRUS 2 (SARS-COV-2) (CORONAVIRUS DISEASE [COVID-19]), AMPLIFIED PROBE TECHNIQUE, MAKING USE OF HIGH THROUGHPUT TECHNOLOGIES AS DESCRIBED BY CMS-2020-01-R: HCPCS

## 2021-08-24 NOTE — ASSESSMENT & PLAN NOTE
Illness: 14 days of illness including: nasal congestion, green/purulent rhinorrhea, sore throat, cough  Symptoms negative for fever, chills,   Treatments tried: Increase fluids rest and albuterol  Since onset, symptoms are worse   Patient was seen in this office on 8/12/2021 at which time she had a negative Covid test and a clear chest x-ray.  She has received 1 Pfizer vaccine for Covid in July and is due for her second.  Medical history negative for asthma  She  reports that she has been smoking cigarettes. She has a 20.00 pack-year smoking history. She has never used smokeless tobacco.  Patient is concerned because she is scheduled for CyberKnife procedure next week at [place].  Of note she is immunocompromised and on Plaquenil for her RA.

## 2021-08-24 NOTE — PROGRESS NOTES
Subjective:     Chief Complaint   Patient presents with   • Cough     X 2wks   • Sinus Problem       Clifford Murphy is a 61 y.o. female here today for evaluation and management of:    Bronchitis  Illness: 14 days of illness including: nasal congestion, green/purulent rhinorrhea, sore throat, cough  Symptoms negative for fever, chills,   Treatments tried: Increase fluids rest and albuterol  Since onset, symptoms are worse   Patient was seen in this office on 8/12/2021 at which time she had a negative Covid test and a clear chest x-ray.  She has received 1 Pfizer vaccine for Covid in July and is due for her second.  Medical history negative for asthma  She  reports that she has been smoking cigarettes. She has a 20.00 pack-year smoking history. She has never used smokeless tobacco.  Patient is concerned because she is scheduled for CyberKnife procedure next week at [place].  Of note she is immunocompromised and on Plaquenil for her RA.       Allergies   Allergen Reactions   • Morphine Anaphylaxis     Morphine and morphine derivatives. (demerol)   • Aspirin      Stomach pain   • Codeine      Stomach pain   • Tramadol Vomiting     SEVERE HEADACHE   • Sulfamethoxazole W-Trimethoprim Shortness of Breath     Rxn - flushing, SOB  Late 80's     • Other Drug      Muscle relaxants cause dizziness         Current medicines (including changes today)  Current Outpatient Medications   Medication Sig Dispense Refill   • methylPREDNISolone (MEDROL DOSEPAK) 4 MG Tablet Therapy Pack As directed on the packaging label. 21 Tablet 0   • doxycycline (VIBRAMYCIN) 100 MG Tab Take 1 Tablet by mouth 2 times a day. 14 Tablet 0   • albuterol 108 (90 Base) MCG/ACT Aero Soln inhalation aerosol Inhale 2 Puffs every four hours as needed for Shortness of Breath. 1 Each 1   • hydroxychloroquine (PLAQUENIL) 200 MG Tab TAKE 1 TABLET BY MOUTH TWICE A  tablet 1   • fluconazole (DIFLUCAN) 150 MG tablet Take 1 tablet by mouth every 7 days. 2  tablet 2   • leflunomide (ARAVA) 20 MG Tab TAKE 1 TABLET BY MOUTH EVERY DAY 90 tablet 0   • levothyroxine (SYNTHROID) 125 MCG Tab TAKE 1 TABLET BY MOUTH EVERY MORNING ON AN EMPTY STOMACH. 90 tablet 3   • cimetidine (TAGAMET) 400 MG Tab TAKE 1 TABLET BY MOUTH TWICE A  tablet 3   • cholestyramine (QUESTRAN) 4 g packet Take 4 g by mouth every day. 60 Each 2   • cetirizine (ZYRTEC) 10 MG Tab Take 10 mg by mouth 1 time daily as needed for Allergies.     • Cholecalciferol (VITAMIN D3) 5000 units Tab Take 5,000 Int'l Units by mouth every day at 6 PM.     • Folic Acid 0.8 MG Cap Take 0.8-1.6 mg by mouth every day at 6 PM. takes 1 pill daily and alternates every other day 2 pills daily     • ibuprofen (MOTRIN) 800 MG Tab Take 800 mg by mouth every 8 hours as needed for Mild Pain.     • acetaminophen (TYLENOL) 500 MG Tab Take 500-1,000 mg by mouth 2 times a day as needed for Mild Pain.       No current facility-administered medications for this visit.       She  has a past medical history of Anesthesia, Asthma (02/21/2018), Breath shortness, Bronchitis (2016), Cough (07/13/2018), Environmental and seasonal allergies, Genital herpes in women (2/11/2014), Heart burn, High risk medications (not anticoagulants) long-term use (8/29/2012), Hypothyroid (9/14/2011), Kidney stones, Macrocytosis without anemia (med effect), Menopause (9/15/2011), Pain (07/13/2018), Pneumonia (01/2015), Post-cholecystectomy syndrome, Rheumatoid arthritis(714.0), Snoring, Tobacco abuse, episodic (9/14/2011), and Vitamin d deficiency (5/15/2012).    Patient Active Problem List    Diagnosis Date Noted   • At high risk for fracture 06/30/2021   • Osteopenia after menopause 06/30/2021   • S/P lumbar spinal fusion 06/09/2021   • Hearing loss of left ear 05/04/2021   • Vertigo of central origin 05/04/2021   • Immunocompromised (HCC) 04/09/2020   • Acute vaginitis 10/15/2019   • Left ear pain 10/15/2019   • Fungal infection of toenail 12/20/2018   •  "Hordeolum externum of right upper eyelid 12/20/2018   • Gastroesophageal reflux disease without esophagitis 11/07/2018   • Other viral warts 11/07/2018   • Acoustic neuroma ([facility]) 10/17/2018   • Surgical site infection 08/11/2018   • Allergic sinusitis 06/25/2018   • Subclinical hypothyroidism 06/25/2018   • Long-term use of Plaquenil 02/01/2018   • Bronchitis 12/15/2017   • Spinal stenosis of lumbar region 12/12/2017   • Internal derangement of left knee 12/12/2017   • Obesity (BMI 30-39.9) 11/17/2017   • Thrombocytopenia ([facility]) 10/05/2017   • Productive cough 12/28/2015   • Tobacco abuse 08/05/2015   • Seasonal allergies 06/17/2015   • Hypertriglyceridemia 09/29/2014   • Encounter for long-term (current) use of insulin ([facility]) 09/15/2014   • Genital herpes in women 02/11/2014   • Jaw pain 08/04/2013   • High risk medications (not anticoagulants) long-term use 08/29/2012   • Post-cholecystectomy syndrome 05/23/2012   • Vitamin D insufficiency 05/15/2012   • Menopause 09/15/2011   • Macrocytosis without anemia 09/14/2011   • Acquired hypothyroidism 09/14/2011   • RA (rheumatoid arthritis) ([facility]) 09/14/2011       ROS   No fever or chills.  No nausea or vomiting.  No chest pain or palpitations.  .  No pain with urination or hematuria.  No black or bloody stools.       Objective:     /80   Pulse 76   Temp 36.9 °C (98.5 °F)   Resp 16   Ht 1.626 m (5' 4\")   Wt 71.9 kg (158 lb 8.2 oz)   SpO2 90%  Body mass index is 27.21 kg/m².   Physical Exam:  Well developed, well nourished.  Alert, oriented in no acute distress.  Eye contact is good, speech goal directed, affect calm  Eyes: conjunctiva non-injected, sclera non-icteric.  Ears: Pinna normal. TM pearly gray.   Nose: Nares are patent.  Erythematous swollen mucosa with yellow discharge  Mouth: Oral mucous membranes pink and moist with no lesions.  Diffuse erythema the posterior pharynx  Neck Supple.  No adenopathy or masses in the neck or supraclavicular regions. No " thyromegaly  Lungs: Diffuse expiratory wheezes on auscultation bilaterally with good excursion. No crackles or rhonchi  CV: regular rate and rhythm. No murmur        Assessment and Plan:   The following treatment plan was discussed    1. Bronchitis  Zithromax as directed.  Medrol Dosepak given the bronchospasm present.  Continue albuterol.  Increase fluids and rest.  Patient to contact me on Monday to let me know how she is feeling.  - azithromycin (ZITHROMAX) 250 MG Tab; 2 tabs by mouth day 1, 1 tab by mouth days 2-5  Dispense: 6 Tablet; Refill: 0  - methylPREDNISolone (MEDROL DOSEPAK) 4 MG Tablet Therapy Pack; As directed on the packaging label.  Dispense: 21 Tablet; Refill: 0    Any change or worsening of signs or symptoms, patient encouraged to follow-up or report to the emergency room for further evaluation. Patient understands and agrees.    Followup: Return if symptoms worsen or fail to improve.

## 2021-08-25 LAB
SARS-COV-2 RNA RESP QL NAA+PROBE: NOTDETECTED
SPECIMEN SOURCE: NORMAL

## 2021-09-29 ENCOUNTER — TELEPHONE (OUTPATIENT)
Dept: MEDICAL GROUP | Facility: LAB | Age: 61
End: 2021-09-29

## 2021-09-29 DIAGNOSIS — D33.3 ACOUSTIC NEUROMA (HCC): ICD-10-CM

## 2021-09-29 NOTE — TELEPHONE ENCOUNTER
1. Caller Name: Ofelia                        Call Back Number: [phone]      How would the patient prefer to be contacted with a response:     Ofelia from [facility] requesting a referral for pt to be seen. PT has appt 10/4/21.  ICD-10 D33.3

## 2021-10-06 ENCOUNTER — TELEMEDICINE (OUTPATIENT)
Dept: MEDICAL GROUP | Facility: LAB | Age: 61
End: 2021-10-06
Payer: COMMERCIAL

## 2021-10-06 VITALS — HEIGHT: 64 IN | WEIGHT: 159 LBS | BODY MASS INDEX: 27.14 KG/M2

## 2021-10-06 DIAGNOSIS — M79.2 NEURALGIA: ICD-10-CM

## 2021-10-06 DIAGNOSIS — G89.18 POST-OPERATIVE PAIN: ICD-10-CM

## 2021-10-06 DIAGNOSIS — D33.3 ACOUSTIC NEUROMA (HCC): ICD-10-CM

## 2021-10-06 PROCEDURE — 99214 OFFICE O/P EST MOD 30 MIN: CPT | Mod: 95 | Performed by: FAMILY MEDICINE

## 2021-10-06 RX ORDER — DEXAMETHASONE 2 MG/1
TABLET ORAL
COMMUNITY
Start: 2021-10-01 | End: 2021-10-21

## 2021-10-06 RX ORDER — GABAPENTIN 100 MG/1
CAPSULE ORAL
Qty: 126 CAPSULE | Refills: 0 | Status: SHIPPED | OUTPATIENT
Start: 2021-10-06 | End: 2021-10-20

## 2021-10-06 RX ORDER — LEUCOVORIN CALCIUM 10 MG/1
TABLET ORAL
COMMUNITY
Start: 2021-09-29 | End: 2021-10-21

## 2021-10-06 ASSESSMENT — FIBROSIS 4 INDEX: FIB4 SCORE: 3.01

## 2021-10-06 NOTE — PATIENT INSTRUCTIONS
Gabapentin capsules or tablets  What is this medicine?  GABAPENTIN (GA ba pen tin) is used to control seizures in certain types of epilepsy. It is also used to treat certain types of nerve pain.  This medicine may be used for other purposes; ask your health care provider or pharmacist if you have questions.  COMMON BRAND NAME(S): Active-PAC with Gabapentin, Gabarone, Neurontin  What should I tell my health care provider before I take this medicine?  They need to know if you have any of these conditions:  · history of drug abuse or alcohol abuse problem  · kidney disease  · lung or breathing disease  · suicidal thoughts, plans, or attempt; a previous suicide attempt by you or a family member  · an unusual or allergic reaction to gabapentin, other medicines, foods, dyes, or preservatives  · pregnant or trying to get pregnant  · breast-feeding  How should I use this medicine?  Take this medicine by mouth with a glass of water. Follow the directions on the prescription label. You can take it with or without food. If it upsets your stomach, take it with food. Take your medicine at regular intervals. Do not take it more often than directed. Do not stop taking except on your doctor's advice.  If you are directed to break the 600 or 800 mg tablets in half as part of your dose, the extra half tablet should be used for the next dose. If you have not used the extra half tablet within 28 days, it should be thrown away.  A special MedGuide will be given to you by the pharmacist with each prescription and refill. Be sure to read this information carefully each time.  Talk to your pediatrician regarding the use of this medicine in children. While this drug may be prescribed for children as young as 3 years for selected conditions, precautions do apply.  Overdosage: If you think you have taken too much of this medicine contact a poison control center or emergency room at once.  NOTE: This medicine is only for you. Do not share this  medicine with others.  What if I miss a dose?  If you miss a dose, take it as soon as you can. If it is almost time for your next dose, take only that dose. Do not take double or extra doses.  What may interact with this medicine?  This medicine may interact with the following medications:  · alcohol  · antihistamines for allergy, cough, and cold  · certain medicines for anxiety or sleep  · certain medicines for depression like amitriptyline, fluoxetine, sertraline  · certain medicines for seizures like phenobarbital, primidone  · certain medicines for stomach problems  · general anesthetics like halothane, isoflurane, methoxyflurane, propofol  · local anesthetics like lidocaine, pramoxine, tetracaine  · medicines that relax muscles for surgery  · narcotic medicines for pain  · phenothiazines like chlorpromazine, mesoridazine, prochlorperazine, thioridazine  This list may not describe all possible interactions. Give your health care provider a list of all the medicines, herbs, non-prescription drugs, or dietary supplements you use. Also tell them if you smoke, drink alcohol, or use illegal drugs. Some items may interact with your medicine.  What should I watch for while using this medicine?  Visit your doctor or health care provider for regular checks on your progress. You may want to keep a record at home of how you feel your condition is responding to treatment. You may want to share this information with your doctor or health care provider at each visit. You should contact your doctor or health care provider if your seizures get worse or if you have any new types of seizures. Do not stop taking this medicine or any of your seizure medicines unless instructed by your doctor or health care provider. Stopping your medicine suddenly can increase your seizures or their severity.  This medicine may cause serious skin reactions. They can happen weeks to months after starting the medicine. Contact your health care  provider right away if you notice fevers or flu-like symptoms with a rash. The rash may be red or purple and then turn into blisters or peeling of the skin. Or, you might notice a red rash with swelling of the face, lips or lymph nodes in your neck or under your arms.  Wear a medical identification bracelet or chain if you are taking this medicine for seizures, and carry a card that lists all your medications.  You may get drowsy, dizzy, or have blurred vision. Do not drive, use machinery, or do anything that needs mental alertness until you know how this medicine affects you. To reduce dizzy or fainting spells, do not sit or stand up quickly, especially if you are an older patient. Alcohol can increase drowsiness and dizziness. Avoid alcoholic drinks.  Your mouth may get dry. Chewing sugarless gum or sucking hard candy, and drinking plenty of water will help.  The use of this medicine may increase the chance of suicidal thoughts or actions. Pay special attention to how you are responding while on this medicine. Any worsening of mood, or thoughts of suicide or dying should be reported to your health care provider right away.  Women who become pregnant while using this medicine may enroll in the [place] Antiepileptic Drug Pregnancy Registry by calling 1-815.840.3496. This registry collects information about the safety of antiepileptic drug use during pregnancy.  What side effects may I notice from receiving this medicine?  Side effects that you should report to your doctor or health care professional as soon as possible:  · allergic reactions like skin rash, itching or hives, swelling of the face, lips, or tongue  · breathing problems  · rash, fever, and swollen lymph nodes  · redness, blistering, peeling or loosening of the skin, including inside the mouth  · suicidal thoughts, mood changes  Side effects that usually do not require medical attention (report to your doctor or health care professional if they  continue or are bothersome):  · dizziness  · drowsiness  · headache  · nausea, vomiting  · swelling of ankles, feet, hands  · tiredness  This list may not describe all possible side effects. Call your doctor for medical advice about side effects. You may report side effects to FDA at 9-216-NDT-8660.  Where should I keep my medicine?  Keep out of reach of children.  This medicine may cause accidental overdose and death if it taken by other adults, children, or pets. Mix any unused medicine with a substance like cat litter or coffee grounds. Then throw the medicine away in a sealed container like a sealed bag or a coffee can with a lid. Do not use the medicine after the expiration date.  Store at room temperature between 15 and 30 degrees C (59 and 86 degrees F).  NOTE: This sheet is a summary. It may not cover all possible information. If you have questions about this medicine, talk to your doctor, pharmacist, or health care provider.  © 2020 [employer]/Gold Standard (2020-03-20 14:16:43)

## 2021-10-06 NOTE — ASSESSMENT & PLAN NOTE
S/p Cyber Knife procedure on 8/31/2021 for acoustic neuroma at [place].  She is having severe postoperative pain at the sites of the stereotactic radiation.  She was seen fortunately at their headache clinic and they recommended starting gabapentin but did not prescribe this and asked her to have her primary care do it.  She is having severe pain and is unable to sleep secondary to the pain.  She is currently on a dexamethasone taper.  She has failed several tapers because the pain worsens anytime she lowers the dose of dexamethasone.

## 2021-10-13 NOTE — PROGRESS NOTES
Virtual Visit: Established Patient   This visit was conducted via Zoom using secure and encrypted videoconferencing technology.   The patient was in a private location in the state of [place].    The patient's identity was confirmed and verbal consent was obtained for this virtual visit.    Subjective:   CC:   Chief Complaint   Patient presents with   • Headache       Clifford Murphy is a 61 y.o. female presenting for evaluation and management of:    Acoustic neuroma (HCC)  S/p Cyber Knife procedure on 8/31/2021 for acoustic neuroma at [place].  She is having severe postoperative pain at the sites of the stereotactic radiation.  She was seen fortunately at their headache clinic and they recommended starting gabapentin but did not prescribe this and asked her to have her primary care do it.  She is having severe pain and is unable to sleep secondary to the pain.  She is currently on a dexamethasone taper.  She has failed several tapers because the pain worsens anytime she lowers the dose of dexamethasone.          ROS   Patient denies any fever or chills.    Current medicines (including changes today)  Current Outpatient Medications   Medication Sig Dispense Refill   • dexamethasone (DECADRON) 2 MG tablet Take 4mg (2 tablets) dexamethasone daily for total 7 days, then 2mg (1 tablet) daily for 3 days, then 1 mg (1/2 tablet) daily for 3 days then stop     • gabapentin (NEURONTIN) 100 MG Cap Take 1 Capsule by mouth 2 times a day for 3 days, THEN 2 Capsules 2 times a day for 3 days, THEN 3 Capsules 2 times a day for 3 days, THEN 4 Capsules 2 times a day for 3 days, THEN 5 Capsules 2 times a day for 3 days, THEN 6 Capsules 2 times a day for 3 days. 126 Capsule 0   • Leucovorin Calcium 10 MG Tab      • dexamethasone (DECADRON) 2 MG tablet      • doxycycline (VIBRAMYCIN) 100 MG Tab Take 1 Tablet by mouth 2 times a day. 14 Tablet 0   • albuterol 108 (90 Base) MCG/ACT Aero Soln inhalation aerosol Inhale 2 Puffs every four  hours as needed for Shortness of Breath. 1 Each 1   • hydroxychloroquine (PLAQUENIL) 200 MG Tab TAKE 1 TABLET BY MOUTH TWICE A  tablet 1   • fluconazole (DIFLUCAN) 150 MG tablet Take 1 tablet by mouth every 7 days. 2 tablet 2   • leflunomide (ARAVA) 20 MG Tab TAKE 1 TABLET BY MOUTH EVERY DAY 90 tablet 0   • levothyroxine (SYNTHROID) 125 MCG Tab TAKE 1 TABLET BY MOUTH EVERY MORNING ON AN EMPTY STOMACH. 90 tablet 3   • cimetidine (TAGAMET) 400 MG Tab TAKE 1 TABLET BY MOUTH TWICE A  tablet 3   • cholestyramine (QUESTRAN) 4 g packet Take 4 g by mouth every day. 60 Each 2   • cetirizine (ZYRTEC) 10 MG Tab Take 10 mg by mouth 1 time daily as needed for Allergies.     • Cholecalciferol (VITAMIN D3) 5000 units Tab Take 5,000 Int'l Units by mouth every day at 6 PM.     • Folic Acid 0.8 MG Cap Take 0.8-1.6 mg by mouth every day at 6 PM. takes 1 pill daily and alternates every other day 2 pills daily     • ibuprofen (MOTRIN) 800 MG Tab Take 800 mg by mouth every 8 hours as needed for Mild Pain.     • acetaminophen (TYLENOL) 500 MG Tab Take 500-1,000 mg by mouth 2 times a day as needed for Mild Pain.       No current facility-administered medications for this visit.       Patient Active Problem List    Diagnosis Date Noted   • Neuralgia 10/06/2021   • Post-operative pain 10/06/2021   • At high risk for fracture 06/30/2021   • Osteopenia after menopause 06/30/2021   • S/P lumbar spinal fusion 06/09/2021   • Hearing loss of left ear 05/04/2021   • Vertigo of central origin 05/04/2021   • Immunocompromised (HCC) 04/09/2020   • Acute vaginitis 10/15/2019   • Left ear pain 10/15/2019   • Fungal infection of toenail 12/20/2018   • Hordeolum externum of right upper eyelid 12/20/2018   • Gastroesophageal reflux disease without esophagitis 11/07/2018   • Other viral warts 11/07/2018   • Acoustic neuroma (HCC) 10/17/2018   • Surgical site infection 08/11/2018   • Allergic sinusitis 06/25/2018   • Subclinical hypothyroidism  "06/25/2018   • Long-term use of Plaquenil 02/01/2018   • Bronchitis 12/15/2017   • Spinal stenosis of lumbar region 12/12/2017   • Internal derangement of left knee 12/12/2017   • Obesity (BMI 30-39.9) 11/17/2017   • Thrombocytopenia ([facility]) 10/05/2017   • Productive cough 12/28/2015   • Tobacco abuse 08/05/2015   • Seasonal allergies 06/17/2015   • Hypertriglyceridemia 09/29/2014   • Encounter for long-term (current) use of insulin ([facility]) 09/15/2014   • Genital herpes in women 02/11/2014   • Jaw pain 08/04/2013   • High risk medications (not anticoagulants) long-term use 08/29/2012   • Post-cholecystectomy syndrome 05/23/2012   • Vitamin D insufficiency 05/15/2012   • Menopause 09/15/2011   • Macrocytosis without anemia 09/14/2011   • Acquired hypothyroidism 09/14/2011   • RA (rheumatoid arthritis) ([facility]) 09/14/2011        Objective:   Ht 1.626 m (5' 4.02\")   Wt 72.1 kg (159 lb)   LMP  (LMP Unknown)   BMI 27.28 kg/m²     Physical Exam:  Constitutional: Alert, no distress, well-groomed.  Skin: No rashes in visible areas.  Eye: Round. Conjunctiva clear, lids normal. No icterus.   ENMT: Lips pink without lesions, good dentition, moist mucous membranes. Phonation normal.  Neck: No masses, no thyromegaly. Moves freely without pain.  Respiratory: Unlabored respiratory effort, no cough or audible wheeze  Psych: Alert and oriented x3, normal affect and mood.     Assessment and Plan:   The following treatment plan was discussed:     1. Acoustic neuroma (HCC)  Start gabapentin on a ramp-up dose.  Discussed risks and benefits.  Once we determine what dose she is doing best on we will call in that dose.  Patient to follow-up with [facility] as scheduled.  - gabapentin (NEURONTIN) 100 MG Cap; Take 1 Capsule by mouth 2 times a day for 3 days, THEN 2 Capsules 2 times a day for 3 days, THEN 3 Capsules 2 times a day for 3 days, THEN 4 Capsules 2 times a day for 3 days, THEN 5 Capsules 2 times a day for 3 days, THEN 6 Capsules 2 times " a day for 3 days.  Dispense: 126 Capsule; Refill: 0    2. Neuralgia  This is a new problem.  Start gabapentin on a ramp-up dose.  Discussed risks and benefits.  Once we determine what dose she is doing best on we will call in that dose.  Patient to follow-up with [facility] as scheduled.  - gabapentin (NEURONTIN) 100 MG Cap; Take 1 Capsule by mouth 2 times a day for 3 days, THEN 2 Capsules 2 times a day for 3 days, THEN 3 Capsules 2 times a day for 3 days, THEN 4 Capsules 2 times a day for 3 days, THEN 5 Capsules 2 times a day for 3 days, THEN 6 Capsules 2 times a day for 3 days.  Dispense: 126 Capsule; Refill: 0    3. Post-operative pain  This is a new problem.  Start gabapentin on a ramp-up dose.  Discussed risks and benefits.  Once we determine what dose she is doing best on we will call in that dose.  Patient to follow-up with [facility] as scheduled.  - gabapentin (NEURONTIN) 100 MG Cap; Take 1 Capsule by mouth 2 times a day for 3 days, THEN 2 Capsules 2 times a day for 3 days, THEN 3 Capsules 2 times a day for 3 days, THEN 4 Capsules 2 times a day for 3 days, THEN 5 Capsules 2 times a day for 3 days, THEN 6 Capsules 2 times a day for 3 days.  Dispense: 126 Capsule; Refill: 0    Follow-up: Return in about 2 weeks (around 10/20/2021).

## 2021-10-14 ENCOUNTER — HOSPITAL ENCOUNTER (OUTPATIENT)
Dept: LAB | Facility: MEDICAL CENTER | Age: 61
End: 2021-10-14
Attending: INTERNAL MEDICINE
Payer: COMMERCIAL

## 2021-10-14 DIAGNOSIS — Z79.899 ENCOUNTER FOR MONITORING ARAVA THERAPY: ICD-10-CM

## 2021-10-14 DIAGNOSIS — Z79.899 LONG-TERM USE OF PLAQUENIL: ICD-10-CM

## 2021-10-14 DIAGNOSIS — D36.10 SCHWANNOMA: ICD-10-CM

## 2021-10-14 DIAGNOSIS — M81.0 OSTEOPOROSIS WITHOUT CURRENT PATHOLOGICAL FRACTURE, UNSPECIFIED OSTEOPOROSIS TYPE: ICD-10-CM

## 2021-10-14 DIAGNOSIS — Z79.1 NSAID LONG-TERM USE: ICD-10-CM

## 2021-10-14 DIAGNOSIS — Z51.81 ENCOUNTER FOR MONITORING ARAVA THERAPY: ICD-10-CM

## 2021-10-14 DIAGNOSIS — M06.9 RHEUMATOID ARTHRITIS, INVOLVING UNSPECIFIED SITE, UNSPECIFIED WHETHER RHEUMATOID FACTOR PRESENT (HCC): ICD-10-CM

## 2021-10-14 LAB
ALBUMIN SERPL BCP-MCNC: 4.3 G/DL (ref 3.2–4.9)
ALBUMIN/GLOB SERPL: 1.6 G/DL
ALP SERPL-CCNC: 63 U/L (ref 30–99)
ALT SERPL-CCNC: 13 U/L (ref 2–50)
ANION GAP SERPL CALC-SCNC: 11 MMOL/L (ref 7–16)
AST SERPL-CCNC: 17 U/L (ref 12–45)
BASOPHILS # BLD AUTO: 0.6 % (ref 0–1.8)
BASOPHILS # BLD: 0.04 K/UL (ref 0–0.12)
BILIRUB SERPL-MCNC: 0.5 MG/DL (ref 0.1–1.5)
BUN SERPL-MCNC: 19 MG/DL (ref 8–22)
CALCIUM SERPL-MCNC: 9.1 MG/DL (ref 8.5–10.5)
CHLORIDE SERPL-SCNC: 108 MMOL/L (ref 96–112)
CO2 SERPL-SCNC: 23 MMOL/L (ref 20–33)
CREAT SERPL-MCNC: 0.85 MG/DL (ref 0.5–1.4)
EOSINOPHIL # BLD AUTO: 0.19 K/UL (ref 0–0.51)
EOSINOPHIL NFR BLD: 3 % (ref 0–6.9)
ERYTHROCYTE [DISTWIDTH] IN BLOOD BY AUTOMATED COUNT: 57.3 FL (ref 35.9–50)
ERYTHROCYTE [SEDIMENTATION RATE] IN BLOOD BY WESTERGREN METHOD: 17 MM/HOUR (ref 0–25)
GLOBULIN SER CALC-MCNC: 2.7 G/DL (ref 1.9–3.5)
GLUCOSE SERPL-MCNC: 85 MG/DL (ref 65–99)
HCT VFR BLD AUTO: 47.5 % (ref 37–47)
HGB BLD-MCNC: 15.3 G/DL (ref 12–16)
IMM GRANULOCYTES # BLD AUTO: 0.03 K/UL (ref 0–0.11)
IMM GRANULOCYTES NFR BLD AUTO: 0.5 % (ref 0–0.9)
LYMPHOCYTES # BLD AUTO: 1.53 K/UL (ref 1–4.8)
LYMPHOCYTES NFR BLD: 24.1 % (ref 22–41)
MCH RBC QN AUTO: 33.8 PG (ref 27–33)
MCHC RBC AUTO-ENTMCNC: 32.2 G/DL (ref 33.6–35)
MCV RBC AUTO: 104.9 FL (ref 81.4–97.8)
MONOCYTES # BLD AUTO: 0.41 K/UL (ref 0–0.85)
MONOCYTES NFR BLD AUTO: 6.5 % (ref 0–13.4)
NEUTROPHILS # BLD AUTO: 4.15 K/UL (ref 2–7.15)
NEUTROPHILS NFR BLD: 65.3 % (ref 44–72)
NRBC # BLD AUTO: 0 K/UL
NRBC BLD-RTO: 0 /100 WBC
PLATELET # BLD AUTO: 149 K/UL (ref 164–446)
PMV BLD AUTO: 11.5 FL (ref 9–12.9)
POTASSIUM SERPL-SCNC: 4.2 MMOL/L (ref 3.6–5.5)
PROT SERPL-MCNC: 7 G/DL (ref 6–8.2)
RBC # BLD AUTO: 4.53 M/UL (ref 4.2–5.4)
SODIUM SERPL-SCNC: 142 MMOL/L (ref 135–145)
WBC # BLD AUTO: 6.4 K/UL (ref 4.8–10.8)

## 2021-10-14 PROCEDURE — 85652 RBC SED RATE AUTOMATED: CPT

## 2021-10-14 PROCEDURE — 36415 COLL VENOUS BLD VENIPUNCTURE: CPT

## 2021-10-14 PROCEDURE — 85025 COMPLETE CBC W/AUTO DIFF WBC: CPT

## 2021-10-14 PROCEDURE — 80053 COMPREHEN METABOLIC PANEL: CPT

## 2021-10-16 ENCOUNTER — PATIENT MESSAGE (OUTPATIENT)
Dept: MEDICAL GROUP | Facility: LAB | Age: 61
End: 2021-10-16

## 2021-10-16 DIAGNOSIS — G89.18 POST-OPERATIVE PAIN: ICD-10-CM

## 2021-10-16 DIAGNOSIS — M79.2 NEURALGIA: ICD-10-CM

## 2021-10-16 DIAGNOSIS — D33.3 ACOUSTIC NEUROMA (HCC): ICD-10-CM

## 2021-10-18 ENCOUNTER — HOSPITAL ENCOUNTER (OUTPATIENT)
Dept: RADIOLOGY | Facility: MEDICAL CENTER | Age: 61
End: 2021-10-18
Attending: STUDENT IN AN ORGANIZED HEALTH CARE EDUCATION/TRAINING PROGRAM
Payer: COMMERCIAL

## 2021-10-18 DIAGNOSIS — D33.3 ACOUSTIC NEUROMA (HCC): ICD-10-CM

## 2021-10-18 PROCEDURE — A9576 INJ PROHANCE MULTIPACK: HCPCS

## 2021-10-18 PROCEDURE — 70553 MRI BRAIN STEM W/O & W/DYE: CPT

## 2021-10-18 PROCEDURE — 700117 HCHG RX CONTRAST REV CODE 255

## 2021-10-18 RX ADMIN — GADOTERIDOL 15 ML: 279.3 INJECTION, SOLUTION INTRAVENOUS at 09:41

## 2021-10-19 ENCOUNTER — TELEPHONE (OUTPATIENT)
Dept: RHEUMATOLOGY | Facility: MEDICAL CENTER | Age: 61
End: 2021-10-19

## 2021-10-19 NOTE — TELEPHONE ENCOUNTER
Please notify patient that we received the results of her blood test and one of her blood test called MCV is quite elevated at 104    Patient is leflunomide is probably exacerbating this finding, recommend to stop the leflunomide, patient can stop cold turkey.    I know patient is currently undergoing treatment for schwannoma, if patient's joints continue to have problem please schedule follow-up appointment so we can discuss alternative treatment options.

## 2021-10-20 ENCOUNTER — TELEMEDICINE (OUTPATIENT)
Dept: MEDICAL GROUP | Facility: LAB | Age: 61
End: 2021-10-20
Payer: COMMERCIAL

## 2021-10-20 VITALS — WEIGHT: 159 LBS | BODY MASS INDEX: 27.14 KG/M2 | HEIGHT: 64 IN

## 2021-10-20 DIAGNOSIS — Z92.3: ICD-10-CM

## 2021-10-20 DIAGNOSIS — D33.3 ACOUSTIC NEUROMA (HCC): ICD-10-CM

## 2021-10-20 DIAGNOSIS — G89.18 POST-OPERATIVE PAIN: ICD-10-CM

## 2021-10-20 DIAGNOSIS — M79.2 NEURALGIA: ICD-10-CM

## 2021-10-20 PROBLEM — T81.49XA SURGICAL SITE INFECTION: Status: RESOLVED | Noted: 2018-08-11 | Resolved: 2021-10-20

## 2021-10-20 PROBLEM — J40 BRONCHITIS: Status: RESOLVED | Noted: 2017-12-15 | Resolved: 2021-10-20

## 2021-10-20 PROCEDURE — 99214 OFFICE O/P EST MOD 30 MIN: CPT | Mod: 95 | Performed by: FAMILY MEDICINE

## 2021-10-20 RX ORDER — GABAPENTIN 100 MG/1
400 CAPSULE ORAL
COMMUNITY
Start: 2021-10-06 | End: 2021-10-20

## 2021-10-20 RX ORDER — GABAPENTIN 300 MG/1
CAPSULE ORAL
Qty: 90 CAPSULE | Refills: 1 | Status: SHIPPED | OUTPATIENT
Start: 2021-10-20 | End: 2021-11-04 | Stop reason: SDUPTHER

## 2021-10-20 ASSESSMENT — FIBROSIS 4 INDEX: FIB4 SCORE: 1.99

## 2021-10-20 NOTE — PATIENT INSTRUCTIONS
Increase gabapentin to 300 mg in the morning and 600 mg in the evening.    Pregabalin capsules  What is this medicine?  PREGABALIN (pre SHABNAM a jeane) is used to treat nerve pain from diabetes, shingles, spinal cord injury, and fibromyalgia. It is also used to control seizures in epilepsy.  This medicine may be used for other purposes; ask your health care provider or pharmacist if you have questions.  COMMON BRAND NAME(S): Lyrica  What should I tell my health care provider before I take this medicine?  They need to know if you have any of these conditions:  · heart disease  · history of drug abuse or alcohol abuse problem  · kidney disease  · lung or breathing disease  · suicidal thoughts, plans, or attempt; a previous suicide attempt by you or a family member  · an unusual or allergic reaction to pregabalin, gabapentin, other medicines, foods, dyes, or preservatives  · pregnant or trying to get pregnant  · breast-feeding  How should I use this medicine?  Take this medicine by mouth with a glass of water. Follow the directions on the prescription label. You can take it with or without food. If it upsets your stomach, take it with food. Take your medicine at regular intervals. Do not take it more often than directed. Do not stop taking except on your doctor's advice.  A special MedGuide will be given to you by the pharmacist with each prescription and refill. Be sure to read this information carefully each time.  Talk to your pediatrician regarding the use of this medicine in children. While this drug may be prescribed for children as young as 1 month for selected conditions, precautions do apply.  Overdosage: If you think you have taken too much of this medicine contact a poison control center or emergency room at once.  NOTE: This medicine is only for you. Do not share this medicine with others.  What if I miss a dose?  If you miss a dose, take it as soon as you can. If it is almost time for your next dose, take only  that dose. Do not take double or extra doses.  What may interact with this medicine?  This medicine may interact with the following medications:  · alcohol  · antihistamines for allergy, cough, and cold  · certain medicines for anxiety or sleep  · certain medicines for depression like amitriptyline, fluoxetine, sertraline  · certain medicines for diabetes  · certain medicines for seizures like phenobarbital, primidone  · general anesthetics like halothane, isoflurane, methoxyflurane, propofol  · local anesthetics like lidocaine, pramoxine, tetracaine  · medicines that relax muscles for surgery  · narcotic medicines for pain  · phenothiazines like chlorpromazine, mesoridazine, prochlorperazine, thioridazine  This list may not describe all possible interactions. Give your health care provider a list of all the medicines, herbs, non-prescription drugs, or dietary supplements you use. Also tell them if you smoke, drink alcohol, or use illegal drugs. Some items may interact with your medicine.  What should I watch for while using this medicine?  Tell your doctor or healthcare professional if your symptoms do not start to get better or if they get worse. Visit your doctor or health care professional for regular checks on your progress. Do not stop taking except on your doctor's advice. You may develop a severe reaction. Your doctor will tell you how much medicine to take.  Wear a medical identification bracelet or chain if you are taking this medicine for seizures, and carry a card that describes your disease and details of your medicine and dosage times.  You may get drowsy or dizzy. Do not drive, use machinery, or do anything that needs mental alertness until you know how this medicine affects you. Do not stand or sit up quickly, especially if you are an older patient. This reduces the risk of dizzy or fainting spells. Alcohol may interfere with the effect of this medicine. Avoid alcoholic drinks.  If you have a heart  condition, like congestive heart failure, and notice that you are retaining water and have swelling in your hands or feet, contact your health care provider immediately.  The use of this medicine may increase the chance of suicidal thoughts or actions. Pay special attention to how you are responding while on this medicine. Any worsening of mood, or thoughts of suicide or dying should be reported to your health care professional right away.  This medicine has caused reduced sperm counts in some men. This may interfere with the ability to father a child. You should talk to your doctor or health care professional if you are concerned about your fertility.  Women who become pregnant while using this medicine for seizures may enroll in the [place] Antiepileptic Drug Pregnancy Registry by calling 1-637.988.2073. This registry collects information about the safety of antiepileptic drug use during pregnancy.  What side effects may I notice from receiving this medicine?  Side effects that you should report to your doctor or health care professional as soon as possible:  · allergic reactions like skin rash, itching or hives, swelling of the face, lips, or tongue  · breathing problems  · changes in vision  · chest pain  · confusion  · jerking or unusual movements of any part of your body  · loss of memory  · muscle pain, tenderness, or weakness  · suicidal thoughts or other mood changes  · swelling of the ankles, feet, hands  · unusual bruising or bleeding  Side effects that usually do not require medical attention (report to your doctor or health care professional if they continue or are bothersome):  · dizziness  · drowsiness  · dry mouth  · headache  · nausea  · tremors  · trouble sleeping  · weight gain  This list may not describe all possible side effects. Call your doctor for medical advice about side effects. You may report side effects to FDA at 4-295-FDA-5522.  Where should I keep my medicine?  Keep out of the  reach of children. This medicine can be abused. Keep your medicine in a safe place to protect it from theft. Do not share this medicine with anyone. Selling or giving away this medicine is dangerous and against the law.  This medicine may cause accidental overdose and death if it taken by other adults, children, or pets. Mix any unused medicine with a substance like cat litter or coffee grounds. Then throw the medicine away in a sealed container like a sealed bag or a coffee can with a lid. Do not use the medicine after the expiration date.  Store at room temperature between 15 and 30 degrees C (59 and 86 degrees F).  NOTE: This sheet is a summary. It may not cover all possible information. If you have questions about this medicine, talk to your doctor, pharmacist, or health care provider.  © 2020 [employer] (2019-12-20 13:15:55)

## 2021-10-20 NOTE — ASSESSMENT & PLAN NOTE
Pain is improved on the gabapentin 400 mg BID.  Getting 5 hours of sleep before pain wakes her up.  She does feel very groggy from the medications but feels like this is better than the pain.  She is scheduled to follow-up with Thien postoperatively.

## 2021-10-21 ENCOUNTER — PATIENT MESSAGE (OUTPATIENT)
Dept: MEDICAL GROUP | Facility: LAB | Age: 61
End: 2021-10-21

## 2021-10-21 ENCOUNTER — OFFICE VISIT (OUTPATIENT)
Dept: RHEUMATOLOGY | Facility: MEDICAL CENTER | Age: 61
End: 2021-10-21
Payer: COMMERCIAL

## 2021-10-21 VITALS
HEART RATE: 94 BPM | WEIGHT: 164 LBS | SYSTOLIC BLOOD PRESSURE: 142 MMHG | RESPIRATION RATE: 14 BRPM | DIASTOLIC BLOOD PRESSURE: 80 MMHG | OXYGEN SATURATION: 90 % | BODY MASS INDEX: 28.14 KG/M2 | TEMPERATURE: 96.6 F

## 2021-10-21 DIAGNOSIS — R71.8 ELEVATED MCV: ICD-10-CM

## 2021-10-21 DIAGNOSIS — D33.3 ACOUSTIC NEUROMA (HCC): ICD-10-CM

## 2021-10-21 DIAGNOSIS — M79.2 NEURALGIA: ICD-10-CM

## 2021-10-21 DIAGNOSIS — M06.9 RHEUMATOID ARTHRITIS, INVOLVING UNSPECIFIED SITE, UNSPECIFIED WHETHER RHEUMATOID FACTOR PRESENT (HCC): ICD-10-CM

## 2021-10-21 DIAGNOSIS — M81.0 OSTEOPOROSIS WITHOUT CURRENT PATHOLOGICAL FRACTURE, UNSPECIFIED OSTEOPOROSIS TYPE: ICD-10-CM

## 2021-10-21 DIAGNOSIS — Z79.1 NSAID LONG-TERM USE: ICD-10-CM

## 2021-10-21 DIAGNOSIS — E03.9 HYPOTHYROIDISM, UNSPECIFIED TYPE: ICD-10-CM

## 2021-10-21 DIAGNOSIS — Z72.0 TOBACCO ABUSE: ICD-10-CM

## 2021-10-21 DIAGNOSIS — Z79.899 LONG-TERM USE OF PLAQUENIL: ICD-10-CM

## 2021-10-21 DIAGNOSIS — G89.18 POST-OPERATIVE PAIN: ICD-10-CM

## 2021-10-21 DIAGNOSIS — M48.061 SPINAL STENOSIS OF LUMBAR REGION, UNSPECIFIED WHETHER NEUROGENIC CLAUDICATION PRESENT: ICD-10-CM

## 2021-10-21 DIAGNOSIS — D36.10 SCHWANNOMA: ICD-10-CM

## 2021-10-21 PROCEDURE — 99214 OFFICE O/P EST MOD 30 MIN: CPT | Performed by: INTERNAL MEDICINE

## 2021-10-21 RX ORDER — NABUMETONE 500 MG/1
TABLET, FILM COATED ORAL
Qty: 360 TABLET | Refills: 0 | Status: SHIPPED | OUTPATIENT
Start: 2021-10-21 | End: 2022-03-22 | Stop reason: SDUPTHER

## 2021-10-21 ASSESSMENT — FIBROSIS 4 INDEX: FIB4 SCORE: 1.99

## 2021-10-21 ASSESSMENT — JOINT PAIN
TOTAL NUMBER OF TENDER JOINTS: 1
TOTAL NUMBER OF SWOLLEN JOINTS: 0

## 2021-10-21 NOTE — TELEPHONE ENCOUNTER
From: Clifford Murphy  To: Physician Tish Smallwood  Sent: 10/21/2021 12:38 PM PDT  Subject: Acupuncture    Hi!    I was talking with a friend and wonder if you think acupuncture would be something to look into? If you do, and since I have not heard from Neurology yet, can you put a referral in. I've never looked at alternative medicine but I'm desperate.  Thank you,    Clifford Murphy
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
## 2021-10-24 ENCOUNTER — PATIENT MESSAGE (OUTPATIENT)
Dept: MEDICAL GROUP | Facility: LAB | Age: 61
End: 2021-10-24

## 2021-10-24 DIAGNOSIS — E03.9 ACQUIRED HYPOTHYROIDISM: ICD-10-CM

## 2021-10-25 RX ORDER — LEVOTHYROXINE SODIUM 0.12 MG/1
TABLET ORAL
Qty: 102 TABLET | Refills: 1 | Status: SHIPPED | OUTPATIENT
Start: 2021-10-25 | End: 2022-04-20

## 2021-11-02 NOTE — PROGRESS NOTES
Virtual Visit: Established Patient   This visit was conducted via Zoom using secure and encrypted videoconferencing technology.   The patient was in a private location in the state of [place].    The patient's identity was confirmed and verbal consent was obtained for this virtual visit.    Subjective:   CC:   Chief Complaint   Patient presents with   • Medication Management     discuss gabapentin   • Follow-Up     from Dr. Sheri Horton Karine Murphy is a 61 y.o. female presenting for evaluation and management of:    Acoustic neuroma (HCC)  Pain is improved on the gabapentin 400 mg BID.  Getting 5 hours of sleep before pain wakes her up.  She does feel very groggy from the medications but feels like this is better than the pain.  She is scheduled to follow-up with [facility] postoperatively.        ROS   No fever or chills.  No sore throat.  No visual changes.    Current medicines (including changes today)  Current Outpatient Medications   Medication Sig Dispense Refill   • gabapentin (NEURONTIN) 300 MG Cap 1 tab po qam and 2 tab po qhs 90 Capsule 1   • levothyroxine (SYNTHROID) 125 MCG Tab 1 tab po q 5 d/week and 1.5 tab 2 days a week 102 Tablet 1   • nabumetone (RELAFEN) 500 MG Tab 1-2 tablets bid prn joint pain 360 Tablet 0   • albuterol 108 (90 Base) MCG/ACT Aero Soln inhalation aerosol Inhale 2 Puffs every four hours as needed for Shortness of Breath. 1 Each 1   • hydroxychloroquine (PLAQUENIL) 200 MG Tab TAKE 1 TABLET BY MOUTH TWICE A  tablet 1   • cimetidine (TAGAMET) 400 MG Tab TAKE 1 TABLET BY MOUTH TWICE A  tablet 3   • cholestyramine (QUESTRAN) 4 g packet Take 4 g by mouth every day. 60 Each 2   • cetirizine (ZYRTEC) 10 MG Tab Take 10 mg by mouth 1 time daily as needed for Allergies.     • Cholecalciferol (VITAMIN D3) 5000 units Tab Take 5,000 Int'l Units by mouth every day at 6 PM.     • Folic Acid 0.8 MG Cap Take 0.8-1.6 mg by mouth every day at 6 PM. takes 1 pill daily and alternates  "every other day 2 pills daily     • ibuprofen (MOTRIN) 800 MG Tab Take 800 mg by mouth every 8 hours as needed for Mild Pain.     • acetaminophen (TYLENOL) 500 MG Tab Take 500-1,000 mg by mouth 2 times a day as needed for Mild Pain.       No current facility-administered medications for this visit.       Patient Active Problem List    Diagnosis Date Noted   • Neuralgia 10/06/2021   • Post-operative pain 10/06/2021   • At high risk for fracture 06/30/2021   • Osteopenia after menopause 06/30/2021   • S/P lumbar spinal fusion 06/09/2021   • Hearing loss of left ear 05/04/2021   • Vertigo of central origin 05/04/2021   • Immunocompromised ([facility]) 04/09/2020   • Acute vaginitis 10/15/2019   • Left ear pain 10/15/2019   • Fungal infection of toenail 12/20/2018   • Hordeolum externum of right upper eyelid 12/20/2018   • Gastroesophageal reflux disease without esophagitis 11/07/2018   • Other viral warts 11/07/2018   • Acoustic neuroma ([facility]) 10/17/2018   • Allergic sinusitis 06/25/2018   • Subclinical hypothyroidism 06/25/2018   • Long-term use of Plaquenil 02/01/2018   • Spinal stenosis of lumbar region 12/12/2017   • Internal derangement of left knee 12/12/2017   • Obesity (BMI 30-39.9) 11/17/2017   • Thrombocytopenia ([facility]) 10/05/2017   • Productive cough 12/28/2015   • Tobacco abuse 08/05/2015   • Seasonal allergies 06/17/2015   • Hypertriglyceridemia 09/29/2014   • Encounter for long-term (current) use of insulin ([facility]) 09/15/2014   • Genital herpes in women 02/11/2014   • Jaw pain 08/04/2013   • High risk medications (not anticoagulants) long-term use 08/29/2012   • Post-cholecystectomy syndrome 05/23/2012   • Vitamin D insufficiency 05/15/2012   • Menopause 09/15/2011   • Macrocytosis without anemia 09/14/2011   • Acquired hypothyroidism 09/14/2011   • RA (rheumatoid arthritis) ([facility]) 09/14/2011        Objective:   Ht 1.626 m (5' 4.02\")   Wt 72.1 kg (159 lb)   LMP  (LMP Unknown)   BMI 27.28 kg/m²     Physical " Exam:  Constitutional: Alert, no distress, well-groomed.  Skin: No rashes in visible areas.  Eye: Round. Conjunctiva clear, lids normal. No icterus.   ENMT: Lips pink without lesions, good dentition, moist mucous membranes. Phonation normal.  Neck: No masses, no thyromegaly. Moves freely without pain.  Respiratory: Unlabored respiratory effort, no cough or audible wheeze  Psych: Alert and oriented x3, normal affect and mood.     Assessment and Plan:   The following treatment plan was discussed:     1. Acoustic neuroma (HCC)  Increase gabapentin to 300 mg in the morning and 600 mg at night.  We will see if this gets her better sleep.  Can always add in a third dose as needed and titrate dose up.  Consider Lyrica 75 mg twice a day.  Patient information given.  Will recheck in 2 weeks and if it is not improving switch to Lyrica at that time.  We will also place a referral to neurology so she has local follow-up.  - gabapentin (NEURONTIN) 300 MG Cap; 1 tab po qam and 2 tab po qhs  Dispense: 90 Capsule; Refill: 1  - REFERRAL TO NEUROLOGY    2. Post-operative pain  Increase gabapentin to 300 mg in the morning and 600 mg at night.  We will see if this gets her better sleep.  Can always add in a third dose as needed and titrate dose up.  Consider Lyrica 75 mg twice a day.  Patient information given.  Will recheck in 2 weeks and if it is not improving switch to Lyrica at that time.  We will also place a referral to neurology so she has local follow-up.  - REFERRAL TO NEUROLOGY    3. S/P radiation therapy within four to twelve weeks  [facility] feels that this is a occipital neuralgia versus the rare side effect of the radiation.  Refer to neurology for further evaluation and treatment.  - REFERRAL TO NEUROLOGY    4. Neuralgia  Increase gabapentin to 300 mg in the morning and 600 mg at night.  We will see if this gets her better sleep.  Can always add in a third dose as needed and titrate dose up.  Consider Lyrica 75 mg twice  a day.  Patient information given.  Will recheck in 2 weeks and if it is not improving switch to Lyrica at that time.  We will also place a referral to neurology so she has local follow-up.      Follow-up: Return in about 2 weeks (around 11/3/2021).

## 2021-11-04 ENCOUNTER — TELEMEDICINE (OUTPATIENT)
Dept: MEDICAL GROUP | Facility: LAB | Age: 61
End: 2021-11-04
Payer: COMMERCIAL

## 2021-11-04 VITALS
DIASTOLIC BLOOD PRESSURE: 84 MMHG | WEIGHT: 159 LBS | BODY MASS INDEX: 27.14 KG/M2 | HEIGHT: 64 IN | SYSTOLIC BLOOD PRESSURE: 147 MMHG

## 2021-11-04 DIAGNOSIS — M54.81 BILATERAL OCCIPITAL NEURALGIA: ICD-10-CM

## 2021-11-04 DIAGNOSIS — D33.3 ACOUSTIC NEUROMA (HCC): ICD-10-CM

## 2021-11-04 PROCEDURE — 99214 OFFICE O/P EST MOD 30 MIN: CPT | Mod: 95 | Performed by: FAMILY MEDICINE

## 2021-11-04 RX ORDER — GABAPENTIN 400 MG/1
400 CAPSULE ORAL 2 TIMES DAILY
Qty: 60 CAPSULE | Refills: 2 | Status: SHIPPED | OUTPATIENT
Start: 2021-11-04 | End: 2021-11-24

## 2021-11-04 RX ORDER — GABAPENTIN 300 MG/1
300 CAPSULE ORAL 2 TIMES DAILY
Qty: 60 CAPSULE | Refills: 2 | Status: SHIPPED | OUTPATIENT
Start: 2021-11-04 | End: 2021-11-11 | Stop reason: SDUPTHER

## 2021-11-04 ASSESSMENT — FIBROSIS 4 INDEX: FIB4 SCORE: 1.99

## 2021-11-05 ENCOUNTER — APPOINTMENT (OUTPATIENT)
Dept: MEDICAL GROUP | Facility: IMAGING CENTER | Age: 61
End: 2021-11-05

## 2021-11-11 ENCOUNTER — PATIENT MESSAGE (OUTPATIENT)
Dept: MEDICAL GROUP | Facility: LAB | Age: 61
End: 2021-11-11

## 2021-11-11 DIAGNOSIS — M54.81 BILATERAL OCCIPITAL NEURALGIA: ICD-10-CM

## 2021-11-11 DIAGNOSIS — D33.3 ACOUSTIC NEUROMA (HCC): ICD-10-CM

## 2021-11-11 RX ORDER — GABAPENTIN 300 MG/1
300 CAPSULE ORAL 2 TIMES DAILY
Qty: 180 CAPSULE | Refills: 1 | Status: SHIPPED | OUTPATIENT
Start: 2021-11-11 | End: 2021-11-24

## 2021-11-17 NOTE — ASSESSMENT & PLAN NOTE
S/P Cyber Knife procedure on 8/31/2021 for acoustic neuroma at [place].  She is having severe postoperative pain at the sites of the stereotactic radiation.  Pain is improved on the gabapentin 400 mg BID.  Getting 5 hours of sleep before pain wakes her up.  She does feel very groggy from the medications but feels like this is better than the pain.  She saw Pain Management and may have a nerve block done.

## 2021-11-17 NOTE — PROGRESS NOTES
Virtual Visit: Established Patient   This visit was conducted via Zoom using secure and encrypted videoconferencing technology.   The patient was in a private location in the state of [place].    The patient's identity was confirmed and verbal consent was obtained for this virtual visit.    Subjective:   CC:   Chief Complaint   Patient presents with   • Follow-Up     Gabapentin, possible refill       Clifford Murphy is a 61 y.o. female presenting for evaluation and management of:    Acoustic neuroma (HCC)  S/P Cyber Knife procedure on 8/31/2021 for acoustic neuroma at [place].  She is having severe postoperative pain at the sites of the stereotactic radiation.  Pain is improved on the gabapentin 400 mg BID.  Getting 5 hours of sleep before pain wakes her up.  She does feel very groggy from the medications but feels like this is better than the pain.  She saw Pain Management and may have a nerve block done.        ROS   No nausea or vomiting.  No fever or chills    Current medicines (including changes today)  Current Outpatient Medications   Medication Sig Dispense Refill   • gabapentin (NEURONTIN) 400 MG Cap Take 1 Capsule by mouth 2 times a day. 60 Capsule 2   • levothyroxine (SYNTHROID) 125 MCG Tab 1 tab po q 5 d/week and 1.5 tab 2 days a week 102 Tablet 1   • nabumetone (RELAFEN) 500 MG Tab 1-2 tablets bid prn joint pain 360 Tablet 0   • albuterol 108 (90 Base) MCG/ACT Aero Soln inhalation aerosol Inhale 2 Puffs every four hours as needed for Shortness of Breath. 1 Each 1   • hydroxychloroquine (PLAQUENIL) 200 MG Tab TAKE 1 TABLET BY MOUTH TWICE A  tablet 1   • cimetidine (TAGAMET) 400 MG Tab TAKE 1 TABLET BY MOUTH TWICE A  tablet 3   • cholestyramine (QUESTRAN) 4 g packet Take 4 g by mouth every day. 60 Each 2   • cetirizine (ZYRTEC) 10 MG Tab Take 10 mg by mouth 1 time daily as needed for Allergies.     • Cholecalciferol (VITAMIN D3) 5000 units Tab Take 5,000 Int'l Units by mouth every day at 6  PM.     • Folic Acid 0.8 MG Cap Take 0.8-1.6 mg by mouth every day at 6 PM. takes 1 pill daily and alternates every other day 2 pills daily     • ibuprofen (MOTRIN) 800 MG Tab Take 800 mg by mouth every 8 hours as needed for Mild Pain.     • acetaminophen (TYLENOL) 500 MG Tab Take 500-1,000 mg by mouth 2 times a day as needed for Mild Pain.     • gabapentin (NEURONTIN) 300 MG Cap Take 1 Capsule by mouth 2 times a day. DOSAGE CHANGE 180 Capsule 1     No current facility-administered medications for this visit.       Patient Active Problem List    Diagnosis Date Noted   • Bilateral occipital neuralgia 10/06/2021   • Post-operative pain 10/06/2021   • At high risk for fracture 06/30/2021   • Osteopenia after menopause 06/30/2021   • S/P lumbar spinal fusion 06/09/2021   • Hearing loss of left ear 05/04/2021   • Vertigo of central origin 05/04/2021   • Immunocompromised ([facility]) 04/09/2020   • Acute vaginitis 10/15/2019   • Left ear pain 10/15/2019   • Fungal infection of toenail 12/20/2018   • Hordeolum externum of right upper eyelid 12/20/2018   • Gastroesophageal reflux disease without esophagitis 11/07/2018   • Other viral warts 11/07/2018   • Acoustic neuroma ([facility]) 10/17/2018   • Allergic sinusitis 06/25/2018   • Subclinical hypothyroidism 06/25/2018   • Long-term use of Plaquenil 02/01/2018   • Spinal stenosis of lumbar region 12/12/2017   • Internal derangement of left knee 12/12/2017   • Obesity (BMI 30-39.9) 11/17/2017   • Thrombocytopenia ([facility]) 10/05/2017   • Productive cough 12/28/2015   • Tobacco abuse 08/05/2015   • Seasonal allergies 06/17/2015   • Hypertriglyceridemia 09/29/2014   • Encounter for long-term (current) use of insulin ([facility]) 09/15/2014   • Genital herpes in women 02/11/2014   • Jaw pain 08/04/2013   • High risk medications (not anticoagulants) long-term use 08/29/2012   • Post-cholecystectomy syndrome 05/23/2012   • Vitamin D insufficiency 05/15/2012   • Menopause 09/15/2011   • Macrocytosis  "without anemia 09/14/2011   • Acquired hypothyroidism 09/14/2011   • RA (rheumatoid arthritis) (HCC) 09/14/2011        Objective:   /84 Comment: Verbal//taken 11/03 at pain management apointment  Ht 1.626 m (5' 4.02\") Comment: Verbal  Wt 72.1 kg (159 lb) Comment: Verbal  LMP  (LMP Unknown)   BMI 27.28 kg/m²     Physical Exam:  Constitutional: Alert, no distress, well-groomed.  Skin: No rashes in visible areas.  Eye: Round. Conjunctiva clear, lids normal. No icterus.   ENMT: Lips pink without lesions, good dentition, moist mucous membranes. Phonation normal.  Neck: No masses, no thyromegaly. Moves freely without pain.  Respiratory: Unlabored respiratory effort, no cough or audible wheeze  Psych: Alert and oriented x3, normal affect and mood.     Assessment and Plan:   The following treatment plan was discussed:     1. Acoustic neuroma (HCC)  Trial of gabapentin 300 mg in the morning and at noon and then 400 mg in the evening and if she wakes up in the middle the night.  Recheck in 1 month.  Continue follow-up with pain management and hopefully the nerve block will help with her pain.  Patient is also scheduled to follow-up with [facility].  - gabapentin (NEURONTIN) 400 MG Cap; Take 1 Capsule by mouth 2 times a day.  Dispense: 60 Capsule; Refill: 2    2. Bilateral occipital neuralgia  Trial of gabapentin 300 mg in the morning and at noon and then 400 mg in the evening and if she wakes up in the middle the night.  Recheck in 1 month.  Continue follow-up with pain management and hopefully the nerve block will help with her pain.  Patient is also scheduled to follow-up with [facility].  - gabapentin (NEURONTIN) 400 MG Cap; Take 1 Capsule by mouth 2 times a day.  Dispense: 60 Capsule; Refill: 2    Follow-up: Return in about 4 weeks (around 12/2/2021).           "

## 2021-11-23 ENCOUNTER — PATIENT MESSAGE (OUTPATIENT)
Dept: MEDICAL GROUP | Facility: LAB | Age: 61
End: 2021-11-23

## 2021-11-23 DIAGNOSIS — M54.81 BILATERAL OCCIPITAL NEURALGIA: ICD-10-CM

## 2021-11-23 DIAGNOSIS — D33.3 ACOUSTIC NEUROMA (HCC): ICD-10-CM

## 2021-11-24 ENCOUNTER — TELEPHONE (OUTPATIENT)
Dept: MEDICAL GROUP | Facility: LAB | Age: 61
End: 2021-11-24

## 2021-11-24 ENCOUNTER — OFFICE VISIT (OUTPATIENT)
Dept: NEUROLOGY | Facility: MEDICAL CENTER | Age: 61
End: 2021-11-24
Attending: PSYCHIATRY & NEUROLOGY
Payer: COMMERCIAL

## 2021-11-24 VITALS
HEIGHT: 64 IN | BODY MASS INDEX: 27.48 KG/M2 | DIASTOLIC BLOOD PRESSURE: 80 MMHG | RESPIRATION RATE: 14 BRPM | WEIGHT: 160.94 LBS | TEMPERATURE: 97.3 F | HEART RATE: 91 BPM | SYSTOLIC BLOOD PRESSURE: 138 MMHG | OXYGEN SATURATION: 94 %

## 2021-11-24 DIAGNOSIS — M79.2 NEUROPATHIC PAIN: ICD-10-CM

## 2021-11-24 DIAGNOSIS — M54.81 BILATERAL OCCIPITAL NEURALGIA: ICD-10-CM

## 2021-11-24 DIAGNOSIS — G89.18 POST-OPERATIVE PAIN: ICD-10-CM

## 2021-11-24 PROCEDURE — 99204 OFFICE O/P NEW MOD 45 MIN: CPT | Performed by: PSYCHIATRY & NEUROLOGY

## 2021-11-24 PROCEDURE — 99212 OFFICE O/P EST SF 10 MIN: CPT | Performed by: PSYCHIATRY & NEUROLOGY

## 2021-11-24 RX ORDER — NAPROXEN SODIUM 220 MG
220 TABLET ORAL 2 TIMES DAILY WITH MEALS
COMMUNITY
End: 2022-03-24

## 2021-11-24 RX ORDER — GABAPENTIN 600 MG/1
1200 TABLET ORAL DAILY
Qty: 60 TABLET | Refills: 2 | Status: SHIPPED | OUTPATIENT
Start: 2021-11-24 | End: 2021-12-07

## 2021-11-24 ASSESSMENT — FIBROSIS 4 INDEX: FIB4 SCORE: 1.99

## 2021-11-24 NOTE — PROGRESS NOTES
"Chief Complaint   Patient presents with   • New Patient     Acoustic neuroma       History of present illness:  Clifford Murphy 61 y.o. female with acoustic neuroma s/p cyber knife procedure 8/31 at [place] with postop headache. She had a teleneurology appointment at [place] where she was diagnosed with headache attributed to radiosurgery of the brain and medication overuse headaches. Her headache started 7 days after completion of brain radiosurgery and was started on steroids for treatment. [place] neurology provided recommendations on treatment of neuropathic pain with long-acting gabapentin and nortriptyline.  She has been on gabapentin now for 2 months.   She had no history of headache prior to the procedure.     Past medical history:   Past Medical History:   Diagnosis Date   • Anesthesia     hx of difficulty waking up   • Asthma 02/21/2018    Inhaler use during allergy season.   • Breath shortness    • Bronchitis 2016   • Cough 07/13/2018    \"from allergies\"   • Environmental and seasonal allergies    • Genital herpes in women 2/11/2014   • Heart burn    • High risk medications (not anticoagulants) long-term use 8/29/2012   • Hypothyroid 9/14/2011   • Kidney stones    • Macrocytosis without anemia med effect   • Menopause 9/15/2011   • Pain 07/13/2018    back, hands, feet, knees, ankles, 5/10   • Pneumonia 01/2015   • Post-cholecystectomy syndrome     \"dumping syndrome\"   • Rheumatoid arthritis(714.0)    • Snoring     no sleep study   • Tobacco abuse, episodic 9/14/2011   • Vitamin d deficiency 5/15/2012       Past surgical history:   Past Surgical History:   Procedure Laterality Date   • IRRIGATION & DEBRIDEMENT NEURO  8/16/2018    Procedure: IRRIGATION & DEBRIDEMENT NEURO - LUMBAR WOUND POST-FUSION;  Surgeon: Antonio Doran M.D.;  Location: SURGERY [facility];  Service: Orthopedics   • LUMBAR FUSION POSTERIOR  7/19/2018    Procedure: LUMBAR FUSION POSTERIOR- L4-5 TLIF;  Surgeon: Antonio Doran, " M.D.;  Location: SURGERY [facility];  Service: Orthopedics   • LUMBAR DECOMPRESSION  7/19/2018    Procedure: LUMBAR DECOMPRESSION- L2-S1;  Surgeon: Antonio Doran M.D.;  Location: SURGERY [facility];  Service: Orthopedics   • KYPHOPLASTY  2/22/2018    Procedure: KYPHOPLASTY - L1 and Biopsy;  Surgeon: Antonio Doran M.D.;  Location: SURGERY [facility];  Service: Orthopedics   • APPENDECTOMY  2002   • CHOLECYSTECTOMY  2002   • OTHER ORTHOPEDIC SURGERY Left 1974/1981    left knee surgery x 2   • TMJ RECONSTRUCTION BILATERAL     • TONSILLECTOMY         Family history:   Family History   Problem Relation Age of Onset   • Lung Disease Mother         COPD   • Hyperlipidemia Mother    • Hypertension Mother    • Cancer Father         Prostate   • Diabetes Father    • Heart Disease Father    • Genetic Disorder Sister        Social history:   Social History     Socioeconomic History   • Marital status:      Spouse name: Not on file   • Number of children: Not on file   • Years of education: Not on file   • Highest education level: Not on file   Occupational History   • Not on file   Tobacco Use   • Smoking status: Current Every Day Smoker     Packs/day: 0.50     Years: 40.00     Pack years: 20.00     Types: Cigarettes   • Smokeless tobacco: Never Used   Vaping Use   • Vaping Use: Never used   Substance and Sexual Activity   • Alcohol use: Yes     Alcohol/week: 0.6 oz     Types: 1 Glasses of wine per week   • Drug use: No   • Sexual activity: Yes     Partners: Male   Other Topics Concern   • Not on file   Social History Narrative        Travels a lot    . 1 child     Social Determinants of Health     Financial Resource Strain:    • Difficulty of Paying Living Expenses: Not on file   Food Insecurity:    • Worried About Running Out of Food in the Last Year: Not on file   • Ran Out of Food in the Last Year: Not on file   Transportation Needs:    • Lack of Transportation (Medical): Not on file   • Lack  of Transportation (Non-Medical): Not on file   Physical Activity:    • Days of Exercise per Week: Not on file   • Minutes of Exercise per Session: Not on file   Stress:    • Feeling of Stress : Not on file   Social Connections:    • Frequency of Communication with Friends and Family: Not on file   • Frequency of Social Gatherings with Friends and Family: Not on file   • Attends Latter day Services: Not on file   • Active Member of Clubs or Organizations: Not on file   • Attends Club or Organization Meetings: Not on file   • Marital Status: Not on file   Intimate Partner Violence:    • Fear of Current or Ex-Partner: Not on file   • Emotionally Abused: Not on file   • Physically Abused: Not on file   • Sexually Abused: Not on file   Housing Stability:    • Unable to Pay for Housing in the Last Year: Not on file   • Number of Places Lived in the Last Year: Not on file   • Unstable Housing in the Last Year: Not on file       Current medications:   Current Outpatient Medications   Medication   • naproxen (ANAPROX) 220 MG tablet   • Gabapentin, Once-Daily, 600 MG Tab   • levothyroxine (SYNTHROID) 125 MCG Tab   • albuterol 108 (90 Base) MCG/ACT Aero Soln inhalation aerosol   • hydroxychloroquine (PLAQUENIL) 200 MG Tab   • cimetidine (TAGAMET) 400 MG Tab   • cholestyramine (QUESTRAN) 4 g packet   • cetirizine (ZYRTEC) 10 MG Tab   • Cholecalciferol (VITAMIN D3) 5000 units Tab   • Folic Acid 0.8 MG Cap   • ibuprofen (MOTRIN) 800 MG Tab   • nabumetone (RELAFEN) 500 MG Tab   • acetaminophen (TYLENOL) 500 MG Tab     No current facility-administered medications for this visit.       Medication Allergy:  Allergies   Allergen Reactions   • Morphine Anaphylaxis     Morphine and morphine derivatives. (demerol)   • Other Drug      Muscle relaxants cause dizziness     • Aspirin      Stomach pain   • Codeine      Stomach pain   • Tramadol Vomiting     SEVERE HEADACHE   • Sulfamethoxazole W-Trimethoprim Shortness of Breath     Rxn -  "flushing, SOB  Late 80's       Physical examination:   Vitals:    11/24/21 0909   BP: 138/80   BP Location: Left arm   Patient Position: Sitting   BP Cuff Size: Adult   Pulse: 91   Resp: 14   Temp: 36.3 °C (97.3 °F)   TempSrc: Temporal   SpO2: 94%   Weight: 73 kg (160 lb 15 oz)   Height: 1.626 m (5' 4\")     Neurological Exam  Mental Status  Awake and alert. Speech is normal. Language is fluent with no aphasia.      Labs:  I reviewed the following labs personally:  None    Imaging:   None     ASSESSMENT AND PLAN:  Problem List Items Addressed This Visit     Bilateral occipital neuralgia    Post-operative pain      Other Visit Diagnoses     Neuropathic pain        Relevant Medications    Gabapentin, Once-Daily, 600 MG Tab          1. Neuropathic pain  - Gabapentin, Once-Daily, 600 MG Tab; Take 1,200 Tablets by mouth every day.  Dispense: 60 Tablet; Refill: 2    2. Post-operative pain    3. Bilateral occipital neuralgia    Other orders  - naproxen (ANAPROX) 220 MG tablet; Take 220 mg by mouth 2 times a day with meals.    61-year-old female with post CyberKnife headache. CyberKnife procedure was performed for acoustic neuroma.  Patient has no history of headache.  She describes a radiating pain from the occiput to the front of the head that sounds consistent with occipital neuralgia however there was no headache disorder prior to CyberKnife procedure.  Patient was seen at [facility] who provided recommendations for treatment of nerve pain.  Today I have ordered long-acting gabapentin to replace her immediate release gabapentin per recommendations from [facility] headache.  Counseled the patient that I do not provide occipital nerve blocks and she will see Milvia Rosado for occipital nerve block.  Patient will contact me if long-acting gabapentin is not effective and I may prescribe nortriptyline for further treatment.    FOLLOW-UP:   Return in about 2 months (around 1/24/2022) for Long NEW appointment with Milvia Timmons.    Total " time spent for the day for this patient unrelated to procedure time is: 25 minutes. I spent 19 minutes in face to face time and I spent 2 minutes pre-charting and 4 minutes in post-visit documentation.      Dr. Ernie Suresh D.O.  [facility]   Movement Disorders Specialist

## 2021-11-24 NOTE — PATIENT INSTRUCTIONS
I have prescribed a long acting gabapentin.     If this is not effective in a few weeks, contact me and I may prescribe nortriptyline.

## 2021-12-07 ENCOUNTER — OFFICE VISIT (OUTPATIENT)
Dept: MEDICAL GROUP | Facility: LAB | Age: 61
End: 2021-12-07
Payer: COMMERCIAL

## 2021-12-07 ENCOUNTER — PATIENT MESSAGE (OUTPATIENT)
Dept: MEDICAL GROUP | Facility: LAB | Age: 61
End: 2021-12-07

## 2021-12-07 VITALS
RESPIRATION RATE: 12 BRPM | OXYGEN SATURATION: 95 % | SYSTOLIC BLOOD PRESSURE: 130 MMHG | BODY MASS INDEX: 29.37 KG/M2 | HEART RATE: 86 BPM | TEMPERATURE: 98.2 F | DIASTOLIC BLOOD PRESSURE: 76 MMHG | HEIGHT: 64 IN | WEIGHT: 172 LBS

## 2021-12-07 DIAGNOSIS — M54.81 BILATERAL OCCIPITAL NEURALGIA: ICD-10-CM

## 2021-12-07 DIAGNOSIS — D33.3 ACOUSTIC NEUROMA (HCC): ICD-10-CM

## 2021-12-07 DIAGNOSIS — G89.18 POST-OPERATIVE PAIN: ICD-10-CM

## 2021-12-07 PROCEDURE — 99214 OFFICE O/P EST MOD 30 MIN: CPT | Performed by: FAMILY MEDICINE

## 2021-12-07 RX ORDER — GABAPENTIN 600 MG/1
TABLET, FILM COATED ORAL
COMMUNITY
End: 2021-12-07 | Stop reason: SDUPTHER

## 2021-12-07 RX ORDER — GABAPENTIN 100 MG/1
100 CAPSULE ORAL 3 TIMES DAILY PRN
Qty: 90 CAPSULE | Refills: 1 | Status: SHIPPED | OUTPATIENT
Start: 2021-12-07

## 2021-12-07 RX ORDER — GABAPENTIN 600 MG/1
2 TABLET, FILM COATED ORAL
Qty: 60 TABLET | Refills: 1 | Status: SHIPPED | OUTPATIENT
Start: 2021-12-07 | End: 2021-12-15 | Stop reason: SDUPTHER

## 2021-12-07 ASSESSMENT — FIBROSIS 4 INDEX: FIB4 SCORE: 1.99

## 2021-12-07 NOTE — ASSESSMENT & PLAN NOTE
Taking Gralis 1200 mg at dinner.  Having breakthrough pain early hours of the morning and then around 2 pm.  She has not followed up with [facility].  She did not find the local neurology visit helpful.  She is wondering if she could take small doses of gabapentin for the breakthrough pain.  She is very frustrated and discouraged that this is not improving.

## 2021-12-07 NOTE — LETTER
[facility]  Tish Smallwood M.D.  [address]  [place] [address]  Fax: [phone]   Authorization for Release/Disclosure of   Protected Health Information   Name: POOL BARRERA : 1960 SSN: [id number]   Address: [address] [place] [address] [address] [place] [address] Phone:    [phone] (home)    I authorize the entity listed below to release/disclose the PHI below to:   [facility]/Tish Smallwood M.D. and Tish Smallwood M.D.   Provider or Entity Name:  Dr. Earl [place] [facility]    Address   [facility]  [address]   [place] [address] Phone:  [phone]    Fax:  [phone]   Reason for request: continuity of care   Information to be released:    [  ] LAST COLONOSCOPY,  including any PATH REPORT and follow-up  [  ] LAST FIT/COLOGUARD RESULT [  ] LAST DEXA  [  ] LAST MAMMOGRAM  [  ] LAST PAP  [  ] LAST LABS [  ] RETINA EXAM REPORT  [  ] IMMUNIZATION RECORDS  [XXXXX] Release all info      [XXXXX] Check here and initial the line next to each item to release ALL health information INCLUDING  _XXXXX_ Care and treatment for drug and / or alcohol abuse  _XXXXX_ HIV testing, infection status, or AIDS  _XXXXX_ Genetic Testing    DATES OF SERVICE OR TIME PERIOD TO BE DISCLOSED: _____________  I understand and acknowledge that:  * This Authorization may be revoked at any time by you in writing, except if your health information has already been used or disclosed.  * Your health information that will be used or disclosed as a result of you signing this authorization could be re-disclosed by the recipient. If this occurs, your re-disclosed health information may no longer be protected by State or Federal laws.  * You may refuse to sign this Authorization. Your refusal will not affect your ability to obtain treatment.  * This Authorization becomes effective upon signing and will  on (date) __________.      If no date is indicated, this Authorization will  one (1) year  from the signature date.    Name: Clifford Milton Jeffrey    Signature: Continuity of care   Date:     12/7/2021       PLEASE FAX REQUESTED RECORDS BACK TO: [phone]

## 2021-12-07 NOTE — ASSESSMENT & PLAN NOTE
S/P Cyber Knife procedure on 8/31/2021 for acoustic neuroma at [facility].  She is having severe postoperative pain at the sites of the stereotactic radiation.  Pain is improved on the gabapentin 400 mg BID.  Getting 5 hours of sleep before pain wakes her up.  She does feel very groggy from the medications but feels like this is better than the pain.  She saw Pain Management and may have a nerve block done.

## 2021-12-15 DIAGNOSIS — D33.3 ACOUSTIC NEUROMA (HCC): ICD-10-CM

## 2021-12-15 DIAGNOSIS — M54.81 BILATERAL OCCIPITAL NEURALGIA: ICD-10-CM

## 2021-12-15 RX ORDER — GABAPENTIN 600 MG/1
2 TABLET, FILM COATED ORAL
Qty: 60 TABLET | Refills: 1 | Status: SHIPPED | OUTPATIENT
Start: 2021-12-15 | End: 2022-05-23

## 2021-12-15 NOTE — PROGRESS NOTES
Subjective:     Chief Complaint   Patient presents with   • Follow-Up     Headaches   • Medication Management       Clifford Murphy is a 61 y.o. female here today for evaluation and management of:    Bilateral occipital neuralgia  Taking Gralis 1200 mg at dinner.  Having breakthrough pain early hours of the morning and then around 2 pm.  She has not followed up with [facility].  She did not find the local neurology visit helpful.  She is wondering if she could take small doses of gabapentin for the breakthrough pain.  She is very frustrated and discouraged that this is not improving.    Acoustic neuroma (HCC)  S/P Cyber Knife procedure on 8/31/2021 for acoustic neuroma at [facility].  She is having severe postoperative pain at the sites of the stereotactic radiation.  Pain is improved on the gabapentin 400 mg BID.  Getting 5 hours of sleep before pain wakes her up.  She does feel very groggy from the medications but feels like this is better than the pain.  She saw Pain Management and may have a nerve block done.       Allergies   Allergen Reactions   • Morphine Anaphylaxis     Morphine and morphine derivatives. (demerol)   • Other Drug      Muscle relaxants cause dizziness     • Aspirin      Stomach pain   • Codeine      Stomach pain   • Tramadol Vomiting     SEVERE HEADACHE   • Sulfamethoxazole W-Trimethoprim Shortness of Breath     Rxn - flushing, SOB  Late 80's         Current medicines (including changes today)  Current Outpatient Medications   Medication Sig Dispense Refill   • gabapentin (NEURONTIN) 100 MG Cap Take 1 Capsule by mouth 3 times a day as needed. 90 Capsule 1   • Gabapentin, Once-Daily, (GRALISE) 600 MG Tab Take 2 Tablets by mouth before evening meal. 60 Tablet 1   • naproxen (ANAPROX) 220 MG tablet Take 220 mg by mouth 2 times a day with meals.     • levothyroxine (SYNTHROID) 125 MCG Tab 1 tab po q 5 d/week and 1.5 tab 2 days a week 102 Tablet 1   • nabumetone (RELAFEN) 500 MG Tab 1-2 tablets bid  prn joint pain 360 Tablet 0   • albuterol 108 (90 Base) MCG/ACT Aero Soln inhalation aerosol Inhale 2 Puffs every four hours as needed for Shortness of Breath. 1 Each 1   • hydroxychloroquine (PLAQUENIL) 200 MG Tab TAKE 1 TABLET BY MOUTH TWICE A  tablet 1   • cimetidine (TAGAMET) 400 MG Tab TAKE 1 TABLET BY MOUTH TWICE A  tablet 3   • cholestyramine (QUESTRAN) 4 g packet Take 4 g by mouth every day. 60 Each 2   • cetirizine (ZYRTEC) 10 MG Tab Take 10 mg by mouth 1 time daily as needed for Allergies.     • Cholecalciferol (VITAMIN D3) 5000 units Tab Take 5,000 Int'l Units by mouth every day at 6 PM.     • Folic Acid 0.8 MG Cap Take 0.8-1.6 mg by mouth every day at 6 PM. takes 1 pill daily and alternates every other day 2 pills daily     • ibuprofen (MOTRIN) 800 MG Tab Take 800 mg by mouth every 8 hours as needed for Mild Pain.       No current facility-administered medications for this visit.       She  has a past medical history of Anesthesia, Asthma (02/21/2018), Breath shortness, Bronchitis (2016), Cough (07/13/2018), Environmental and seasonal allergies, Genital herpes in women (2/11/2014), Heart burn, High risk medications (not anticoagulants) long-term use (8/29/2012), Hypothyroid (9/14/2011), Kidney stones, Macrocytosis without anemia (med effect), Menopause (9/15/2011), Pain (07/13/2018), Pneumonia (01/2015), Post-cholecystectomy syndrome, Rheumatoid arthritis(714.0), Snoring, Tobacco abuse, episodic (9/14/2011), and Vitamin d deficiency (5/15/2012).    Patient Active Problem List    Diagnosis Date Noted   • Bilateral occipital neuralgia 10/06/2021   • Post-operative pain 10/06/2021   • At high risk for fracture 06/30/2021   • Osteopenia after menopause 06/30/2021   • S/P lumbar spinal fusion 06/09/2021   • Hearing loss of left ear 05/04/2021   • Vertigo of central origin 05/04/2021   • Immunocompromised (HCC) 04/09/2020   • Acute vaginitis 10/15/2019   • Left ear pain 10/15/2019   • Fungal  "infection of toenail 12/20/2018   • Hordeolum externum of right upper eyelid 12/20/2018   • Gastroesophageal reflux disease without esophagitis 11/07/2018   • Other viral warts 11/07/2018   • Acoustic neuroma ([facility]) 10/17/2018   • Allergic sinusitis 06/25/2018   • Subclinical hypothyroidism 06/25/2018   • Long-term use of Plaquenil 02/01/2018   • Spinal stenosis of lumbar region 12/12/2017   • Internal derangement of left knee 12/12/2017   • Obesity (BMI 30-39.9) 11/17/2017   • Thrombocytopenia ([facility]) 10/05/2017   • Productive cough 12/28/2015   • Tobacco abuse 08/05/2015   • Seasonal allergies 06/17/2015   • Hypertriglyceridemia 09/29/2014   • Encounter for long-term (current) use of insulin ([facility]) 09/15/2014   • Genital herpes in women 02/11/2014   • Jaw pain 08/04/2013   • High risk medications (not anticoagulants) long-term use 08/29/2012   • Post-cholecystectomy syndrome 05/23/2012   • Vitamin D insufficiency 05/15/2012   • Menopause 09/15/2011   • Macrocytosis without anemia 09/14/2011   • Acquired hypothyroidism 09/14/2011   • RA (rheumatoid arthritis) ([facility]) 09/14/2011       ROS   No fever or chills.  No nausea or vomiting.  No chest pain or palpitations.  No cough or SOB.  No pain with urination or hematuria.  No black or bloody stools.       Objective:     /76 (BP Location: Right arm, Patient Position: Sitting, BP Cuff Size: Adult)   Pulse 86   Temp 36.8 °C (98.2 °F)   Resp 12   Ht 1.626 m (5' 4\")   Wt 78 kg (172 lb)   SpO2 95%  Body mass index is 29.52 kg/m².   Physical Exam:  Well developed, well nourished.  Alert, oriented in no acute distress.  Eye contact is good, speech goal directed, affect calm  Eyes: conjunctiva non-injected, sclera non-icteric.  Neck Supple.  No adenopathy or masses in the neck or supraclavicular regions. No thyromegaly  Lungs: clear to auscultation bilaterally with good excursion. No wheezes or rhonchi  CV: regular rate and rhythm. No murmur      Assessment and Plan: "   The following treatment plan was discussed    1. Bilateral occipital neuralgia  Persistent symptoms.  Continue extended release gabapentin 1200 mg at dinner and add in gabapentin 100 mg up to 3 times a day as needed for breakthrough pain.  I asked her to follow-up with [facility] again and we placed a referral to pain management (Dr Camarillo at [facility] neurosurgery)for possible occipital nerve block.  - gabapentin (NEURONTIN) 100 MG Cap; Take 1 Capsule by mouth 3 times a day as needed.  Dispense: 90 Capsule; Refill: 1  - Gabapentin, Once-Daily, (GRALISE) 600 MG Tab; Take 2 Tablets by mouth before evening meal.  Dispense: 60 Tablet; Refill: 1    2. Acoustic neuroma (HCC)  Persistent symptoms.  Continue extended release gabapentin 1200 mg at dinner and add in gabapentin 100 mg up to 3 times a day as needed for breakthrough pain.  I asked her to follow-up with [facility] again and we placed a referral to pain management (Dr Camarillo at [facility] neurosurgery)for possible occipital nerve block.  - gabapentin (NEURONTIN) 100 MG Cap; Take 1 Capsule by mouth 3 times a day as needed.  Dispense: 90 Capsule; Refill: 1  - Gabapentin, Once-Daily, (GRALISE) 600 MG Tab; Take 2 Tablets by mouth before evening meal.  Dispense: 60 Tablet; Refill: 1    Any change or worsening of signs or symptoms, patient encouraged to follow-up or report to the emergency room for further evaluation. Patient understands and agrees.    Followup: Return in about 4 weeks (around 1/4/2022).

## 2021-12-15 NOTE — TELEPHONE ENCOUNTER
Received request via: Patient    Was the patient seen in the last year in this department? Yes  LOV 12/07/2021  Does the patient have an active prescription (recently filled or refills available) for medication(s) requested? No     Medication needs to be re-routed to preferred pharmacy.

## 2022-01-20 ENCOUNTER — APPOINTMENT (OUTPATIENT)
Dept: RHEUMATOLOGY | Facility: MEDICAL CENTER | Age: 62
End: 2022-01-20
Payer: COMMERCIAL

## 2022-02-24 ENCOUNTER — TELEPHONE (OUTPATIENT)
Dept: MEDICAL GROUP | Facility: LAB | Age: 62
End: 2022-02-24
Payer: COMMERCIAL

## 2022-02-24 DIAGNOSIS — H91.10 PRESBYCUSIS, UNSPECIFIED LATERALITY: ICD-10-CM

## 2022-02-24 NOTE — TELEPHONE ENCOUNTER
----- Message from Clifford BRIANNALang Murphy sent at 2022  8:27 AM PST -----  Regarding: RE: Hearing Test  Contact: [phone]  Good morning.   Yes, [facility] is requiring a hearing test and yes, I would like to do it in [place] instead of traveling to [place].     Thank you      Clifford Murphy     ----- Message -----  From: Medical Assistant Ofelia NGUYỄN  Sent: 22 7:44 AM  To: Clifford Murphy  Subject: Hearing Test    Not a problem. To verify, you are requesting a referral for a hearing test that [facility] is requiring? You want to see someone in town for this?      ----- Message -----       From:Clifford Murphy       Sent:2022  4:10 PM PST         To:Cierra NGUYỄN    Subject:Hearing Test    I'm so sorry!  I should have been more specific.  I am the patient, Clifford Murphy,  1960.  [facility] and the [facility] Oncology Department in [place], [place].         ----- Message -----       From:Cierra NGUYỄN       Sent:2022 12:29 PM PST         To:Clifford Murphy    Subject:Hearing Test    Who is the patient? [place]? I need his date of birth?      ----- Message -----       From:Clifford Murphy       Sent:2022 12:26 PM PST         To:Physician Tish [facility]    Subject:Hearing Test    Hi!  Would put a referral into somebody in town that can do a hearing test for [place].  This is a follow up for the Cyber Knife surgery I had in 2021.    Thank you!    Clifford Murphy

## 2022-03-01 ENCOUNTER — HOSPITAL ENCOUNTER (OUTPATIENT)
Dept: LAB | Facility: MEDICAL CENTER | Age: 62
End: 2022-03-01
Attending: INTERNAL MEDICINE
Payer: COMMERCIAL

## 2022-03-01 ENCOUNTER — OFFICE VISIT (OUTPATIENT)
Dept: RHEUMATOLOGY | Facility: MEDICAL CENTER | Age: 62
End: 2022-03-01
Payer: COMMERCIAL

## 2022-03-01 VITALS
RESPIRATION RATE: 12 BRPM | OXYGEN SATURATION: 94 % | TEMPERATURE: 97.8 F | HEART RATE: 78 BPM | WEIGHT: 176 LBS | SYSTOLIC BLOOD PRESSURE: 140 MMHG | BODY MASS INDEX: 30.21 KG/M2 | DIASTOLIC BLOOD PRESSURE: 70 MMHG

## 2022-03-01 DIAGNOSIS — M81.0 OSTEOPOROSIS WITHOUT CURRENT PATHOLOGICAL FRACTURE, UNSPECIFIED OSTEOPOROSIS TYPE: ICD-10-CM

## 2022-03-01 DIAGNOSIS — Z79.1 NSAID LONG-TERM USE: ICD-10-CM

## 2022-03-01 DIAGNOSIS — D36.10 SCHWANNOMA: ICD-10-CM

## 2022-03-01 DIAGNOSIS — Z79.899 LONG-TERM USE OF PLAQUENIL: ICD-10-CM

## 2022-03-01 DIAGNOSIS — R71.8 ELEVATED MCV: ICD-10-CM

## 2022-03-01 DIAGNOSIS — Z72.0 TOBACCO ABUSE: ICD-10-CM

## 2022-03-01 DIAGNOSIS — E03.9 HYPOTHYROIDISM, UNSPECIFIED TYPE: ICD-10-CM

## 2022-03-01 DIAGNOSIS — M06.9 RHEUMATOID ARTHRITIS, INVOLVING UNSPECIFIED SITE, UNSPECIFIED WHETHER RHEUMATOID FACTOR PRESENT (HCC): ICD-10-CM

## 2022-03-01 LAB
ALBUMIN SERPL BCP-MCNC: 4.1 G/DL (ref 3.2–4.9)
ALBUMIN/GLOB SERPL: 1.5 G/DL
ALP SERPL-CCNC: 76 U/L (ref 30–99)
ALT SERPL-CCNC: 12 U/L (ref 2–50)
ANION GAP SERPL CALC-SCNC: 9 MMOL/L (ref 7–16)
AST SERPL-CCNC: 21 U/L (ref 12–45)
BASOPHILS # BLD AUTO: 0.8 % (ref 0–1.8)
BASOPHILS # BLD: 0.04 K/UL (ref 0–0.12)
BILIRUB SERPL-MCNC: 0.3 MG/DL (ref 0.1–1.5)
BUN SERPL-MCNC: 22 MG/DL (ref 8–22)
CALCIUM SERPL-MCNC: 9 MG/DL (ref 8.5–10.5)
CHLORIDE SERPL-SCNC: 107 MMOL/L (ref 96–112)
CO2 SERPL-SCNC: 26 MMOL/L (ref 20–33)
CREAT SERPL-MCNC: 0.95 MG/DL (ref 0.5–1.4)
EOSINOPHIL # BLD AUTO: 0.01 K/UL (ref 0–0.51)
EOSINOPHIL NFR BLD: 0.2 % (ref 0–6.9)
ERYTHROCYTE [DISTWIDTH] IN BLOOD BY AUTOMATED COUNT: 48.1 FL (ref 35.9–50)
ERYTHROCYTE [SEDIMENTATION RATE] IN BLOOD BY WESTERGREN METHOD: 7 MM/HOUR (ref 0–25)
GLOBULIN SER CALC-MCNC: 2.8 G/DL (ref 1.9–3.5)
GLUCOSE SERPL-MCNC: 100 MG/DL (ref 65–99)
HCT VFR BLD AUTO: 49.1 % (ref 37–47)
HGB BLD-MCNC: 16.1 G/DL (ref 12–16)
IMM GRANULOCYTES # BLD AUTO: 0.01 K/UL (ref 0–0.11)
IMM GRANULOCYTES NFR BLD AUTO: 0.2 % (ref 0–0.9)
LYMPHOCYTES # BLD AUTO: 1.29 K/UL (ref 1–4.8)
LYMPHOCYTES NFR BLD: 25.6 % (ref 22–41)
MCH RBC QN AUTO: 32 PG (ref 27–33)
MCHC RBC AUTO-ENTMCNC: 32.8 G/DL (ref 33.6–35)
MCV RBC AUTO: 97.6 FL (ref 81.4–97.8)
MONOCYTES # BLD AUTO: 0.42 K/UL (ref 0–0.85)
MONOCYTES NFR BLD AUTO: 8.3 % (ref 0–13.4)
NEUTROPHILS # BLD AUTO: 3.27 K/UL (ref 2–7.15)
NEUTROPHILS NFR BLD: 64.9 % (ref 44–72)
NRBC # BLD AUTO: 0 K/UL
NRBC BLD-RTO: 0 /100 WBC
PLATELET # BLD AUTO: 155 K/UL (ref 164–446)
PMV BLD AUTO: 12.5 FL (ref 9–12.9)
POTASSIUM SERPL-SCNC: 4.3 MMOL/L (ref 3.6–5.5)
PROT SERPL-MCNC: 6.9 G/DL (ref 6–8.2)
RBC # BLD AUTO: 5.03 M/UL (ref 4.2–5.4)
SODIUM SERPL-SCNC: 142 MMOL/L (ref 135–145)
WBC # BLD AUTO: 5 K/UL (ref 4.8–10.8)

## 2022-03-01 PROCEDURE — 99214 OFFICE O/P EST MOD 30 MIN: CPT | Performed by: INTERNAL MEDICINE

## 2022-03-01 PROCEDURE — 36415 COLL VENOUS BLD VENIPUNCTURE: CPT

## 2022-03-01 PROCEDURE — 80053 COMPREHEN METABOLIC PANEL: CPT

## 2022-03-01 PROCEDURE — 85025 COMPLETE CBC W/AUTO DIFF WBC: CPT

## 2022-03-01 PROCEDURE — 85652 RBC SED RATE AUTOMATED: CPT

## 2022-03-01 ASSESSMENT — FIBROSIS 4 INDEX: FIB4 SCORE: 2.03
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
## 2022-03-09 ENCOUNTER — APPOINTMENT (OUTPATIENT)
Dept: RADIOLOGY | Facility: MEDICAL CENTER | Age: 62
End: 2022-03-09
Attending: NURSE PRACTITIONER
Payer: COMMERCIAL

## 2022-03-09 DIAGNOSIS — D33.3 ACOUSTIC NEUROMA (HCC): ICD-10-CM

## 2022-03-09 PROCEDURE — 700117 HCHG RX CONTRAST REV CODE 255: Performed by: NURSE PRACTITIONER

## 2022-03-09 PROCEDURE — A9576 INJ PROHANCE MULTIPACK: HCPCS | Performed by: NURSE PRACTITIONER

## 2022-03-09 PROCEDURE — 70553 MRI BRAIN STEM W/O & W/DYE: CPT

## 2022-03-09 RX ADMIN — GADOTERIDOL 15 ML: 279.3 INJECTION, SOLUTION INTRAVENOUS at 13:31

## 2022-03-22 DIAGNOSIS — D36.10 SCHWANNOMA: ICD-10-CM

## 2022-03-22 DIAGNOSIS — M06.9 RHEUMATOID ARTHRITIS, INVOLVING UNSPECIFIED SITE, UNSPECIFIED WHETHER RHEUMATOID FACTOR PRESENT (HCC): ICD-10-CM

## 2022-03-22 DIAGNOSIS — Z79.899 LONG-TERM USE OF PLAQUENIL: ICD-10-CM

## 2022-03-22 DIAGNOSIS — Z79.1 NSAID LONG-TERM USE: ICD-10-CM

## 2022-03-22 NOTE — TELEPHONE ENCOUNTER
----- Message from Clifford Murphy sent at 3/21/2022  9:35 PM PDT -----  Regarding: Prescription Question  Contact: [phone]  Hi!  I need a refill for Nabumetone please.    Thank you      Clifford Murphy      Received request via: Patient March labs    Was the patient seen in the last year in this department? Yes    Does the patient have an active prescription (recently filled or refills available) for medication(s) requested? No

## 2022-03-24 RX ORDER — NABUMETONE 500 MG/1
TABLET, FILM COATED ORAL
Qty: 360 TABLET | Refills: 1 | Status: SHIPPED | OUTPATIENT
Start: 2022-03-24 | End: 2022-09-26

## 2022-04-04 ENCOUNTER — PATIENT MESSAGE (OUTPATIENT)
Dept: MEDICAL GROUP | Facility: LAB | Age: 62
End: 2022-04-04
Payer: COMMERCIAL

## 2022-04-04 DIAGNOSIS — D33.3 ACOUSTIC NEUROMA (HCC): ICD-10-CM

## 2022-04-04 DIAGNOSIS — E03.9 ACQUIRED HYPOTHYROIDISM: ICD-10-CM

## 2022-04-11 NOTE — TELEPHONE ENCOUNTER
Forwarded to Kim Hope in Referrals.  
Referral for [facility] is requesting codes to be added and faxed to [phone]  Code:[id number], [phone]  Please have this done ASAP pt has appt in 2 days.   
Quality 226: Preventive Care And Screening: Tobacco Use: Screening And Cessation Intervention: Patient screened for tobacco use and is an ex/non-smoker
Detail Level: Detailed
Quality 111:Pneumonia Vaccination Status For Older Adults: Pneumococcal Vaccination Previously Received
Quality 130: Documentation Of Current Medications In The Medical Record: Current Medications Documented
Quality 110: Preventive Care And Screening: Influenza Immunization: Influenza Immunization previously received during influenza season

## 2022-04-12 ENCOUNTER — HOSPITAL ENCOUNTER (OUTPATIENT)
Dept: LAB | Facility: MEDICAL CENTER | Age: 62
End: 2022-04-12
Attending: PHYSICIAN ASSISTANT
Payer: COMMERCIAL

## 2022-04-12 DIAGNOSIS — E03.9 ACQUIRED HYPOTHYROIDISM: ICD-10-CM

## 2022-04-12 LAB — TSH SERPL DL<=0.005 MIU/L-ACNC: 1.39 UIU/ML (ref 0.38–5.33)

## 2022-04-12 PROCEDURE — 84443 ASSAY THYROID STIM HORMONE: CPT

## 2022-04-12 PROCEDURE — 36415 COLL VENOUS BLD VENIPUNCTURE: CPT

## 2022-04-20 DIAGNOSIS — E03.9 ACQUIRED HYPOTHYROIDISM: ICD-10-CM

## 2022-04-20 RX ORDER — LEVOTHYROXINE SODIUM 0.12 MG/1
TABLET ORAL
Qty: 102 TABLET | Refills: 1 | Status: SHIPPED | OUTPATIENT
Start: 2022-04-20 | End: 2022-07-11

## 2022-04-20 NOTE — TELEPHONE ENCOUNTER
Received request via: Pharmacy    Was the patient seen in the last year in this department? Yes  LOV 12/07/2021  Does the patient have an active prescription (recently filled or refills available) for medication(s) requested? No

## 2022-05-12 DIAGNOSIS — K21.9 GASTROESOPHAGEAL REFLUX DISEASE WITHOUT ESOPHAGITIS: ICD-10-CM

## 2022-05-12 RX ORDER — CIMETIDINE 400 MG/1
TABLET, FILM COATED ORAL
Qty: 180 TABLET | Refills: 3 | Status: SHIPPED | OUTPATIENT
Start: 2022-05-12 | End: 2023-05-05

## 2022-05-22 DIAGNOSIS — M54.81 BILATERAL OCCIPITAL NEURALGIA: ICD-10-CM

## 2022-05-22 DIAGNOSIS — D33.3 ACOUSTIC NEUROMA (HCC): ICD-10-CM

## 2022-05-23 RX ORDER — GABAPENTIN 600 MG/1
2 TABLET, FILM COATED ORAL
Qty: 60 TABLET | Refills: 1 | Status: SHIPPED | OUTPATIENT
Start: 2022-05-23

## 2022-05-24 ENCOUNTER — APPOINTMENT (OUTPATIENT)
Dept: RHEUMATOLOGY | Facility: MEDICAL CENTER | Age: 62
End: 2022-05-24
Payer: COMMERCIAL

## 2022-06-23 ENCOUNTER — HOSPITAL ENCOUNTER (OUTPATIENT)
Dept: RADIOLOGY | Facility: MEDICAL CENTER | Age: 62
End: 2022-06-23
Attending: INTERNAL MEDICINE
Payer: COMMERCIAL

## 2022-06-23 DIAGNOSIS — Z79.899 LONG-TERM USE OF PLAQUENIL: ICD-10-CM

## 2022-06-23 DIAGNOSIS — Z79.1 NSAID LONG-TERM USE: ICD-10-CM

## 2022-06-23 DIAGNOSIS — M06.9 RHEUMATOID ARTHRITIS, INVOLVING UNSPECIFIED SITE, UNSPECIFIED WHETHER RHEUMATOID FACTOR PRESENT (HCC): ICD-10-CM

## 2022-06-23 DIAGNOSIS — D36.10 SCHWANNOMA: ICD-10-CM

## 2022-06-23 PROCEDURE — 77077 JOINT SURVEY SINGLE VIEW: CPT

## 2022-06-26 ENCOUNTER — HOSPITAL ENCOUNTER (OUTPATIENT)
Facility: MEDICAL CENTER | Age: 62
End: 2022-06-26
Attending: FAMILY MEDICINE
Payer: COMMERCIAL

## 2022-06-26 ENCOUNTER — OFFICE VISIT (OUTPATIENT)
Dept: URGENT CARE | Facility: CLINIC | Age: 62
End: 2022-06-26
Payer: COMMERCIAL

## 2022-06-26 VITALS
DIASTOLIC BLOOD PRESSURE: 84 MMHG | SYSTOLIC BLOOD PRESSURE: 118 MMHG | HEART RATE: 87 BPM | HEIGHT: 64 IN | WEIGHT: 164 LBS | TEMPERATURE: 98.5 F | OXYGEN SATURATION: 92 % | BODY MASS INDEX: 28 KG/M2 | RESPIRATION RATE: 18 BRPM

## 2022-06-26 DIAGNOSIS — Z03.818 ENCOUNTER FOR PATIENT CONCERN ABOUT EXPOSURE TO INFECTIOUS ORGANISM: ICD-10-CM

## 2022-06-26 DIAGNOSIS — M06.9 RHEUMATOID ARTHRITIS INVOLVING MULTIPLE SITES, UNSPECIFIED WHETHER RHEUMATOID FACTOR PRESENT (HCC): ICD-10-CM

## 2022-06-26 DIAGNOSIS — Z72.0 TOBACCO ABUSE: ICD-10-CM

## 2022-06-26 DIAGNOSIS — D84.9 IMMUNOCOMPROMISED (HCC): ICD-10-CM

## 2022-06-26 PROCEDURE — 99214 OFFICE O/P EST MOD 30 MIN: CPT | Mod: 25,CS | Performed by: FAMILY MEDICINE

## 2022-06-26 PROCEDURE — 99406 BEHAV CHNG SMOKING 3-10 MIN: CPT | Performed by: FAMILY MEDICINE

## 2022-06-26 PROCEDURE — 0240U HCHG SARS-COV-2 COVID-19 NFCT DS RESP RNA 3 TRGT MIC: CPT

## 2022-06-26 ASSESSMENT — FIBROSIS 4 INDEX: FIB4 SCORE: 2.5

## 2022-06-26 NOTE — PROGRESS NOTES
"  Subjective:      62 y.o. female presents to urgent care for cold symptoms that started Friday.  She is experiencing shortness of breath, runny nose, cough, and headache. No body aches, fevers, sore throat, or diarrhea. She has been using Albuterol and Mucinex which only provide minimal relief.  She is considered to be immunocompromise due to the medications used to treat her rheumatoid arthritis.  Overall is doing well with this. She smokes an average of 8 cigarettes per day. No history of asthma or COPD. She is fully vaccinated against COVID. No known sick contacts.     She denies any other questions or concerns at this time.    Current problem list, medication, and past medical/surgical history were reviewed in Epic.    ROS  See HPI     Objective:      /84   Pulse 87   Temp 36.9 °C (98.5 °F) (Temporal)   Resp 18   Ht 1.626 m (5' 4\")   Wt 74.4 kg (164 lb)   LMP  (LMP Unknown)   SpO2 92%   BMI 28.15 kg/m²     Physical Exam  Constitutional:       General: She is not in acute distress.     Appearance: She is not diaphoretic.   HENT:      Right Ear: Tympanic membrane, ear canal and external ear normal.      Left Ear: Tympanic membrane, ear canal and external ear normal.      Nose:      Right Sinus: No maxillary sinus tenderness or frontal sinus tenderness.      Left Sinus: No maxillary sinus tenderness or frontal sinus tenderness.      Mouth/Throat:      Pharynx: Uvula midline. No posterior oropharyngeal erythema.      Tonsils: No tonsillar exudate. 0 on the right.   Cardiovascular:      Rate and Rhythm: Normal rate and regular rhythm.      Heart sounds: Normal heart sounds.   Pulmonary:      Effort: Pulmonary effort is normal. No respiratory distress.      Breath sounds: Normal breath sounds.   Neurological:      Mental Status: She is alert.   Psychiatric:         Mood and Affect: Affect normal.         Judgment: Judgment normal.       Assessment/Plan:     1. Encounter for patient concern about " exposure to infectious organism  2. Rheumatoid arthritis involving multiple sites, unspecified whether rheumatoid factor present (HCC)  3. Immunocompromised (HCC)  She is high risk secondary to being immunocompromised. I did discuss this at length with the patient. Patient is currently without indication of need for higher level of care. Patient was given precautionary signs/symptoms that mandate immediate follow up and evaluation in the ED. The patient demonstrated a good understanding and agreed with the treatment plan. Testing performed for COVID-19. In the meantime patient was advised to isolate until COVID test results returned. I encouraged mask use, frequent handwashing, wiping down hard surfaces, etc. Tylenol and Ibuprofen were recommended for symptomatic relief. If positive they will be contacted by their local health department regarding return to work/school protocols.   - CoV-2 and Flu A/B by PCR (24 hour In-House): Collect NP swab in VTM; Future    4. Tobacco abuse  > 3 minutes was spent in educating patient of health risks associated with tobacco, nicotine, and vaping. Verbalized understanding. She has been successful in quitting in the past. Declines further information or assistance at this time.       Instructed to return to Urgent Care or nearest Emergency Department if symptoms fail to improve, for any change in condition, further concerns, or new concerning symptoms. Patient states understanding of the plan of care and discharge instructions.    Ines Patel M.D.

## 2022-06-27 LAB
FLUAV RNA SPEC QL NAA+PROBE: NEGATIVE
FLUBV RNA SPEC QL NAA+PROBE: NEGATIVE
SARS-COV-2 RNA RESP QL NAA+PROBE: DETECTED
SPECIMEN SOURCE: ABNORMAL

## 2022-07-05 ENCOUNTER — PATIENT MESSAGE (OUTPATIENT)
Dept: HEALTH INFORMATION MANAGEMENT | Facility: OTHER | Age: 62
End: 2022-07-05

## 2022-07-09 DIAGNOSIS — E03.9 ACQUIRED HYPOTHYROIDISM: ICD-10-CM

## 2022-07-11 RX ORDER — LEVOTHYROXINE SODIUM 0.12 MG/1
TABLET ORAL
Qty: 90 TABLET | Refills: 3 | Status: SHIPPED | OUTPATIENT
Start: 2022-07-11

## 2022-07-11 NOTE — TELEPHONE ENCOUNTER
Received request via: Pharmacy  12/7/2021  Was the patient seen in the last year in this department? Yes    Does the patient have an active prescription (recently filled or refills available) for medication(s) requested? No

## 2022-08-01 ENCOUNTER — OFFICE VISIT (OUTPATIENT)
Dept: MEDICAL GROUP | Facility: LAB | Age: 62
End: 2022-08-01
Payer: COMMERCIAL

## 2022-08-01 ENCOUNTER — HOSPITAL ENCOUNTER (OUTPATIENT)
Facility: MEDICAL CENTER | Age: 62
End: 2022-08-01
Attending: PHYSICIAN ASSISTANT
Payer: COMMERCIAL

## 2022-08-01 VITALS
WEIGHT: 168 LBS | OXYGEN SATURATION: 94 % | DIASTOLIC BLOOD PRESSURE: 78 MMHG | HEIGHT: 64 IN | SYSTOLIC BLOOD PRESSURE: 130 MMHG | HEART RATE: 73 BPM | TEMPERATURE: 98.1 F | BODY MASS INDEX: 28.68 KG/M2 | RESPIRATION RATE: 16 BRPM

## 2022-08-01 DIAGNOSIS — N76.1 SUBACUTE VAGINITIS: Primary | ICD-10-CM

## 2022-08-01 DIAGNOSIS — Z12.11 SCREENING FOR COLON CANCER: ICD-10-CM

## 2022-08-01 LAB
APPEARANCE UR: CLEAR
BILIRUB UR STRIP-MCNC: NEGATIVE MG/DL
COLOR UR AUTO: YELLOW
GLUCOSE UR STRIP.AUTO-MCNC: NEGATIVE MG/DL
KETONES UR STRIP.AUTO-MCNC: NEGATIVE MG/DL
LEUKOCYTE ESTERASE UR QL STRIP.AUTO: NEGATIVE
NITRITE UR QL STRIP.AUTO: NEGATIVE
PH UR STRIP.AUTO: 5 [PH] (ref 5–8)
PROT UR QL STRIP: NEGATIVE MG/DL
RBC UR QL AUTO: NEGATIVE
SP GR UR STRIP.AUTO: 1.03
UROBILINOGEN UR STRIP-MCNC: 0.2 MG/DL

## 2022-08-01 PROCEDURE — 87510 GARDNER VAG DNA DIR PROBE: CPT

## 2022-08-01 PROCEDURE — 87480 CANDIDA DNA DIR PROBE: CPT

## 2022-08-01 PROCEDURE — 99213 OFFICE O/P EST LOW 20 MIN: CPT | Performed by: PHYSICIAN ASSISTANT

## 2022-08-01 PROCEDURE — 81002 URINALYSIS NONAUTO W/O SCOPE: CPT | Performed by: PHYSICIAN ASSISTANT

## 2022-08-01 PROCEDURE — 87660 TRICHOMONAS VAGIN DIR PROBE: CPT

## 2022-08-01 ASSESSMENT — FIBROSIS 4 INDEX: FIB4 SCORE: 2.5

## 2022-08-02 DIAGNOSIS — N76.1 SUBACUTE VAGINITIS: ICD-10-CM

## 2022-08-03 PROBLEM — N76.1 SUBACUTE VAGINITIS: Status: ACTIVE | Noted: 2022-08-03

## 2022-08-03 NOTE — ASSESSMENT & PLAN NOTE
New to me, intermittent symptoms.  Patient is having ongoing vaginal itching and burning.  Previous vaginal pathogen swab was negative though she has had a previous infection with BV and yeast.  Denies any vaginal discharge, no malodorous discharge.  Does feel like the vaginal area is dry and atrophied as she is postmenopausal.

## 2022-08-03 NOTE — PROGRESS NOTES
Subjective:   CC: Clifford Murphy is a 62 y.o. female here today for subacute vaginitis    HPI:  Subacute vaginitis  New to me, intermittent symptoms.  Patient is having ongoing vaginal itching and burning.  Previous vaginal pathogen swab was negative though she has had a previous infection with BV and yeast.  Denies any vaginal discharge, no malodorous discharge.  Does feel like the vaginal area is dry and atrophied as she is postmenopausal.       Current medicines (including changes today)  Current Outpatient Medications   Medication Sig Dispense Refill   • OnabotulinumtoxinA (BOTOX INJ) Inject  as directed. For migraines     • levothyroxine (SYNTHROID) 125 MCG Tab TAKE 1 TABLET BY MOUTH EVERY DAY IN THE MORNING ON AN EMPTY STOMACH 90 Tablet 3   • GRALISE 600 MG Tab TAKE 2 TABLETS BY MOUTH BEFORE EVENING MEAL 60 Tablet 1   • cimetidine (TAGAMET) 400 MG Tab TAKE 1 TABLET BY MOUTH TWICE A  Tablet 3   • hydroxychloroquine (PLAQUENIL) 200 MG Tab TAKE 1 TABLET BY MOUTH TWICE A  Tablet 1   • nabumetone (RELAFEN) 500 MG Tab 1-2 tablets bid prn joint pain 360 Tablet 1   • gabapentin (NEURONTIN) 100 MG Cap Take 1 Capsule by mouth 3 times a day as needed. 90 Capsule 1   • albuterol 108 (90 Base) MCG/ACT Aero Soln inhalation aerosol Inhale 2 Puffs every four hours as needed for Shortness of Breath. 1 Each 1   • cholestyramine (QUESTRAN) 4 g packet Take 4 g by mouth every day. 60 Each 2   • cetirizine (ZYRTEC) 10 MG Tab Take 10 mg by mouth 1 time daily as needed for Allergies.     • Cholecalciferol (VITAMIN D3) 125 MCG (5000 UT) Tab Take 5,000 Int'l Units by mouth every day at 6 PM.     • Folic Acid 0.8 MG Cap Take 0.8-1.6 mg by mouth every day at 6 PM. takes 1 pill daily and alternates every other day 2 pills daily       No current facility-administered medications for this visit.     She  has a past medical history of Anesthesia, Asthma (02/21/2018), Breath shortness, Bronchitis (2016), Cough (07/13/2018),  "Environmental and seasonal allergies, Genital herpes in women (2/11/2014), Heart burn, High risk medications (not anticoagulants) long-term use (8/29/2012), Hypothyroid (9/14/2011), Kidney stones, Macrocytosis without anemia (med effect), Menopause (9/15/2011), Pain (07/13/2018), Pneumonia (01/2015), Post-cholecystectomy syndrome, Rheumatoid arthritis(714.0), Snoring, Tobacco abuse, episodic (9/14/2011), and Vitamin d deficiency (5/15/2012).    ROS   No chest pain, no shortness of breath, no abdominal pain       Objective:     /78 (BP Location: Left arm, Patient Position: Sitting, BP Cuff Size: Adult)   Pulse 73   Temp 36.7 °C (98.1 °F) (Temporal)   Resp 16   Ht 1.626 m (5' 4\")   Wt 76.2 kg (168 lb)   SpO2 94%  Body mass index is 28.84 kg/m².   Physical Exam:  Constitutional: Alert, no distress.  Skin: Warm, dry, good turgor, no rashes in visible areas.  Eye: Equal, round, conjunctiva clear, lids normal.  Neck: Trachea midline, no masses  Respiratory: Unlabored respiratory effort  Psych: Alert and oriented x3, normal affect and mood.    Assessment and Plan:   The following treatment plan was discussed    1. Subacute vaginitis  POCT urinalysis was negative for infection.  Patient opted to do a self swab.  Will discuss results with her once available for review.  We did discuss that the itchy and burning may be related to atrophic vaginitis likely secondary to being postmenopausal.  Could do a trial of topical estrogen however would be hesitant to start an estrogen due to severity of headaches.  We will continue to monitor  - VAGINAL PATHOGENS DNA PANEL; Future  - POCT Urinalysis    2. Screening for colon cancer  - COLOGUARD (FIT DNA)      Followup: Return for TBD.       Ivelisse Olmos P.A.-C.  Supervising MD: Dr. Pilo Taylor MD  08/03/22    "

## 2022-08-05 ENCOUNTER — PATIENT MESSAGE (OUTPATIENT)
Dept: MEDICAL GROUP | Facility: LAB | Age: 62
End: 2022-08-05
Payer: COMMERCIAL

## 2022-08-05 DIAGNOSIS — N95.2 VAGINAL ATROPHY: ICD-10-CM

## 2022-08-17 NOTE — PROGRESS NOTES
1. Vaginal atrophy  estrogens, conjugated (PREMARIN) 0.625 MG/GM Cream        See [facility] messaging re: vaginal estrogen   Her neurologist also recommend that she try this regimen and does not feel like this will trigger any worsening migraines.     Continue to monitor. Discontinue immediately if she is having any worsening headaches or migraines.     Ivelisse Olmos P.A.-C.

## 2022-08-18 RX ORDER — CONJUGATED ESTROGENS 0.62 MG/G
CREAM VAGINAL
Qty: 30 G | Refills: 0 | Status: SHIPPED | OUTPATIENT
Start: 2022-08-18 | End: 2022-09-16

## 2022-09-06 ENCOUNTER — OFFICE VISIT (OUTPATIENT)
Dept: RHEUMATOLOGY | Facility: MEDICAL CENTER | Age: 62
End: 2022-09-06
Attending: INTERNAL MEDICINE
Payer: COMMERCIAL

## 2022-09-06 VITALS
DIASTOLIC BLOOD PRESSURE: 68 MMHG | OXYGEN SATURATION: 99 % | HEART RATE: 82 BPM | BODY MASS INDEX: 29.87 KG/M2 | WEIGHT: 174 LBS | TEMPERATURE: 97.9 F | SYSTOLIC BLOOD PRESSURE: 122 MMHG | RESPIRATION RATE: 14 BRPM

## 2022-09-06 DIAGNOSIS — Z79.899 LONG-TERM USE OF PLAQUENIL: ICD-10-CM

## 2022-09-06 DIAGNOSIS — M81.0 OSTEOPOROSIS WITHOUT CURRENT PATHOLOGICAL FRACTURE, UNSPECIFIED OSTEOPOROSIS TYPE: ICD-10-CM

## 2022-09-06 DIAGNOSIS — Z79.1 NSAID LONG-TERM USE: ICD-10-CM

## 2022-09-06 DIAGNOSIS — E03.9 HYPOTHYROIDISM, UNSPECIFIED TYPE: ICD-10-CM

## 2022-09-06 DIAGNOSIS — M06.9 RHEUMATOID ARTHRITIS, INVOLVING UNSPECIFIED SITE, UNSPECIFIED WHETHER RHEUMATOID FACTOR PRESENT (HCC): ICD-10-CM

## 2022-09-06 DIAGNOSIS — E55.9 VITAMIN D DEFICIENCY: ICD-10-CM

## 2022-09-06 DIAGNOSIS — D36.10 SCHWANNOMA: ICD-10-CM

## 2022-09-06 DIAGNOSIS — Z72.0 TOBACCO ABUSE: ICD-10-CM

## 2022-09-06 PROCEDURE — 99214 OFFICE O/P EST MOD 30 MIN: CPT | Performed by: INTERNAL MEDICINE

## 2022-09-06 PROCEDURE — 99212 OFFICE O/P EST SF 10 MIN: CPT | Performed by: INTERNAL MEDICINE

## 2022-09-06 RX ORDER — 0.9 % SODIUM CHLORIDE 0.9 %
VIAL (ML) INJECTION PRN
Status: CANCELLED | OUTPATIENT
Start: 2022-09-06

## 2022-09-06 RX ORDER — METHYLPREDNISOLONE SODIUM SUCCINATE 125 MG/2ML
125 INJECTION, POWDER, LYOPHILIZED, FOR SOLUTION INTRAMUSCULAR; INTRAVENOUS PRN
Status: CANCELLED | OUTPATIENT
Start: 2022-09-06

## 2022-09-06 RX ORDER — HEPARIN SODIUM (PORCINE) LOCK FLUSH IV SOLN 100 UNIT/ML 100 UNIT/ML
500 SOLUTION INTRAVENOUS PRN
Status: CANCELLED | OUTPATIENT
Start: 2022-09-06

## 2022-09-06 RX ORDER — 0.9 % SODIUM CHLORIDE 0.9 %
10 VIAL (ML) INJECTION PRN
Status: CANCELLED | OUTPATIENT
Start: 2022-09-06

## 2022-09-06 RX ORDER — ZOLEDRONIC ACID 5 MG/100ML
5 INJECTION, SOLUTION INTRAVENOUS ONCE
Status: CANCELLED | OUTPATIENT
Start: 2022-09-06 | End: 2022-09-06

## 2022-09-06 RX ORDER — 0.9 % SODIUM CHLORIDE 0.9 %
3 VIAL (ML) INJECTION PRN
Status: CANCELLED | OUTPATIENT
Start: 2022-09-06

## 2022-09-06 RX ORDER — EPINEPHRINE 1 MG/ML(1)
0.5 AMPUL (ML) INJECTION PRN
Status: CANCELLED | OUTPATIENT
Start: 2022-09-06

## 2022-09-06 RX ORDER — DIPHENHYDRAMINE HYDROCHLORIDE 50 MG/ML
50 INJECTION INTRAMUSCULAR; INTRAVENOUS PRN
Status: CANCELLED | OUTPATIENT
Start: 2022-09-06

## 2022-09-06 RX ORDER — SODIUM CHLORIDE 9 MG/ML
INJECTION, SOLUTION INTRAVENOUS CONTINUOUS
Status: CANCELLED | OUTPATIENT
Start: 2022-09-06

## 2022-09-06 ASSESSMENT — FIBROSIS 4 INDEX: FIB4 SCORE: 2.5
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
## 2022-09-15 ENCOUNTER — HOSPITAL ENCOUNTER (OUTPATIENT)
Dept: LAB | Facility: MEDICAL CENTER | Age: 62
End: 2022-09-15
Attending: INTERNAL MEDICINE
Payer: COMMERCIAL

## 2022-09-15 DIAGNOSIS — Z79.1 NSAID LONG-TERM USE: ICD-10-CM

## 2022-09-15 DIAGNOSIS — D36.10 SCHWANNOMA: ICD-10-CM

## 2022-09-15 DIAGNOSIS — E55.9 VITAMIN D DEFICIENCY: ICD-10-CM

## 2022-09-15 DIAGNOSIS — E03.9 HYPOTHYROIDISM, UNSPECIFIED TYPE: ICD-10-CM

## 2022-09-15 DIAGNOSIS — Z79.899 LONG-TERM USE OF PLAQUENIL: ICD-10-CM

## 2022-09-15 DIAGNOSIS — M81.0 OSTEOPOROSIS WITHOUT CURRENT PATHOLOGICAL FRACTURE, UNSPECIFIED OSTEOPOROSIS TYPE: ICD-10-CM

## 2022-09-15 DIAGNOSIS — M06.9 RHEUMATOID ARTHRITIS, INVOLVING UNSPECIFIED SITE, UNSPECIFIED WHETHER RHEUMATOID FACTOR PRESENT (HCC): ICD-10-CM

## 2022-09-15 LAB
25(OH)D3 SERPL-MCNC: 44 NG/ML (ref 30–100)
ALBUMIN SERPL BCP-MCNC: 4.1 G/DL (ref 3.2–4.9)
ALBUMIN/GLOB SERPL: 1.5 G/DL
ALP SERPL-CCNC: 73 U/L (ref 30–99)
ALT SERPL-CCNC: 16 U/L (ref 2–50)
ANION GAP SERPL CALC-SCNC: 10 MMOL/L (ref 7–16)
AST SERPL-CCNC: 25 U/L (ref 12–45)
BASOPHILS # BLD AUTO: 0.8 % (ref 0–1.8)
BASOPHILS # BLD: 0.05 K/UL (ref 0–0.12)
BILIRUB SERPL-MCNC: 0.5 MG/DL (ref 0.1–1.5)
BUN SERPL-MCNC: 20 MG/DL (ref 8–22)
CALCIUM SERPL-MCNC: 9.3 MG/DL (ref 8.5–10.5)
CHLORIDE SERPL-SCNC: 104 MMOL/L (ref 96–112)
CO2 SERPL-SCNC: 27 MMOL/L (ref 20–33)
CREAT SERPL-MCNC: 1.07 MG/DL (ref 0.5–1.4)
EOSINOPHIL # BLD AUTO: 0.14 K/UL (ref 0–0.51)
EOSINOPHIL NFR BLD: 2.2 % (ref 0–6.9)
ERYTHROCYTE [DISTWIDTH] IN BLOOD BY AUTOMATED COUNT: 53.5 FL (ref 35.9–50)
GFR SERPLBLD CREATININE-BSD FMLA CKD-EPI: 58 ML/MIN/1.73 M 2
GLOBULIN SER CALC-MCNC: 2.8 G/DL (ref 1.9–3.5)
GLUCOSE SERPL-MCNC: 72 MG/DL (ref 65–99)
HCT VFR BLD AUTO: 50.5 % (ref 37–47)
HGB BLD-MCNC: 16.2 G/DL (ref 12–16)
IMM GRANULOCYTES # BLD AUTO: 0.03 K/UL (ref 0–0.11)
IMM GRANULOCYTES NFR BLD AUTO: 0.5 % (ref 0–0.9)
LYMPHOCYTES # BLD AUTO: 1.57 K/UL (ref 1–4.8)
LYMPHOCYTES NFR BLD: 24.5 % (ref 22–41)
MCH RBC QN AUTO: 32.5 PG (ref 27–33)
MCHC RBC AUTO-ENTMCNC: 32.1 G/DL (ref 33.6–35)
MCV RBC AUTO: 101.2 FL (ref 81.4–97.8)
MONOCYTES # BLD AUTO: 0.55 K/UL (ref 0–0.85)
MONOCYTES NFR BLD AUTO: 8.6 % (ref 0–13.4)
NEUTROPHILS # BLD AUTO: 4.06 K/UL (ref 2–7.15)
NEUTROPHILS NFR BLD: 63.4 % (ref 44–72)
NRBC # BLD AUTO: 0 K/UL
NRBC BLD-RTO: 0 /100 WBC
PLATELET # BLD AUTO: 178 K/UL (ref 164–446)
PMV BLD AUTO: 12.6 FL (ref 9–12.9)
POTASSIUM SERPL-SCNC: 4.2 MMOL/L (ref 3.6–5.5)
PROT SERPL-MCNC: 6.9 G/DL (ref 6–8.2)
RBC # BLD AUTO: 4.99 M/UL (ref 4.2–5.4)
SODIUM SERPL-SCNC: 141 MMOL/L (ref 135–145)
WBC # BLD AUTO: 6.4 K/UL (ref 4.8–10.8)

## 2022-09-15 PROCEDURE — 36415 COLL VENOUS BLD VENIPUNCTURE: CPT

## 2022-09-15 PROCEDURE — 85025 COMPLETE CBC W/AUTO DIFF WBC: CPT

## 2022-09-15 PROCEDURE — 80053 COMPREHEN METABOLIC PANEL: CPT

## 2022-09-15 PROCEDURE — 85652 RBC SED RATE AUTOMATED: CPT

## 2022-09-15 PROCEDURE — 82306 VITAMIN D 25 HYDROXY: CPT

## 2022-09-16 LAB — ERYTHROCYTE [SEDIMENTATION RATE] IN BLOOD BY WESTERGREN METHOD: 7 MM/HOUR (ref 0–25)

## 2022-09-27 ENCOUNTER — HOSPITAL ENCOUNTER (OUTPATIENT)
Dept: RADIOLOGY | Facility: MEDICAL CENTER | Age: 62
End: 2022-09-27
Attending: OTOLARYNGOLOGY
Payer: COMMERCIAL

## 2022-09-27 DIAGNOSIS — D33.3 BENIGN NEOPLASM OF CRANIAL NERVES (HCC): ICD-10-CM

## 2022-09-27 PROCEDURE — 700117 HCHG RX CONTRAST REV CODE 255

## 2022-09-27 PROCEDURE — A9576 INJ PROHANCE MULTIPACK: HCPCS

## 2022-09-27 PROCEDURE — 70553 MRI BRAIN STEM W/O & W/DYE: CPT

## 2022-09-27 RX ADMIN — GADOTERIDOL 15 ML: 279.3 INJECTION, SOLUTION INTRAVENOUS at 14:14

## 2022-10-06 ENCOUNTER — OFFICE VISIT (OUTPATIENT)
Dept: MEDICAL GROUP | Facility: LAB | Age: 62
End: 2022-10-06
Payer: COMMERCIAL

## 2022-10-06 VITALS
TEMPERATURE: 98.2 F | RESPIRATION RATE: 16 BRPM | SYSTOLIC BLOOD PRESSURE: 130 MMHG | DIASTOLIC BLOOD PRESSURE: 76 MMHG | HEART RATE: 71 BPM | OXYGEN SATURATION: 99 % | HEIGHT: 64 IN | BODY MASS INDEX: 29.71 KG/M2 | WEIGHT: 174 LBS

## 2022-10-06 DIAGNOSIS — Z23 NEED FOR VACCINATION: ICD-10-CM

## 2022-10-06 DIAGNOSIS — L82.1 SEBORRHEIC KERATOSIS: ICD-10-CM

## 2022-10-06 DIAGNOSIS — R10.9 FLANK PAIN, CHRONIC: ICD-10-CM

## 2022-10-06 DIAGNOSIS — G89.29 FLANK PAIN, CHRONIC: ICD-10-CM

## 2022-10-06 DIAGNOSIS — Z98.1 S/P LUMBAR SPINAL FUSION: ICD-10-CM

## 2022-10-06 DIAGNOSIS — M48.061 SPINAL STENOSIS OF LUMBAR REGION, UNSPECIFIED WHETHER NEUROGENIC CLAUDICATION PRESENT: ICD-10-CM

## 2022-10-06 PROCEDURE — 90686 IIV4 VACC NO PRSV 0.5 ML IM: CPT | Performed by: FAMILY MEDICINE

## 2022-10-06 PROCEDURE — 90471 IMMUNIZATION ADMIN: CPT | Performed by: FAMILY MEDICINE

## 2022-10-06 PROCEDURE — 99214 OFFICE O/P EST MOD 30 MIN: CPT | Mod: 25 | Performed by: FAMILY MEDICINE

## 2022-10-06 RX ORDER — RIMEGEPANT SULFATE 75 MG/75MG
TABLET, ORALLY DISINTEGRATING ORAL
COMMUNITY
Start: 2022-08-11

## 2022-10-06 ASSESSMENT — PATIENT HEALTH QUESTIONNAIRE - PHQ9: CLINICAL INTERPRETATION OF PHQ2 SCORE: 0

## 2022-10-06 ASSESSMENT — FIBROSIS 4 INDEX: FIB4 SCORE: 2.25

## 2022-10-06 NOTE — PROGRESS NOTES
"Subjective:     CC: Back pain, mole    HPI:   Clifford is a 62-year-old patient of Dr. Smallwood with a history of acoustic neuroma, RA, lumbar fusion who presents with concern for chronic back pain and mole.    Has history of lumbar spinal fusion secondary to spinal stenosis.  Also had persistent dizziness after surgery for acoustic neuroma and had multiple falls about 1 year ago.  Her main concern today is right-sided thoracic pain that she feels radiates to the right side of the abdomen.  She was actually seen for this pain in the ER over a year ago with reassuring abdominal CT scan.  She reports that sometimes the pain makes it feel like she needs to have a bowel movement, reports that she generally has a bowel movement daily but does have some straining at times.  Pain is described as aching but can be sharp or exacerbated with activities.  8/10 at worst.  No nausea or vomiting, no melena or blood in stool, she has a history of a negative Cologuard and is avoid colonoscopy due to concern that dehydration would exacerbate her chronic headaches.    She also has a chronic pain rating down her lateral right leg.  She does follow with pain management and is on Gralise.    No incontinence, no numbness or tingling, no weakness.  No dysuria, urinary frequency.    Medications, past medical history, allergies, and social history have been reviewed and updated.      Objective:       Exam:  /76   Pulse 71   Temp 36.8 °C (98.2 °F) (Temporal)   Resp 16   Ht 1.626 m (5' 4\")   Wt 78.9 kg (174 lb)   LMP  (LMP Unknown)   SpO2 99%   BMI 29.87 kg/m²  Body mass index is 29.87 kg/m².    Constitutional: Alert. Well appearing. No distress.  Skin: Warm, dry, good turgor.  Stuck on brown patch to the mid back with well-defined borders.  Eye: Equal, round and reactive to light, conjunctiva clear, lids normal.  ENMT: Moist mucous membranes. Normal dentition.  Respiratory: Normal effort.  Abdomen: Mild right-sided abdominal tenderness " without rebound or guarding.   MSK:  There is also slight tenderness over the posterior right upper pelvis and right lower thoracic paraspinal musculature.  No midline lumbar or thoracic tenderness.  5/5 strength both lower extremities, patellar DTRs are 2+ and symmetric.  Neuro: Moves all four extremities. No facial droop.  Psych: Answers questions appropriately. Normal affect and mood.      Assessment & Plan:     62 y.o. female with the following -     1. Flank pain, chronic  Presents with over 1 year of pain over the right thoracic, right flank and right side of the abdomen.  Pain will sometimes make her feel like she needs to have a bowel movement.  XR in 2019 with large stool burden.  Did have abdominal CT scan for this in the ER over 1 year ago which was reassuring.  Remote history of multiple severe falls.  I think it is possible this is caused by constipation and recommended bowel regimen with fiber supplement along with addition of stool softener or laxative as needed for 1-2 soft bowel movements daily.  We will also do imaging as below to rule out fracture given her history of falls.  - DX-THORACIC SPINE-2 VIEWS; Future    2. Spinal stenosis of lumbar region, unspecified whether neurogenic claudication present  3. S/P lumbar spinal fusion  Previous  lumbar fusion, following with pain management, does have some right-sided radiculopathy.  Remote history of many falls and will image lumbar spine to rule out any fracture. Continue Gralise through pain management.  - DX-LUMBAR SPINE-2 OR 3 VIEWS; Future    4. Seborrheic keratosis  To the mid back.  No further treatment needed at this time.  Did recommend yearly skin exams with dermatology.  - INFLUENZA VACCINE QUAD INJ (PF)         Please note that this note was created using voice recognition software.

## 2022-10-11 ENCOUNTER — OUTPATIENT INFUSION SERVICES (OUTPATIENT)
Dept: ONCOLOGY | Facility: MEDICAL CENTER | Age: 62
End: 2022-10-11
Attending: INTERNAL MEDICINE
Payer: COMMERCIAL

## 2022-10-11 VITALS
SYSTOLIC BLOOD PRESSURE: 134 MMHG | RESPIRATION RATE: 18 BRPM | OXYGEN SATURATION: 98 % | BODY MASS INDEX: 30.45 KG/M2 | HEART RATE: 76 BPM | DIASTOLIC BLOOD PRESSURE: 82 MMHG | WEIGHT: 178.35 LBS | TEMPERATURE: 97 F | HEIGHT: 64 IN

## 2022-10-11 DIAGNOSIS — M85.80 OSTEOPENIA AFTER MENOPAUSE: ICD-10-CM

## 2022-10-11 DIAGNOSIS — M81.0 OSTEOPOROSIS WITHOUT CURRENT PATHOLOGICAL FRACTURE, UNSPECIFIED OSTEOPOROSIS TYPE: ICD-10-CM

## 2022-10-11 DIAGNOSIS — Z78.0 OSTEOPENIA AFTER MENOPAUSE: ICD-10-CM

## 2022-10-11 LAB
CA-I BLD ISE-SCNC: 1.12 MMOL/L (ref 1.1–1.3)
CREAT BLD-MCNC: 1 MG/DL (ref 0.5–1.4)

## 2022-10-11 PROCEDURE — 700111 HCHG RX REV CODE 636 W/ 250 OVERRIDE (IP): Performed by: INTERNAL MEDICINE

## 2022-10-11 PROCEDURE — 96365 THER/PROPH/DIAG IV INF INIT: CPT

## 2022-10-11 PROCEDURE — 82330 ASSAY OF CALCIUM: CPT

## 2022-10-11 PROCEDURE — 82565 ASSAY OF CREATININE: CPT

## 2022-10-11 RX ORDER — 0.9 % SODIUM CHLORIDE 0.9 %
3 VIAL (ML) INJECTION PRN
Status: CANCELLED | OUTPATIENT
Start: 2023-10-12

## 2022-10-11 RX ORDER — ZOLEDRONIC ACID 5 MG/100ML
5 INJECTION, SOLUTION INTRAVENOUS ONCE
Status: COMPLETED | OUTPATIENT
Start: 2022-10-11 | End: 2022-10-11

## 2022-10-11 RX ORDER — SODIUM CHLORIDE 9 MG/ML
INJECTION, SOLUTION INTRAVENOUS CONTINUOUS
Status: CANCELLED | OUTPATIENT
Start: 2023-10-12

## 2022-10-11 RX ORDER — METHYLPREDNISOLONE SODIUM SUCCINATE 125 MG/2ML
125 INJECTION, POWDER, LYOPHILIZED, FOR SOLUTION INTRAMUSCULAR; INTRAVENOUS PRN
Status: CANCELLED | OUTPATIENT
Start: 2023-10-12

## 2022-10-11 RX ORDER — 0.9 % SODIUM CHLORIDE 0.9 %
VIAL (ML) INJECTION PRN
Status: CANCELLED | OUTPATIENT
Start: 2023-10-12

## 2022-10-11 RX ORDER — HEPARIN SODIUM (PORCINE) LOCK FLUSH IV SOLN 100 UNIT/ML 100 UNIT/ML
500 SOLUTION INTRAVENOUS PRN
Status: CANCELLED | OUTPATIENT
Start: 2023-10-12

## 2022-10-11 RX ORDER — ZOLEDRONIC ACID 5 MG/100ML
5 INJECTION, SOLUTION INTRAVENOUS ONCE
Status: CANCELLED | OUTPATIENT
Start: 2023-10-12 | End: 2023-10-12

## 2022-10-11 RX ORDER — DIPHENHYDRAMINE HYDROCHLORIDE 50 MG/ML
50 INJECTION INTRAMUSCULAR; INTRAVENOUS PRN
Status: CANCELLED | OUTPATIENT
Start: 2023-10-12

## 2022-10-11 RX ORDER — 0.9 % SODIUM CHLORIDE 0.9 %
10 VIAL (ML) INJECTION PRN
Status: CANCELLED | OUTPATIENT
Start: 2023-10-12

## 2022-10-11 RX ORDER — EPINEPHRINE 1 MG/ML(1)
0.5 AMPUL (ML) INJECTION PRN
Status: CANCELLED | OUTPATIENT
Start: 2023-10-12

## 2022-10-11 RX ADMIN — ZOLEDRONIC ACID 5 MG: 5 INJECTION, SOLUTION INTRAVENOUS at 09:20

## 2022-10-11 ASSESSMENT — FIBROSIS 4 INDEX: FIB4 SCORE: 2.25

## 2022-10-12 ENCOUNTER — APPOINTMENT (OUTPATIENT)
Dept: RADIOLOGY | Facility: MEDICAL CENTER | Age: 62
End: 2022-10-12
Attending: FAMILY MEDICINE
Payer: COMMERCIAL

## 2022-10-12 DIAGNOSIS — M48.061 SPINAL STENOSIS OF LUMBAR REGION, UNSPECIFIED WHETHER NEUROGENIC CLAUDICATION PRESENT: ICD-10-CM

## 2022-10-12 DIAGNOSIS — Z98.1 S/P LUMBAR SPINAL FUSION: ICD-10-CM

## 2022-10-12 DIAGNOSIS — R10.9 FLANK PAIN, CHRONIC: ICD-10-CM

## 2022-10-12 DIAGNOSIS — G89.29 FLANK PAIN, CHRONIC: ICD-10-CM

## 2022-10-12 PROCEDURE — 72100 X-RAY EXAM L-S SPINE 2/3 VWS: CPT

## 2022-10-12 PROCEDURE — 72070 X-RAY EXAM THORAC SPINE 2VWS: CPT

## 2022-10-12 NOTE — PROGRESS NOTES
Patient to [facility] for reclast infusion. PIV inserted into left forearm, flushed with + blood return, labs drawn. Patient meets parameters for Reclast. Reclast infused with no s/s of infusion reaction. PIV flushed with + blood return and removed. Gauze and coban applied as a dressing. Patient to home in care of self.

## 2022-12-15 DIAGNOSIS — N95.2 VAGINAL ATROPHY: ICD-10-CM

## 2022-12-15 RX ORDER — CONJUGATED ESTROGENS 0.62 MG/G
CREAM VAGINAL
Qty: 30 G | Refills: 3 | Status: SHIPPED | OUTPATIENT
Start: 2022-12-15

## 2022-12-15 NOTE — TELEPHONE ENCOUNTER
Received request via: Pharmacy    Was the patient seen in the last year in this department? Yes 10/6/2022    Does the patient have an active prescription (recently filled or refills available) for medication(s) requested? No    Does the patient have alf Plus and need 100 day supply (blood pressure, diabetes and cholesterol meds only)? Patient does not have SCP

## 2023-02-16 ENCOUNTER — APPOINTMENT (OUTPATIENT)
Dept: RHEUMATOLOGY | Facility: MEDICAL CENTER | Age: 63
End: 2023-02-16
Attending: INTERNAL MEDICINE

## 2023-04-17 ENCOUNTER — DOCUMENTATION (OUTPATIENT)
Dept: SCHEDULING | Facility: IMAGING CENTER | Age: 63
End: 2023-04-17

## 2023-05-05 DIAGNOSIS — K21.9 GASTROESOPHAGEAL REFLUX DISEASE WITHOUT ESOPHAGITIS: ICD-10-CM

## 2023-05-05 RX ORDER — CIMETIDINE 400 MG/1
TABLET, FILM COATED ORAL
Qty: 180 TABLET | Refills: 3 | Status: SHIPPED | OUTPATIENT
Start: 2023-05-05

## 2023-05-05 NOTE — TELEPHONE ENCOUNTER
Received request via: Pharmacy    Was the patient seen in the last year in this department? Yes  LOV 10/06/2022  Does the patient have an active prescription (recently filled or refills available) for medication(s) requested? No    Does the patient have long term Plus and need 100 day supply (blood pressure, diabetes and cholesterol meds only)? Patient does not have SCP

## 2023-05-15 ENCOUNTER — TELEPHONE (OUTPATIENT)
Dept: HEALTH INFORMATION MANAGEMENT | Facility: OTHER | Age: 63
End: 2023-05-15

## 2023-08-31 ENCOUNTER — TELEPHONE (OUTPATIENT)
Dept: RHEUMATOLOGY | Facility: MEDICAL CENTER | Age: 63
End: 2023-08-31

## 2023-09-01 NOTE — TELEPHONE ENCOUNTER
Please notify patient that we received a request from the infusion center for patient's Reclast the patient has not been seen in the clinic for almost a year, we do need to see patient first before we can sign the orders for the Reclast infusions.

## (undated) DEVICE — MASK ANESTHESIA ADULT  - (100/CA)

## (undated) DEVICE — TUBING C&T SET FLYING LEADS DRAIN TUBING (10EA/BX)

## (undated) DEVICE — GLOVE BIOGEL PI INDICATOR SZ 7.0 SURGICAL PF LF - (50/BX 4BX/CA)

## (undated) DEVICE — DRESSING TRANSPARENT FILM TEGADERM 4 X 4.75" (50EA/BX)"

## (undated) DEVICE — LENS/HOOD FOR SPACESUIT - (32/PK) PEEL AWAY FACE

## (undated) DEVICE — KIT ANESTHESIA W/CIRCUIT & 3/LT BAG W/FILTER (20EA/CA)

## (undated) DEVICE — SUCTION TIP STRAIGHT ARGYLE - 50EA/CA

## (undated) DEVICE — SPONGE GAUZESTER 4 X 4 4PLY - (128PK/CA)

## (undated) DEVICE — CHLORAPREP 26 ML APPLICATOR - ORANGE TINT(25/CA)

## (undated) DEVICE — PROTECTOR ULNA NERVE - (36PR/CA)

## (undated) DEVICE — SODIUM CHL. IRRIGATION 0.9% 3000ML (4EA/CA 65CA/PF)

## (undated) DEVICE — PACK MINOR BASIN - (2EA/CA)

## (undated) DEVICE — BONE MILL BM210

## (undated) DEVICE — SYRINGE 30 ML LL (56/BX)

## (undated) DEVICE — DRAPE STRLE REG TOWEL 18X24 - (10/BX 4BX/CA)"

## (undated) DEVICE — KIT ROOM DECONTAMINATION

## (undated) DEVICE — GLOVE BIOGEL ECLIPSE PF LATEX SIZE 8.5 (50PR/BX)

## (undated) DEVICE — GLOVE BIOGEL INDICATOR SZ 7.5 SURGICAL PF LTX - (50PR/BX 4BX/CA)

## (undated) DEVICE — CLOSURE PRINEO SKIN - (2EA/BX)

## (undated) DEVICE — BONE BIOPSY DEVICE

## (undated) DEVICE — SET EXTENSION WITH 2 PORTS (48EA/CA) ***PART #2C8610 IS A SUBSTITUTE*****

## (undated) DEVICE — TOOL DISSECT MATCH HEAD

## (undated) DEVICE — STERI STRIP COMPOUND BENZOIN - TINCTURE 0.6ML WITH APPLICATOR (40EA/BX)

## (undated) DEVICE — BOVIE  BLADE 6 EXTENDED - (50/PK)

## (undated) DEVICE — ELECTRODE 850 FOAM ADHESIVE - HYDROGEL RADIOTRNSPRNT (50/PK)

## (undated) DEVICE — HEADREST PRONEVIEW LARGE - (10/CA)

## (undated) DEVICE — TRAY KYPHOPACK 20/3 ONE STEP

## (undated) DEVICE — DRAPE C ARMOR (12EA/CA)

## (undated) DEVICE — KIT SURGIFLO W/OUT THROMBIN - (6EA/CA)

## (undated) DEVICE — CANISTER SUCTION 3000ML MECHANICAL FILTER AUTO SHUTOFF MEDI-VAC NONSTERILE LF DISP  (40EA/CA)

## (undated) DEVICE — SUTURE GENERAL

## (undated) DEVICE — CLOSURE SKIN STRIP 1/4 X 3 IN - (STERI STRIP) (50/BX)

## (undated) DEVICE — PEN SKIN MARKER W/RULER - (50EA/BX)

## (undated) DEVICE — SYRINGE SAFETY 10 ML 18 GA X 1 1/2 BLUNT LL (100/BX 4BX/CA)

## (undated) DEVICE — BLADE SURGICAL #15 - (50/BX 3BX/CA)

## (undated) DEVICE — TIP INTPLS HFLO ML ORFC BTRY - (12/CS)  FOR SURGILAV

## (undated) DEVICE — LACTATED RINGERS INJ. 500 ML - (24EA/CA)

## (undated) DEVICE — DRAPE LARGE 3 QUARTER - (20/CA)

## (undated) DEVICE — DEVICE KYPHON CDS AND BONE FILLER

## (undated) DEVICE — SYRINGE 10 ML CONTROL LL (25EA/BX 4BX/CA)

## (undated) DEVICE — MEDICINE CUP STERILE 2 OZ - (100/CA)

## (undated) DEVICE — SHEET PEDIATRIC LAPAROTOMY - (10/CA)

## (undated) DEVICE — SODIUM CHL IRRIGATION 0.9% 1000ML (12EA/CA)

## (undated) DEVICE — TOWELS CLOTH SURGICAL - (4/PK 20PK/CA)

## (undated) DEVICE — HANDPIECE 10FT INTPLS SCT PLS IRRIGATION HAND CONTROL SET (6/PK)

## (undated) DEVICE — LACTATED RINGERS INJ 1000 ML - (14EA/CA 60CA/PF)

## (undated) DEVICE — TRAY CATHETER FOLEY URINE METER W/STATLOCK 350ML (10EA/CA)

## (undated) DEVICE — GLOVE BIOGEL SZ 8.5 SURGICAL PF LTX - (50PR/BX 4BX/CA)

## (undated) DEVICE — ELECTRODE DUAL RETURN W/ CORD - (50/PK)

## (undated) DEVICE — SURGIFOAM (12X7) - (12EA/CA)

## (undated) DEVICE — KIT EVACUATER 3 SPRING PVC LF 1/8 DRAIN SIZE (10EA/CA)"

## (undated) DEVICE — SLEEVE, VASO, THIGH, MED

## (undated) DEVICE — SENSOR SPO2 NEO LNCS ADHESIVE (20/BX) SEE USER NOTES

## (undated) DEVICE — SPONGE GAUZESTER. 2X2 4-PL - (2/PK 50PK/BX 30BX/CS)

## (undated) DEVICE — TUBE E-T HI-LO CUFF 7.0MM (10EA/PK)

## (undated) DEVICE — SET LEADWIRE 5 LEAD BEDSIDE DISPOSABLE ECG (1SET OF 5/EA)

## (undated) DEVICE — SEALER BIPOLAR 2.3 AQUAMANTYS

## (undated) DEVICE — TUBE E-T HI-LO CUFF 7.5MM (10EA/PK)

## (undated) DEVICE — ARMREST CRADLE FOAM - (2PR/PK 12PR/CA)

## (undated) DEVICE — DRAPE 36X28IN RAD CARM BND BG - (25/CA) O

## (undated) DEVICE — CONTAINER SPECIMEN BAG OR - STERILE 4 OZ W/LID (100EA/CA)

## (undated) DEVICE — SUCTION INSTRUMENT YANKAUER BULBOUS TIP W/O VENT (50EA/CA)

## (undated) DEVICE — SUTURE 2-0 VICRYL PLUS CT-1 - 8 X 18 INCH(12/BX)

## (undated) DEVICE — GLOVE BIOGEL INDICATOR SZ 8.5 SURGICAL PF LTX - (50/BX 4BX/CA)

## (undated) DEVICE — BANDAID SHEER STRIP 3/4 IN (100EA/BX 12BX/CA)

## (undated) DEVICE — HEMOSTAT SURG ABSORBABLE - 4 X 8 IN SURGICEL (24EA/CA)

## (undated) DEVICE — DRAPE LAPAROTOMY T SHEET - (12EA/CA)

## (undated) DEVICE — NEPTUNE 4 PORT MANIFOLD - (20/PK)

## (undated) DEVICE — CLOSURE SKIN STRIP 1/2 X 4 IN - (STERI STRIP) (50/BX 4BX/CA)

## (undated) DEVICE — SET FLUID WARMING STANDARD FLOW - (10/CA)

## (undated) DEVICE — PACK NEURO - (2EA/CA)

## (undated) DEVICE — SURGIFOAM (SIZE 100) - (6EA/CA)

## (undated) DEVICE — GLOVE BIOGEL INDICATOR SZ 7SURGICAL PF LTX - (50/BX 4BX/CA)

## (undated) DEVICE — BLADE SURGICAL #11 - (50/BX)

## (undated) DEVICE — GOWN WARMING STANDARD FLEX - (30/CA)

## (undated) DEVICE — TRAY KYPHOPACK 15/3 ONE STEP

## (undated) DEVICE — PACK JACKSON TABLE KIT W/OUT - HR (6EA/CA)

## (undated) DEVICE — DRESSING XEROFORM 1X8 - (50/BX 4BX/CA)

## (undated) DEVICE — TUBING CLEARLINK DUO-VENT - C-FLO (48EA/CA)

## (undated) DEVICE — DRESSING SURGICAL NUKNIT 6X9 (10EA/BX)

## (undated) DEVICE — GOWN SURGICAL XX-LARGE - (28EA/CA) SIRUS NON REINFORCED

## (undated) DEVICE — MIDAS LUBRICATOR DIFFUSER PACK (4EA/CA)

## (undated) DEVICE — DRESSING NON ADHERENT 3 X 4 - STERILE (100/BX 12BX/CA)

## (undated) DEVICE — NEEDLE 11GA FOR KYPHOPLASTY

## (undated) DEVICE — DRESSING PETROLEUM GAUZE 5 X 9" (50EA/BX 4BX/CA)"

## (undated) DEVICE — SUTURE 1 VICRYL PLUS CTX - 36 INCH (36/BX)

## (undated) DEVICE — GLOVE BIOGEL SZ 7.5 SURGICAL PF LTX - (50PR/BX 4BX/CA)

## (undated) DEVICE — CEMENT KYPHON CDS CARTRIDGES

## (undated) DEVICE — TUBE, CULTURE AEROBIC

## (undated) DEVICE — SWAB ANAEROBIC SPEC.COLLECTOR - (25/PK 4PK/CA 100EA/CA)

## (undated) DEVICE — PAD LAP STERILE 18 X 18 - (5/PK 40PK/CA)

## (undated) DEVICE — SYRINGE DISP. 12 CC LL - (100/BX)

## (undated) DEVICE — BOVIE BLADE COATED - (50/PK)

## (undated) DEVICE — GLOVE BIOGEL SZ 6.5 SURGICAL PF LTX (50PR/BX 4BX/CA)

## (undated) DEVICE — GLOVE BIOGEL PI ORTHO SZ 6 1/2 SURGICAL PF LF (40PR/BX)

## (undated) DEVICE — GLOVE SZ 6.5 BIOGEL PI MICRO - PF LF (50PR/BX)

## (undated) DEVICE — DRAPE C-ARM LARGE 41IN X 74 IN - (10/BX 2BX/CA)

## (undated) DEVICE — COVER MAYO STAND X-LG - (22EA/CA)

## (undated) DEVICE — GLOVE BIOGEL SZ 7 SURGICAL PF LTX - (50PR/BX 4BX/CA)